# Patient Record
Sex: FEMALE | Race: WHITE | NOT HISPANIC OR LATINO | Employment: OTHER | URBAN - METROPOLITAN AREA
[De-identification: names, ages, dates, MRNs, and addresses within clinical notes are randomized per-mention and may not be internally consistent; named-entity substitution may affect disease eponyms.]

---

## 2017-01-05 ENCOUNTER — HOSPITAL ENCOUNTER (OUTPATIENT)
Dept: RADIOLOGY | Facility: HOSPITAL | Age: 82
Discharge: HOME | End: 2017-01-05
Attending: ORTHOPAEDIC SURGERY
Payer: MEDICARE

## 2017-01-05 ENCOUNTER — ALLSCRIPTS OFFICE VISIT (OUTPATIENT)
Dept: OTHER | Facility: OTHER | Age: 82
End: 2017-01-05

## 2017-01-05 DIAGNOSIS — M25.512 PAIN IN LEFT SHOULDER: ICD-10-CM

## 2017-01-05 DIAGNOSIS — M19.012 PRIMARY OSTEOARTHRITIS OF LEFT SHOULDER: ICD-10-CM

## 2017-01-05 DIAGNOSIS — M79.646 PAIN OF FINGER: ICD-10-CM

## 2017-01-05 PROCEDURE — 73030 X-RAY EXAM OF SHOULDER: CPT

## 2017-01-10 ENCOUNTER — GENERIC CONVERSION - ENCOUNTER (OUTPATIENT)
Dept: OTHER | Facility: OTHER | Age: 82
End: 2017-01-10

## 2017-01-10 ENCOUNTER — APPOINTMENT (OUTPATIENT)
Dept: PHYSICAL THERAPY | Facility: REHABILITATION | Age: 82
End: 2017-01-10
Payer: MEDICARE

## 2017-01-10 DIAGNOSIS — M25.512 PAIN IN LEFT SHOULDER: ICD-10-CM

## 2017-01-10 PROCEDURE — G8990 OTHER PT/OT CURRENT STATUS: HCPCS

## 2017-01-10 PROCEDURE — 97162 PT EVAL MOD COMPLEX 30 MIN: CPT

## 2017-01-10 PROCEDURE — 97140 MANUAL THERAPY 1/> REGIONS: CPT

## 2017-01-10 PROCEDURE — G8991 OTHER PT/OT GOAL STATUS: HCPCS

## 2017-01-16 ENCOUNTER — APPOINTMENT (OUTPATIENT)
Dept: PHYSICAL THERAPY | Facility: REHABILITATION | Age: 82
End: 2017-01-16
Payer: MEDICARE

## 2017-01-16 PROCEDURE — 97140 MANUAL THERAPY 1/> REGIONS: CPT

## 2017-01-16 PROCEDURE — 97110 THERAPEUTIC EXERCISES: CPT

## 2017-01-19 ENCOUNTER — ALLSCRIPTS OFFICE VISIT (OUTPATIENT)
Dept: OTHER | Facility: OTHER | Age: 82
End: 2017-01-19

## 2017-01-19 ENCOUNTER — APPOINTMENT (OUTPATIENT)
Dept: PHYSICAL THERAPY | Facility: REHABILITATION | Age: 82
End: 2017-01-19
Payer: MEDICARE

## 2017-01-19 PROCEDURE — 97140 MANUAL THERAPY 1/> REGIONS: CPT

## 2017-01-23 ENCOUNTER — TELEPHONE (OUTPATIENT)
Dept: PHYSICAL THERAPY | Facility: REHABILITATION | Age: 82
End: 2017-01-23

## 2017-01-25 ENCOUNTER — HOSPITAL ENCOUNTER (OUTPATIENT)
Dept: RADIOLOGY | Facility: HOSPITAL | Age: 82
Discharge: HOME | End: 2017-01-25
Attending: ORTHOPAEDIC SURGERY
Payer: MEDICARE

## 2017-01-25 ENCOUNTER — ALLSCRIPTS OFFICE VISIT (OUTPATIENT)
Dept: OTHER | Facility: OTHER | Age: 82
End: 2017-01-25

## 2017-01-25 DIAGNOSIS — M79.646 PAIN OF FINGER: ICD-10-CM

## 2017-01-25 PROCEDURE — 73140 X-RAY EXAM OF FINGER(S): CPT

## 2017-01-26 ENCOUNTER — APPOINTMENT (OUTPATIENT)
Dept: PHYSICAL THERAPY | Facility: REHABILITATION | Age: 82
End: 2017-01-26
Payer: MEDICARE

## 2017-01-30 ENCOUNTER — APPOINTMENT (OUTPATIENT)
Dept: PHYSICAL THERAPY | Facility: REHABILITATION | Age: 82
End: 2017-01-30
Payer: MEDICARE

## 2017-02-11 ENCOUNTER — APPOINTMENT (EMERGENCY)
Dept: RADIOLOGY | Facility: HOSPITAL | Age: 82
End: 2017-02-11
Payer: MEDICARE

## 2017-02-11 ENCOUNTER — HOSPITAL ENCOUNTER (OUTPATIENT)
Facility: HOSPITAL | Age: 82
Setting detail: OBSERVATION
Discharge: HOME | End: 2017-02-12
Attending: EMERGENCY MEDICINE | Admitting: INTERNAL MEDICINE
Payer: MEDICARE

## 2017-02-11 DIAGNOSIS — K57.90 DIVERTICULOSIS: ICD-10-CM

## 2017-02-11 DIAGNOSIS — A41.9 SEPSIS (HCC): ICD-10-CM

## 2017-02-11 DIAGNOSIS — N39.0 UTI (URINARY TRACT INFECTION): Primary | ICD-10-CM

## 2017-02-11 PROBLEM — E78.5 HLD (HYPERLIPIDEMIA): Status: ACTIVE | Noted: 2017-02-11

## 2017-02-11 PROBLEM — E87.6 HYPOKALEMIA: Status: ACTIVE | Noted: 2017-02-11

## 2017-02-11 PROBLEM — I10 ESSENTIAL HYPERTENSION: Status: ACTIVE | Noted: 2017-02-11

## 2017-02-11 PROBLEM — E11.9 TYPE 2 DIABETES MELLITUS (HCC): Status: ACTIVE | Noted: 2017-02-11

## 2017-02-11 PROBLEM — E87.20 LACTIC ACID ACIDOSIS: Status: ACTIVE | Noted: 2017-02-11

## 2017-02-11 PROBLEM — K52.9 GASTROENTERITIS, ACUTE: Status: ACTIVE | Noted: 2017-02-11

## 2017-02-11 PROBLEM — D72.829 LEUCOCYTOSIS: Status: ACTIVE | Noted: 2017-02-11

## 2017-02-11 PROBLEM — E87.2 LACTIC ACID ACIDOSIS: Status: ACTIVE | Noted: 2017-02-11

## 2017-02-11 PROBLEM — F41.9 ANXIETY: Status: ACTIVE | Noted: 2017-02-11

## 2017-02-11 LAB
ALBUMIN SERPL BCP-MCNC: 3.6 G/DL (ref 3.5–5)
ALP SERPL-CCNC: 95 U/L (ref 46–116)
ALT SERPL W P-5'-P-CCNC: 20 U/L (ref 12–78)
ANION GAP SERPL CALCULATED.3IONS-SCNC: 13 MMOL/L (ref 4–13)
APTT PPP: 28 SECONDS (ref 24–36)
AST SERPL W P-5'-P-CCNC: 23 U/L (ref 5–45)
BACTERIA UR QL AUTO: ABNORMAL /HPF
BASOPHILS # BLD MANUAL: 0 THOUSAND/UL (ref 0–0.1)
BASOPHILS NFR MAR MANUAL: 0 % (ref 0–1)
BILIRUB SERPL-MCNC: 0.85 MG/DL (ref 0.2–1)
BILIRUB UR QL STRIP: NEGATIVE
BUN SERPL-MCNC: 15 MG/DL (ref 5–25)
CALCIUM SERPL-MCNC: 8.9 MG/DL (ref 8.3–10.1)
CHLORIDE SERPL-SCNC: 103 MMOL/L (ref 100–108)
CLARITY UR: CLEAR
CO2 SERPL-SCNC: 26 MMOL/L (ref 21–32)
COLOR UR: YELLOW
CREAT SERPL-MCNC: 0.77 MG/DL (ref 0.6–1.3)
EOSINOPHIL # BLD MANUAL: 0.19 THOUSAND/UL (ref 0–0.4)
EOSINOPHIL NFR BLD MANUAL: 1 % (ref 0–6)
ERYTHROCYTE [DISTWIDTH] IN BLOOD BY AUTOMATED COUNT: 13.3 % (ref 11.6–15.1)
GFR SERPL CREATININE-BSD FRML MDRD: >60 ML/MIN/1.73SQ M
GLUCOSE SERPL-MCNC: 171 MG/DL (ref 65–140)
GLUCOSE UR STRIP-MCNC: NEGATIVE MG/DL
HCT VFR BLD AUTO: 45.6 % (ref 34.8–46.1)
HGB BLD-MCNC: 16.1 G/DL (ref 11.5–15.4)
HGB UR QL STRIP.AUTO: ABNORMAL
HYALINE CASTS #/AREA URNS LPF: ABNORMAL /LPF
INR PPP: 1.07 (ref 0.86–1.16)
KETONES UR STRIP-MCNC: NEGATIVE MG/DL
LACTATE SERPL-SCNC: 1.3 MMOL/L (ref 0.5–2)
LACTATE SERPL-SCNC: 3.1 MMOL/L (ref 0.5–2)
LACTATE SERPL-SCNC: 3.6 MMOL/L (ref 0.5–2)
LEUKOCYTE ESTERASE UR QL STRIP: ABNORMAL
LIPASE SERPL-CCNC: 223 U/L (ref 73–393)
LYMPHOCYTES # BLD AUTO: 1.55 THOUSAND/UL (ref 0.6–4.47)
LYMPHOCYTES # BLD AUTO: 8 % (ref 14–44)
MCH RBC QN AUTO: 31.8 PG (ref 26.8–34.3)
MCHC RBC AUTO-ENTMCNC: 35.3 G/DL (ref 31.4–37.4)
MCV RBC AUTO: 90 FL (ref 82–98)
MONOCYTES # BLD AUTO: 1.94 THOUSAND/UL (ref 0–1.22)
MONOCYTES NFR BLD: 10 % (ref 4–12)
MUCOUS THREADS UR QL AUTO: ABNORMAL
NEUTROPHILS # BLD MANUAL: 15.52 THOUSAND/UL (ref 1.85–7.62)
NEUTS BAND NFR BLD MANUAL: 3 % (ref 0–8)
NEUTS SEG NFR BLD AUTO: 77 % (ref 43–75)
NITRITE UR QL STRIP: NEGATIVE
NON-SQ EPI CELLS URNS QL MICRO: ABNORMAL /HPF
NRBC BLD AUTO-RTO: 0 /100 WBCS
PH UR STRIP.AUTO: 5 [PH] (ref 4.5–8)
PLATELET # BLD AUTO: 146 THOUSANDS/UL (ref 149–390)
PLATELET # BLD AUTO: 201 THOUSANDS/UL (ref 149–390)
PLATELET BLD QL SMEAR: ADEQUATE
PMV BLD AUTO: 12.2 FL (ref 8.9–12.7)
PMV BLD AUTO: 12.6 FL (ref 8.9–12.7)
POTASSIUM SERPL-SCNC: 3.3 MMOL/L (ref 3.5–5.3)
PROMYELOCYTES NFR BLD MANUAL: 1 % (ref 0–0)
PROT SERPL-MCNC: 7.5 G/DL (ref 6.4–8.2)
PROT UR STRIP-MCNC: NEGATIVE MG/DL
PROTHROMBIN TIME: 14 SECONDS (ref 12–14.3)
RBC # BLD AUTO: 5.07 MILLION/UL (ref 3.81–5.12)
RBC #/AREA URNS AUTO: ABNORMAL /HPF
RBC MORPH BLD: NORMAL
SODIUM SERPL-SCNC: 142 MMOL/L (ref 136–145)
SP GR UR STRIP.AUTO: 1.04 (ref 1–1.03)
UROBILINOGEN UR QL STRIP.AUTO: 0.2 E.U./DL
WBC # BLD AUTO: 19.4 THOUSAND/UL (ref 4.31–10.16)
WBC #/AREA URNS AUTO: ABNORMAL /HPF

## 2017-02-11 PROCEDURE — 85007 BL SMEAR W/DIFF WBC COUNT: CPT | Performed by: EMERGENCY MEDICINE

## 2017-02-11 PROCEDURE — 85049 AUTOMATED PLATELET COUNT: CPT | Performed by: INTERNAL MEDICINE

## 2017-02-11 PROCEDURE — 83690 ASSAY OF LIPASE: CPT | Performed by: EMERGENCY MEDICINE

## 2017-02-11 PROCEDURE — 87040 BLOOD CULTURE FOR BACTERIA: CPT | Performed by: EMERGENCY MEDICINE

## 2017-02-11 PROCEDURE — 87086 URINE CULTURE/COLONY COUNT: CPT | Performed by: EMERGENCY MEDICINE

## 2017-02-11 PROCEDURE — 83605 ASSAY OF LACTIC ACID: CPT | Performed by: INTERNAL MEDICINE

## 2017-02-11 PROCEDURE — 81001 URINALYSIS AUTO W/SCOPE: CPT | Performed by: EMERGENCY MEDICINE

## 2017-02-11 PROCEDURE — 99285 EMERGENCY DEPT VISIT HI MDM: CPT

## 2017-02-11 PROCEDURE — 96361 HYDRATE IV INFUSION ADD-ON: CPT

## 2017-02-11 PROCEDURE — 74177 CT ABD & PELVIS W/CONTRAST: CPT

## 2017-02-11 PROCEDURE — 36415 COLL VENOUS BLD VENIPUNCTURE: CPT | Performed by: EMERGENCY MEDICINE

## 2017-02-11 PROCEDURE — 85027 COMPLETE CBC AUTOMATED: CPT | Performed by: EMERGENCY MEDICINE

## 2017-02-11 PROCEDURE — 85730 THROMBOPLASTIN TIME PARTIAL: CPT | Performed by: EMERGENCY MEDICINE

## 2017-02-11 PROCEDURE — 83605 ASSAY OF LACTIC ACID: CPT | Performed by: EMERGENCY MEDICINE

## 2017-02-11 PROCEDURE — 85610 PROTHROMBIN TIME: CPT | Performed by: EMERGENCY MEDICINE

## 2017-02-11 PROCEDURE — 80053 COMPREHEN METABOLIC PANEL: CPT | Performed by: EMERGENCY MEDICINE

## 2017-02-11 PROCEDURE — 96360 HYDRATION IV INFUSION INIT: CPT

## 2017-02-11 RX ORDER — DOCUSATE SODIUM 100 MG/1
100 CAPSULE, LIQUID FILLED ORAL 2 TIMES DAILY PRN
Status: DISCONTINUED | OUTPATIENT
Start: 2017-02-11 | End: 2017-02-12 | Stop reason: HOSPADM

## 2017-02-11 RX ORDER — ACETAMINOPHEN 325 MG/1
650 TABLET ORAL EVERY 6 HOURS PRN
Status: DISCONTINUED | OUTPATIENT
Start: 2017-02-11 | End: 2017-02-12 | Stop reason: HOSPADM

## 2017-02-11 RX ORDER — AMLODIPINE BESYLATE 5 MG/1
5 TABLET ORAL DAILY
Status: DISCONTINUED | OUTPATIENT
Start: 2017-02-12 | End: 2017-02-12 | Stop reason: HOSPADM

## 2017-02-11 RX ORDER — BENAZEPRIL HYDROCHLORIDE 10 MG/1
20 TABLET ORAL DAILY
Status: DISCONTINUED | OUTPATIENT
Start: 2017-02-12 | End: 2017-02-12 | Stop reason: HOSPADM

## 2017-02-11 RX ORDER — METOPROLOL TARTRATE 50 MG/1
100 TABLET, FILM COATED ORAL EVERY 12 HOURS SCHEDULED
Status: DISCONTINUED | OUTPATIENT
Start: 2017-02-11 | End: 2017-02-11

## 2017-02-11 RX ORDER — CALCIUM CARBONATE 200(500)MG
1000 TABLET,CHEWABLE ORAL DAILY PRN
Status: DISCONTINUED | OUTPATIENT
Start: 2017-02-11 | End: 2017-02-12 | Stop reason: HOSPADM

## 2017-02-11 RX ORDER — ATORVASTATIN CALCIUM 10 MG/1
10 TABLET, FILM COATED ORAL
Status: DISCONTINUED | OUTPATIENT
Start: 2017-02-11 | End: 2017-02-12 | Stop reason: HOSPADM

## 2017-02-11 RX ORDER — ONDANSETRON 2 MG/ML
4 INJECTION INTRAMUSCULAR; INTRAVENOUS EVERY 4 HOURS PRN
Status: DISCONTINUED | OUTPATIENT
Start: 2017-02-11 | End: 2017-02-11

## 2017-02-11 RX ORDER — PANTOPRAZOLE SODIUM 20 MG/1
20 TABLET, DELAYED RELEASE ORAL
Status: DISCONTINUED | OUTPATIENT
Start: 2017-02-12 | End: 2017-02-12 | Stop reason: HOSPADM

## 2017-02-11 RX ORDER — ONDANSETRON 2 MG/ML
4 INJECTION INTRAMUSCULAR; INTRAVENOUS EVERY 6 HOURS PRN
Status: DISCONTINUED | OUTPATIENT
Start: 2017-02-11 | End: 2017-02-12 | Stop reason: HOSPADM

## 2017-02-11 RX ORDER — ONDANSETRON 2 MG/ML
INJECTION INTRAMUSCULAR; INTRAVENOUS
Status: DISPENSED
Start: 2017-02-11 | End: 2017-02-11

## 2017-02-11 RX ORDER — ONDANSETRON 2 MG/ML
4 INJECTION INTRAMUSCULAR; INTRAVENOUS ONCE AS NEEDED
Status: DISCONTINUED | OUTPATIENT
Start: 2017-02-11 | End: 2017-02-12 | Stop reason: HOSPADM

## 2017-02-11 RX ORDER — ONDANSETRON 2 MG/ML
4 INJECTION INTRAMUSCULAR; INTRAVENOUS ONCE
Status: DISCONTINUED | OUTPATIENT
Start: 2017-02-11 | End: 2017-02-12 | Stop reason: HOSPADM

## 2017-02-11 RX ADMIN — IOHEXOL 100 ML: 350 INJECTION, SOLUTION INTRAVENOUS at 05:30

## 2017-02-11 RX ADMIN — METOPROLOL TARTRATE 25 MG: 25 TABLET ORAL at 21:29

## 2017-02-11 RX ADMIN — SODIUM CHLORIDE 1000 ML: 0.9 INJECTION, SOLUTION INTRAVENOUS at 04:24

## 2017-02-11 RX ADMIN — SODIUM CHLORIDE 3.38 G: 900 INJECTION INTRAVENOUS at 08:30

## 2017-02-11 RX ADMIN — SODIUM CHLORIDE 2382 ML: 0.9 INJECTION, SOLUTION INTRAVENOUS at 05:47

## 2017-02-11 RX ADMIN — ATORVASTATIN CALCIUM 10 MG: 10 TABLET, FILM COATED ORAL at 21:15

## 2017-02-11 RX ADMIN — POTASSIUM CHLORIDE 125 ML/HR: 2 INJECTION, SOLUTION, CONCENTRATE INTRAVENOUS at 21:28

## 2017-02-11 RX ADMIN — POTASSIUM CHLORIDE 125 ML/HR: 2 INJECTION, SOLUTION, CONCENTRATE INTRAVENOUS at 19:08

## 2017-02-12 VITALS
DIASTOLIC BLOOD PRESSURE: 56 MMHG | HEIGHT: 63 IN | RESPIRATION RATE: 18 BRPM | WEIGHT: 172.84 LBS | HEART RATE: 62 BPM | TEMPERATURE: 98.6 F | BODY MASS INDEX: 30.62 KG/M2 | SYSTOLIC BLOOD PRESSURE: 136 MMHG | OXYGEN SATURATION: 97 %

## 2017-02-12 LAB
ANION GAP SERPL CALCULATED.3IONS-SCNC: 5 MMOL/L (ref 4–13)
BACTERIA UR CULT: NORMAL
BUN SERPL-MCNC: 5 MG/DL (ref 5–25)
CALCIUM SERPL-MCNC: 8.3 MG/DL (ref 8.3–10.1)
CHLORIDE SERPL-SCNC: 110 MMOL/L (ref 100–108)
CO2 SERPL-SCNC: 29 MMOL/L (ref 21–32)
CREAT SERPL-MCNC: 0.46 MG/DL (ref 0.6–1.3)
ERYTHROCYTE [DISTWIDTH] IN BLOOD BY AUTOMATED COUNT: 13.9 % (ref 11.6–15.1)
GFR SERPL CREATININE-BSD FRML MDRD: >60 ML/MIN/1.73SQ M
GLUCOSE SERPL-MCNC: 119 MG/DL (ref 65–140)
HCT VFR BLD AUTO: 39.3 % (ref 34.8–46.1)
HGB BLD-MCNC: 12.9 G/DL (ref 11.5–15.4)
MAGNESIUM SERPL-MCNC: 1.9 MG/DL (ref 1.6–2.6)
MCH RBC QN AUTO: 30.4 PG (ref 26.8–34.3)
MCHC RBC AUTO-ENTMCNC: 32.8 G/DL (ref 31.4–37.4)
MCV RBC AUTO: 93 FL (ref 82–98)
PLATELET # BLD AUTO: 182 THOUSANDS/UL (ref 149–390)
PMV BLD AUTO: 11.9 FL (ref 8.9–12.7)
POTASSIUM SERPL-SCNC: 4 MMOL/L (ref 3.5–5.3)
RBC # BLD AUTO: 4.25 MILLION/UL (ref 3.81–5.12)
SODIUM SERPL-SCNC: 144 MMOL/L (ref 136–145)
WBC # BLD AUTO: 7.64 THOUSAND/UL (ref 4.31–10.16)

## 2017-02-12 PROCEDURE — 85027 COMPLETE CBC AUTOMATED: CPT | Performed by: INTERNAL MEDICINE

## 2017-02-12 PROCEDURE — 80048 BASIC METABOLIC PNL TOTAL CA: CPT | Performed by: INTERNAL MEDICINE

## 2017-02-12 PROCEDURE — 83735 ASSAY OF MAGNESIUM: CPT | Performed by: INTERNAL MEDICINE

## 2017-02-12 RX ADMIN — AMLODIPINE BESYLATE 5 MG: 5 TABLET ORAL at 08:53

## 2017-02-12 RX ADMIN — BENAZEPRIL HYDROCHLORIDE 20 MG: 10 TABLET ORAL at 08:52

## 2017-02-12 RX ADMIN — PANTOPRAZOLE SODIUM 20 MG: 20 TABLET, DELAYED RELEASE ORAL at 05:24

## 2017-02-12 RX ADMIN — ENOXAPARIN SODIUM 40 MG: 40 INJECTION SUBCUTANEOUS at 08:53

## 2017-02-12 RX ADMIN — METOPROLOL TARTRATE 25 MG: 25 TABLET ORAL at 08:52

## 2017-02-12 RX ADMIN — POTASSIUM CHLORIDE 125 ML/HR: 2 INJECTION, SOLUTION, CONCENTRATE INTRAVENOUS at 13:26

## 2017-02-12 RX ADMIN — SERTRALINE HYDROCHLORIDE 50 MG: 50 TABLET, FILM COATED ORAL at 09:01

## 2017-02-12 RX ADMIN — POTASSIUM CHLORIDE 125 ML/HR: 2 INJECTION, SOLUTION, CONCENTRATE INTRAVENOUS at 05:23

## 2017-02-16 LAB
BACTERIA BLD CULT: NORMAL
BACTERIA BLD CULT: NORMAL

## 2017-02-23 ENCOUNTER — GENERIC CONVERSION - ENCOUNTER (OUTPATIENT)
Dept: OTHER | Facility: OTHER | Age: 82
End: 2017-02-23

## 2017-02-25 ENCOUNTER — TRANSCRIBE ORDERS (OUTPATIENT)
Dept: LAB | Facility: HOSPITAL | Age: 82
End: 2017-02-25

## 2017-02-27 ENCOUNTER — ALLSCRIPTS OFFICE VISIT (OUTPATIENT)
Dept: OTHER | Facility: OTHER | Age: 82
End: 2017-02-27

## 2017-02-27 ENCOUNTER — TRANSCRIBE ORDERS (OUTPATIENT)
Dept: ADMINISTRATIVE | Facility: HOSPITAL | Age: 82
End: 2017-02-27

## 2017-02-27 DIAGNOSIS — K86.3 CYST AND PSEUDOCYST OF PANCREAS: Primary | ICD-10-CM

## 2017-02-27 DIAGNOSIS — K86.2 CYST AND PSEUDOCYST OF PANCREAS: Primary | ICD-10-CM

## 2017-02-27 DIAGNOSIS — K86.2 CYST OF PANCREAS: ICD-10-CM

## 2017-03-01 ENCOUNTER — ALLSCRIPTS OFFICE VISIT (OUTPATIENT)
Dept: OTHER | Facility: OTHER | Age: 82
End: 2017-03-01

## 2017-03-01 PROCEDURE — 88175 CYTOPATH C/V AUTO FLUID REDO: CPT | Performed by: OBSTETRICS & GYNECOLOGY

## 2017-03-01 PROCEDURE — 88305 TISSUE EXAM BY PATHOLOGIST: CPT | Performed by: OBSTETRICS & GYNECOLOGY

## 2017-03-01 PROCEDURE — 87624 HPV HI-RISK TYP POOLED RSLT: CPT | Performed by: OBSTETRICS & GYNECOLOGY

## 2017-03-02 ENCOUNTER — LAB REQUISITION (OUTPATIENT)
Dept: LAB | Facility: HOSPITAL | Age: 82
End: 2017-03-02
Payer: MEDICARE

## 2017-03-02 DIAGNOSIS — M54.12 RADICULOPATHY OF CERVICAL REGION: ICD-10-CM

## 2017-03-04 ENCOUNTER — HOSPITAL ENCOUNTER (EMERGENCY)
Facility: HOSPITAL | Age: 82
Discharge: HOME | End: 2017-03-04
Attending: EMERGENCY MEDICINE | Admitting: EMERGENCY MEDICINE
Payer: MEDICARE

## 2017-03-04 ENCOUNTER — APPOINTMENT (EMERGENCY)
Dept: CT IMAGING | Facility: HOSPITAL | Age: 82
End: 2017-03-04
Payer: MEDICARE

## 2017-03-04 VITALS
OXYGEN SATURATION: 98 % | BODY MASS INDEX: 30.58 KG/M2 | WEIGHT: 172.62 LBS | HEART RATE: 74 BPM | SYSTOLIC BLOOD PRESSURE: 143 MMHG | DIASTOLIC BLOOD PRESSURE: 73 MMHG | RESPIRATION RATE: 18 BRPM | TEMPERATURE: 98 F

## 2017-03-04 DIAGNOSIS — S00.03XA HEMATOMA OF FRONTAL SCALP, INITIAL ENCOUNTER: Primary | ICD-10-CM

## 2017-03-04 PROCEDURE — 99284 EMERGENCY DEPT VISIT MOD MDM: CPT

## 2017-03-04 PROCEDURE — 70450 CT HEAD/BRAIN W/O DYE: CPT

## 2017-03-04 RX ORDER — METOPROLOL SUCCINATE 100 MG/1
100 TABLET, EXTENDED RELEASE ORAL DAILY
COMMUNITY
End: 2017-04-01 | Stop reason: HOSPADM

## 2017-03-06 LAB — HPV RRNA GENITAL QL NAA+PROBE: NORMAL

## 2017-03-07 LAB
LAB AP GYN PRIMARY INTERPRETATION: NORMAL
Lab: NORMAL

## 2017-03-11 ENCOUNTER — HOSPITAL ENCOUNTER (OUTPATIENT)
Dept: MRI IMAGING | Facility: HOSPITAL | Age: 82
Discharge: HOME | End: 2017-03-11
Payer: MEDICARE

## 2017-03-11 DIAGNOSIS — K86.2 CYST OF PANCREAS: ICD-10-CM

## 2017-03-11 PROCEDURE — A9585 GADOBUTROL INJECTION: HCPCS | Performed by: INTERNAL MEDICINE

## 2017-03-11 PROCEDURE — 74183 MRI ABD W/O CNTR FLWD CNTR: CPT

## 2017-03-11 RX ADMIN — GADOBUTROL 7 ML: 604.72 INJECTION INTRAVENOUS at 11:55

## 2017-03-13 ENCOUNTER — ALLSCRIPTS OFFICE VISIT (OUTPATIENT)
Dept: OTHER | Facility: OTHER | Age: 82
End: 2017-03-13

## 2017-03-13 ENCOUNTER — GENERIC CONVERSION - ENCOUNTER (OUTPATIENT)
Dept: OTHER | Facility: OTHER | Age: 82
End: 2017-03-13

## 2017-03-13 ENCOUNTER — TRANSCRIBE ORDERS (OUTPATIENT)
Dept: ADMINISTRATIVE | Facility: HOSPITAL | Age: 82
End: 2017-03-13

## 2017-03-13 DIAGNOSIS — K86.2 CYST OF PANCREAS: Primary | ICD-10-CM

## 2017-03-14 ENCOUNTER — HOSPITAL ENCOUNTER (OUTPATIENT)
Dept: RADIOLOGY | Facility: HOSPITAL | Age: 82
Discharge: HOME | End: 2017-03-14

## 2017-03-19 DIAGNOSIS — S32.599A OTHER SPECIFIED FRACTURE OF UNSPECIFIED PUBIS, INITIAL ENCOUNTER FOR CLOSED FRACTURE (HCC): ICD-10-CM

## 2017-03-19 DIAGNOSIS — E11.9 TYPE 2 DIABETES MELLITUS WITHOUT COMPLICATIONS (HCC): ICD-10-CM

## 2017-03-20 ENCOUNTER — APPOINTMENT (EMERGENCY)
Dept: RADIOLOGY | Facility: HOSPITAL | Age: 82
DRG: 535 | End: 2017-03-20
Payer: MEDICARE

## 2017-03-20 ENCOUNTER — APPOINTMENT (EMERGENCY)
Dept: OCCUPATIONAL THERAPY | Facility: HOSPITAL | Age: 82
DRG: 535 | End: 2017-03-20
Payer: MEDICARE

## 2017-03-20 ENCOUNTER — APPOINTMENT (OUTPATIENT)
Dept: RADIOLOGY | Facility: HOSPITAL | Age: 82
DRG: 535 | End: 2017-03-20
Payer: MEDICARE

## 2017-03-20 ENCOUNTER — HOSPITAL ENCOUNTER (INPATIENT)
Facility: HOSPITAL | Age: 82
LOS: 1 days | DRG: 535 | End: 2017-03-22
Attending: EMERGENCY MEDICINE | Admitting: SURGERY
Payer: MEDICARE
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
## 2017-03-20 DIAGNOSIS — S32.511A CLOSED FRACTURE OF RIGHT SUPERIOR PUBIC RAMUS, INITIAL ENCOUNTER: Primary | ICD-10-CM

## 2017-03-20 DIAGNOSIS — R26.2 AMBULATORY DYSFUNCTION: ICD-10-CM

## 2017-03-20 DIAGNOSIS — N85.00 ENDOMETRIAL HYPERPLASIA: ICD-10-CM

## 2017-03-20 PROBLEM — S32.409A ACETABULAR FRACTURE (HCC): Status: ACTIVE | Noted: 2017-03-20

## 2017-03-20 LAB
ANION GAP SERPL CALCULATED.3IONS-SCNC: 8 MMOL/L (ref 4–13)
BASOPHILS # BLD AUTO: 0.02 THOUSANDS/ΜL (ref 0–0.1)
BASOPHILS NFR BLD AUTO: 0 % (ref 0–1)
BUN SERPL-MCNC: 13 MG/DL (ref 5–25)
CALCIUM SERPL-MCNC: 8.9 MG/DL (ref 8.3–10.1)
CHLORIDE SERPL-SCNC: 102 MMOL/L (ref 100–108)
CO2 SERPL-SCNC: 30 MMOL/L (ref 21–32)
CREAT SERPL-MCNC: 0.67 MG/DL (ref 0.6–1.3)
EOSINOPHIL # BLD AUTO: 0.15 THOUSAND/ΜL (ref 0–0.61)
EOSINOPHIL NFR BLD AUTO: 1 % (ref 0–6)
ERYTHROCYTE [DISTWIDTH] IN BLOOD BY AUTOMATED COUNT: 13.7 % (ref 11.6–15.1)
GFR SERPL CREATININE-BSD FRML MDRD: >60 ML/MIN/1.73SQ M
GLUCOSE SERPL-MCNC: 147 MG/DL (ref 65–140)
HCT VFR BLD AUTO: 42 % (ref 34.8–46.1)
HGB BLD-MCNC: 14.3 G/DL (ref 11.5–15.4)
LYMPHOCYTES # BLD AUTO: 0.97 THOUSANDS/ΜL (ref 0.6–4.47)
LYMPHOCYTES NFR BLD AUTO: 9 % (ref 14–44)
MCH RBC QN AUTO: 30.6 PG (ref 26.8–34.3)
MCHC RBC AUTO-ENTMCNC: 34 G/DL (ref 31.4–37.4)
MCV RBC AUTO: 90 FL (ref 82–98)
MONOCYTES # BLD AUTO: 0.61 THOUSAND/ΜL (ref 0.17–1.22)
MONOCYTES NFR BLD AUTO: 6 % (ref 4–12)
NEUTROPHILS # BLD AUTO: 9.09 THOUSANDS/ΜL (ref 1.85–7.62)
NEUTS SEG NFR BLD AUTO: 84 % (ref 43–75)
NRBC BLD AUTO-RTO: 0 /100 WBCS
PLATELET # BLD AUTO: 189 THOUSANDS/UL (ref 149–390)
PMV BLD AUTO: 11.7 FL (ref 8.9–12.7)
POTASSIUM SERPL-SCNC: 3.7 MMOL/L (ref 3.5–5.3)
RBC # BLD AUTO: 4.67 MILLION/UL (ref 3.81–5.12)
SODIUM SERPL-SCNC: 140 MMOL/L (ref 136–145)
WBC # BLD AUTO: 10.87 THOUSAND/UL (ref 4.31–10.16)

## 2017-03-20 PROCEDURE — 97163 PT EVAL HIGH COMPLEX 45 MIN: CPT

## 2017-03-20 PROCEDURE — 73564 X-RAY EXAM KNEE 4 OR MORE: CPT

## 2017-03-20 PROCEDURE — 73502 X-RAY EXAM HIP UNI 2-3 VIEWS: CPT

## 2017-03-20 PROCEDURE — G8988 SELF CARE GOAL STATUS: HCPCS

## 2017-03-20 PROCEDURE — G8979 MOBILITY GOAL STATUS: HCPCS

## 2017-03-20 PROCEDURE — G8978 MOBILITY CURRENT STATUS: HCPCS

## 2017-03-20 PROCEDURE — 73552 X-RAY EXAM OF FEMUR 2/>: CPT

## 2017-03-20 PROCEDURE — 99285 EMERGENCY DEPT VISIT HI MDM: CPT

## 2017-03-20 PROCEDURE — 70450 CT HEAD/BRAIN W/O DYE: CPT

## 2017-03-20 PROCEDURE — 97166 OT EVAL MOD COMPLEX 45 MIN: CPT

## 2017-03-20 PROCEDURE — 72192 CT PELVIS W/O DYE: CPT

## 2017-03-20 PROCEDURE — 80048 BASIC METABOLIC PNL TOTAL CA: CPT | Performed by: ORTHOPAEDIC SURGERY

## 2017-03-20 PROCEDURE — 85025 COMPLETE CBC W/AUTO DIFF WBC: CPT | Performed by: ORTHOPAEDIC SURGERY

## 2017-03-20 PROCEDURE — 74177 CT ABD & PELVIS W/CONTRAST: CPT

## 2017-03-20 PROCEDURE — G8987 SELF CARE CURRENT STATUS: HCPCS

## 2017-03-20 RX ORDER — OXYCODONE HYDROCHLORIDE 10 MG/1
10 TABLET ORAL EVERY 4 HOURS PRN
Status: DISCONTINUED | OUTPATIENT
Start: 2017-03-20 | End: 2017-03-21

## 2017-03-20 RX ORDER — TRAMADOL HYDROCHLORIDE 50 MG/1
50 TABLET ORAL ONCE
Status: COMPLETED | OUTPATIENT
Start: 2017-03-20 | End: 2017-03-20

## 2017-03-20 RX ORDER — MORPHINE SULFATE 2 MG/ML
1 INJECTION, SOLUTION INTRAMUSCULAR; INTRAVENOUS EVERY 4 HOURS PRN
Status: DISCONTINUED | OUTPATIENT
Start: 2017-03-20 | End: 2017-03-22 | Stop reason: HOSPADM

## 2017-03-20 RX ORDER — METOPROLOL SUCCINATE 100 MG/1
100 TABLET, EXTENDED RELEASE ORAL DAILY
Status: DISCONTINUED | OUTPATIENT
Start: 2017-03-20 | End: 2017-03-22 | Stop reason: HOSPADM

## 2017-03-20 RX ORDER — METHOCARBAMOL 500 MG/1
500 TABLET, FILM COATED ORAL EVERY 8 HOURS SCHEDULED
Status: DISCONTINUED | OUTPATIENT
Start: 2017-03-20 | End: 2017-03-22 | Stop reason: HOSPADM

## 2017-03-20 RX ORDER — OXYCODONE HYDROCHLORIDE 5 MG/1
5 TABLET ORAL EVERY 4 HOURS PRN
Status: DISCONTINUED | OUTPATIENT
Start: 2017-03-20 | End: 2017-03-21

## 2017-03-20 RX ORDER — AMLODIPINE BESYLATE AND BENAZEPRIL HYDROCHLORIDE 10; 20 MG/1; MG/1
1 CAPSULE ORAL DAILY
COMMUNITY
End: 2017-04-01 | Stop reason: HOSPADM

## 2017-03-20 RX ORDER — AMLODIPINE BESYLATE 5 MG/1
TABLET ORAL DAILY
COMMUNITY
End: 2017-03-20

## 2017-03-20 RX ORDER — ATORVASTATIN CALCIUM 10 MG/1
10 TABLET, FILM COATED ORAL
Status: DISCONTINUED | OUTPATIENT
Start: 2017-03-20 | End: 2017-03-22 | Stop reason: HOSPADM

## 2017-03-20 RX ORDER — ATORVASTATIN CALCIUM 10 MG/1
TABLET, FILM COATED ORAL DAILY
COMMUNITY
End: 2018-02-26 | Stop reason: SDUPTHER

## 2017-03-20 RX ORDER — ACETAMINOPHEN 325 MG/1
650 TABLET ORAL ONCE
Status: COMPLETED | OUTPATIENT
Start: 2017-03-20 | End: 2017-03-20

## 2017-03-20 RX ADMIN — METHOCARBAMOL 500 MG: 500 TABLET ORAL at 18:38

## 2017-03-20 RX ADMIN — METHOCARBAMOL 500 MG: 500 TABLET ORAL at 22:57

## 2017-03-20 RX ADMIN — ATORVASTATIN CALCIUM 10 MG: 10 TABLET, FILM COATED ORAL at 16:14

## 2017-03-20 RX ADMIN — ACETAMINOPHEN 650 MG: 325 TABLET, FILM COATED ORAL at 09:31

## 2017-03-20 RX ADMIN — SERTRALINE HYDROCHLORIDE 50 MG: 50 TABLET ORAL at 16:14

## 2017-03-20 RX ADMIN — TRAMADOL HYDROCHLORIDE 50 MG: 50 TABLET, FILM COATED ORAL at 12:29

## 2017-03-20 RX ADMIN — IOHEXOL 100 ML: 350 INJECTION, SOLUTION INTRAVENOUS at 18:28

## 2017-03-20 RX ADMIN — METOPROLOL SUCCINATE 100 MG: 100 TABLET, EXTENDED RELEASE ORAL at 16:14

## 2017-03-21 ENCOUNTER — APPOINTMENT (OUTPATIENT)
Dept: RADIOLOGY | Facility: HOSPITAL | Age: 82
DRG: 535 | End: 2017-03-21
Payer: MEDICARE

## 2017-03-21 ENCOUNTER — APPOINTMENT (OUTPATIENT)
Dept: OCCUPATIONAL THERAPY | Facility: HOSPITAL | Age: 82
DRG: 535 | End: 2017-03-21
Payer: MEDICARE

## 2017-03-21 ENCOUNTER — APPOINTMENT (OUTPATIENT)
Dept: PHYSICAL THERAPY | Facility: HOSPITAL | Age: 82
DRG: 535 | End: 2017-03-21
Payer: MEDICARE

## 2017-03-21 LAB
PLATELET # BLD AUTO: 162 THOUSANDS/UL (ref 149–390)
PMV BLD AUTO: 11.3 FL (ref 8.9–12.7)

## 2017-03-21 PROCEDURE — 97530 THERAPEUTIC ACTIVITIES: CPT

## 2017-03-21 PROCEDURE — 97116 GAIT TRAINING THERAPY: CPT

## 2017-03-21 PROCEDURE — 97535 SELF CARE MNGMENT TRAINING: CPT

## 2017-03-21 PROCEDURE — 85049 AUTOMATED PLATELET COUNT: CPT | Performed by: ORTHOPAEDIC SURGERY

## 2017-03-21 PROCEDURE — 71010 HB CHEST X-RAY 1 VIEW FRONTAL (PORTABLE): CPT

## 2017-03-21 RX ORDER — OXYCODONE HYDROCHLORIDE 5 MG/1
5 TABLET ORAL EVERY 4 HOURS PRN
Status: DISCONTINUED | OUTPATIENT
Start: 2017-03-21 | End: 2017-03-21

## 2017-03-21 RX ORDER — ACETAMINOPHEN 325 MG/1
975 TABLET ORAL EVERY 8 HOURS SCHEDULED
Status: DISCONTINUED | OUTPATIENT
Start: 2017-03-21 | End: 2017-03-22 | Stop reason: HOSPADM

## 2017-03-21 RX ORDER — OXYCODONE HYDROCHLORIDE 5 MG/1
5 TABLET ORAL EVERY 4 HOURS PRN
Status: DISCONTINUED | OUTPATIENT
Start: 2017-03-21 | End: 2017-03-22 | Stop reason: HOSPADM

## 2017-03-21 RX ORDER — OXYCODONE HYDROCHLORIDE 5 MG/1
2.5 TABLET ORAL EVERY 4 HOURS PRN
Status: DISCONTINUED | OUTPATIENT
Start: 2017-03-21 | End: 2017-03-22 | Stop reason: HOSPADM

## 2017-03-21 RX ADMIN — OXYCODONE HYDROCHLORIDE 5 MG: 5 TABLET ORAL at 16:30

## 2017-03-21 RX ADMIN — METOPROLOL SUCCINATE 100 MG: 100 TABLET, EXTENDED RELEASE ORAL at 08:08

## 2017-03-21 RX ADMIN — METHOCARBAMOL 500 MG: 500 TABLET ORAL at 21:58

## 2017-03-21 RX ADMIN — METHOCARBAMOL 500 MG: 500 TABLET ORAL at 13:12

## 2017-03-21 RX ADMIN — ATORVASTATIN CALCIUM 10 MG: 10 TABLET, FILM COATED ORAL at 16:28

## 2017-03-21 RX ADMIN — ENOXAPARIN SODIUM 40 MG: 40 INJECTION SUBCUTANEOUS at 08:08

## 2017-03-21 RX ADMIN — METHOCARBAMOL 500 MG: 500 TABLET ORAL at 05:21

## 2017-03-21 RX ADMIN — ACETAMINOPHEN 975 MG: 325 TABLET, FILM COATED ORAL at 21:57

## 2017-03-21 RX ADMIN — SERTRALINE HYDROCHLORIDE 50 MG: 50 TABLET ORAL at 08:08

## 2017-03-22 ENCOUNTER — APPOINTMENT (INPATIENT)
Dept: PHYSICAL THERAPY | Facility: HOSPITAL | Age: 82
DRG: 535 | End: 2017-03-22
Payer: MEDICARE

## 2017-03-22 ENCOUNTER — GENERIC CONVERSION - ENCOUNTER (OUTPATIENT)
Dept: OTHER | Facility: OTHER | Age: 82
End: 2017-03-22

## 2017-03-22 ENCOUNTER — HOSPITAL ENCOUNTER (INPATIENT)
Facility: HOSPITAL | Age: 82
LOS: 10 days | Discharge: HOME | DRG: 560 | End: 2017-04-01
Attending: PHYSICAL MEDICINE & REHABILITATION | Admitting: PHYSICAL MEDICINE & REHABILITATION
Payer: MEDICARE

## 2017-03-22 ENCOUNTER — APPOINTMENT (INPATIENT)
Dept: OCCUPATIONAL THERAPY | Facility: HOSPITAL | Age: 82
DRG: 535 | End: 2017-03-22
Payer: MEDICARE

## 2017-03-22 VITALS
HEART RATE: 59 BPM | RESPIRATION RATE: 16 BRPM | TEMPERATURE: 97.7 F | BODY MASS INDEX: 34.13 KG/M2 | WEIGHT: 180.78 LBS | DIASTOLIC BLOOD PRESSURE: 74 MMHG | SYSTOLIC BLOOD PRESSURE: 162 MMHG | HEIGHT: 61 IN | OXYGEN SATURATION: 92 %

## 2017-03-22 DIAGNOSIS — S32.511A CLOSED FRACTURE OF RIGHT SUPERIOR PUBIC RAMUS (HCC): ICD-10-CM

## 2017-03-22 DIAGNOSIS — S32.409A ACETABULAR FRACTURE (HCC): Primary | ICD-10-CM

## 2017-03-22 LAB — GLUCOSE SERPL-MCNC: 133 MG/DL (ref 65–140)

## 2017-03-22 PROCEDURE — 82948 REAGENT STRIP/BLOOD GLUCOSE: CPT

## 2017-03-22 PROCEDURE — 97110 THERAPEUTIC EXERCISES: CPT

## 2017-03-22 PROCEDURE — 97535 SELF CARE MNGMENT TRAINING: CPT

## 2017-03-22 PROCEDURE — 97116 GAIT TRAINING THERAPY: CPT

## 2017-03-22 PROCEDURE — 97530 THERAPEUTIC ACTIVITIES: CPT

## 2017-03-22 RX ORDER — ATORVASTATIN CALCIUM 10 MG/1
10 TABLET, FILM COATED ORAL
Status: DISCONTINUED | OUTPATIENT
Start: 2017-03-22 | End: 2017-04-01 | Stop reason: HOSPADM

## 2017-03-22 RX ORDER — ACETAMINOPHEN 325 MG/1
975 TABLET ORAL EVERY 8 HOURS SCHEDULED
Status: DISCONTINUED | OUTPATIENT
Start: 2017-03-22 | End: 2017-04-01 | Stop reason: HOSPADM

## 2017-03-22 RX ORDER — METOPROLOL SUCCINATE 100 MG/1
100 TABLET, EXTENDED RELEASE ORAL DAILY
Status: CANCELLED | OUTPATIENT
Start: 2017-03-23

## 2017-03-22 RX ORDER — HYDRALAZINE HYDROCHLORIDE 10 MG/1
10 TABLET, FILM COATED ORAL EVERY 8 HOURS PRN
Status: DISCONTINUED | OUTPATIENT
Start: 2017-03-22 | End: 2017-04-01 | Stop reason: HOSPADM

## 2017-03-22 RX ORDER — OXYCODONE HYDROCHLORIDE 5 MG/1
5 TABLET ORAL EVERY 4 HOURS PRN
Qty: 30 TABLET | Refills: 0
Start: 2017-03-22 | End: 2017-04-01

## 2017-03-22 RX ORDER — METOPROLOL TARTRATE 50 MG/1
50 TABLET, FILM COATED ORAL EVERY 12 HOURS SCHEDULED
Status: DISCONTINUED | OUTPATIENT
Start: 2017-03-23 | End: 2017-03-29

## 2017-03-22 RX ORDER — METHOCARBAMOL 500 MG/1
500 TABLET, FILM COATED ORAL EVERY 8 HOURS SCHEDULED
Status: COMPLETED | OUTPATIENT
Start: 2017-03-22 | End: 2017-03-27

## 2017-03-22 RX ORDER — METHOCARBAMOL 500 MG/1
500 TABLET, FILM COATED ORAL EVERY 8 HOURS SCHEDULED
Status: CANCELLED | OUTPATIENT
Start: 2017-03-22 | End: 2017-03-27

## 2017-03-22 RX ORDER — METOPROLOL SUCCINATE 100 MG/1
100 TABLET, EXTENDED RELEASE ORAL DAILY
Status: DISCONTINUED | OUTPATIENT
Start: 2017-03-23 | End: 2017-03-22

## 2017-03-22 RX ORDER — MORPHINE SULFATE 2 MG/ML
1 INJECTION, SOLUTION INTRAMUSCULAR; INTRAVENOUS EVERY 4 HOURS PRN
Status: DISCONTINUED | OUTPATIENT
Start: 2017-03-22 | End: 2017-03-22

## 2017-03-22 RX ORDER — OXYCODONE HYDROCHLORIDE 5 MG/1
5 TABLET ORAL EVERY 4 HOURS PRN
Status: DISCONTINUED | OUTPATIENT
Start: 2017-03-22 | End: 2017-03-24

## 2017-03-22 RX ORDER — OXYCODONE HYDROCHLORIDE 5 MG/1
2.5 TABLET ORAL EVERY 4 HOURS PRN
Status: CANCELLED | OUTPATIENT
Start: 2017-03-22

## 2017-03-22 RX ORDER — METOPROLOL TARTRATE 50 MG/1
50 TABLET, FILM COATED ORAL EVERY 12 HOURS SCHEDULED
Status: DISCONTINUED | OUTPATIENT
Start: 2017-03-22 | End: 2017-03-22

## 2017-03-22 RX ORDER — ACETAMINOPHEN 325 MG/1
975 TABLET ORAL EVERY 8 HOURS SCHEDULED
Status: CANCELLED | OUTPATIENT
Start: 2017-03-22

## 2017-03-22 RX ORDER — OXYCODONE HYDROCHLORIDE 5 MG/1
5 TABLET ORAL EVERY 4 HOURS PRN
Status: CANCELLED | OUTPATIENT
Start: 2017-03-22

## 2017-03-22 RX ORDER — MORPHINE SULFATE 2 MG/ML
1 INJECTION, SOLUTION INTRAMUSCULAR; INTRAVENOUS EVERY 4 HOURS PRN
Status: CANCELLED | OUTPATIENT
Start: 2017-03-22

## 2017-03-22 RX ORDER — OXYCODONE HYDROCHLORIDE 5 MG/1
2.5 TABLET ORAL EVERY 4 HOURS PRN
Status: DISCONTINUED | OUTPATIENT
Start: 2017-03-22 | End: 2017-03-24

## 2017-03-22 RX ORDER — AMLODIPINE BESYLATE 5 MG/1
5 TABLET ORAL DAILY
Status: DISCONTINUED | OUTPATIENT
Start: 2017-03-22 | End: 2017-03-25

## 2017-03-22 RX ORDER — ATORVASTATIN CALCIUM 10 MG/1
10 TABLET, FILM COATED ORAL
Status: CANCELLED | OUTPATIENT
Start: 2017-03-22

## 2017-03-22 RX ADMIN — METOPROLOL SUCCINATE 100 MG: 100 TABLET, EXTENDED RELEASE ORAL at 09:59

## 2017-03-22 RX ADMIN — ACETAMINOPHEN 975 MG: 325 TABLET, FILM COATED ORAL at 21:00

## 2017-03-22 RX ADMIN — ACETAMINOPHEN 975 MG: 325 TABLET, FILM COATED ORAL at 14:50

## 2017-03-22 RX ADMIN — METHOCARBAMOL 500 MG: 500 TABLET ORAL at 14:50

## 2017-03-22 RX ADMIN — ACETAMINOPHEN 975 MG: 325 TABLET, FILM COATED ORAL at 05:59

## 2017-03-22 RX ADMIN — METHOCARBAMOL 500 MG: 500 TABLET ORAL at 21:00

## 2017-03-22 RX ADMIN — AMLODIPINE BESYLATE 5 MG: 5 TABLET ORAL at 14:50

## 2017-03-22 RX ADMIN — ATORVASTATIN CALCIUM 10 MG: 80 TABLET, FILM COATED ORAL at 16:17

## 2017-03-22 RX ADMIN — METHOCARBAMOL 500 MG: 500 TABLET ORAL at 05:59

## 2017-03-22 RX ADMIN — ENOXAPARIN SODIUM 40 MG: 40 INJECTION SUBCUTANEOUS at 09:59

## 2017-03-22 RX ADMIN — SERTRALINE HYDROCHLORIDE 50 MG: 50 TABLET ORAL at 09:58

## 2017-03-23 LAB
ANION GAP SERPL CALCULATED.3IONS-SCNC: 4 MMOL/L (ref 4–13)
BASOPHILS # BLD AUTO: 0.01 THOUSANDS/ΜL (ref 0–0.1)
BASOPHILS NFR BLD AUTO: 0 % (ref 0–1)
BUN SERPL-MCNC: 10 MG/DL (ref 5–25)
CALCIUM SERPL-MCNC: 9.1 MG/DL (ref 8.3–10.1)
CHLORIDE SERPL-SCNC: 104 MMOL/L (ref 100–108)
CO2 SERPL-SCNC: 33 MMOL/L (ref 21–32)
CREAT SERPL-MCNC: 0.52 MG/DL (ref 0.6–1.3)
EOSINOPHIL # BLD AUTO: 0.18 THOUSAND/ΜL (ref 0–0.61)
EOSINOPHIL NFR BLD AUTO: 2 % (ref 0–6)
ERYTHROCYTE [DISTWIDTH] IN BLOOD BY AUTOMATED COUNT: 13.3 % (ref 11.6–15.1)
GFR SERPL CREATININE-BSD FRML MDRD: >60 ML/MIN/1.73SQ M
GLUCOSE SERPL-MCNC: 100 MG/DL (ref 65–140)
GLUCOSE SERPL-MCNC: 100 MG/DL (ref 65–140)
GLUCOSE SERPL-MCNC: 109 MG/DL (ref 65–140)
GLUCOSE SERPL-MCNC: 113 MG/DL (ref 65–140)
GLUCOSE SERPL-MCNC: 115 MG/DL (ref 65–140)
GLUCOSE SERPL-MCNC: 120 MG/DL (ref 65–140)
GLUCOSE SERPL-MCNC: 79 MG/DL (ref 65–140)
HCT VFR BLD AUTO: 41.1 % (ref 34.8–46.1)
HGB BLD-MCNC: 14.1 G/DL (ref 11.5–15.4)
LYMPHOCYTES # BLD AUTO: 1.29 THOUSANDS/ΜL (ref 0.6–4.47)
LYMPHOCYTES NFR BLD AUTO: 18 % (ref 14–44)
MCH RBC QN AUTO: 30.9 PG (ref 26.8–34.3)
MCHC RBC AUTO-ENTMCNC: 34.3 G/DL (ref 31.4–37.4)
MCV RBC AUTO: 90 FL (ref 82–98)
MONOCYTES # BLD AUTO: 0.61 THOUSAND/ΜL (ref 0.17–1.22)
MONOCYTES NFR BLD AUTO: 8 % (ref 4–12)
NEUTROPHILS # BLD AUTO: 5.26 THOUSANDS/ΜL (ref 1.85–7.62)
NEUTS SEG NFR BLD AUTO: 72 % (ref 43–75)
NRBC BLD AUTO-RTO: 0 /100 WBCS
PLATELET # BLD AUTO: 182 THOUSANDS/UL (ref 149–390)
PMV BLD AUTO: 10.9 FL (ref 8.9–12.7)
POTASSIUM SERPL-SCNC: 3.9 MMOL/L (ref 3.5–5.3)
RBC # BLD AUTO: 4.56 MILLION/UL (ref 3.81–5.12)
SODIUM SERPL-SCNC: 141 MMOL/L (ref 136–145)
WBC # BLD AUTO: 7.39 THOUSAND/UL (ref 4.31–10.16)

## 2017-03-23 PROCEDURE — 97535 SELF CARE MNGMENT TRAINING: CPT

## 2017-03-23 PROCEDURE — 82948 REAGENT STRIP/BLOOD GLUCOSE: CPT

## 2017-03-23 PROCEDURE — 97110 THERAPEUTIC EXERCISES: CPT

## 2017-03-23 PROCEDURE — 85025 COMPLETE CBC W/AUTO DIFF WBC: CPT | Performed by: NURSE PRACTITIONER

## 2017-03-23 PROCEDURE — 80048 BASIC METABOLIC PNL TOTAL CA: CPT | Performed by: NURSE PRACTITIONER

## 2017-03-23 PROCEDURE — 97166 OT EVAL MOD COMPLEX 45 MIN: CPT

## 2017-03-23 PROCEDURE — 97162 PT EVAL MOD COMPLEX 30 MIN: CPT

## 2017-03-23 PROCEDURE — 97530 THERAPEUTIC ACTIVITIES: CPT

## 2017-03-23 RX ADMIN — ACETAMINOPHEN 975 MG: 325 TABLET, FILM COATED ORAL at 21:29

## 2017-03-23 RX ADMIN — AMLODIPINE BESYLATE 5 MG: 5 TABLET ORAL at 09:12

## 2017-03-23 RX ADMIN — METHOCARBAMOL 500 MG: 500 TABLET ORAL at 13:04

## 2017-03-23 RX ADMIN — ACETAMINOPHEN 975 MG: 325 TABLET, FILM COATED ORAL at 05:35

## 2017-03-23 RX ADMIN — ACETAMINOPHEN 975 MG: 325 TABLET, FILM COATED ORAL at 13:03

## 2017-03-23 RX ADMIN — ENOXAPARIN SODIUM 40 MG: 40 INJECTION SUBCUTANEOUS at 09:12

## 2017-03-23 RX ADMIN — METOPROLOL TARTRATE 50 MG: 50 TABLET ORAL at 09:12

## 2017-03-23 RX ADMIN — SERTRALINE 75 MG: 25 TABLET, FILM COATED ORAL at 09:11

## 2017-03-23 RX ADMIN — METOPROLOL TARTRATE 50 MG: 50 TABLET ORAL at 21:29

## 2017-03-23 RX ADMIN — ATORVASTATIN CALCIUM 10 MG: 80 TABLET, FILM COATED ORAL at 16:00

## 2017-03-23 RX ADMIN — METHOCARBAMOL 500 MG: 500 TABLET ORAL at 05:35

## 2017-03-23 RX ADMIN — METHOCARBAMOL 500 MG: 500 TABLET ORAL at 21:29

## 2017-03-24 LAB
GLUCOSE SERPL-MCNC: 103 MG/DL (ref 65–140)
GLUCOSE SERPL-MCNC: 106 MG/DL (ref 65–140)

## 2017-03-24 PROCEDURE — 97530 THERAPEUTIC ACTIVITIES: CPT

## 2017-03-24 PROCEDURE — 97110 THERAPEUTIC EXERCISES: CPT

## 2017-03-24 PROCEDURE — 82948 REAGENT STRIP/BLOOD GLUCOSE: CPT

## 2017-03-24 PROCEDURE — 97535 SELF CARE MNGMENT TRAINING: CPT

## 2017-03-24 RX ORDER — TRAMADOL HYDROCHLORIDE 50 MG/1
25 TABLET ORAL EVERY 4 HOURS PRN
Status: DISCONTINUED | OUTPATIENT
Start: 2017-03-24 | End: 2017-04-01 | Stop reason: HOSPADM

## 2017-03-24 RX ORDER — TRAMADOL HYDROCHLORIDE 50 MG/1
50 TABLET ORAL EVERY 4 HOURS PRN
Status: DISCONTINUED | OUTPATIENT
Start: 2017-03-24 | End: 2017-04-01 | Stop reason: HOSPADM

## 2017-03-24 RX ADMIN — METOPROLOL TARTRATE 50 MG: 50 TABLET ORAL at 09:04

## 2017-03-24 RX ADMIN — METHOCARBAMOL 500 MG: 500 TABLET ORAL at 13:57

## 2017-03-24 RX ADMIN — HYDRALAZINE HYDROCHLORIDE 10 MG: 10 TABLET, FILM COATED ORAL at 22:36

## 2017-03-24 RX ADMIN — ACETAMINOPHEN 975 MG: 325 TABLET, FILM COATED ORAL at 13:56

## 2017-03-24 RX ADMIN — ACETAMINOPHEN 975 MG: 325 TABLET, FILM COATED ORAL at 21:24

## 2017-03-24 RX ADMIN — SERTRALINE 75 MG: 25 TABLET, FILM COATED ORAL at 09:04

## 2017-03-24 RX ADMIN — ENOXAPARIN SODIUM 40 MG: 40 INJECTION SUBCUTANEOUS at 09:07

## 2017-03-24 RX ADMIN — ATORVASTATIN CALCIUM 10 MG: 80 TABLET, FILM COATED ORAL at 17:19

## 2017-03-24 RX ADMIN — METHOCARBAMOL 500 MG: 500 TABLET ORAL at 05:41

## 2017-03-24 RX ADMIN — METOPROLOL TARTRATE 50 MG: 50 TABLET ORAL at 21:24

## 2017-03-24 RX ADMIN — METHOCARBAMOL 500 MG: 500 TABLET ORAL at 21:24

## 2017-03-24 RX ADMIN — AMLODIPINE BESYLATE 5 MG: 5 TABLET ORAL at 09:06

## 2017-03-24 RX ADMIN — ACETAMINOPHEN 975 MG: 325 TABLET, FILM COATED ORAL at 05:41

## 2017-03-24 RX ADMIN — HYDRALAZINE HYDROCHLORIDE 10 MG: 10 TABLET, FILM COATED ORAL at 05:48

## 2017-03-25 PROCEDURE — 97110 THERAPEUTIC EXERCISES: CPT

## 2017-03-25 PROCEDURE — 97116 GAIT TRAINING THERAPY: CPT

## 2017-03-25 PROCEDURE — 97530 THERAPEUTIC ACTIVITIES: CPT

## 2017-03-25 PROCEDURE — 97530 THERAPEUTIC ACTIVITIES: CPT | Performed by: STUDENT IN AN ORGANIZED HEALTH CARE EDUCATION/TRAINING PROGRAM

## 2017-03-25 RX ORDER — AMLODIPINE BESYLATE 5 MG/1
5 TABLET ORAL ONCE
Status: COMPLETED | OUTPATIENT
Start: 2017-03-25 | End: 2017-03-25

## 2017-03-25 RX ORDER — AMLODIPINE BESYLATE 10 MG/1
10 TABLET ORAL DAILY
Status: DISCONTINUED | OUTPATIENT
Start: 2017-03-26 | End: 2017-04-01 | Stop reason: HOSPADM

## 2017-03-25 RX ORDER — ZOLPIDEM TARTRATE 5 MG/1
5 TABLET ORAL
Status: DISCONTINUED | OUTPATIENT
Start: 2017-03-25 | End: 2017-04-01 | Stop reason: HOSPADM

## 2017-03-25 RX ADMIN — SERTRALINE 75 MG: 25 TABLET, FILM COATED ORAL at 08:26

## 2017-03-25 RX ADMIN — METOPROLOL TARTRATE 50 MG: 50 TABLET ORAL at 08:25

## 2017-03-25 RX ADMIN — HYDRALAZINE HYDROCHLORIDE 10 MG: 10 TABLET, FILM COATED ORAL at 22:27

## 2017-03-25 RX ADMIN — AMLODIPINE BESYLATE 5 MG: 5 TABLET ORAL at 11:02

## 2017-03-25 RX ADMIN — ZOLPIDEM TARTRATE 5 MG: 5 TABLET, FILM COATED ORAL at 22:27

## 2017-03-25 RX ADMIN — METHOCARBAMOL 500 MG: 500 TABLET ORAL at 13:58

## 2017-03-25 RX ADMIN — ATORVASTATIN CALCIUM 10 MG: 80 TABLET, FILM COATED ORAL at 15:58

## 2017-03-25 RX ADMIN — METOPROLOL TARTRATE 50 MG: 50 TABLET ORAL at 21:38

## 2017-03-25 RX ADMIN — AMLODIPINE BESYLATE 5 MG: 5 TABLET ORAL at 08:26

## 2017-03-25 RX ADMIN — ACETAMINOPHEN 975 MG: 325 TABLET, FILM COATED ORAL at 13:58

## 2017-03-25 RX ADMIN — ENOXAPARIN SODIUM 40 MG: 40 INJECTION SUBCUTANEOUS at 08:28

## 2017-03-25 RX ADMIN — METHOCARBAMOL 500 MG: 500 TABLET ORAL at 05:57

## 2017-03-25 RX ADMIN — ACETAMINOPHEN 975 MG: 325 TABLET, FILM COATED ORAL at 21:38

## 2017-03-25 RX ADMIN — ACETAMINOPHEN 975 MG: 325 TABLET, FILM COATED ORAL at 05:57

## 2017-03-25 RX ADMIN — METHOCARBAMOL 500 MG: 500 TABLET ORAL at 21:38

## 2017-03-26 PROCEDURE — 97110 THERAPEUTIC EXERCISES: CPT

## 2017-03-26 PROCEDURE — 97530 THERAPEUTIC ACTIVITIES: CPT

## 2017-03-26 PROCEDURE — 97535 SELF CARE MNGMENT TRAINING: CPT

## 2017-03-26 PROCEDURE — 97116 GAIT TRAINING THERAPY: CPT

## 2017-03-26 RX ORDER — BENAZEPRIL HYDROCHLORIDE 10 MG/1
20 TABLET ORAL DAILY
Status: DISCONTINUED | OUTPATIENT
Start: 2017-03-26 | End: 2017-04-01 | Stop reason: HOSPADM

## 2017-03-26 RX ADMIN — METHOCARBAMOL 500 MG: 500 TABLET ORAL at 21:23

## 2017-03-26 RX ADMIN — ACETAMINOPHEN 975 MG: 325 TABLET, FILM COATED ORAL at 14:56

## 2017-03-26 RX ADMIN — ACETAMINOPHEN 975 MG: 325 TABLET, FILM COATED ORAL at 21:23

## 2017-03-26 RX ADMIN — METOPROLOL TARTRATE 50 MG: 50 TABLET ORAL at 21:23

## 2017-03-26 RX ADMIN — ATORVASTATIN CALCIUM 10 MG: 80 TABLET, FILM COATED ORAL at 17:15

## 2017-03-26 RX ADMIN — BENAZEPRIL HYDROCHLORIDE 20 MG: 10 TABLET ORAL at 11:14

## 2017-03-26 RX ADMIN — HYDRALAZINE HYDROCHLORIDE 10 MG: 10 TABLET, FILM COATED ORAL at 06:38

## 2017-03-26 RX ADMIN — ACETAMINOPHEN 975 MG: 325 TABLET, FILM COATED ORAL at 05:28

## 2017-03-26 RX ADMIN — METHOCARBAMOL 500 MG: 500 TABLET ORAL at 05:28

## 2017-03-26 RX ADMIN — SERTRALINE 75 MG: 25 TABLET, FILM COATED ORAL at 08:49

## 2017-03-26 RX ADMIN — METOPROLOL TARTRATE 50 MG: 50 TABLET ORAL at 08:49

## 2017-03-26 RX ADMIN — ZOLPIDEM TARTRATE 5 MG: 5 TABLET, FILM COATED ORAL at 21:23

## 2017-03-26 RX ADMIN — METHOCARBAMOL 500 MG: 500 TABLET ORAL at 14:56

## 2017-03-26 RX ADMIN — ENOXAPARIN SODIUM 40 MG: 40 INJECTION SUBCUTANEOUS at 08:50

## 2017-03-26 RX ADMIN — AMLODIPINE BESYLATE 10 MG: 10 TABLET ORAL at 08:48

## 2017-03-27 LAB
ANION GAP SERPL CALCULATED.3IONS-SCNC: 7 MMOL/L (ref 4–13)
BASOPHILS # BLD AUTO: 0.02 THOUSANDS/ΜL (ref 0–0.1)
BASOPHILS NFR BLD AUTO: 0 % (ref 0–1)
BUN SERPL-MCNC: 11 MG/DL (ref 5–25)
CALCIUM SERPL-MCNC: 9.1 MG/DL (ref 8.3–10.1)
CHLORIDE SERPL-SCNC: 104 MMOL/L (ref 100–108)
CO2 SERPL-SCNC: 31 MMOL/L (ref 21–32)
CREAT SERPL-MCNC: 0.53 MG/DL (ref 0.6–1.3)
EOSINOPHIL # BLD AUTO: 0.08 THOUSAND/ΜL (ref 0–0.61)
EOSINOPHIL NFR BLD AUTO: 1 % (ref 0–6)
ERYTHROCYTE [DISTWIDTH] IN BLOOD BY AUTOMATED COUNT: 13.5 % (ref 11.6–15.1)
GFR SERPL CREATININE-BSD FRML MDRD: >60 ML/MIN/1.73SQ M
GLUCOSE SERPL-MCNC: 93 MG/DL (ref 65–140)
HCT VFR BLD AUTO: 41.3 % (ref 34.8–46.1)
HGB BLD-MCNC: 13.9 G/DL (ref 11.5–15.4)
LYMPHOCYTES # BLD AUTO: 2.02 THOUSANDS/ΜL (ref 0.6–4.47)
LYMPHOCYTES NFR BLD AUTO: 26 % (ref 14–44)
MCH RBC QN AUTO: 30.5 PG (ref 26.8–34.3)
MCHC RBC AUTO-ENTMCNC: 33.7 G/DL (ref 31.4–37.4)
MCV RBC AUTO: 91 FL (ref 82–98)
MONOCYTES # BLD AUTO: 0.55 THOUSAND/ΜL (ref 0.17–1.22)
MONOCYTES NFR BLD AUTO: 7 % (ref 4–12)
NEUTROPHILS # BLD AUTO: 4.96 THOUSANDS/ΜL (ref 1.85–7.62)
NEUTS SEG NFR BLD AUTO: 66 % (ref 43–75)
NRBC BLD AUTO-RTO: 0 /100 WBCS
PLATELET # BLD AUTO: 236 THOUSANDS/UL (ref 149–390)
PMV BLD AUTO: 10.6 FL (ref 8.9–12.7)
POTASSIUM SERPL-SCNC: 4.1 MMOL/L (ref 3.5–5.3)
RBC # BLD AUTO: 4.56 MILLION/UL (ref 3.81–5.12)
SODIUM SERPL-SCNC: 142 MMOL/L (ref 136–145)
WBC # BLD AUTO: 7.66 THOUSAND/UL (ref 4.31–10.16)

## 2017-03-27 PROCEDURE — 97535 SELF CARE MNGMENT TRAINING: CPT

## 2017-03-27 PROCEDURE — 80048 BASIC METABOLIC PNL TOTAL CA: CPT | Performed by: PHYSICIAN ASSISTANT

## 2017-03-27 PROCEDURE — 97116 GAIT TRAINING THERAPY: CPT

## 2017-03-27 PROCEDURE — 85025 COMPLETE CBC W/AUTO DIFF WBC: CPT | Performed by: PHYSICIAN ASSISTANT

## 2017-03-27 PROCEDURE — 97110 THERAPEUTIC EXERCISES: CPT

## 2017-03-27 PROCEDURE — 97530 THERAPEUTIC ACTIVITIES: CPT

## 2017-03-27 RX ADMIN — METOPROLOL TARTRATE 50 MG: 50 TABLET ORAL at 21:31

## 2017-03-27 RX ADMIN — ZOLPIDEM TARTRATE 5 MG: 5 TABLET, FILM COATED ORAL at 21:34

## 2017-03-27 RX ADMIN — ACETAMINOPHEN 975 MG: 325 TABLET, FILM COATED ORAL at 13:21

## 2017-03-27 RX ADMIN — METHOCARBAMOL 500 MG: 500 TABLET ORAL at 05:32

## 2017-03-27 RX ADMIN — ACETAMINOPHEN 975 MG: 325 TABLET, FILM COATED ORAL at 21:31

## 2017-03-27 RX ADMIN — AMLODIPINE BESYLATE 10 MG: 10 TABLET ORAL at 08:43

## 2017-03-27 RX ADMIN — HYDRALAZINE HYDROCHLORIDE 10 MG: 10 TABLET, FILM COATED ORAL at 22:33

## 2017-03-27 RX ADMIN — ATORVASTATIN CALCIUM 10 MG: 80 TABLET, FILM COATED ORAL at 15:47

## 2017-03-27 RX ADMIN — BENAZEPRIL HYDROCHLORIDE 20 MG: 10 TABLET ORAL at 08:43

## 2017-03-27 RX ADMIN — SERTRALINE 75 MG: 25 TABLET, FILM COATED ORAL at 08:42

## 2017-03-27 RX ADMIN — ACETAMINOPHEN 975 MG: 325 TABLET, FILM COATED ORAL at 05:32

## 2017-03-27 RX ADMIN — ENOXAPARIN SODIUM 40 MG: 40 INJECTION SUBCUTANEOUS at 08:42

## 2017-03-27 RX ADMIN — METOPROLOL TARTRATE 50 MG: 50 TABLET ORAL at 08:42

## 2017-03-28 PROCEDURE — 97530 THERAPEUTIC ACTIVITIES: CPT

## 2017-03-28 PROCEDURE — 97535 SELF CARE MNGMENT TRAINING: CPT

## 2017-03-28 PROCEDURE — 97532 HB COGNITIVE SKILLS DEVELOPMENT: CPT

## 2017-03-28 PROCEDURE — 97116 GAIT TRAINING THERAPY: CPT

## 2017-03-28 PROCEDURE — 97110 THERAPEUTIC EXERCISES: CPT

## 2017-03-28 RX ORDER — HYDROCHLOROTHIAZIDE 25 MG/1
25 TABLET ORAL DAILY
Status: DISCONTINUED | OUTPATIENT
Start: 2017-03-28 | End: 2017-04-01 | Stop reason: HOSPADM

## 2017-03-28 RX ADMIN — HYDROCHLOROTHIAZIDE 25 MG: 25 TABLET ORAL at 11:49

## 2017-03-28 RX ADMIN — ACETAMINOPHEN 975 MG: 325 TABLET, FILM COATED ORAL at 21:47

## 2017-03-28 RX ADMIN — TRAMADOL HYDROCHLORIDE 50 MG: 50 TABLET, COATED ORAL at 11:49

## 2017-03-28 RX ADMIN — METOPROLOL TARTRATE 50 MG: 50 TABLET ORAL at 20:27

## 2017-03-28 RX ADMIN — HYDRALAZINE HYDROCHLORIDE 10 MG: 10 TABLET, FILM COATED ORAL at 06:38

## 2017-03-28 RX ADMIN — ENOXAPARIN SODIUM 40 MG: 40 INJECTION SUBCUTANEOUS at 08:10

## 2017-03-28 RX ADMIN — ACETAMINOPHEN 975 MG: 325 TABLET, FILM COATED ORAL at 13:53

## 2017-03-28 RX ADMIN — METOPROLOL TARTRATE 50 MG: 50 TABLET ORAL at 08:09

## 2017-03-28 RX ADMIN — SERTRALINE 75 MG: 25 TABLET, FILM COATED ORAL at 08:09

## 2017-03-28 RX ADMIN — BENAZEPRIL HYDROCHLORIDE 20 MG: 10 TABLET ORAL at 08:09

## 2017-03-28 RX ADMIN — HYDRALAZINE HYDROCHLORIDE 10 MG: 10 TABLET, FILM COATED ORAL at 08:08

## 2017-03-28 RX ADMIN — AMLODIPINE BESYLATE 10 MG: 10 TABLET ORAL at 08:09

## 2017-03-28 RX ADMIN — ATORVASTATIN CALCIUM 10 MG: 80 TABLET, FILM COATED ORAL at 16:54

## 2017-03-28 RX ADMIN — ACETAMINOPHEN 975 MG: 325 TABLET, FILM COATED ORAL at 05:57

## 2017-03-29 PROCEDURE — 97530 THERAPEUTIC ACTIVITIES: CPT | Performed by: OCCUPATIONAL THERAPY ASSISTANT

## 2017-03-29 PROCEDURE — 97110 THERAPEUTIC EXERCISES: CPT | Performed by: OCCUPATIONAL THERAPY ASSISTANT

## 2017-03-29 PROCEDURE — 97530 THERAPEUTIC ACTIVITIES: CPT

## 2017-03-29 PROCEDURE — 97535 SELF CARE MNGMENT TRAINING: CPT | Performed by: OCCUPATIONAL THERAPY ASSISTANT

## 2017-03-29 PROCEDURE — 97110 THERAPEUTIC EXERCISES: CPT

## 2017-03-29 PROCEDURE — 97532 HB COGNITIVE SKILLS DEVELOPMENT: CPT | Performed by: OCCUPATIONAL THERAPY ASSISTANT

## 2017-03-29 PROCEDURE — 97116 GAIT TRAINING THERAPY: CPT

## 2017-03-29 RX ORDER — LANOLIN ALCOHOL/MO/W.PET/CERES
3 CREAM (GRAM) TOPICAL
Status: DISCONTINUED | OUTPATIENT
Start: 2017-03-29 | End: 2017-04-01 | Stop reason: HOSPADM

## 2017-03-29 RX ADMIN — HYDROCHLOROTHIAZIDE 25 MG: 25 TABLET ORAL at 09:21

## 2017-03-29 RX ADMIN — ATORVASTATIN CALCIUM 10 MG: 80 TABLET, FILM COATED ORAL at 15:59

## 2017-03-29 RX ADMIN — METOPROLOL TARTRATE 50 MG: 50 TABLET ORAL at 09:20

## 2017-03-29 RX ADMIN — METOPROLOL TARTRATE 75 MG: 50 TABLET ORAL at 21:38

## 2017-03-29 RX ADMIN — BENAZEPRIL HYDROCHLORIDE 20 MG: 10 TABLET ORAL at 09:22

## 2017-03-29 RX ADMIN — ACETAMINOPHEN 975 MG: 325 TABLET, FILM COATED ORAL at 21:38

## 2017-03-29 RX ADMIN — SERTRALINE 75 MG: 25 TABLET, FILM COATED ORAL at 09:21

## 2017-03-29 RX ADMIN — TRAMADOL HYDROCHLORIDE 50 MG: 50 TABLET, COATED ORAL at 09:21

## 2017-03-29 RX ADMIN — ACETAMINOPHEN 975 MG: 325 TABLET, FILM COATED ORAL at 06:43

## 2017-03-29 RX ADMIN — ENOXAPARIN SODIUM 40 MG: 40 INJECTION SUBCUTANEOUS at 09:20

## 2017-03-29 RX ADMIN — TRAMADOL HYDROCHLORIDE 25 MG: 50 TABLET, COATED ORAL at 13:28

## 2017-03-29 RX ADMIN — HYDRALAZINE HYDROCHLORIDE 10 MG: 10 TABLET, FILM COATED ORAL at 06:47

## 2017-03-29 RX ADMIN — METOPROLOL TARTRATE 25 MG: 25 TABLET ORAL at 09:55

## 2017-03-29 RX ADMIN — ACETAMINOPHEN 975 MG: 325 TABLET, FILM COATED ORAL at 13:28

## 2017-03-29 RX ADMIN — AMLODIPINE BESYLATE 10 MG: 10 TABLET ORAL at 09:21

## 2017-03-30 PROCEDURE — 97110 THERAPEUTIC EXERCISES: CPT

## 2017-03-30 PROCEDURE — 97535 SELF CARE MNGMENT TRAINING: CPT

## 2017-03-30 PROCEDURE — 97530 THERAPEUTIC ACTIVITIES: CPT

## 2017-03-30 PROCEDURE — 97116 GAIT TRAINING THERAPY: CPT

## 2017-03-30 PROCEDURE — 97532 HB COGNITIVE SKILLS DEVELOPMENT: CPT

## 2017-03-30 RX ADMIN — SERTRALINE 75 MG: 25 TABLET, FILM COATED ORAL at 09:23

## 2017-03-30 RX ADMIN — METOPROLOL TARTRATE 75 MG: 50 TABLET ORAL at 21:13

## 2017-03-30 RX ADMIN — ATORVASTATIN CALCIUM 10 MG: 80 TABLET, FILM COATED ORAL at 15:51

## 2017-03-30 RX ADMIN — TRAMADOL HYDROCHLORIDE 50 MG: 50 TABLET, COATED ORAL at 09:23

## 2017-03-30 RX ADMIN — ACETAMINOPHEN 975 MG: 325 TABLET, FILM COATED ORAL at 06:00

## 2017-03-30 RX ADMIN — ACETAMINOPHEN 975 MG: 325 TABLET, FILM COATED ORAL at 13:56

## 2017-03-30 RX ADMIN — ENOXAPARIN SODIUM 40 MG: 40 INJECTION SUBCUTANEOUS at 09:23

## 2017-03-30 RX ADMIN — ACETAMINOPHEN 975 MG: 325 TABLET, FILM COATED ORAL at 21:13

## 2017-03-30 RX ADMIN — METOPROLOL TARTRATE 75 MG: 50 TABLET ORAL at 09:23

## 2017-03-30 RX ADMIN — BENAZEPRIL HYDROCHLORIDE 20 MG: 10 TABLET ORAL at 09:24

## 2017-03-30 RX ADMIN — HYDROCHLOROTHIAZIDE 25 MG: 25 TABLET ORAL at 09:24

## 2017-03-30 RX ADMIN — AMLODIPINE BESYLATE 10 MG: 10 TABLET ORAL at 09:24

## 2017-03-30 RX ADMIN — TRAMADOL HYDROCHLORIDE 25 MG: 50 TABLET, COATED ORAL at 23:26

## 2017-03-31 PROCEDURE — 97530 THERAPEUTIC ACTIVITIES: CPT

## 2017-03-31 PROCEDURE — 97535 SELF CARE MNGMENT TRAINING: CPT

## 2017-03-31 PROCEDURE — 97110 THERAPEUTIC EXERCISES: CPT

## 2017-03-31 RX ORDER — METOPROLOL TARTRATE 50 MG/1
100 TABLET, FILM COATED ORAL EVERY 12 HOURS SCHEDULED
Status: DISCONTINUED | OUTPATIENT
Start: 2017-03-31 | End: 2017-04-01 | Stop reason: HOSPADM

## 2017-03-31 RX ORDER — BENAZEPRIL HYDROCHLORIDE 20 MG/1
20 TABLET ORAL DAILY
Qty: 30 TABLET | Refills: 3 | Status: SHIPPED | OUTPATIENT
Start: 2017-03-31 | End: 2018-02-26 | Stop reason: SDUPTHER

## 2017-03-31 RX ORDER — TRAMADOL HYDROCHLORIDE 50 MG/1
25 TABLET ORAL EVERY 8 HOURS PRN
Qty: 30 TABLET | Refills: 0 | Status: SHIPPED | OUTPATIENT
Start: 2017-03-31 | End: 2017-04-10

## 2017-03-31 RX ORDER — HYDROCHLOROTHIAZIDE 25 MG/1
25 TABLET ORAL DAILY
Qty: 30 TABLET | Refills: 3 | Status: SHIPPED | OUTPATIENT
Start: 2017-03-31 | End: 2018-02-26 | Stop reason: SDUPTHER

## 2017-03-31 RX ORDER — AMLODIPINE BESYLATE 10 MG/1
10 TABLET ORAL DAILY
Qty: 30 TABLET | Refills: 3 | Status: SHIPPED | OUTPATIENT
Start: 2017-03-31 | End: 2018-02-26 | Stop reason: SDUPTHER

## 2017-03-31 RX ORDER — METOPROLOL TARTRATE 100 MG/1
100 TABLET ORAL EVERY 12 HOURS SCHEDULED
Qty: 60 TABLET | Refills: 3 | Status: SHIPPED | OUTPATIENT
Start: 2017-03-31 | End: 2018-02-26 | Stop reason: SDUPTHER

## 2017-03-31 RX ADMIN — HYDRALAZINE HYDROCHLORIDE 10 MG: 10 TABLET, FILM COATED ORAL at 05:46

## 2017-03-31 RX ADMIN — ENOXAPARIN SODIUM 40 MG: 40 INJECTION SUBCUTANEOUS at 08:53

## 2017-03-31 RX ADMIN — ACETAMINOPHEN 975 MG: 325 TABLET, FILM COATED ORAL at 13:32

## 2017-03-31 RX ADMIN — AMLODIPINE BESYLATE 10 MG: 10 TABLET ORAL at 08:54

## 2017-03-31 RX ADMIN — ACETAMINOPHEN 975 MG: 325 TABLET, FILM COATED ORAL at 21:45

## 2017-03-31 RX ADMIN — TRAMADOL HYDROCHLORIDE 50 MG: 50 TABLET, COATED ORAL at 07:12

## 2017-03-31 RX ADMIN — ACETAMINOPHEN 975 MG: 325 TABLET, FILM COATED ORAL at 05:40

## 2017-03-31 RX ADMIN — SERTRALINE 75 MG: 25 TABLET, FILM COATED ORAL at 08:53

## 2017-03-31 RX ADMIN — METOPROLOL TARTRATE 100 MG: 50 TABLET ORAL at 21:45

## 2017-03-31 RX ADMIN — METOPROLOL TARTRATE 100 MG: 50 TABLET ORAL at 08:53

## 2017-03-31 RX ADMIN — TRAMADOL HYDROCHLORIDE 50 MG: 50 TABLET, COATED ORAL at 13:32

## 2017-03-31 RX ADMIN — ATORVASTATIN CALCIUM 10 MG: 80 TABLET, FILM COATED ORAL at 16:30

## 2017-03-31 RX ADMIN — BENAZEPRIL HYDROCHLORIDE 20 MG: 10 TABLET ORAL at 08:53

## 2017-03-31 RX ADMIN — HYDROCHLOROTHIAZIDE 25 MG: 25 TABLET ORAL at 08:53

## 2017-04-01 VITALS
HEIGHT: 61 IN | BODY MASS INDEX: 32.05 KG/M2 | WEIGHT: 169.75 LBS | RESPIRATION RATE: 18 BRPM | TEMPERATURE: 97.9 F | SYSTOLIC BLOOD PRESSURE: 148 MMHG | DIASTOLIC BLOOD PRESSURE: 70 MMHG | HEART RATE: 64 BPM | OXYGEN SATURATION: 96 %

## 2017-04-01 RX ADMIN — HYDROCHLOROTHIAZIDE 25 MG: 25 TABLET ORAL at 08:31

## 2017-04-01 RX ADMIN — METOPROLOL TARTRATE 100 MG: 50 TABLET ORAL at 08:30

## 2017-04-01 RX ADMIN — ENOXAPARIN SODIUM 40 MG: 40 INJECTION SUBCUTANEOUS at 08:30

## 2017-04-01 RX ADMIN — SERTRALINE 75 MG: 25 TABLET, FILM COATED ORAL at 08:30

## 2017-04-01 RX ADMIN — BENAZEPRIL HYDROCHLORIDE 20 MG: 10 TABLET ORAL at 08:31

## 2017-04-01 RX ADMIN — AMLODIPINE BESYLATE 10 MG: 10 TABLET ORAL at 08:31

## 2017-04-01 RX ADMIN — ACETAMINOPHEN 975 MG: 325 TABLET, FILM COATED ORAL at 05:45

## 2017-04-03 ENCOUNTER — GENERIC CONVERSION - ENCOUNTER (OUTPATIENT)
Dept: OTHER | Facility: OTHER | Age: 82
End: 2017-04-03

## 2017-04-07 ENCOUNTER — APPOINTMENT (OUTPATIENT)
Dept: PHYSICAL THERAPY | Facility: REHABILITATION | Age: 82
End: 2017-04-07
Payer: MEDICARE

## 2017-04-11 ENCOUNTER — GENERIC CONVERSION - ENCOUNTER (OUTPATIENT)
Dept: OTHER | Facility: OTHER | Age: 82
End: 2017-04-11

## 2017-04-17 ENCOUNTER — GENERIC CONVERSION - ENCOUNTER (OUTPATIENT)
Dept: OTHER | Facility: OTHER | Age: 82
End: 2017-04-17

## 2017-04-17 ENCOUNTER — APPOINTMENT (OUTPATIENT)
Dept: PHYSICAL THERAPY | Facility: REHABILITATION | Age: 82
End: 2017-04-17
Payer: MEDICARE

## 2017-04-17 PROCEDURE — G8991 OTHER PT/OT GOAL STATUS: HCPCS

## 2017-04-17 PROCEDURE — 97162 PT EVAL MOD COMPLEX 30 MIN: CPT

## 2017-04-17 PROCEDURE — G8990 OTHER PT/OT CURRENT STATUS: HCPCS

## 2017-04-18 ENCOUNTER — ALLSCRIPTS OFFICE VISIT (OUTPATIENT)
Dept: OTHER | Facility: OTHER | Age: 82
End: 2017-04-18

## 2017-04-18 ENCOUNTER — APPOINTMENT (OUTPATIENT)
Dept: PHYSICAL THERAPY | Facility: REHABILITATION | Age: 82
End: 2017-04-18
Payer: MEDICARE

## 2017-04-19 ENCOUNTER — APPOINTMENT (OUTPATIENT)
Dept: PHYSICAL THERAPY | Facility: REHABILITATION | Age: 82
End: 2017-04-19
Payer: MEDICARE

## 2017-04-19 PROCEDURE — 97140 MANUAL THERAPY 1/> REGIONS: CPT

## 2017-04-25 ENCOUNTER — HOSPITAL ENCOUNTER (OUTPATIENT)
Dept: RADIOLOGY | Facility: HOSPITAL | Age: 82
Discharge: HOME | End: 2017-04-25
Attending: ORTHOPAEDIC SURGERY
Payer: MEDICARE

## 2017-04-25 ENCOUNTER — ALLSCRIPTS OFFICE VISIT (OUTPATIENT)
Dept: OTHER | Facility: OTHER | Age: 82
End: 2017-04-25

## 2017-04-25 ENCOUNTER — APPOINTMENT (OUTPATIENT)
Dept: PHYSICAL THERAPY | Facility: REHABILITATION | Age: 82
End: 2017-04-25
Payer: MEDICARE

## 2017-04-25 DIAGNOSIS — S32.401D: ICD-10-CM

## 2017-04-25 DIAGNOSIS — M25.551 PAIN IN RIGHT HIP: ICD-10-CM

## 2017-04-25 DIAGNOSIS — S32.591D OTHER SPECIFIED FRACTURE OF RIGHT PUBIS, SUBSEQUENT ENCOUNTER FOR FRACTURE WITH ROUTINE HEALING: ICD-10-CM

## 2017-04-25 PROCEDURE — 73502 X-RAY EXAM HIP UNI 2-3 VIEWS: CPT

## 2017-04-25 PROCEDURE — 97140 MANUAL THERAPY 1/> REGIONS: CPT

## 2017-04-25 PROCEDURE — 97110 THERAPEUTIC EXERCISES: CPT

## 2017-04-27 ENCOUNTER — APPOINTMENT (OUTPATIENT)
Dept: PHYSICAL THERAPY | Facility: REHABILITATION | Age: 82
End: 2017-04-27
Payer: MEDICARE

## 2017-04-27 PROCEDURE — 97140 MANUAL THERAPY 1/> REGIONS: CPT

## 2017-04-27 PROCEDURE — 97110 THERAPEUTIC EXERCISES: CPT

## 2017-05-02 ENCOUNTER — APPOINTMENT (OUTPATIENT)
Dept: PHYSICAL THERAPY | Facility: REHABILITATION | Age: 82
End: 2017-05-02
Payer: MEDICARE

## 2017-05-02 PROCEDURE — 97112 NEUROMUSCULAR REEDUCATION: CPT

## 2017-05-02 PROCEDURE — 97140 MANUAL THERAPY 1/> REGIONS: CPT

## 2017-05-02 PROCEDURE — 97110 THERAPEUTIC EXERCISES: CPT

## 2017-05-04 ENCOUNTER — APPOINTMENT (OUTPATIENT)
Dept: PHYSICAL THERAPY | Facility: REHABILITATION | Age: 82
End: 2017-05-04
Payer: MEDICARE

## 2017-05-04 PROCEDURE — 97112 NEUROMUSCULAR REEDUCATION: CPT

## 2017-05-04 PROCEDURE — 97110 THERAPEUTIC EXERCISES: CPT

## 2017-05-04 PROCEDURE — 97140 MANUAL THERAPY 1/> REGIONS: CPT

## 2017-05-09 ENCOUNTER — APPOINTMENT (OUTPATIENT)
Dept: PHYSICAL THERAPY | Facility: REHABILITATION | Age: 82
End: 2017-05-09
Payer: MEDICARE

## 2017-05-09 PROCEDURE — 97140 MANUAL THERAPY 1/> REGIONS: CPT

## 2017-05-09 PROCEDURE — 97110 THERAPEUTIC EXERCISES: CPT

## 2017-05-11 ENCOUNTER — APPOINTMENT (OUTPATIENT)
Dept: PHYSICAL THERAPY | Facility: REHABILITATION | Age: 82
End: 2017-05-11
Payer: MEDICARE

## 2017-05-11 PROCEDURE — 97110 THERAPEUTIC EXERCISES: CPT

## 2017-05-16 ENCOUNTER — APPOINTMENT (OUTPATIENT)
Dept: PHYSICAL THERAPY | Facility: REHABILITATION | Age: 82
End: 2017-05-16
Payer: MEDICARE

## 2017-05-16 PROCEDURE — 97112 NEUROMUSCULAR REEDUCATION: CPT

## 2017-05-16 PROCEDURE — 97110 THERAPEUTIC EXERCISES: CPT

## 2017-05-16 PROCEDURE — 97140 MANUAL THERAPY 1/> REGIONS: CPT

## 2017-05-18 ENCOUNTER — APPOINTMENT (OUTPATIENT)
Dept: PHYSICAL THERAPY | Facility: REHABILITATION | Age: 82
End: 2017-05-18
Payer: MEDICARE

## 2017-05-23 ENCOUNTER — APPOINTMENT (OUTPATIENT)
Dept: PHYSICAL THERAPY | Facility: REHABILITATION | Age: 82
End: 2017-05-23
Payer: MEDICARE

## 2017-05-23 PROCEDURE — 97140 MANUAL THERAPY 1/> REGIONS: CPT

## 2017-05-23 PROCEDURE — 97110 THERAPEUTIC EXERCISES: CPT

## 2017-05-25 ENCOUNTER — APPOINTMENT (OUTPATIENT)
Dept: PHYSICAL THERAPY | Facility: REHABILITATION | Age: 82
End: 2017-05-25
Payer: MEDICARE

## 2017-05-25 PROCEDURE — G8990 OTHER PT/OT CURRENT STATUS: HCPCS

## 2017-05-25 PROCEDURE — 97110 THERAPEUTIC EXERCISES: CPT

## 2017-05-25 PROCEDURE — 97140 MANUAL THERAPY 1/> REGIONS: CPT

## 2017-05-25 PROCEDURE — G8991 OTHER PT/OT GOAL STATUS: HCPCS

## 2017-05-26 ENCOUNTER — GENERIC CONVERSION - ENCOUNTER (OUTPATIENT)
Dept: OTHER | Facility: OTHER | Age: 82
End: 2017-05-26

## 2017-05-30 ENCOUNTER — APPOINTMENT (OUTPATIENT)
Dept: PHYSICAL THERAPY | Facility: REHABILITATION | Age: 82
End: 2017-05-30
Payer: MEDICARE

## 2017-05-31 ENCOUNTER — ALLSCRIPTS OFFICE VISIT (OUTPATIENT)
Dept: OTHER | Facility: OTHER | Age: 82
End: 2017-05-31

## 2017-06-06 ENCOUNTER — HOSPITAL ENCOUNTER (OUTPATIENT)
Dept: RADIOLOGY | Facility: HOSPITAL | Age: 82
Discharge: HOME | End: 2017-06-06
Attending: ORTHOPAEDIC SURGERY
Payer: MEDICARE

## 2017-06-06 ENCOUNTER — ALLSCRIPTS OFFICE VISIT (OUTPATIENT)
Dept: OTHER | Facility: OTHER | Age: 82
End: 2017-06-06

## 2017-06-06 DIAGNOSIS — S32.401D: ICD-10-CM

## 2017-06-06 PROCEDURE — 73502 X-RAY EXAM HIP UNI 2-3 VIEWS: CPT

## 2017-06-07 ENCOUNTER — ALLSCRIPTS OFFICE VISIT (OUTPATIENT)
Dept: OTHER | Facility: OTHER | Age: 82
End: 2017-06-07

## 2017-07-10 ENCOUNTER — GENERIC CONVERSION - ENCOUNTER (OUTPATIENT)
Dept: OTHER | Facility: OTHER | Age: 82
End: 2017-07-10

## 2017-07-27 ENCOUNTER — ALLSCRIPTS OFFICE VISIT (OUTPATIENT)
Dept: OTHER | Facility: OTHER | Age: 82
End: 2017-07-27

## 2017-07-27 DIAGNOSIS — Z00.00 ENCOUNTER FOR GENERAL ADULT MEDICAL EXAMINATION WITHOUT ABNORMAL FINDINGS: ICD-10-CM

## 2017-07-27 DIAGNOSIS — R79.9 ABNORMAL FINDING OF BLOOD CHEMISTRY: ICD-10-CM

## 2017-07-27 DIAGNOSIS — E11.9 TYPE 2 DIABETES MELLITUS WITHOUT COMPLICATIONS (HCC): ICD-10-CM

## 2017-07-27 DIAGNOSIS — M79.89 OTHER DISORDERS OF SOFT TISSUE: ICD-10-CM

## 2017-08-07 ENCOUNTER — HOSPITAL ENCOUNTER (OUTPATIENT)
Dept: NON INVASIVE DIAGNOSTICS | Facility: CLINIC | Age: 82
Discharge: HOME | End: 2017-08-07
Payer: MEDICARE

## 2017-08-07 ENCOUNTER — GENERIC CONVERSION - ENCOUNTER (OUTPATIENT)
Dept: OTHER | Facility: OTHER | Age: 82
End: 2017-08-07

## 2017-08-07 DIAGNOSIS — M79.89 OTHER DISORDERS OF SOFT TISSUE: ICD-10-CM

## 2017-08-07 PROCEDURE — 93971 EXTREMITY STUDY: CPT

## 2017-08-24 DIAGNOSIS — Z12.31 ENCOUNTER FOR SCREENING MAMMOGRAM FOR MALIGNANT NEOPLASM OF BREAST: ICD-10-CM

## 2017-11-21 ENCOUNTER — APPOINTMENT (OUTPATIENT)
Dept: LAB | Facility: CLINIC | Age: 82
End: 2017-11-21
Payer: MEDICARE

## 2017-11-21 DIAGNOSIS — R79.9 ABNORMAL FINDING OF BLOOD CHEMISTRY: ICD-10-CM

## 2017-11-21 LAB — VIT B12 SERPL-MCNC: 541 PG/ML (ref 100–900)

## 2017-11-21 PROCEDURE — 82607 VITAMIN B-12: CPT

## 2017-11-21 PROCEDURE — 36415 COLL VENOUS BLD VENIPUNCTURE: CPT

## 2017-12-29 ENCOUNTER — GENERIC CONVERSION - ENCOUNTER (OUTPATIENT)
Dept: OTHER | Facility: OTHER | Age: 82
End: 2017-12-29

## 2017-12-29 DIAGNOSIS — E55.9 VITAMIN D DEFICIENCY: ICD-10-CM

## 2017-12-29 DIAGNOSIS — R41.3 OTHER AMNESIA: ICD-10-CM

## 2018-01-03 ENCOUNTER — GENERIC CONVERSION - ENCOUNTER (OUTPATIENT)
Dept: OTHER | Facility: OTHER | Age: 83
End: 2018-01-03

## 2018-01-10 NOTE — PSYCH
History of Present Illness  Psychotherapy Provided [facility]: Individual Psychotherapy 30 minutes minutes provided today  Goals addressed in session:   Patient experiencing anxiety, mood swings secondary to caregiver stress  Primary caretaker of  who is wheelchair bound currently following back surgery and wound care issues   diabetic  Patient pleasant, verbal and cooperative  HPI - Psych: Patient details hx of mood and anxiety issues and had previously been on Paxil  Having mood swings, issues with sleep, nd feeling overwhelmed at times care taking her   Denies any SI  Has good family and social support systems  Active in her gabino  Will be seeing Dr Mt Strickland today [facility]   Note   Note:   Processed caregiver stress with her- validation and support provided  Reviewed co[ping strategies to better con with this stress and she was provided a patient handout on same which she will review  Agrees to,contact me if needed  Assessment    1   Anxiety (300 00) (F41 9)    Signatures   Electronically signed by : Enriqeu Ruiz LCSW; Jan 11 2016  9:45AM EST                       (Author)

## 2018-01-10 NOTE — RESULT NOTES
Message   Notify the patient normal comprehensive metabolic panel except a mild elevation of the glucose level in the prediabetic range; please have the patient reduce carbohydrates and sweets in the diet and follow up as scheduled        Verified Results  (1) COMPREHENSIVE METABOLIC PANEL 45INO0408 40:31KQ Estefany Other Order Number: [id number]     Test Name Result Flag Reference   GLUCOSE,RANDM 112 mg/dL     If the patient is fasting, the ADA then defines impaired fasting glucose as > 100 mg/dL and diabetes as > or equal to 123 mg/dL  SODIUM 143 mmol/L  136-145   POTASSIUM 4 4 mmol/L  3 5-5 3   CHLORIDE 105 mmol/L  100-108   CARBON DIOXIDE 30 mmol/L  21-32   ANION GAP (CALC) 8 mmol/L  4-13   BLOOD UREA NITROGEN 10 mg/dL  5-25   CREATININE 0 64 mg/dL  0 60-1 30   Standardized to IDMS reference method   CALCIUM 9 3 mg/dL  8 3-10 1   BILI, TOTAL 0 88 mg/dL  0 20-1 00   ALK PHOSPHATAS 96 U/L     ALT (SGPT) 25 U/L  12-78   AST(SGOT) 31 U/L  5-45   ALBUMIN 3 7 g/dL  3 5-5 0   TOTAL PROTEIN 7 7 g/dL  6 4-8 2   eGFR Non-African American      >60 0 ml/min/1 73sq m   - Patient Instructions: This is a fasting blood test  Water,black tea or black  coffee only after 9:00pm the night before test Drink 2 glasses of water the morning of test   National Kidney Disease Education Program recommendations are as follows:  GFR calculation is accurate only with a steady state creatinine  Chronic Kidney disease less than 60 ml/min/1 73 sq  meters  Kidney failure less than 15 ml/min/1 73 sq  meters         Signatures   Electronically signed by : Alberto Peres DO; Nov 26 2016  7:45AM EST                       (Author)

## 2018-01-11 NOTE — RESULT NOTES
Message   Notify the patient normal lipid level follow up as scheduled        Verified Results  (1) COMPREHENSIVE METABOLIC PANEL 89OYZ4074 94:33WH Brooke Su Order Number: [id number]     Test Name Result Flag Reference   GLUCOSE,RANDM 112 mg/dL     If the patient is fasting, the ADA then defines impaired fasting glucose as > 100 mg/dL and diabetes as > or equal to 123 mg/dL  SODIUM 143 mmol/L  136-145   POTASSIUM 4 4 mmol/L  3 5-5 3   CHLORIDE 105 mmol/L  100-108   CARBON DIOXIDE 30 mmol/L  21-32   ANION GAP (CALC) 8 mmol/L  4-13   BLOOD UREA NITROGEN 10 mg/dL  5-25   CREATININE 0 64 mg/dL  0 60-1 30   Standardized to IDMS reference method   CALCIUM 9 3 mg/dL  8 3-10 1   BILI, TOTAL 0 88 mg/dL  0 20-1 00   ALK PHOSPHATAS 96 U/L     ALT (SGPT) 25 U/L  12-78   AST(SGOT) 31 U/L  5-45   ALBUMIN 3 7 g/dL  3 5-5 0   TOTAL PROTEIN 7 7 g/dL  6 4-8 2   eGFR Non-African American      >60 0 ml/min/1 73sq m   - Patient Instructions: This is a fasting blood test  Water,black tea or black  coffee only after 9:00pm the night before test Drink 2 glasses of water the morning of test   National Kidney Disease Education Program recommendations are as follows:  GFR calculation is accurate only with a steady state creatinine  Chronic Kidney disease less than 60 ml/min/1 73 sq  meters  Kidney failure less than 15 ml/min/1 73 sq  meters  (1) HEMOGLOBIN A1C 75XLV1675 10:48AM [email or web address] Order Number: [id number]     Test Name Result Flag Reference   HEMOGLOBIN A1C 6 2 %  4 2-6 3   EST  AVG  GLUCOSE 131 mg/dl       (1) LIPID PANEL, FASTING 25Nov2016 10:48AM [email or web address] Order Number: [id number]     Test Name Result Flag Reference   CHOLESTEROL 136 mg/dL     HDL,DIRECT 54 mg/dL  40-60   Specimen collection should occur prior to Metamizole administration due to the potential for falsely depressed results     LDL CHOLESTEROL CALCULATED 64 mg/dL  0-100 - Patient Instructions: This is a fasting blood test  Water,black tea or black  coffee only after 9:00pm the night before test   Drink 2 glasses of water the morning of test     - Patient Instructions: This is a fasting blood test  Water,black tea or black  coffee only after 9:00pm the night before test Drink 2 glasses of water the morning of test   Triglyceride:         Normal              <150 mg/dl       Borderline High    150-199 mg/dl       High               200-499 mg/dl       Very High          >499 mg/dl  Cholesterol:         Desirable        <200 mg/dl      Borderline High  200-239 mg/dl      High             >239 mg/dl  HDL Cholesterol:        High    >59 mg/dL      Low     <41 mg/dL  LDL CALCULATED:    This screening LDL is a calculated result  It does not have the accuracy of the Direct Measured LDL in the monitoring of patients with hyperlipidemia and/or statin therapy  Direct Measure LDL (PVY879) must be ordered separately in these patients  TRIGLYCERIDES 91 mg/dL  <=150   Specimen collection should occur prior to N-Acetylcysteine or Metamizole administration due to the potential for falsely depressed results         Signatures   Electronically signed by : Errol Caldwell DO; Nov 26 2016  7:49AM EST                       (Author)

## 2018-01-11 NOTE — RESULT NOTES
Message   Notify the patient normal lipid level follow up as scheduled        Verified Results  (1) COMPREHENSIVE METABOLIC PANEL [id number] 78:44UU Estefany Orourke Order Number: [id number]  TW Order Number: [id number] Order Number: [id number]     Test Name Result Flag Reference   GLUCOSE,RANDM 108 mg/dL     If the patient is fasting, the ADA then defines impaired fasting glucose as > 100 mg/dL and diabetes as > or equal to 123 mg/dL  SODIUM 140 mmol/L  136-145   POTASSIUM 4 6 mmol/L  3 5-5 3   CHLORIDE 102 mmol/L  100-108   CARBON DIOXIDE 32 mmol/L  21-32   ANION GAP (CALC) 6 mmol/L  4-13   BLOOD UREA NITROGEN 11 mg/dL  5-25   CREATININE 0 66 mg/dL  0 60-1 30   Standardized to IDMS reference method   CALCIUM 9 1 mg/dL  8 3-10 1   BILI, TOTAL 1 05 mg/dL H 0 20-1 00   ALK PHOSPHATAS 98 U/L     ALT (SGPT) 21 U/L  12-78   AST(SGOT) 27 U/L  5-45   ALBUMIN 3 7 g/dL  3 5-5 0   TOTAL PROTEIN 7 4 g/dL  6 4-8 2   eGFR Non-African American      >60 0 ml/min/1 73sq [facility] Disease Education Program recommendations are as follows:  GFR calculation is accurate only with a steady state creatinine  Chronic Kidney disease less than 60 ml/min/1 73 sq  meters  Kidney failure less than 15 ml/min/1 73 sq  meters  (1) LIPID PANEL, FASTING 64JGT2065 09:29AM Anish Canela   TW Order Number: [id number]  TW Order Number: [id number] Order Number: [id number]     Test Name Result Flag Reference   CHOLESTEROL 137 mg/dL     HDL,DIRECT 57 mg/dL  40-60   Specimen collection should occur prior to Metamizole administration due to the potential for falsely depressed results     LDL CHOLESTEROL CALCULATED 57 mg/dL  0-100   Triglyceride:         Normal              <150 mg/dl       Borderline High    150-199 mg/dl       High               200-499 mg/dl       Very High          >499 mg/dl  Cholesterol:         Desirable        <200 mg/dl Borderline High  200-239 mg/dl      High             >239 mg/dl  HDL Cholesterol:        High    >59 mg/dL      Low     <41 mg/dL  LDL CALCULATED:    This screening LDL is a calculated result  It does not have the accuracy of the Direct Measured LDL in the monitoring of patients with hyperlipidemia and/or statin therapy  Direct Measure LDL (JUH487) must be ordered separately in these patients  TRIGLYCERIDES 116 mg/dL  <=150   Specimen collection should occur prior to N-Acetylcysteine or Metamizole administration due to the potential for falsely depressed results         Signatures   Electronically signed by : Frank Mcclure DO; May 16 2016  6:50PM EST                       (Author)

## 2018-01-11 NOTE — MISCELLANEOUS
Message   Recorded as Task   Date: 04/11/2017 11:04 AM, Created By: [facility]   Task Name: Follow Up   Assigned To: Carmita Hernandez   Regarding Patient: Kendy Reyes, Status: Active   Comment:    Saida Miguel - 11 Apr 2017 11:04 AM     TASK CREATED  Joel Lopez, patients daughter called stating her mother has an apt tomorrow with rk    had recent pelvic fracture and is concerned about ability to do exam  would chair room be an option   daughter awaits call at [phone]    Scotty Salgado is daughter        Active Problems    1  Abnormal CT scan, pelvis (793 6) (R93 5)   2  Age related osteoporosis (733 01) (M81 0)   3  Anxiety (300 00) (F41 9)   4  Asymptomatic gallstones (574 20) (K80 20)   5  Benign essential hypertension (401 1) (I10)   6  Cervical stenosis of spine (723 0) (M48 02)   7  Depression (311) (F32 9)   8  Diabetes mellitus, type 2 (250 00) (E11 9)   9  Dyslipidemia (272 4) (E78 5)   10  Fracture of ramus of right pubis with routine healing (V54 19) (S32 591D)   11  History of allergy (V15 09) (Z88 9)   12  Hyperlipidemia (272 4) (E78 5)   13  Hypokalemia (276 8) (E87 6)   14  Left shoulder pain (719 41) (M25 512)   15  Leiomyoma of uterus (218 9) (D25 9)   16  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)   17  Pancreatic cyst (577 2) (K86 2)   18  Resting tremor (781 0) (R25 9)   19  Right bundle-branch block (426 4) (I45 10)   20  SAH (subarachnoid hemorrhage) (430) (I60 9)   21  Thickened endometrium (793 5) (R93 8)   22  Tinea pedis of left foot (110 4) (B35 3)   23  Vascular anomaly (747 60) (Q27 9)   24  Vitamin D deficiency (268 9) (E55 9)    Current Meds   1  Amlodipine Besy-Benazepril HCl - 10-20 MG Oral Capsule; take 1 capsule daily; Therapy: 48NEK9689 to (Evaluate:01Mar2018)  Requested for: 47YDX5684; Last   Rx:06Mar2017 Ordered   2  Atorvastatin Calcium 10 MG Oral Tablet; take 1 tablet every day;    Therapy: 20Apr2015 to (Evaluate:14Jan2018)  Requested for: 16UEQ9389; Last Rx:19Jan2017 Ordered   3  B Complex Vitamins Oral Capsule; TAKE 1 CAPSULE DAILY; Therapy: (Recorded:27Nov2015) to Recorded   4  Caltrate 600+D TABS; Take 1 tablet daily; Therapy: () to Recorded   5  [employer] Vitamin D 2000 UNIT CAPS; take 1 capsule daily; Therapy: (Osei Newberry) to Recorded   6  Metoprolol Tartrate 100 MG Oral Tablet; Take 1 tablet twice daily; Therapy: 28LON1762 to (Fady Brian)  Requested for: 09QXW4784; Last   Rx:98Wdq0166 Ordered   7  Omeprazole 20 MG Oral Capsule Delayed Release (PriLOSEC); TAKE 1 CAPSULE   DAILY  Requested for: 97WEV0136; Last Rx:28Nov2015 Ordered   8  Sertraline HCl - 50 MG Oral Tablet; TAKE 1 AND 1/2 TABLETS DAILY; Therapy: 90Xav3319 to (Evaluate:01Mar2018)  Requested for: 03ONP8244; Last   Rx:06Mar2017 Ordered   9  Vitamin C 500 MG Oral Capsule; TAKE 1 CAPSULE DAILY; Therapy: 24GFU3350 to Recorded    Allergies    1  No Known Drug Allergies    2   Seasonal    Signatures   Electronically signed by : Karina Myles, ; Apr 11 2017 11:04AM EST                       (Author)

## 2018-01-12 VITALS
RESPIRATION RATE: 16 BRPM | BODY MASS INDEX: 32.02 KG/M2 | HEART RATE: 70 BPM | DIASTOLIC BLOOD PRESSURE: 80 MMHG | WEIGHT: 174 LBS | OXYGEN SATURATION: 97 % | HEIGHT: 62 IN | TEMPERATURE: 97.9 F | SYSTOLIC BLOOD PRESSURE: 142 MMHG

## 2018-01-12 VITALS
SYSTOLIC BLOOD PRESSURE: 148 MMHG | RESPIRATION RATE: 16 BRPM | WEIGHT: 173.03 LBS | HEIGHT: 62 IN | DIASTOLIC BLOOD PRESSURE: 72 MMHG | HEART RATE: 78 BPM | BODY MASS INDEX: 31.84 KG/M2

## 2018-01-12 VITALS
SYSTOLIC BLOOD PRESSURE: 143 MMHG | HEIGHT: 62 IN | BODY MASS INDEX: 32.57 KG/M2 | WEIGHT: 177 LBS | HEART RATE: 78 BPM | DIASTOLIC BLOOD PRESSURE: 75 MMHG

## 2018-01-12 NOTE — RESULT NOTES
Message   notify  the patient normal microalbumin level follow-up is scheduled        Verified Results  (1) COMPREHENSIVE METABOLIC PANEL 02MWF0084 35:24XK Nessa Hannah Order Number: [id number]     Test Name Result Flag Reference   GLUCOSE,RANDM 112 mg/dL     If the patient is fasting, the ADA then defines impaired fasting glucose as > 100 mg/dL and diabetes as > or equal to 123 mg/dL  SODIUM 143 mmol/L  136-145   POTASSIUM 4 4 mmol/L  3 5-5 3   CHLORIDE 105 mmol/L  100-108   CARBON DIOXIDE 30 mmol/L  21-32   ANION GAP (CALC) 8 mmol/L  4-13   BLOOD UREA NITROGEN 10 mg/dL  5-25   CREATININE 0 64 mg/dL  0 60-1 30   Standardized to IDMS reference method   CALCIUM 9 3 mg/dL  8 3-10 1   BILI, TOTAL 0 88 mg/dL  0 20-1 00   ALK PHOSPHATAS 96 U/L     ALT (SGPT) 25 U/L  12-78   AST(SGOT) 31 U/L  5-45   ALBUMIN 3 7 g/dL  3 5-5 0   TOTAL PROTEIN 7 7 g/dL  6 4-8 2   eGFR Non-African American      >60 0 ml/min/1 73sq m   - Patient Instructions: This is a fasting blood test  Water,black tea or black  coffee only after 9:00pm the night before test Drink 2 glasses of water the morning of test   National Kidney Disease Education Program recommendations are as follows:  GFR calculation is accurate only with a steady state creatinine  Chronic Kidney disease less than 60 ml/min/1 73 sq  meters  Kidney failure less than 15 ml/min/1 73 sq  meters  (1) HEMOGLOBIN A1C 38MSO2557 10:48AM [employer] Abraham    Order Number: [id number]     Test Name Result Flag Reference   HEMOGLOBIN A1C 6 2 %  4 2-6 3   EST  AVG  GLUCOSE 131 mg/dl       (1) LIPID PANEL, FASTING 25Nov2016 10:48AM [employer] Rosario    Order Number: [id number]     Test Name Result Flag Reference   CHOLESTEROL 136 mg/dL     HDL,DIRECT 54 mg/dL  40-60   Specimen collection should occur prior to Metamizole administration due to the potential for falsely depressed results     LDL CHOLESTEROL CALCULATED 64 mg/dL  0-100   - Patient Instructions: This is a fasting blood test  Water,black tea or black  coffee only after 9:00pm the night before test   Drink 2 glasses of water the morning of test     - Patient Instructions: This is a fasting blood test  Water,black tea or black  coffee only after 9:00pm the night before test Drink 2 glasses of water the morning of test   Triglyceride:         Normal              <150 mg/dl       Borderline High    150-199 mg/dl       High               200-499 mg/dl       Very High          >499 mg/dl  Cholesterol:         Desirable        <200 mg/dl      Borderline High  200-239 mg/dl      High             >239 mg/dl  HDL Cholesterol:        High    >59 mg/dL      Low     <41 mg/dL  LDL CALCULATED:    This screening LDL is a calculated result  It does not have the accuracy of the Direct Measured LDL in the monitoring of patients with hyperlipidemia and/or statin therapy  Direct Measure LDL (YAG216) must be ordered separately in these patients  TRIGLYCERIDES 91 mg/dL  <=150   Specimen collection should occur prior to N-Acetylcysteine or Metamizole administration due to the potential for falsely depressed results       (1) MICROALBUMIN CREATININE RATIO, RANDOM URINE 25Nov2016 10:48AM Anish Canela    Order Number: [id number]     Test Name Result Flag Reference   MICROALBUMIN/ CREAT R 13 mg/g creatinine  0-30   MICROALBUMIN,URINE 17 6 mg/L  0 0-20 0   CREATININE URINE 136 0 mg/dL         Signatures   Electronically signed by : Alberto Peres DO; Nov 26 2016  7:50AM EST                       (Author)

## 2018-01-12 NOTE — RESULT NOTES
Message   Notify the patient normal TSH follow up as scheduled        Verified Results  (1) TSH 62YTP4375 09:29AM Huerfano Sudhakar    Order Number: [id number] Order Number: [id number]     Test Name Result Flag Reference   TSH 1 850 uIU/mL  0 358-3 740   The recommended reference ranges for TSH during pregnancy are as follows:  First trimester 0 1 to 2 5 uIU/mL  Second trimester  0 2 to 3 0 uIU/mL  Third trimester 0 3 to 3 0 uIU/m       Signatures   Electronically signed by : Alexia Powell DO; May 16 2016  6:02PM EST                       (Author)
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
## 2018-01-13 VITALS
WEIGHT: 164 LBS | BODY MASS INDEX: 30.18 KG/M2 | HEIGHT: 62 IN | SYSTOLIC BLOOD PRESSURE: 136 MMHG | DIASTOLIC BLOOD PRESSURE: 72 MMHG

## 2018-01-13 VITALS
WEIGHT: 173.04 LBS | HEART RATE: 57 BPM | RESPIRATION RATE: 16 BRPM | DIASTOLIC BLOOD PRESSURE: 54 MMHG | HEIGHT: 62 IN | BODY MASS INDEX: 31.84 KG/M2 | OXYGEN SATURATION: 97 % | SYSTOLIC BLOOD PRESSURE: 130 MMHG

## 2018-01-13 VITALS
HEART RATE: 71 BPM | HEIGHT: 62 IN | WEIGHT: 160.04 LBS | DIASTOLIC BLOOD PRESSURE: 62 MMHG | RESPIRATION RATE: 16 BRPM | SYSTOLIC BLOOD PRESSURE: 128 MMHG | BODY MASS INDEX: 29.45 KG/M2

## 2018-01-13 VITALS
BODY MASS INDEX: 30.09 KG/M2 | DIASTOLIC BLOOD PRESSURE: 67 MMHG | HEART RATE: 60 BPM | WEIGHT: 163.5 LBS | HEIGHT: 62 IN | SYSTOLIC BLOOD PRESSURE: 126 MMHG

## 2018-01-13 VITALS
HEIGHT: 62 IN | SYSTOLIC BLOOD PRESSURE: 138 MMHG | WEIGHT: 175.6 LBS | OXYGEN SATURATION: 97 % | BODY MASS INDEX: 32.31 KG/M2 | HEART RATE: 58 BPM | DIASTOLIC BLOOD PRESSURE: 72 MMHG

## 2018-01-13 NOTE — RESULT NOTES
Message   Notify the patient The venous Doppler is normal follow-up as scheduled        Verified Results  VAS LOWER LIMB VENOUS DUPLEX STUDY, UNILATERAL/LIMITED [id number] 09:01AM Surya Prescott Order Number: [id number]    - Patient Instructions: To schedule this appointment, please contact Central Scheduling at [phone]  Test Name Result Flag Reference   VAS LOWER LIMB VENOUS DUPLEX STUDY, UNILATERAL/LIMITED (Report)     [facility] REPORT   CLINICAL:   Indications: Right Other specified soft tissue disorders [M79 89]  Patient has   a swollen right foot with some pain after a recent walk in her yard  The patient   claims she doesn't remember injuring/trauma to her left leg, foot or ankle   Risk Factors: The patient has history of obesity  She has no history of DVT,   limb trauma or immobilization  CONCLUSION:   Impression:   RIGHT LOWER LIMB LIMITED: NORMAL   Evaluation shows no evidence of thrombus in the common femoral vein  Doppler evaluation shows a normal response to augmentation maneuvers  LEFT LOWER LIMB: NORMAL   No evidence of acute or chronic deep vein thrombosis   No evidence of superficial thrombophlebitis noted  Doppler evaluation shows a normal response to augmentation maneuvers  Popliteal, posterior tibial and anterior tibial arterial Doppler waveforms are   triphasic        SIGNATURE:   Electronically Signed by: Vick Taylor MD, RPVI on 2017-08-07 04:15:14 PM       Signatures   Electronically signed by : Salvatore Jeffries DO; Aug  7 2017  6:48PM EST                       (Author)

## 2018-01-14 VITALS
HEART RATE: 54 BPM | DIASTOLIC BLOOD PRESSURE: 82 MMHG | SYSTOLIC BLOOD PRESSURE: 145 MMHG | BODY MASS INDEX: 32.41 KG/M2 | WEIGHT: 176.13 LBS | HEIGHT: 62 IN

## 2018-01-14 VITALS
HEIGHT: 62 IN | TEMPERATURE: 98 F | WEIGHT: 173 LBS | RESPIRATION RATE: 16 BRPM | HEART RATE: 72 BPM | BODY MASS INDEX: 31.83 KG/M2 | SYSTOLIC BLOOD PRESSURE: 140 MMHG | DIASTOLIC BLOOD PRESSURE: 78 MMHG

## 2018-01-14 NOTE — MISCELLANEOUS
Provider Comments  Provider Comments:   Patient did not come to her scheduled appointment today 01/20/16 so a message was left for her to call  the office to reschedule another appointment        Signatures   Electronically signed by : Maggie Gilmore DO; Jan 20 2016  1:56PM EST                       (Author)
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
## 2018-01-16 NOTE — RESULT NOTES
Message   Notify the patient mild elevation of the hemoglobin A1c in the prediabetic range follow up as scheduled        Verified Results  (1) COMPREHENSIVE METABOLIC PANEL 37DWM4358 68:42BP Naveen Nazario Order Number: [id number]     Test Name Result Flag Reference   GLUCOSE,RANDM 112 mg/dL     If the patient is fasting, the ADA then defines impaired fasting glucose as > 100 mg/dL and diabetes as > or equal to 123 mg/dL  SODIUM 143 mmol/L  136-145   POTASSIUM 4 4 mmol/L  3 5-5 3   CHLORIDE 105 mmol/L  100-108   CARBON DIOXIDE 30 mmol/L  21-32   ANION GAP (CALC) 8 mmol/L  4-13   BLOOD UREA NITROGEN 10 mg/dL  5-25   CREATININE 0 64 mg/dL  0 60-1 30   Standardized to IDMS reference method   CALCIUM 9 3 mg/dL  8 3-10 1   BILI, TOTAL 0 88 mg/dL  0 20-1 00   ALK PHOSPHATAS 96 U/L     ALT (SGPT) 25 U/L  12-78   AST(SGOT) 31 U/L  5-45   ALBUMIN 3 7 g/dL  3 5-5 0   TOTAL PROTEIN 7 7 g/dL  6 4-8 2   eGFR Non-African American      >60 0 ml/min/1 73sq m   - Patient Instructions: This is a fasting blood test  Water,black tea or black  coffee only after 9:00pm the night before test Drink 2 glasses of water the morning of test   National Kidney Disease Education Program recommendations are as follows:  GFR calculation is accurate only with a steady state creatinine  Chronic Kidney disease less than 60 ml/min/1 73 sq  meters  Kidney failure less than 15 ml/min/1 73 sq  meters  (1) HEMOGLOBIN A1C 05HRS2124 10:48AM [facility] Order Number: [id number]     Test Name Result Flag Reference   HEMOGLOBIN A1C 6 2 %  4 2-6 3   EST  AVG   GLUCOSE 131 mg/dl         Signatures   Electronically signed by : Zenobia Guallpa DO; Nov 26 2016  7:46AM EST                       (Author)

## 2018-01-16 NOTE — RESULT NOTES
Message   Notify the patient mild elevation of the blood glucose and also a slight elevation of the bilirubin; please have the patient reduce carbohydrates and sweets in the diet and follow up as scheduled to discuss her report  Verified Results  (1) COMPREHENSIVE METABOLIC PANEL 09WQC3422 83:75WQ Joselyn Calzada   TW Order Number: [id number]  TW Order Number: [id number] Order Number: [id number]     Test Name Result Flag Reference   GLUCOSE,RANDM 108 mg/dL     If the patient is fasting, the ADA then defines impaired fasting glucose as > 100 mg/dL and diabetes as > or equal to 123 mg/dL  SODIUM 140 mmol/L  136-145   POTASSIUM 4 6 mmol/L  3 5-5 3   CHLORIDE 102 mmol/L  100-108   CARBON DIOXIDE 32 mmol/L  21-32   ANION GAP (CALC) 6 mmol/L  4-13   BLOOD UREA NITROGEN 11 mg/dL  5-25   CREATININE 0 66 mg/dL  0 60-1 30   Standardized to IDMS reference method   CALCIUM 9 1 mg/dL  8 3-10 1   BILI, TOTAL 1 05 mg/dL H 0 20-1 00   ALK PHOSPHATAS 98 U/L     ALT (SGPT) 21 U/L  12-78   AST(SGOT) 27 U/L  5-45   ALBUMIN 3 7 g/dL  3 5-5 0   TOTAL PROTEIN 7 4 g/dL  6 4-8 2   eGFR Non-African American      >60 0 ml/min/1 73sq [facility] Disease Education Program recommendations are as follows:  GFR calculation is accurate only with a steady state creatinine  Chronic Kidney disease less than 60 ml/min/1 73 sq  meters  Kidney failure less than 15 ml/min/1 73 sq  meters         Signatures   Electronically signed by : Lucía Keen DO; May 16 2016  6:50PM EST                       (Author)

## 2018-01-16 NOTE — RESULT NOTES
Message    Notify the patient the MRI of the abdomen does show nonenhancing cysts throughout the pancreas measuring the largest 11 mm please have the patient see surgical oncologist Dr Alyse Paniagua;      Also it does show gallstones please have the patient follow up with me to discuss and let me know if the patient is having any symptoms of right upper quadrant abdominal pain            Verified Results  * MRI ABDOMEN W WO CONTRAST AND MRCP [id number] 10:51AM Ernie Marshall Order Number: [id number]    - Patient Instructions: To schedule this appointment, please contact Central Scheduling at [phone]  Test Name Result Flag Reference   MRI ABDOMEN W WO CONTRAST AND MRCP (Report)     This is a summary report  The complete report is available in the patient's medical record  If you cannot access the medical record, please contact the sending organization for a detailed fax or copy  MRI OF THE ABDOMEN (PANCREAS) WITH AND WITHOUT CONTRAST WITH MRCP     INDICATION: Follow-up new pancreatic cyst      COMPARISON: CT abdomen and pelvis 2/11/2017  TECHNIQUE: The following pulse sequences were obtained on a 1 5 T scanner: Coronal and axial T2 with TE of 90 and 180 respectively, axial T2 with fat saturation, axial FIESTA fat-sat, axial T1-weighted in-and-out-of phase, axial DWI/ADC, pre-contrast    axial T1 with fat saturation, post-contrast dynamic axial T1 with fat saturation at 20, 70, and 180 seconds, followed by coronal T1 with fat saturation and 7-minute delayed axial T1 with fat saturation  3D MRCP images were obtained with radial thick    slabs and projections  3D rendering was performed from the acquisition scanner  IV Contrast: 7 mL of gadobutrol injection (MULTI-DOSE)       FINDINGS:     PANCREAS:    General: Mild to moderate atrophy of the parenchymal tissue  Lesions: Innumerable cysts are seen scattered throughout the pancreas including a newly described 11 mm cyst in the tail   The cysts are nonenhancing and do not communicate with the normal caliber main pancreatic duct, all measuring less than 12 mm  Accounting for the finding of mild pancreatic atrophy and punctate calcifications on recent CT, 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  Side branch intraductal papillary mucinous neoplasms cannot be completely excluded and recommend next followup in    1 year  Preferred modality is Abdomen MRI with and without IV contrast/MRCP  First alternative is pancreatic protocol abdomen and pelvic CT with contrast  Second is abdomen MRI without contrast/MRCP  The recommendations regarding pancreatic findings assumes that patient does not have family history of pancreatic cancer nor have any symptoms potentially attributable to pancreatic cystic lesions (hyperamylasemia, recent-onset diabetes, severe    epigastric pain, weight loss, steatorrhea, or jaundice ) If these conditions are not true, then management should be deferred to judgement of specialists such as gastroenterologists or oncologic surgeons  Recommendations are based on recent consensus    statements on management of pancreatic cystic lesions from [address] [place] [address] Gastroenterology Association, [employer], the journal Pancreatology, and our own institutional consensus  REFERENCES:   [facility] Guidelines on the Diagnosis and Management of Asymptomatic Neoplastic Pancreatic Cysts  Gastroenterology 2015; 003:510-199 (AGA GUIDELINES)   International Consensus Guidelines for Management of Intraductal Papillary Mucinous Neoplasm and Mucinous Cystic Neoplasms of the Pancreas  Pancreatology 12 (2012) 183-197 Damian Macdonald paper)   [employer]: White Paper: Managing Incidental Findings on Abdominal CT, JACR, October 2011  (ACR GUIDELINES )                  Duct: Main pancreatic duct and side-branches are normal in appearance        BILARY DUCTS:    No intra-hepatic biliary ductal dilatation  Common bile duct is normal in caliber  No evidence of bile duct stricture or choledocholithiasis  No mass identified  LIVER: Normal       GALLBLADDER: Gallstones with no acute cholecystitis or pericholecystic edema  ADRENAL GLANDS: Normal      SPLEEN: Normal      KIDNEYS: Bilateral renal cysts with no hydronephrosis, perinephric collections, or enhancing solid mass lesions  ABDOMINAL CAVITY: No lymphadenopathy or mass  No Ascites  BOWEL: Unremarkable MRI appearance  OSSEOUS STRUCTURES: No osseous destruction  VASCULAR STRUCTURES: Visualized vasculature is normal      ABDOMINAL WALL: Normal      LOWER CHEST: Unremarkable MRI appearance  IMPRESSION:     1  Innumerable nonenhancing cysts throughout the pancreas the largest measuring 11 mm in the tail  These do not communicate with the normal caliber main pancreatic duct and likely represent benign pseudocysts although side branch intraductal papillary    mucinous neoplasms cannot be excluded  Follow-up MRI/MRCP in one year recommended  2  Cholelithiasis with no acute cholecystitis or biliary ductal obstruction  2  Normal MRCP         Workstation performed: [id number]     Signed by:   Genoveva Castro MD   3/13/17       Signatures   Electronically signed by : Zenobia Guallpa DO; Mar 13 2017  5:08PM EST                       (Author)
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
## 2018-01-24 VITALS
RESPIRATION RATE: 16 BRPM | HEIGHT: 62 IN | WEIGHT: 186.01 LBS | HEART RATE: 59 BPM | BODY MASS INDEX: 34.23 KG/M2 | SYSTOLIC BLOOD PRESSURE: 130 MMHG | DIASTOLIC BLOOD PRESSURE: 70 MMHG | OXYGEN SATURATION: 98 %

## 2018-01-24 VITALS — TEMPERATURE: 98.1 F

## 2018-01-24 RX ORDER — ATORVASTATIN CALCIUM 10 MG/1
TABLET, FILM COATED ORAL
Qty: 270 TABLET | Refills: 2 | Status: CANCELLED | OUTPATIENT
Start: 2018-01-24

## 2018-02-26 ENCOUNTER — OFFICE VISIT (OUTPATIENT)
Dept: INTERNAL MEDICINE CLINIC | Facility: CLINIC | Age: 83
End: 2018-02-26
Payer: MEDICARE

## 2018-02-26 VITALS
OXYGEN SATURATION: 97 % | BODY MASS INDEX: 35.23 KG/M2 | WEIGHT: 186.6 LBS | SYSTOLIC BLOOD PRESSURE: 142 MMHG | DIASTOLIC BLOOD PRESSURE: 84 MMHG | TEMPERATURE: 97.8 F | HEIGHT: 61 IN | HEART RATE: 55 BPM

## 2018-02-26 DIAGNOSIS — H61.23 IMPACTED CERUMEN OF BOTH EARS: ICD-10-CM

## 2018-02-26 DIAGNOSIS — F32.A DEPRESSION, UNSPECIFIED DEPRESSION TYPE: ICD-10-CM

## 2018-02-26 DIAGNOSIS — K21.9 GASTROESOPHAGEAL REFLUX DISEASE WITHOUT ESOPHAGITIS: ICD-10-CM

## 2018-02-26 DIAGNOSIS — E78.5 HYPERLIPIDEMIA, UNSPECIFIED HYPERLIPIDEMIA TYPE: ICD-10-CM

## 2018-02-26 DIAGNOSIS — I10 HYPERTENSION, UNSPECIFIED TYPE: ICD-10-CM

## 2018-02-26 DIAGNOSIS — J06.9 ACUTE UPPER RESPIRATORY INFECTION: Primary | ICD-10-CM

## 2018-02-26 DIAGNOSIS — J30.9 ALLERGIC RHINITIS, UNSPECIFIED CHRONICITY, UNSPECIFIED SEASONALITY, UNSPECIFIED TRIGGER: ICD-10-CM

## 2018-02-26 PROCEDURE — 99214 OFFICE O/P EST MOD 30 MIN: CPT | Performed by: NURSE PRACTITIONER

## 2018-02-26 RX ORDER — HYDROCHLOROTHIAZIDE 25 MG/1
25 TABLET ORAL DAILY
Qty: 90 TABLET | Refills: 1 | Status: SHIPPED | OUTPATIENT
Start: 2018-02-26 | End: 2018-08-31 | Stop reason: SDUPTHER

## 2018-02-26 RX ORDER — AMLODIPINE BESYLATE 10 MG/1
10 TABLET ORAL DAILY
Qty: 90 TABLET | Refills: 1 | Status: SHIPPED | OUTPATIENT
Start: 2018-02-26 | End: 2018-08-31 | Stop reason: SDUPTHER

## 2018-02-26 RX ORDER — AMOXICILLIN 500 MG/1
500 CAPSULE ORAL EVERY 8 HOURS SCHEDULED
Qty: 30 CAPSULE | Refills: 0 | Status: SHIPPED | OUTPATIENT
Start: 2018-02-26 | End: 2018-03-08

## 2018-02-26 RX ORDER — MULTIVIT-MIN/IRON/FOLIC ACID/K 18-600-40
1 CAPSULE ORAL DAILY
COMMUNITY
Start: 2016-05-10 | End: 2019-07-18

## 2018-02-26 RX ORDER — METOPROLOL TARTRATE 100 MG/1
100 TABLET ORAL EVERY 12 HOURS SCHEDULED
Qty: 180 TABLET | Refills: 1 | Status: SHIPPED | OUTPATIENT
Start: 2018-02-26 | End: 2019-01-11 | Stop reason: SDUPTHER

## 2018-02-26 RX ORDER — BENAZEPRIL HYDROCHLORIDE 20 MG/1
20 TABLET ORAL DAILY
Qty: 90 TABLET | Refills: 1 | Status: SHIPPED | OUTPATIENT
Start: 2018-02-26 | End: 2019-07-16 | Stop reason: HOSPADM

## 2018-02-26 RX ORDER — OMEPRAZOLE 20 MG/1
20 CAPSULE, DELAYED RELEASE ORAL DAILY
Qty: 90 CAPSULE | Refills: 1 | Status: SHIPPED | OUTPATIENT
Start: 2018-02-26 | End: 2018-08-31 | Stop reason: SDUPTHER

## 2018-02-26 RX ORDER — ATORVASTATIN CALCIUM 10 MG/1
10 TABLET, FILM COATED ORAL DAILY
Qty: 90 TABLET | Refills: 1 | Status: SHIPPED | OUTPATIENT
Start: 2018-02-26 | End: 2018-08-31 | Stop reason: SDUPTHER

## 2018-02-26 RX ORDER — OMEPRAZOLE 20 MG/1
1 CAPSULE, DELAYED RELEASE ORAL DAILY
COMMUNITY
Start: 2017-09-05 | End: 2018-02-26 | Stop reason: SDUPTHER

## 2018-02-26 NOTE — PROGRESS NOTES
Assessment/Plan:    Refilled all medications  Instructed to make normal follow up appointment for chronic medical conditions  Try flonase one spray each nostril daily  Take Antibiotic as prescribed  Schedule an appointment for cerumen removal  Debrox in both ears daily about a week prior to appointment  RTO for worsening symptoms  Diagnoses and all orders for this visit:    Acute upper respiratory infection  -     amoxicillin (AMOXIL) 500 mg capsule; Take 1 capsule (500 mg total) by mouth every 8 (eight) hours for 10 days    Impacted cerumen of both ears    Allergic rhinitis, unspecified chronicity, unspecified seasonality, unspecified trigger    Hypertension, unspecified type  -     amLODIPine (NORVASC) 10 mg tablet; Take 1 tablet (10 mg total) by mouth daily  -     benazepril (LOTENSIN) 20 mg tablet; Take 1 tablet (20 mg total) by mouth daily for 180 days  -     hydrochlorothiazide (HYDRODIURIL) 25 mg tablet; Take 1 tablet (25 mg total) by mouth daily for 180 days  -     metoprolol tartrate (LOPRESSOR) 100 mg tablet; Take 1 tablet (100 mg total) by mouth every 12 (twelve) hours for 180 days    Gastroesophageal reflux disease without esophagitis  -     omeprazole (PriLOSEC) 20 mg delayed release capsule; Take 1 capsule (20 mg total) by mouth daily for 180 days    Hyperlipidemia, unspecified hyperlipidemia type  -     atorvastatin (LIPITOR) 10 mg tablet; Take 1 tablet (10 mg total) by mouth daily for 180 days Pt is unaware of dosage    Depression, unspecified depression type  -     sertraline (ZOLOFT) 50 mg tablet; Take 1 5 tablets (75 mg total) by mouth daily for 90 days    Other orders  -     Discontinue: omeprazole (PriLOSEC) 20 mg delayed release capsule; Take 1 capsule by mouth daily  -     Ascorbic Acid (VITAMIN C) 500 MG CAPS; Take 1 capsule by mouth daily  -     Cholecalciferol ([employer]S VITAMIN D) 2000 units CAPS;  Take 1 capsule by mouth daily  -     B COMPLEX VITAMINS PO; Take 1 capsule by mouth daily  -     Calcium Carb-Cholecalciferol (CALTRATE 600+D) 600-800 MG-UNIT TABS; Take 1 tablet by mouth daily          Subjective:      Patient ID: Marie Amor is a 80 y o  female  Symptoms started about a month ago  +moist non productive cough, fatigue   Denies fever, shortness of breath, n/v/d   Feels like her cough is getting worse  Was here to see Dr Navarro Slider 12/29- dx with URI and given Amoxicillin but patient never took      Also having problems sleeping since her  passed away a year ago         The following portions of the patient's history were reviewed and updated as appropriate: allergies, current medications, past family history, past medical history, past social history, past surgical history and problem list     Review of Systems   Constitutional: Negative  HENT: Positive for postnasal drip and rhinorrhea  Eyes: Negative for discharge  Respiratory: Positive for cough  Negative for chest tightness, shortness of breath and wheezing  Cardiovascular: Negative for chest pain, palpitations and leg swelling  Gastrointestinal: Negative for abdominal pain, diarrhea, nausea and vomiting  Neurological: Negative for dizziness, light-headedness and headaches  Psychiatric/Behavioral: Positive for sleep disturbance  The patient is not nervous/anxious  Objective:      /84 (BP Location: Right arm, Patient Position: Sitting, Cuff Size: Standard)   Pulse 55   Temp 97 8 °F (36 6 °C)   Ht 5' 1" (1 549 m)   Wt 84 6 kg (186 lb 9 6 oz)   SpO2 97%   BMI 35 26 kg/m²          Physical Exam   Constitutional: She is oriented to person, place, and time  She appears well-developed and well-nourished  HENT:   Right Ear: External ear normal    Left Ear: External ear normal    Ears:    Nose: Rhinorrhea present  Mouth/Throat: No oropharyngeal exudate or posterior oropharyngeal erythema  Cardiovascular: Normal rate, regular rhythm and normal heart sounds      Pulmonary/Chest: Effort normal and breath sounds normal    Lymphadenopathy:        Head (right side): Submandibular and tonsillar adenopathy present  Head (left side): Submandibular and tonsillar adenopathy present  Neurological: She is alert and oriented to person, place, and time  Psychiatric: She has a normal mood and affect  Her behavior is normal    Vitals reviewed

## 2018-02-27 DIAGNOSIS — K86.2 CYST OF PANCREAS: Primary | ICD-10-CM

## 2018-03-09 ENCOUNTER — TELEPHONE (OUTPATIENT)
Dept: INTERNAL MEDICINE CLINIC | Facility: CLINIC | Age: 83
End: 2018-03-09

## 2018-03-09 PROBLEM — R93.89 THICKENED ENDOMETRIUM: Status: ACTIVE | Noted: 2017-03-01

## 2018-03-09 PROBLEM — K21.9 ACID REFLUX: Status: ACTIVE | Noted: 2017-09-05

## 2018-03-09 PROBLEM — K86.2 PANCREATIC CYST: Status: ACTIVE | Noted: 2017-02-27

## 2018-03-09 PROBLEM — J06.9 ACUTE UPPER RESPIRATORY INFECTION: Status: ACTIVE | Noted: 2017-12-29

## 2018-03-09 PROBLEM — M25.551 RIGHT HIP PAIN: Status: ACTIVE | Noted: 2017-04-25

## 2018-03-09 PROBLEM — M79.89 RIGHT LEG SWELLING: Status: ACTIVE | Noted: 2017-07-27

## 2018-03-10 ENCOUNTER — TRANSCRIBE ORDERS (OUTPATIENT)
Dept: LAB | Facility: CLINIC | Age: 83
End: 2018-03-10

## 2018-03-10 ENCOUNTER — HOSPITAL ENCOUNTER (OUTPATIENT)
Dept: MRI IMAGING | Facility: HOSPITAL | Age: 83
Discharge: HOME | End: 2018-03-10
Attending: SURGERY
Payer: MEDICARE

## 2018-03-10 ENCOUNTER — APPOINTMENT (OUTPATIENT)
Dept: LAB | Facility: CLINIC | Age: 83
End: 2018-03-10
Payer: MEDICARE

## 2018-03-10 DIAGNOSIS — K86.2 CYST OF PANCREAS: ICD-10-CM

## 2018-03-10 LAB
BUN SERPL-MCNC: 13 MG/DL (ref 5–25)
CREAT SERPL-MCNC: 0.92 MG/DL (ref 0.6–1.3)
GFR SERPL CREATININE-BSD FRML MDRD: 58 ML/MIN/1.73SQ M

## 2018-03-10 PROCEDURE — 74183 MRI ABD W/O CNTR FLWD CNTR: CPT

## 2018-03-10 PROCEDURE — 84520 ASSAY OF UREA NITROGEN: CPT

## 2018-03-10 PROCEDURE — 82565 ASSAY OF CREATININE: CPT

## 2018-03-10 PROCEDURE — 76377 3D RENDER W/INTRP POSTPROCES: CPT

## 2018-03-10 PROCEDURE — A9585 GADOBUTROL INJECTION: HCPCS | Performed by: SURGERY

## 2018-03-10 PROCEDURE — 36415 COLL VENOUS BLD VENIPUNCTURE: CPT

## 2018-03-10 RX ADMIN — GADOBUTROL 7 ML: 604.72 INJECTION INTRAVENOUS at 11:17

## 2018-03-10 NOTE — TELEPHONE ENCOUNTER
Please check with the patient if she can tolerate Ativan 0 5 mg 1 tablet 15 minutes prior to procedure no driving

## 2018-03-12 DIAGNOSIS — F41.9 ANXIETY: Primary | ICD-10-CM

## 2018-03-12 RX ORDER — LORAZEPAM 0.5 MG/1
TABLET ORAL
Qty: 1 TABLET | Refills: 0 | OUTPATIENT
Start: 2018-03-12 | End: 2019-07-16 | Stop reason: HOSPADM

## 2018-03-12 NOTE — TELEPHONE ENCOUNTER
Ok for Ativan per patient  Sent to you for authorization  PDMP checked and good  No fills on file for this medication

## 2018-03-13 ENCOUNTER — OFFICE VISIT (OUTPATIENT)
Dept: SURGICAL ONCOLOGY | Facility: CLINIC | Age: 83
End: 2018-03-13
Payer: MEDICARE

## 2018-03-13 VITALS
WEIGHT: 189 LBS | SYSTOLIC BLOOD PRESSURE: 144 MMHG | TEMPERATURE: 97.5 F | HEIGHT: 61 IN | BODY MASS INDEX: 35.68 KG/M2 | DIASTOLIC BLOOD PRESSURE: 80 MMHG | RESPIRATION RATE: 16 BRPM | HEART RATE: 74 BPM

## 2018-03-13 DIAGNOSIS — K86.2 CYST OF PANCREAS: ICD-10-CM

## 2018-03-13 DIAGNOSIS — D49.0 IPMN (INTRADUCTAL PAPILLARY MUCINOUS NEOPLASM): Primary | ICD-10-CM

## 2018-03-13 PROCEDURE — 99214 OFFICE O/P EST MOD 30 MIN: CPT | Performed by: SURGERY
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
## 2018-03-23 ENCOUNTER — TELEPHONE (OUTPATIENT)
Dept: INTERNAL MEDICINE CLINIC | Facility: CLINIC | Age: 83
End: 2018-03-23
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
## 2018-05-04 ENCOUNTER — TELEPHONE (OUTPATIENT)
Dept: GERIATRICS | Age: 83
End: 2018-05-04

## 2018-05-07 ENCOUNTER — OFFICE VISIT (OUTPATIENT)
Dept: GERIATRICS | Age: 83
End: 2018-05-07
Payer: COMMERCIAL

## 2018-05-07 VITALS
TEMPERATURE: 97.3 F | WEIGHT: 186.5 LBS | HEART RATE: 62 BPM | RESPIRATION RATE: 16 BRPM | BODY MASS INDEX: 33.04 KG/M2 | HEIGHT: 63 IN | DIASTOLIC BLOOD PRESSURE: 72 MMHG | SYSTOLIC BLOOD PRESSURE: 130 MMHG

## 2018-05-07 DIAGNOSIS — F32.A ANXIETY AND DEPRESSION: ICD-10-CM

## 2018-05-07 DIAGNOSIS — H91.93 HEARING PROBLEM OF BOTH EARS: ICD-10-CM

## 2018-05-07 DIAGNOSIS — R41.3 MEMORY CHANGES: Primary | ICD-10-CM

## 2018-05-07 DIAGNOSIS — F41.9 ANXIETY AND DEPRESSION: ICD-10-CM

## 2018-05-07 DIAGNOSIS — R26.2 AMBULATORY DYSFUNCTION: ICD-10-CM

## 2018-05-07 DIAGNOSIS — Z91.89 DRIVING SAFETY ISSUE: ICD-10-CM

## 2018-05-07 DIAGNOSIS — Z79.899 POLYPHARMACY: ICD-10-CM

## 2018-05-07 PROCEDURE — 99205 OFFICE O/P NEW HI 60 MIN: CPT | Performed by: INTERNAL MEDICINE

## 2018-05-07 NOTE — PROGRESS NOTES
MSW met with patient for initial geriatric assessment  Pt presented as alert, and oriented X 3  Pt completed MoCA 22/30 and Geriatric Depression Scale 2/15  Pt reports difficulty with recent loss of  and reports periods of depression but denies any suicidal ideation  Also reports goal is to sell her  Home and purchase a smaller home to move into with daughter Danny Aguilar  MSW met with pt daughter, Rosemary Busby, who reports memory concerns began prior to her dad's death who reported memory concerns about wife to Rosemary Busby reports pt well-being, long term-needs and medication management are concerns  Will discuss with team and create care plan

## 2018-05-08 DIAGNOSIS — I10 ESSENTIAL HYPERTENSION: Primary | ICD-10-CM

## 2018-05-08 RX ORDER — AMLODIPINE BESYLATE AND BENAZEPRIL HYDROCHLORIDE 10; 20 MG/1; MG/1
CAPSULE ORAL
Qty: 90 CAPSULE | Refills: 2 | Status: SHIPPED | OUTPATIENT
Start: 2018-05-08 | End: 2019-07-16 | Stop reason: HOSPADM

## 2018-05-16 ENCOUNTER — TELEPHONE (OUTPATIENT)
Dept: GERIATRICS | Age: 83
End: 2018-05-16

## 2018-05-17 ENCOUNTER — OFFICE VISIT (OUTPATIENT)
Dept: GERIATRICS | Age: 83
End: 2018-05-17
Payer: COMMERCIAL

## 2018-05-17 DIAGNOSIS — R41.3 MEMORY LOSS: ICD-10-CM

## 2018-05-17 PROCEDURE — 96120 PR NEUROPSYCH TESTING BY COMPUTER: CPT | Performed by: INTERNAL MEDICINE

## 2018-06-18 ENCOUNTER — TELEPHONE (OUTPATIENT)
Dept: GERIATRICS | Age: 83
End: 2018-06-18

## 2018-06-18 ENCOUNTER — OFFICE VISIT (OUTPATIENT)
Dept: GERIATRICS | Age: 83
End: 2018-06-18
Payer: COMMERCIAL

## 2018-06-18 VITALS
RESPIRATION RATE: 16 BRPM | HEART RATE: 60 BPM | WEIGHT: 189 LBS | BODY MASS INDEX: 33.49 KG/M2 | TEMPERATURE: 99.6 F | SYSTOLIC BLOOD PRESSURE: 140 MMHG | DIASTOLIC BLOOD PRESSURE: 80 MMHG | HEIGHT: 63 IN

## 2018-06-18 DIAGNOSIS — Z76.89 SLEEP CONCERN: ICD-10-CM

## 2018-06-18 DIAGNOSIS — Z91.89 DRIVING SAFETY ISSUE: Primary | ICD-10-CM

## 2018-06-18 DIAGNOSIS — R26.2 AMBULATORY DYSFUNCTION: ICD-10-CM

## 2018-06-18 DIAGNOSIS — F41.9 ANXIETY: ICD-10-CM

## 2018-06-18 DIAGNOSIS — Z79.899 POLYPHARMACY: ICD-10-CM

## 2018-06-18 DIAGNOSIS — G31.84 MILD COGNITIVE IMPAIRMENT: ICD-10-CM

## 2018-06-18 PROCEDURE — 99215 OFFICE O/P EST HI 40 MIN: CPT | Performed by: INTERNAL MEDICINE
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
## 2018-08-09 DIAGNOSIS — F32.A DEPRESSION, UNSPECIFIED DEPRESSION TYPE: ICD-10-CM

## 2018-08-31 DIAGNOSIS — K21.9 GASTROESOPHAGEAL REFLUX DISEASE WITHOUT ESOPHAGITIS: ICD-10-CM

## 2018-08-31 DIAGNOSIS — E78.5 HYPERLIPIDEMIA, UNSPECIFIED HYPERLIPIDEMIA TYPE: ICD-10-CM

## 2018-08-31 DIAGNOSIS — I10 HYPERTENSION, UNSPECIFIED TYPE: ICD-10-CM

## 2018-08-31 RX ORDER — AMLODIPINE BESYLATE 10 MG/1
10 TABLET ORAL DAILY
Qty: 90 TABLET | Refills: 1 | Status: SHIPPED | OUTPATIENT
Start: 2018-08-31 | End: 2018-11-14 | Stop reason: SDUPTHER

## 2018-08-31 RX ORDER — HYDROCHLOROTHIAZIDE 25 MG/1
25 TABLET ORAL DAILY
Qty: 90 TABLET | Refills: 1 | Status: SHIPPED | OUTPATIENT
Start: 2018-08-31 | End: 2018-11-14 | Stop reason: SDUPTHER

## 2018-08-31 RX ORDER — ATORVASTATIN CALCIUM 10 MG/1
10 TABLET, FILM COATED ORAL DAILY
Qty: 90 TABLET | Refills: 1 | Status: SHIPPED | OUTPATIENT
Start: 2018-08-31 | End: 2018-11-14 | Stop reason: SDUPTHER

## 2018-08-31 RX ORDER — OMEPRAZOLE 20 MG/1
20 CAPSULE, DELAYED RELEASE ORAL DAILY
Qty: 90 CAPSULE | Refills: 1 | Status: SHIPPED | OUTPATIENT
Start: 2018-08-31 | End: 2018-11-14 | Stop reason: SDUPTHER

## 2018-09-10 ENCOUNTER — OFFICE VISIT (OUTPATIENT)
Dept: URGENT CARE | Facility: MEDICAL CENTER | Age: 83
End: 2018-09-10
Payer: COMMERCIAL

## 2018-09-10 ENCOUNTER — APPOINTMENT (OUTPATIENT)
Dept: RADIOLOGY | Facility: MEDICAL CENTER | Age: 83
End: 2018-09-10
Payer: COMMERCIAL

## 2018-09-10 VITALS
TEMPERATURE: 97.9 F | RESPIRATION RATE: 18 BRPM | WEIGHT: 180 LBS | SYSTOLIC BLOOD PRESSURE: 106 MMHG | HEART RATE: 60 BPM | BODY MASS INDEX: 33.99 KG/M2 | OXYGEN SATURATION: 96 % | DIASTOLIC BLOOD PRESSURE: 64 MMHG | HEIGHT: 61 IN

## 2018-09-10 DIAGNOSIS — R53.83 OTHER FATIGUE: ICD-10-CM

## 2018-09-10 DIAGNOSIS — R53.83 OTHER FATIGUE: Primary | ICD-10-CM

## 2018-09-10 DIAGNOSIS — J20.9 ACUTE BRONCHITIS, UNSPECIFIED ORGANISM: ICD-10-CM

## 2018-09-10 DIAGNOSIS — J30.9 ALLERGIC RHINITIS, UNSPECIFIED SEASONALITY, UNSPECIFIED TRIGGER: ICD-10-CM

## 2018-09-10 PROCEDURE — 99213 OFFICE O/P EST LOW 20 MIN: CPT | Performed by: FAMILY MEDICINE

## 2018-09-10 PROCEDURE — 71046 X-RAY EXAM CHEST 2 VIEWS: CPT

## 2018-09-10 RX ORDER — FLUTICASONE PROPIONATE 50 MCG
2 SPRAY, SUSPENSION (ML) NASAL DAILY
Qty: 16 G | Refills: 0 | Status: SHIPPED | OUTPATIENT
Start: 2018-09-10 | End: 2019-03-15

## 2018-09-10 RX ORDER — BENZONATATE 100 MG/1
100 CAPSULE ORAL 3 TIMES DAILY PRN
Qty: 20 CAPSULE | Refills: 0 | Status: SHIPPED | OUTPATIENT
Start: 2018-09-10 | End: 2018-09-25 | Stop reason: SDUPTHER

## 2018-09-10 RX ORDER — ALBUTEROL SULFATE 90 UG/1
2 AEROSOL, METERED RESPIRATORY (INHALATION) EVERY 6 HOURS PRN
Qty: 1 INHALER | Refills: 0 | Status: SHIPPED | OUTPATIENT
Start: 2018-09-10 | End: 2019-07-18
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
## 2018-09-10 NOTE — PATIENT INSTRUCTIONS
Chest x-ray reveals no infiltrates or effusion  Placed the patient on Ventolin inhaler every 4-6 hours, Tessalon Perles 100 mg q 8 hours  For symptoms of allergic rhinitis, I prescribed fluticasone nasal spray  She is to complete course of doxycycline as prescribed  She is to follow-up per primary care provider symptoms worsen or go to the emergency room  Allergic Rhinitis   WHAT YOU NEED TO KNOW:   Allergic rhinitis, or hay fever, is swelling of the inside of your nose  The swelling is a reaction to allergens in the air  An allergen can be anything that causes an allergic reaction  Allergies to weeds, grass, trees, or mold often cause seasonal allergic rhinitis  Indoor dust mites, cockroaches, pet dander, or mold can also cause allergic rhinitis  DISCHARGE INSTRUCTIONS:   Call 911 for the following:   · You have chest pain or shortness of breath  Return to the emergency department if:   · You have severe pain  · You cough up blood  Contact your healthcare provider if:   · You have a fever  · You have ear or sinus pain, or a headache  · Your symptoms get worse, even after treatment  · You have yellow, green, brown, or bloody mucus coming from your nose  · Your nose is bleeding or you have pain inside your nose  · You have trouble sleeping because of your symptoms  · You have questions or concerns about your condition or care  Medicines:   · Medicines  help decrease your symptoms and clear your stuffy nose  · Take your medicine as directed  Contact your healthcare provider if you think your medicine is not helping or if you have side effects  Tell him of her if you are allergic to any medicine  Keep a list of the medicines, vitamins, and herbs you take  Include the amounts, and when and why you take them  Bring the list or the pill bottles to follow-up visits  Carry your medicine list with you in case of an emergency    How to manage allergic rhinitis:  The best way to manage allergic rhinitis is to avoid allergens that can trigger your symptoms  Any of the following may help decrease your symptoms:  · Rinse your nose and sinuses  with a salt water solution or use a salt water nasal spray  This will help thin the mucus in your nose and rinse away pollen and dirt  It will also help reduce swelling so you can breathe normally  Ask your healthcare provider how often to rinse your nose  · Reduce exposure to dust mites  Wash sheets and towels in hot water every week  Cover your pillows and mattresses with allergen-free covers  Limit the number of stuffed animals and soft toys your child has  Wash your child's toys in hot water regularly  Vacuum weekly and use a vacuum  with an air filter  If possible, get rid of carpets and curtains  These collect dust and dust mites  · Reduce exposure to pollen  Keep windows and doors closed in your house and car  Stay inside when air pollution or the pollen count is high  Run your air conditioner on recycle, and change air filters often  Shower and wash your hair before bed every night to rinse away pollen  · Reduce exposure to pet dander  If possible, do not keep cats, dogs, birds, or other pets  If you do keep pets in your home, keep them out of bedrooms and carpeted rooms  Bathe them often  · Reduce exposure to mold  Do not spend time in basements  Choose artificial plants instead of live plants  Keep your home's humidity at less than 45%  Do not have ponds or standing water in your home or yard  · Do not smoke  Avoid others who smoke  Ask your healthcare provider for information if you currently smoke and need help to quit  Follow up with your healthcare provider as directed: You may need to see an allergist often to control your symptoms  Write down your questions so you remember to ask them during your visits    © 2017 [employer] Information is for End User's use only and may not be sold, redistributed or otherwise used for commercial purposes  All illustrations and images included in CareNotes® are the copyrighted property of [employer]  or [employer]  The above information is an  only  It is not intended as medical advice for individual conditions or treatments  Talk to your doctor, nurse or pharmacist before following any medical regimen to see if it is safe and effective for you  Acute Bronchitis   WHAT YOU NEED TO KNOW:   Acute bronchitis is swelling and irritation in the air passages of your lungs  This irritation may cause you to cough or have other breathing problems  Acute bronchitis often starts because of another illness, such as a cold or the flu  The illness spreads from your nose and throat to your windpipe and airways  Bronchitis is often called a chest cold  Acute bronchitis lasts about 3 to 6 weeks and is usually not a serious illness  Your cough can last for several weeks  DISCHARGE INSTRUCTIONS:   Return to the emergency department if:   · You cough up blood  · Your lips or fingernails turn blue  · You feel like you are not getting enough air when you breathe  Contact your healthcare provider if:   · You have a fever  · Your breathing problems do not go away or get worse  · Your cough does not get better within 4 weeks  · You have questions or concerns about your condition or care  Self-care:   · Get more rest   Rest helps your body to heal  Slowly start to do more each day  Rest when you feel it is needed  · Avoid irritants in the air  Avoid chemicals, fumes, and dust  Wear a face mask if you must work around dust or fumes  Stay inside on days when air pollution levels are high  If you have allergies, stay inside when pollen counts are high  Do not use aerosol products, such as spray-on deodorant, bug spray, and hair spray  · Do not smoke or be around others who smoke    Nicotine and other chemicals in cigarettes and cigars damages the cilia that move mucus out of your lungs  Ask your healthcare provider for information if you currently smoke and need help to quit  E-cigarettes or smokeless tobacco still contain nicotine  Talk to your healthcare provider before you use these products  · Drink liquids as directed  Liquids help keep your air passages moist and help you cough up mucus  You may need to drink more liquids when you have acute bronchitis  Ask how much liquid to drink each day and which liquids are best for you  · Use a humidifier or vaporizer  Use a cool mist humidifier or a vaporizer to increase air moisture in your home  This may make it easier for you to breathe and help decrease your cough  Decrease risk for acute bronchitis:   · Get the vaccinations you need  Ask your healthcare provider if you should get vaccinated against the flu or pneumonia  · Prevent the spread of germs  You can decrease your risk of acute bronchitis and other illnesses by doing the following:     [facility] your hands often with soap and water  Carry germ-killing hand lotion or gel with you  You can use the lotion or gel to clean your hands when soap and water are not available  ¨ Do not touch your eyes, nose, or mouth unless you have washed your hands first     ¨ Always cover your mouth when you cough to prevent the spread of germs  It is best to cough into a tissue or your shirt sleeve instead of into your hand  Ask those around you cover their mouths when they cough  ¨ Try to avoid people who have a cold or the flu  If you are sick, stay away from others as much as possible  Medicines: Your healthcare provider may  give you any of the following:  · Ibuprofen or acetaminophen  are medicines that help lower your fever  They are available without a doctor's order  Ask your healthcare provider which medicine is right for you  Ask how much to take and how often to take it  Follow directions   These medicines can cause stomach bleeding if not taken correctly  Ibuprofen can cause kidney damage  Do not take ibuprofen if you have kidney disease, an ulcer, or allergies to aspirin  Acetaminophen can cause liver damage  Do not take more than 4,000 milligrams in 24 hours  · Decongestants  help loosen mucus in your lungs and make it easier to cough up  This can help you breathe easier  · Cough suppressants  decrease your urge to cough  If your cough produces mucus, do not take a cough suppressant unless your healthcare provider tells you to  Your healthcare provider may suggest that you take a cough suppressant at night so you can rest     · Inhalers  may be given  Your healthcare provider may give you one or more inhalers to help you breathe easier and cough less  An inhaler gives your medicine to open your airways  Ask your healthcare provider to show you how to use your inhaler correctly  · Take your medicine as directed  Contact your healthcare provider if you think your medicine is not helping or if you have side effects  Tell him of her if you are allergic to any medicine  Keep a list of the medicines, vitamins, and herbs you take  Include the amounts, and when and why you take them  Bring the list or the pill bottles to follow-up visits  Carry your medicine list with you in case of an emergency  Follow up with your healthcare provider as directed:  Write down questions you have so you will remember to ask them during your follow-up visits  © 2017 [employer] Information is for End User's use only and may not be sold, redistributed or otherwise used for commercial purposes  All illustrations and images included in CareNotes® are the copyrighted property of [employer]  or [employer]  The above information is an  only  It is not intended as medical advice for individual conditions or treatments   Talk to your doctor, nurse or pharmacist before following any medical regimen to see if it is safe and effective for you

## 2018-09-24 ENCOUNTER — TELEPHONE (OUTPATIENT)
Dept: INTERNAL MEDICINE CLINIC | Facility: CLINIC | Age: 83
End: 2018-09-24

## 2018-09-24 NOTE — TELEPHONE ENCOUNTER
Pt's granddaughter called in stating the pt was at urgent care recently and given benzonatate for her cough    Asking if you can please re-send that same script in as she is running out - 100mg quant  30+  [facility]       Call granddaughter once sent please

## 2018-09-25 DIAGNOSIS — J20.9 ACUTE BRONCHITIS, UNSPECIFIED ORGANISM: ICD-10-CM

## 2018-09-25 RX ORDER — BENZONATATE 100 MG/1
100 CAPSULE ORAL 3 TIMES DAILY PRN
Qty: 20 CAPSULE | Refills: 0 | Status: SHIPPED | OUTPATIENT
Start: 2018-09-25 | End: 2019-03-15

## 2018-09-25 NOTE — TELEPHONE ENCOUNTER
Spoke to patient's grand daughter Ofilia Needle and advised  She will call to set up an appointment once she speaks to the patient

## 2018-09-25 NOTE — TELEPHONE ENCOUNTER
Please call patient for appointment per Dr Zaida Verdin, please verify medication, dose and directions for use   Thank you

## 2018-09-25 NOTE — TELEPHONE ENCOUNTER
Sent to you for authorization  LM on grand daughter's VM to CB to set up an appointment for the patient

## 2018-09-25 NOTE — TELEPHONE ENCOUNTER
Pt's granddaughter asked that we please call once it's authorized - she will then call [facility] and get schedule to make an appt

## 2018-11-10 DIAGNOSIS — F32.A DEPRESSION, UNSPECIFIED DEPRESSION TYPE: ICD-10-CM

## 2018-11-14 DIAGNOSIS — I10 HYPERTENSION, UNSPECIFIED TYPE: ICD-10-CM

## 2018-11-14 DIAGNOSIS — K21.9 GASTROESOPHAGEAL REFLUX DISEASE WITHOUT ESOPHAGITIS: ICD-10-CM

## 2018-11-14 DIAGNOSIS — E78.5 HYPERLIPIDEMIA, UNSPECIFIED HYPERLIPIDEMIA TYPE: ICD-10-CM

## 2018-11-14 RX ORDER — AMLODIPINE BESYLATE 10 MG/1
10 TABLET ORAL DAILY
Qty: 90 TABLET | Refills: 0 | Status: SHIPPED | OUTPATIENT
Start: 2018-11-14 | End: 2019-02-08 | Stop reason: SDUPTHER

## 2018-11-14 RX ORDER — ATORVASTATIN CALCIUM 10 MG/1
10 TABLET, FILM COATED ORAL DAILY
Qty: 90 TABLET | Refills: 0 | Status: SHIPPED | OUTPATIENT
Start: 2018-11-14 | End: 2019-05-03 | Stop reason: SDUPTHER

## 2018-11-14 RX ORDER — HYDROCHLOROTHIAZIDE 25 MG/1
25 TABLET ORAL DAILY
Qty: 90 TABLET | Refills: 0 | Status: SHIPPED | OUTPATIENT
Start: 2018-11-14 | End: 2019-05-03 | Stop reason: SDUPTHER

## 2018-11-14 RX ORDER — OMEPRAZOLE 20 MG/1
20 CAPSULE, DELAYED RELEASE ORAL DAILY
Qty: 90 CAPSULE | Refills: 0 | Status: SHIPPED | OUTPATIENT
Start: 2018-11-14 | End: 2019-05-03 | Stop reason: SDUPTHER

## 2018-11-14 NOTE — TELEPHONE ENCOUNTER
Patient recently moved and lost her medications and is asking for refills on all of them      Please advise

## 2019-01-11 DIAGNOSIS — I10 HYPERTENSION, UNSPECIFIED TYPE: ICD-10-CM

## 2019-01-11 RX ORDER — METOPROLOL TARTRATE 100 MG/1
100 TABLET ORAL EVERY 12 HOURS SCHEDULED
Qty: 180 TABLET | Refills: 0 | Status: SHIPPED | OUTPATIENT
Start: 2019-01-11 | End: 2019-07-18

## 2019-02-08 DIAGNOSIS — I10 HYPERTENSION, UNSPECIFIED TYPE: ICD-10-CM

## 2019-02-09 RX ORDER — AMLODIPINE BESYLATE 10 MG/1
TABLET ORAL
Qty: 90 TABLET | Refills: 0 | Status: SHIPPED | OUTPATIENT
Start: 2019-02-09 | End: 2019-05-03 | Stop reason: SDUPTHER

## 2019-03-15 ENCOUNTER — OFFICE VISIT (OUTPATIENT)
Dept: URGENT CARE | Facility: MEDICAL CENTER | Age: 84
End: 2019-03-15
Payer: COMMERCIAL

## 2019-03-15 VITALS
HEART RATE: 56 BPM | HEIGHT: 63 IN | SYSTOLIC BLOOD PRESSURE: 156 MMHG | DIASTOLIC BLOOD PRESSURE: 78 MMHG | OXYGEN SATURATION: 96 % | BODY MASS INDEX: 31.18 KG/M2 | RESPIRATION RATE: 18 BRPM | TEMPERATURE: 98.8 F | WEIGHT: 176 LBS

## 2019-03-15 DIAGNOSIS — IMO0001 COLD: Primary | ICD-10-CM

## 2019-03-15 PROCEDURE — 99213 OFFICE O/P EST LOW 20 MIN: CPT | Performed by: PHYSICIAN ASSISTANT

## 2019-03-15 RX ORDER — FLUTICASONE PROPIONATE 50 MCG
2 SPRAY, SUSPENSION (ML) NASAL DAILY
Qty: 16 G | Refills: 0 | Status: SHIPPED | OUTPATIENT
Start: 2019-03-15 | End: 2019-07-16 | Stop reason: HOSPADM

## 2019-03-15 RX ORDER — CETIRIZINE HYDROCHLORIDE 10 MG/1
5 TABLET ORAL DAILY
Qty: 30 TABLET | Refills: 0 | Status: SHIPPED | OUTPATIENT
Start: 2019-03-15 | End: 2019-07-16 | Stop reason: HOSPADM

## 2019-03-15 NOTE — PATIENT INSTRUCTIONS
Use medications as directed  Motrin and/or Tylenol as needed for aches and pains or any fevers  Follow up with PCP in 3-5 days  Proceed to  ER if symptoms worsen  Cold Symptoms   AMBULATORY CARE:   Cold symptoms  include sneezing, dry throat, a stuffy nose, headache, watery eyes, and a cough  Your cough may be dry, or you may cough up mucus  You may also have muscle aches, joint pain, and tiredness  Rarely, you may have a fever  Cold symptoms occur from inflammation in your upper respiratory system caused by a virus  Most colds go away without treatment  Seek care immediately if:   · You have increased tiredness and weakness  · You are unable to eat  · Your heart is beating much faster than usual for you  · You see white spots in the back of your throat and your neck is swollen and sore to the touch  · You see pinpoint or larger reddish-purple dots on your skin  Contact your healthcare provider if:   · You have a fever higher than 102°F (38 9°C)  · You have new or worsening shortness of breath  · You have thick nasal drainage for more than 2 days  · Your symptoms do not improve or get worse within 5 days  · You have questions or concerns about your condition or care  Treatment for cold symptoms  may include NSAIDS to decrease muscle aches and fever  Cold medicines may also be given to decrease coughing, nasal stuffiness, sneezing, and a runny nose  Manage your cold symptoms: The following may help relieve cold symptoms, such as a dry throat and congestion:  · Gargle with mouthwash or warm salt water as directed  · Suck on throat lozenges or hard candy  · Use a cold or warm vaporizer or humidifier to ease your breathing  · Rest for at least 2 days and then as needed to decrease tiredness and weakness  · Use petroleum based jelly around your nostrils to decrease irritation from blowing your nose  · Drink plenty of liquids   Liquids will help thin and loosen thick mucus so you can cough it up  Liquids will also keep you hydrated  Ask your healthcare provider which liquids are best for you and how much to drink each day  Prevent the spread of germs  by washing your hands often  You can spread your cold germs to others for at least 3 days after your symptoms start  Do not share items, such as eating utensils  Cover your nose and mouth when you cough or sneeze using the crook of your elbow instead of your hands  Throw used tissues in the garbage  Do not smoke:  Smoking may worsen your symptoms and increase the length of time you feel sick  Talk with your healthcare provider if you need help to stop smoking  Follow up with your healthcare provider as directed:  Write down your questions so you remember to ask them during your visits  © 2017 [employer] Information is for End User's use only and may not be sold, redistributed or otherwise used for commercial purposes  All illustrations and images included in CareNotes® are the copyrighted property of [employer]  or [employer]  The above information is an  only  It is not intended as medical advice for individual conditions or treatments  Talk to your doctor, nurse or pharmacist before following any medical regimen to see if it is safe and effective for you
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
## 2019-03-26 ENCOUNTER — APPOINTMENT (OUTPATIENT)
Dept: LAB | Facility: MEDICAL CENTER | Age: 84
End: 2019-03-26
Payer: COMMERCIAL

## 2019-03-26 DIAGNOSIS — D49.0 IPMN (INTRADUCTAL PAPILLARY MUCINOUS NEOPLASM): ICD-10-CM

## 2019-03-26 LAB
BUN SERPL-MCNC: 10 MG/DL (ref 5–25)
CREAT SERPL-MCNC: 0.72 MG/DL (ref 0.6–1.3)
GFR SERPL CREATININE-BSD FRML MDRD: 77 ML/MIN/1.73SQ M

## 2019-03-26 PROCEDURE — 36415 COLL VENOUS BLD VENIPUNCTURE: CPT

## 2019-03-26 PROCEDURE — 82565 ASSAY OF CREATININE: CPT

## 2019-03-26 PROCEDURE — 84520 ASSAY OF UREA NITROGEN: CPT

## 2019-03-30 ENCOUNTER — HOSPITAL ENCOUNTER (OUTPATIENT)
Dept: MRI IMAGING | Facility: HOSPITAL | Age: 84
Discharge: HOME | End: 2019-03-30
Attending: SURGERY
Payer: COMMERCIAL

## 2019-03-30 DIAGNOSIS — D49.0 IPMN (INTRADUCTAL PAPILLARY MUCINOUS NEOPLASM): ICD-10-CM

## 2019-03-30 PROCEDURE — A9585 GADOBUTROL INJECTION: HCPCS | Performed by: SURGERY

## 2019-03-30 PROCEDURE — 74183 MRI ABD W/O CNTR FLWD CNTR: CPT

## 2019-03-30 RX ADMIN — GADOBUTROL 8 ML: 604.72 INJECTION INTRAVENOUS at 10:14

## 2019-04-07 ENCOUNTER — OFFICE VISIT (OUTPATIENT)
Dept: URGENT CARE | Facility: MEDICAL CENTER | Age: 84
End: 2019-04-07
Payer: COMMERCIAL

## 2019-04-07 VITALS
BODY MASS INDEX: 31.18 KG/M2 | TEMPERATURE: 98.2 F | WEIGHT: 176 LBS | HEIGHT: 63 IN | DIASTOLIC BLOOD PRESSURE: 78 MMHG | OXYGEN SATURATION: 98 % | SYSTOLIC BLOOD PRESSURE: 138 MMHG | HEART RATE: 65 BPM | RESPIRATION RATE: 20 BRPM

## 2019-04-07 DIAGNOSIS — A08.4 VIRAL GASTROENTERITIS: Primary | ICD-10-CM

## 2019-04-07 PROCEDURE — 99213 OFFICE O/P EST LOW 20 MIN: CPT | Performed by: FAMILY MEDICINE

## 2019-04-30 ENCOUNTER — TELEPHONE (OUTPATIENT)
Dept: SURGICAL ONCOLOGY | Facility: CLINIC | Age: 84
End: 2019-04-30

## 2019-04-30 ENCOUNTER — OFFICE VISIT (OUTPATIENT)
Dept: SURGICAL ONCOLOGY | Facility: CLINIC | Age: 84
End: 2019-04-30
Payer: COMMERCIAL

## 2019-04-30 VITALS
BODY MASS INDEX: 31.54 KG/M2 | SYSTOLIC BLOOD PRESSURE: 128 MMHG | DIASTOLIC BLOOD PRESSURE: 80 MMHG | WEIGHT: 178 LBS | RESPIRATION RATE: 16 BRPM | HEART RATE: 64 BPM | HEIGHT: 63 IN | TEMPERATURE: 98.7 F

## 2019-04-30 DIAGNOSIS — K86.2 PANCREATIC CYST: Primary | ICD-10-CM

## 2019-04-30 PROCEDURE — 99213 OFFICE O/P EST LOW 20 MIN: CPT | Performed by: SURGERY

## 2019-05-03 DIAGNOSIS — E78.5 HYPERLIPIDEMIA, UNSPECIFIED HYPERLIPIDEMIA TYPE: ICD-10-CM

## 2019-05-03 DIAGNOSIS — I10 HYPERTENSION, UNSPECIFIED TYPE: ICD-10-CM

## 2019-05-03 DIAGNOSIS — K21.9 GASTROESOPHAGEAL REFLUX DISEASE WITHOUT ESOPHAGITIS: ICD-10-CM

## 2019-05-03 RX ORDER — AMLODIPINE BESYLATE 10 MG/1
TABLET ORAL
Qty: 90 TABLET | Refills: 0 | Status: SHIPPED | OUTPATIENT
Start: 2019-05-03 | End: 2019-08-01

## 2019-05-03 RX ORDER — OMEPRAZOLE 20 MG/1
CAPSULE, DELAYED RELEASE ORAL
Qty: 90 CAPSULE | Refills: 0 | Status: SHIPPED | OUTPATIENT
Start: 2019-05-03 | End: 2019-07-18

## 2019-05-03 RX ORDER — ATORVASTATIN CALCIUM 10 MG/1
10 TABLET, FILM COATED ORAL DAILY
Qty: 90 TABLET | Refills: 0 | Status: SHIPPED | OUTPATIENT
Start: 2019-05-03 | End: 2019-07-18

## 2019-05-03 RX ORDER — HYDROCHLOROTHIAZIDE 25 MG/1
25 TABLET ORAL DAILY
Qty: 90 TABLET | Refills: 0 | Status: SHIPPED | OUTPATIENT
Start: 2019-05-03 | End: 2019-08-01

## 2019-05-06 ENCOUNTER — TELEPHONE (OUTPATIENT)
Dept: INTERNAL MEDICINE CLINIC | Facility: CLINIC | Age: 84
End: 2019-05-06

## 2019-06-30 DIAGNOSIS — F32.A DEPRESSION, UNSPECIFIED DEPRESSION TYPE: ICD-10-CM

## 2019-07-09 ENCOUNTER — APPOINTMENT (INPATIENT)
Dept: RADIOLOGY | Facility: HOSPITAL | Age: 84
DRG: 482 | End: 2019-07-09
Payer: COMMERCIAL

## 2019-07-09 ENCOUNTER — HOSPITAL ENCOUNTER (INPATIENT)
Facility: HOSPITAL | Age: 84
LOS: 7 days | Discharge: NURSING, REHAB OR LONG-TERM CARE | DRG: 482 | End: 2019-07-16
Attending: SURGERY | Admitting: SURGERY
Payer: COMMERCIAL

## 2019-07-09 DIAGNOSIS — S72.001A CLOSED FRACTURE OF RIGHT HIP, INITIAL ENCOUNTER (HCC): Primary | ICD-10-CM

## 2019-07-09 LAB
ABO GROUP BLD: NORMAL
ANION GAP SERPL CALCULATED.3IONS-SCNC: 7 MMOL/L (ref 4–13)
BASOPHILS # BLD AUTO: 0.01 THOUSANDS/ΜL (ref 0–0.1)
BASOPHILS NFR BLD AUTO: 0 % (ref 0–1)
BLD GP AB SCN SERPL QL: NEGATIVE
BUN SERPL-MCNC: 13 MG/DL (ref 5–25)
CALCIUM SERPL-MCNC: 9 MG/DL (ref 8.3–10.1)
CHLORIDE SERPL-SCNC: 103 MMOL/L (ref 100–108)
CO2 SERPL-SCNC: 26 MMOL/L (ref 21–32)
CREAT SERPL-MCNC: 0.66 MG/DL (ref 0.6–1.3)
EOSINOPHIL # BLD AUTO: 0 THOUSAND/ΜL (ref 0–0.61)
EOSINOPHIL NFR BLD AUTO: 0 % (ref 0–6)
ERYTHROCYTE [DISTWIDTH] IN BLOOD BY AUTOMATED COUNT: 12.9 % (ref 11.6–15.1)
GFR SERPL CREATININE-BSD FRML MDRD: 81 ML/MIN/1.73SQ M
GLUCOSE SERPL-MCNC: 146 MG/DL (ref 65–140)
HCT VFR BLD AUTO: 41.3 % (ref 34.8–46.1)
HGB BLD-MCNC: 14 G/DL (ref 11.5–15.4)
IMM GRANULOCYTES # BLD AUTO: 0.07 THOUSAND/UL (ref 0–0.2)
IMM GRANULOCYTES NFR BLD AUTO: 1 % (ref 0–2)
INR PPP: 1.14 (ref 0.84–1.19)
LYMPHOCYTES # BLD AUTO: 1.44 THOUSANDS/ΜL (ref 0.6–4.47)
LYMPHOCYTES NFR BLD AUTO: 11 % (ref 14–44)
MAGNESIUM SERPL-MCNC: 2 MG/DL (ref 1.6–2.6)
MCH RBC QN AUTO: 30.3 PG (ref 26.8–34.3)
MCHC RBC AUTO-ENTMCNC: 33.9 G/DL (ref 31.4–37.4)
MCV RBC AUTO: 89 FL (ref 82–98)
MONOCYTES # BLD AUTO: 0.59 THOUSAND/ΜL (ref 0.17–1.22)
MONOCYTES NFR BLD AUTO: 5 % (ref 4–12)
NEUTROPHILS # BLD AUTO: 11.06 THOUSANDS/ΜL (ref 1.85–7.62)
NEUTS SEG NFR BLD AUTO: 83 % (ref 43–75)
NRBC BLD AUTO-RTO: 0 /100 WBCS
PLATELET # BLD AUTO: 239 THOUSANDS/UL (ref 149–390)
PMV BLD AUTO: 10.6 FL (ref 8.9–12.7)
POTASSIUM SERPL-SCNC: 3.1 MMOL/L (ref 3.5–5.3)
PROTHROMBIN TIME: 14.2 SECONDS (ref 11.6–14.5)
RBC # BLD AUTO: 4.62 MILLION/UL (ref 3.81–5.12)
RH BLD: NEGATIVE
SODIUM SERPL-SCNC: 136 MMOL/L (ref 136–145)
SPECIMEN EXPIRATION DATE: NORMAL
WBC # BLD AUTO: 13.17 THOUSAND/UL (ref 4.31–10.16)

## 2019-07-09 PROCEDURE — 71045 X-RAY EXAM CHEST 1 VIEW: CPT

## 2019-07-09 PROCEDURE — 85610 PROTHROMBIN TIME: CPT | Performed by: STUDENT IN AN ORGANIZED HEALTH CARE EDUCATION/TRAINING PROGRAM

## 2019-07-09 PROCEDURE — 99285 EMERGENCY DEPT VISIT HI MDM: CPT

## 2019-07-09 PROCEDURE — 99223 1ST HOSP IP/OBS HIGH 75: CPT | Performed by: ORTHOPAEDIC SURGERY

## 2019-07-09 PROCEDURE — 83735 ASSAY OF MAGNESIUM: CPT | Performed by: STUDENT IN AN ORGANIZED HEALTH CARE EDUCATION/TRAINING PROGRAM

## 2019-07-09 PROCEDURE — 36415 COLL VENOUS BLD VENIPUNCTURE: CPT | Performed by: STUDENT IN AN ORGANIZED HEALTH CARE EDUCATION/TRAINING PROGRAM

## 2019-07-09 PROCEDURE — 80048 BASIC METABOLIC PNL TOTAL CA: CPT | Performed by: STUDENT IN AN ORGANIZED HEALTH CARE EDUCATION/TRAINING PROGRAM

## 2019-07-09 PROCEDURE — 73552 X-RAY EXAM OF FEMUR 2/>: CPT

## 2019-07-09 PROCEDURE — 85025 COMPLETE CBC W/AUTO DIFF WBC: CPT | Performed by: STUDENT IN AN ORGANIZED HEALTH CARE EDUCATION/TRAINING PROGRAM

## 2019-07-09 PROCEDURE — 86900 BLOOD TYPING SEROLOGIC ABO: CPT | Performed by: STUDENT IN AN ORGANIZED HEALTH CARE EDUCATION/TRAINING PROGRAM

## 2019-07-09 PROCEDURE — 93005 ELECTROCARDIOGRAM TRACING: CPT

## 2019-07-09 PROCEDURE — 86850 RBC ANTIBODY SCREEN: CPT | Performed by: STUDENT IN AN ORGANIZED HEALTH CARE EDUCATION/TRAINING PROGRAM

## 2019-07-09 PROCEDURE — 99222 1ST HOSP IP/OBS MODERATE 55: CPT | Performed by: SURGERY

## 2019-07-09 PROCEDURE — 86901 BLOOD TYPING SEROLOGIC RH(D): CPT | Performed by: STUDENT IN AN ORGANIZED HEALTH CARE EDUCATION/TRAINING PROGRAM

## 2019-07-09 RX ORDER — HEPARIN SODIUM 5000 [USP'U]/ML
5000 INJECTION, SOLUTION INTRAVENOUS; SUBCUTANEOUS EVERY 8 HOURS SCHEDULED
Status: DISCONTINUED | OUTPATIENT
Start: 2019-07-09 | End: 2019-07-11

## 2019-07-09 RX ORDER — OXYCODONE HYDROCHLORIDE 5 MG/1
2.5 TABLET ORAL EVERY 4 HOURS PRN
Status: DISCONTINUED | OUTPATIENT
Start: 2019-07-09 | End: 2019-07-16 | Stop reason: HOSPADM

## 2019-07-09 RX ORDER — SODIUM CHLORIDE 9 MG/ML
100 INJECTION, SOLUTION INTRAVENOUS CONTINUOUS
Status: DISCONTINUED | OUTPATIENT
Start: 2019-07-10 | End: 2019-07-11

## 2019-07-09 RX ORDER — SODIUM CHLORIDE, SODIUM LACTATE, POTASSIUM CHLORIDE, CALCIUM CHLORIDE 600; 310; 30; 20 MG/100ML; MG/100ML; MG/100ML; MG/100ML
75 INJECTION, SOLUTION INTRAVENOUS CONTINUOUS
Status: DISCONTINUED | OUTPATIENT
Start: 2019-07-10 | End: 2019-07-11

## 2019-07-09 RX ORDER — AMLODIPINE BESYLATE 10 MG/1
10 TABLET ORAL DAILY
Status: DISCONTINUED | OUTPATIENT
Start: 2019-07-10 | End: 2019-07-16 | Stop reason: HOSPADM

## 2019-07-09 RX ORDER — HYDROCHLOROTHIAZIDE 25 MG/1
25 TABLET ORAL DAILY
Status: DISCONTINUED | OUTPATIENT
Start: 2019-07-10 | End: 2019-07-16 | Stop reason: HOSPADM

## 2019-07-09 RX ORDER — SENNOSIDES 8.6 MG
2 TABLET ORAL DAILY
Status: DISCONTINUED | OUTPATIENT
Start: 2019-07-10 | End: 2019-07-16 | Stop reason: HOSPADM

## 2019-07-09 RX ORDER — ONDANSETRON 2 MG/ML
4 INJECTION INTRAMUSCULAR; INTRAVENOUS EVERY 6 HOURS PRN
Status: DISCONTINUED | OUTPATIENT
Start: 2019-07-09 | End: 2019-07-16 | Stop reason: HOSPADM

## 2019-07-09 RX ORDER — ALBUTEROL SULFATE 90 UG/1
2 AEROSOL, METERED RESPIRATORY (INHALATION) EVERY 6 HOURS PRN
Status: DISCONTINUED | OUTPATIENT
Start: 2019-07-09 | End: 2019-07-16 | Stop reason: HOSPADM

## 2019-07-09 RX ORDER — ACETAMINOPHEN 325 MG/1
650 TABLET ORAL EVERY 6 HOURS PRN
Status: DISCONTINUED | OUTPATIENT
Start: 2019-07-09 | End: 2019-07-10

## 2019-07-09 RX ORDER — METOPROLOL TARTRATE 50 MG/1
100 TABLET, FILM COATED ORAL EVERY 12 HOURS SCHEDULED
Status: DISCONTINUED | OUTPATIENT
Start: 2019-07-09 | End: 2019-07-16 | Stop reason: HOSPADM

## 2019-07-09 RX ORDER — PANTOPRAZOLE SODIUM 20 MG/1
20 TABLET, DELAYED RELEASE ORAL
Status: DISCONTINUED | OUTPATIENT
Start: 2019-07-10 | End: 2019-07-16 | Stop reason: HOSPADM

## 2019-07-09 RX ORDER — OXYCODONE HYDROCHLORIDE 5 MG/1
5 TABLET ORAL EVERY 4 HOURS PRN
Status: DISCONTINUED | OUTPATIENT
Start: 2019-07-09 | End: 2019-07-16 | Stop reason: HOSPADM

## 2019-07-09 RX ADMIN — METOPROLOL TARTRATE 100 MG: 50 TABLET, FILM COATED ORAL at 21:38

## 2019-07-09 RX ADMIN — HEPARIN SODIUM 5000 UNITS: 5000 INJECTION INTRAVENOUS; SUBCUTANEOUS at 21:39

## 2019-07-09 RX ADMIN — SODIUM CHLORIDE, SODIUM LACTATE, POTASSIUM CHLORIDE, AND CALCIUM CHLORIDE 75 ML/HR: .6; .31; .03; .02 INJECTION, SOLUTION INTRAVENOUS at 23:41
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
## 2019-07-10 ENCOUNTER — ANESTHESIA (INPATIENT)
Dept: PERIOP | Facility: HOSPITAL | Age: 84
DRG: 482 | End: 2019-07-10
Payer: COMMERCIAL

## 2019-07-10 ENCOUNTER — APPOINTMENT (INPATIENT)
Dept: RADIOLOGY | Facility: HOSPITAL | Age: 84
DRG: 482 | End: 2019-07-10
Payer: COMMERCIAL

## 2019-07-10 ENCOUNTER — ANESTHESIA EVENT (INPATIENT)
Dept: PERIOP | Facility: HOSPITAL | Age: 84
DRG: 482 | End: 2019-07-10
Payer: COMMERCIAL

## 2019-07-10 LAB
ANION GAP SERPL CALCULATED.3IONS-SCNC: 7 MMOL/L (ref 4–13)
ATRIAL RATE: 65 BPM
ATRIAL RATE: 66 BPM
ATRIAL RATE: 98 BPM
BASOPHILS # BLD AUTO: 0.01 THOUSANDS/ΜL (ref 0–0.1)
BASOPHILS NFR BLD AUTO: 0 % (ref 0–1)
BUN SERPL-MCNC: 13 MG/DL (ref 5–25)
CALCIUM SERPL-MCNC: 8.6 MG/DL (ref 8.3–10.1)
CHLORIDE SERPL-SCNC: 104 MMOL/L (ref 100–108)
CO2 SERPL-SCNC: 30 MMOL/L (ref 21–32)
CREAT SERPL-MCNC: 0.7 MG/DL (ref 0.6–1.3)
EOSINOPHIL # BLD AUTO: 0.02 THOUSAND/ΜL (ref 0–0.61)
EOSINOPHIL NFR BLD AUTO: 0 % (ref 0–6)
ERYTHROCYTE [DISTWIDTH] IN BLOOD BY AUTOMATED COUNT: 13 % (ref 11.6–15.1)
GFR SERPL CREATININE-BSD FRML MDRD: 79 ML/MIN/1.73SQ M
GLUCOSE SERPL-MCNC: 114 MG/DL (ref 65–140)
HCT VFR BLD AUTO: 36.8 % (ref 34.8–46.1)
HGB BLD-MCNC: 12.3 G/DL (ref 11.5–15.4)
IMM GRANULOCYTES # BLD AUTO: 0.04 THOUSAND/UL (ref 0–0.2)
IMM GRANULOCYTES NFR BLD AUTO: 0 % (ref 0–2)
LYMPHOCYTES # BLD AUTO: 1.63 THOUSANDS/ΜL (ref 0.6–4.47)
LYMPHOCYTES NFR BLD AUTO: 15 % (ref 14–44)
MCH RBC QN AUTO: 30.2 PG (ref 26.8–34.3)
MCHC RBC AUTO-ENTMCNC: 33.4 G/DL (ref 31.4–37.4)
MCV RBC AUTO: 90 FL (ref 82–98)
MONOCYTES # BLD AUTO: 1.11 THOUSAND/ΜL (ref 0.17–1.22)
MONOCYTES NFR BLD AUTO: 10 % (ref 4–12)
NEUTROPHILS # BLD AUTO: 7.88 THOUSANDS/ΜL (ref 1.85–7.62)
NEUTS SEG NFR BLD AUTO: 75 % (ref 43–75)
NRBC BLD AUTO-RTO: 0 /100 WBCS
P AXIS: -2 DEGREES
P AXIS: 0 DEGREES
P AXIS: 20 DEGREES
PLATELET # BLD AUTO: 205 THOUSANDS/UL (ref 149–390)
PMV BLD AUTO: 10 FL (ref 8.9–12.7)
POTASSIUM SERPL-SCNC: 3.1 MMOL/L (ref 3.5–5.3)
PR INTERVAL: 212 MS
PR INTERVAL: 216 MS
PR INTERVAL: 232 MS
QRS AXIS: 21 DEGREES
QRS AXIS: 23 DEGREES
QRS AXIS: 76 DEGREES
QRSD INTERVAL: 128 MS
QRSD INTERVAL: 132 MS
QRSD INTERVAL: 134 MS
QT INTERVAL: 386 MS
QT INTERVAL: 454 MS
QT INTERVAL: 462 MS
QTC INTERVAL: 431 MS
QTC INTERVAL: 475 MS
QTC INTERVAL: 480 MS
RBC # BLD AUTO: 4.07 MILLION/UL (ref 3.81–5.12)
SODIUM SERPL-SCNC: 141 MMOL/L (ref 136–145)
T WAVE AXIS: -11 DEGREES
T WAVE AXIS: -43 DEGREES
T WAVE AXIS: -7 DEGREES
VENTRICULAR RATE: 65 BPM
VENTRICULAR RATE: 66 BPM
VENTRICULAR RATE: 75 BPM
WBC # BLD AUTO: 10.69 THOUSAND/UL (ref 4.31–10.16)

## 2019-07-10 PROCEDURE — 93010 ELECTROCARDIOGRAM REPORT: CPT | Performed by: INTERNAL MEDICINE

## 2019-07-10 PROCEDURE — 93005 ELECTROCARDIOGRAM TRACING: CPT

## 2019-07-10 PROCEDURE — 27245 TREAT THIGH FRACTURE: CPT | Performed by: ORTHOPAEDIC SURGERY

## 2019-07-10 PROCEDURE — C1713 ANCHOR/SCREW BN/BN,TIS/BN: HCPCS | Performed by: ORTHOPAEDIC SURGERY

## 2019-07-10 PROCEDURE — 88311 DECALCIFY TISSUE: CPT | Performed by: PATHOLOGY

## 2019-07-10 PROCEDURE — 85025 COMPLETE CBC W/AUTO DIFF WBC: CPT | Performed by: STUDENT IN AN ORGANIZED HEALTH CARE EDUCATION/TRAINING PROGRAM

## 2019-07-10 PROCEDURE — 99232 SBSQ HOSP IP/OBS MODERATE 35: CPT | Performed by: SURGERY

## 2019-07-10 PROCEDURE — 88307 TISSUE EXAM BY PATHOLOGIST: CPT | Performed by: PATHOLOGY

## 2019-07-10 PROCEDURE — 0QS606Z REPOSITION RIGHT UPPER FEMUR WITH INTRAMEDULLARY INTERNAL FIXATION DEVICE, OPEN APPROACH: ICD-10-PCS | Performed by: ORTHOPAEDIC SURGERY

## 2019-07-10 PROCEDURE — 73552 X-RAY EXAM OF FEMUR 2/>: CPT

## 2019-07-10 PROCEDURE — 80048 BASIC METABOLIC PNL TOTAL CA: CPT | Performed by: STUDENT IN AN ORGANIZED HEALTH CARE EDUCATION/TRAINING PROGRAM

## 2019-07-10 PROCEDURE — 99024 POSTOP FOLLOW-UP VISIT: CPT | Performed by: ORTHOPAEDIC SURGERY

## 2019-07-10 PROCEDURE — C1769 GUIDE WIRE: HCPCS | Performed by: ORTHOPAEDIC SURGERY

## 2019-07-10 PROCEDURE — 99223 1ST HOSP IP/OBS HIGH 75: CPT | Performed by: NURSE PRACTITIONER

## 2019-07-10 DEVICE — 11MM/130 DEG TI CANN TROCH FIXATION NAIL 360MM/RIGHT-STER: Type: IMPLANTABLE DEVICE | Site: TROCHANTER | Status: FUNCTIONAL

## 2019-07-10 DEVICE — 11.0MM TI TROCH FIXATION NAIL SCREW/105MM - STERILE: Type: IMPLANTABLE DEVICE | Site: TROCHANTER | Status: FUNCTIONAL

## 2019-07-10 DEVICE — 5.0MM TI LOCKING SCREW W/T25 STARDRIVE 38MM F/IM NAIL-STER: Type: IMPLANTABLE DEVICE | Site: TROCHANTER | Status: FUNCTIONAL

## 2019-07-10 DEVICE — 5.0MM TI LOCKING SCREW W/T25 STARDRIVE 40MM F/IM NAIL-STER: Type: IMPLANTABLE DEVICE | Site: TROCHANTER | Status: FUNCTIONAL

## 2019-07-10 RX ORDER — SODIUM CHLORIDE, SODIUM LACTATE, POTASSIUM CHLORIDE, CALCIUM CHLORIDE 600; 310; 30; 20 MG/100ML; MG/100ML; MG/100ML; MG/100ML
20 INJECTION, SOLUTION INTRAVENOUS CONTINUOUS
Status: CANCELLED | OUTPATIENT
Start: 2019-07-10

## 2019-07-10 RX ORDER — ONDANSETRON 2 MG/ML
4 INJECTION INTRAMUSCULAR; INTRAVENOUS ONCE AS NEEDED
Status: DISCONTINUED | OUTPATIENT
Start: 2019-07-10 | End: 2019-07-11 | Stop reason: HOSPADM

## 2019-07-10 RX ORDER — MAGNESIUM HYDROXIDE 1200 MG/15ML
LIQUID ORAL AS NEEDED
Status: DISCONTINUED | OUTPATIENT
Start: 2019-07-10 | End: 2019-07-10 | Stop reason: HOSPADM

## 2019-07-10 RX ORDER — ONDANSETRON 2 MG/ML
INJECTION INTRAMUSCULAR; INTRAVENOUS AS NEEDED
Status: DISCONTINUED | OUTPATIENT
Start: 2019-07-10 | End: 2019-07-10 | Stop reason: SURG

## 2019-07-10 RX ORDER — CEFAZOLIN SODIUM 2 G/50ML
2000 SOLUTION INTRAVENOUS
Status: COMPLETED | OUTPATIENT
Start: 2019-07-10 | End: 2019-07-10

## 2019-07-10 RX ORDER — SODIUM CHLORIDE, SODIUM LACTATE, POTASSIUM CHLORIDE, CALCIUM CHLORIDE 600; 310; 30; 20 MG/100ML; MG/100ML; MG/100ML; MG/100ML
75 INJECTION, SOLUTION INTRAVENOUS ONCE
Status: COMPLETED | OUTPATIENT
Start: 2019-07-10 | End: 2019-07-11

## 2019-07-10 RX ORDER — POTASSIUM CHLORIDE 14.9 MG/ML
20 INJECTION INTRAVENOUS ONCE
Status: COMPLETED | OUTPATIENT
Start: 2019-07-10 | End: 2019-07-10

## 2019-07-10 RX ORDER — METHOCARBAMOL 500 MG/1
250 TABLET, FILM COATED ORAL EVERY 8 HOURS SCHEDULED
Status: DISCONTINUED | OUTPATIENT
Start: 2019-07-10 | End: 2019-07-16 | Stop reason: HOSPADM

## 2019-07-10 RX ORDER — HYDROMORPHONE HCL/PF 1 MG/ML
0.2 SYRINGE (ML) INJECTION
Status: DISCONTINUED | OUTPATIENT
Start: 2019-07-10 | End: 2019-07-11 | Stop reason: HOSPADM

## 2019-07-10 RX ORDER — PROPOFOL 10 MG/ML
INJECTION, EMULSION INTRAVENOUS AS NEEDED
Status: DISCONTINUED | OUTPATIENT
Start: 2019-07-10 | End: 2019-07-10 | Stop reason: SURG

## 2019-07-10 RX ORDER — POTASSIUM CHLORIDE 20 MEQ/1
20 TABLET, EXTENDED RELEASE ORAL ONCE
Status: COMPLETED | OUTPATIENT
Start: 2019-07-10 | End: 2019-07-10

## 2019-07-10 RX ORDER — CEFAZOLIN SODIUM 2 G/50ML
2000 SOLUTION INTRAVENOUS EVERY 8 HOURS
Status: COMPLETED | OUTPATIENT
Start: 2019-07-11 | End: 2019-07-11

## 2019-07-10 RX ORDER — LIDOCAINE HYDROCHLORIDE 10 MG/ML
INJECTION, SOLUTION INFILTRATION; PERINEURAL AS NEEDED
Status: DISCONTINUED | OUTPATIENT
Start: 2019-07-10 | End: 2019-07-10 | Stop reason: SURG

## 2019-07-10 RX ORDER — FENTANYL CITRATE 50 UG/ML
INJECTION, SOLUTION INTRAMUSCULAR; INTRAVENOUS AS NEEDED
Status: DISCONTINUED | OUTPATIENT
Start: 2019-07-10 | End: 2019-07-10 | Stop reason: SURG

## 2019-07-10 RX ORDER — EPHEDRINE SULFATE 50 MG/ML
INJECTION INTRAVENOUS AS NEEDED
Status: DISCONTINUED | OUTPATIENT
Start: 2019-07-10 | End: 2019-07-10 | Stop reason: SURG

## 2019-07-10 RX ORDER — FENTANYL CITRATE/PF 50 MCG/ML
25 SYRINGE (ML) INJECTION
Status: DISCONTINUED | OUTPATIENT
Start: 2019-07-10 | End: 2019-07-11 | Stop reason: HOSPADM

## 2019-07-10 RX ORDER — SODIUM CHLORIDE 9 MG/ML
INJECTION, SOLUTION INTRAVENOUS CONTINUOUS PRN
Status: DISCONTINUED | OUTPATIENT
Start: 2019-07-10 | End: 2019-07-10 | Stop reason: SURG

## 2019-07-10 RX ORDER — GABAPENTIN 100 MG/1
100 CAPSULE ORAL 2 TIMES DAILY
Status: DISCONTINUED | OUTPATIENT
Start: 2019-07-10 | End: 2019-07-12

## 2019-07-10 RX ORDER — SUCCINYLCHOLINE/SOD CL,ISO/PF 100 MG/5ML
SYRINGE (ML) INTRAVENOUS AS NEEDED
Status: DISCONTINUED | OUTPATIENT
Start: 2019-07-10 | End: 2019-07-10 | Stop reason: SURG

## 2019-07-10 RX ORDER — DEXAMETHASONE SODIUM PHOSPHATE 4 MG/ML
INJECTION, SOLUTION INTRA-ARTICULAR; INTRALESIONAL; INTRAMUSCULAR; INTRAVENOUS; SOFT TISSUE AS NEEDED
Status: DISCONTINUED | OUTPATIENT
Start: 2019-07-10 | End: 2019-07-10 | Stop reason: SURG

## 2019-07-10 RX ORDER — ACETAMINOPHEN 325 MG/1
975 TABLET ORAL EVERY 8 HOURS SCHEDULED
Status: DISCONTINUED | OUTPATIENT
Start: 2019-07-10 | End: 2019-07-16 | Stop reason: HOSPADM

## 2019-07-10 RX ADMIN — SODIUM CHLORIDE: 0.9 INJECTION, SOLUTION INTRAVENOUS at 20:05

## 2019-07-10 RX ADMIN — HYDROCHLOROTHIAZIDE 25 MG: 25 TABLET ORAL at 09:03

## 2019-07-10 RX ADMIN — PANTOPRAZOLE SODIUM 20 MG: 20 TABLET, DELAYED RELEASE ORAL at 05:19

## 2019-07-10 RX ADMIN — PHENYLEPHRINE HYDROCHLORIDE 200 MCG: 10 INJECTION INTRAVENOUS at 20:24

## 2019-07-10 RX ADMIN — LIDOCAINE HYDROCHLORIDE 50 MG: 10 INJECTION, SOLUTION INFILTRATION; PERINEURAL at 20:01

## 2019-07-10 RX ADMIN — PHENYLEPHRINE HYDROCHLORIDE 400 MCG: 10 INJECTION INTRAVENOUS at 20:09

## 2019-07-10 RX ADMIN — DEXAMETHASONE SODIUM PHOSPHATE 5 MG: 4 INJECTION, SOLUTION INTRAMUSCULAR; INTRAVENOUS at 20:29

## 2019-07-10 RX ADMIN — PHENYLEPHRINE HYDROCHLORIDE 100 MCG/MIN: 10 INJECTION INTRAVENOUS at 20:35

## 2019-07-10 RX ADMIN — METHOCARBAMOL 250 MG: 500 TABLET, FILM COATED ORAL at 14:45

## 2019-07-10 RX ADMIN — POTASSIUM CHLORIDE 20 MEQ: 1500 TABLET, EXTENDED RELEASE ORAL at 09:13

## 2019-07-10 RX ADMIN — METHOCARBAMOL 250 MG: 500 TABLET, FILM COATED ORAL at 09:10

## 2019-07-10 RX ADMIN — ACETAMINOPHEN 975 MG: 325 TABLET ORAL at 14:44

## 2019-07-10 RX ADMIN — AMLODIPINE BESYLATE 10 MG: 10 TABLET ORAL at 09:04

## 2019-07-10 RX ADMIN — PROPOFOL 150 MG: 10 INJECTION, EMULSION INTRAVENOUS at 20:01

## 2019-07-10 RX ADMIN — POTASSIUM CHLORIDE 20 MEQ: 200 INJECTION, SOLUTION INTRAVENOUS at 09:14

## 2019-07-10 RX ADMIN — EPHEDRINE SULFATE 10 MG: 50 INJECTION, SOLUTION INTRAVENOUS at 21:39

## 2019-07-10 RX ADMIN — Medication 100 MG: at 20:01

## 2019-07-10 RX ADMIN — GABAPENTIN 100 MG: 100 CAPSULE ORAL at 17:58

## 2019-07-10 RX ADMIN — EPHEDRINE SULFATE 10 MG: 50 INJECTION, SOLUTION INTRAVENOUS at 20:19

## 2019-07-10 RX ADMIN — GABAPENTIN 100 MG: 100 CAPSULE ORAL at 09:10

## 2019-07-10 RX ADMIN — EPHEDRINE SULFATE 10 MG: 50 INJECTION, SOLUTION INTRAVENOUS at 20:34

## 2019-07-10 RX ADMIN — EPHEDRINE SULFATE 10 MG: 50 INJECTION, SOLUTION INTRAVENOUS at 20:30

## 2019-07-10 RX ADMIN — SERTRALINE HYDROCHLORIDE 50 MG: 50 TABLET ORAL at 09:05

## 2019-07-10 RX ADMIN — PHENYLEPHRINE HYDROCHLORIDE 200 MCG: 10 INJECTION INTRAVENOUS at 20:36

## 2019-07-10 RX ADMIN — SENNOSIDES 17.2 MG: 8.6 TABLET, FILM COATED ORAL at 09:05

## 2019-07-10 RX ADMIN — FENTANYL CITRATE 100 MCG: 50 INJECTION, SOLUTION INTRAMUSCULAR; INTRAVENOUS at 20:07

## 2019-07-10 RX ADMIN — EPHEDRINE SULFATE 10 MG: 50 INJECTION, SOLUTION INTRAVENOUS at 20:11

## 2019-07-10 RX ADMIN — METOPROLOL TARTRATE 100 MG: 50 TABLET, FILM COATED ORAL at 09:03

## 2019-07-10 RX ADMIN — CEFAZOLIN SODIUM 2000 MG: 2 SOLUTION INTRAVENOUS at 20:06

## 2019-07-10 RX ADMIN — ONDANSETRON 4 MG: 2 INJECTION INTRAMUSCULAR; INTRAVENOUS at 20:29
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
## 2019-07-10 NOTE — PLAN OF CARE
Problem: Potential for Falls  Goal: Patient will remain free of falls  Description  INTERVENTIONS:  - Assess patient frequently for physical needs  -  Identify cognitive and physical deficits and behaviors that affect risk of falls    -  [place] fall precautions as indicated by assessment   - Educate patient/family on patient safety including physical limitations  - Instruct patient to call for assistance with activity based on assessment  - Modify environment to reduce risk of injury  - Consider OT/PT consult to assist with strengthening/mobility  Outcome: Progressing     Problem: Prexisting or High Potential for Compromised Skin Integrity  Goal: Skin integrity is maintained or improved  Description  INTERVENTIONS:  - Identify patients at risk for skin breakdown  - Assess and monitor skin integrity  - Assess and monitor nutrition and hydration status  - Monitor labs (i e  albumin)  - Assess for incontinence   - Turn and reposition patient  - Assist with mobility/ambulation  - Relieve pressure over bony prominences  - Avoid friction and shearing  - Provide appropriate hygiene as needed including keeping skin clean and dry  - Evaluate need for skin moisturizer/barrier cream  - Collaborate with interdisciplinary team (i e  Nutrition, Rehabilitation, etc )   - Patient/family teaching  Outcome: Progressing     Problem: PAIN - ADULT  Goal: Verbalizes/displays adequate comfort level or baseline comfort level  Description  Interventions:  - Encourage patient to monitor pain and request assistance  - Assess pain using appropriate pain scale  - Administer analgesics based on type and severity of pain and evaluate response  - Implement non-pharmacological measures as appropriate and evaluate response  - Consider cultural and social influences on pain and pain management  - Notify physician/advanced practitioner if interventions unsuccessful or patient reports new pain  Outcome: Progressing     Problem: SAFETY ADULT  Goal: Patient will remain free of falls  Description  INTERVENTIONS:  - Assess patient frequently for physical needs  -  Identify cognitive and physical deficits and behaviors that affect risk of falls    -  [place] fall precautions as indicated by assessment   - Educate patient/family on patient safety including physical limitations  - Instruct patient to call for assistance with activity based on assessment  - Modify environment to reduce risk of injury  - Consider OT/PT consult to assist with strengthening/mobility  Outcome: Progressing  Goal: Maintain or return to baseline ADL function  Description  INTERVENTIONS:  -  Assess patient's ability to carry out ADLs; assess patient's baseline for ADL function and identify physical deficits which impact ability to perform ADLs (bathing, care of mouth/teeth, toileting, grooming, dressing, etc )  - Assess/evaluate cause of self-care deficits   - Assess range of motion  - Assess patient's mobility; develop plan if impaired  - Assess patient's need for assistive devices and provide as appropriate  - Encourage maximum independence but intervene and supervise when necessary  ¯ Involve family in performance of ADLs  ¯ Assess for home care needs following discharge   ¯ Request OT consult to assist with ADL evaluation and planning for discharge  ¯ Provide patient education as appropriate  Outcome: Progressing  Goal: Maintain or return mobility status to optimal level  Description  INTERVENTIONS:  - Assess patient's baseline mobility status (ambulation, transfers, stairs, etc )    - Identify cognitive and physical deficits and behaviors that affect mobility  - Identify mobility aids required to assist with transfers and/or ambulation (gait belt, sit-to-stand, lift, walker, cane, etc )  - [place] fall precautions as indicated by assessment  - Record patient progress and toleration of activity level on Mobility SBAR; progress patient to next Phase/Stage  - Instruct patient to call for assistance with activity based on assessment  - Request Rehabilitation consult to assist with strengthening/weightbearing, etc   Outcome: Progressing     Problem: DISCHARGE PLANNING  Goal: Discharge to home or other facility with appropriate resources  Description  INTERVENTIONS:  - Identify barriers to discharge w/patient and caregiver  - Arrange for needed discharge resources and transportation as appropriate  - Identify discharge learning needs (meds, wound care, etc )  - Arrange for interpretive services to assist at discharge as needed  - Refer to Case Management Department for coordinating discharge planning if the patient needs post-hospital services based on physician/advanced practitioner order or complex needs related to functional status, cognitive ability, or social support system  Outcome: Progressing     Problem: Knowledge Deficit  Goal: Patient/family/caregiver demonstrates understanding of disease process, treatment plan, medications, and discharge instructions  Description  Complete learning assessment and assess knowledge base    Interventions:  - Provide teaching at level of understanding  - Provide teaching via preferred learning methods  Outcome: Progressing     Problem: INFECTION - ADULT  Goal: Absence or prevention of progression during hospitalization  Description  INTERVENTIONS:  - Assess and monitor for signs and symptoms of infection  - Monitor lab/diagnostic results  - Monitor all insertion sites, i e  indwelling lines, tubes, and drains  - Monitor endotracheal (as able) and nasal secretions for changes in amount and color  - [place] appropriate cooling/warming therapies per order  - Administer medications as ordered  - Instruct and encourage patient and family to use good hand hygiene technique  - Identify and instruct in appropriate isolation precautions for identified infection/condition  Outcome: Progressing  Goal: Absence of fever/infection during neutropenic period  Description  INTERVENTIONS:  - Monitor WBC  - Implement neutropenic guidelines  Outcome: Progressing

## 2019-07-10 NOTE — PROGRESS NOTES
Subjective:  Pain controlled  Denies any numbness or tingling    Objective:  Lab Results   Component Value Date/Time    WBC 10 69 (H) 07/10/2019 05:06 AM    WBC 8 67 04/20/2015 05:43 AM    HGB 12 3 07/10/2019 05:06 AM    HGB 12 7 04/20/2015 05:43 AM       Vitals:    07/09/19 2300   BP: 144/72   Pulse: 84   Resp: 18   Temp: 98 6 °F (37 °C)   SpO2: 94%     Right lower extremity  Extremity short and externally rotated  Motor & sensation grossly intact distally  Toes warm and pink x5    Assessment:  75-year-old female with right subtrochanteric femur fracture    Plan:   Nonweightbearing right lower extremity  Pain control  DVT ppx- on hold for OR  PT- pending operative fixation  To OR today for operative fixation right subtrochanteric femur fracture in all indicated procedures  Dispo:  Pending OR

## 2019-07-10 NOTE — ASSESSMENT & PLAN NOTE
Admit to trauma  Orthopedics consulted, appreciate evaluation and recommendations  NWB RLE  Neuro checks  PAin management  DVT prophylaxis

## 2019-07-10 NOTE — PROGRESS NOTES
Progress Note - Domingo Marsh 1934, 80 y o  female MRN: [id number]    Unit/Bed#: [facility] 632-01 Encounter: [id number]    Primary Care Provider: No primary care provider on file  Date and time admitted to hospital: 7/9/2019  6:11 PM        * Closed fracture of right hip [facility])  Assessment & Plan  Admit to trauma  Orthopedics consulted, appreciate evaluation and recommendations  NWB RLE  Neuro checks  PAin management  DVT prophylaxis            Bedside nurse rounds completed with nurse Kana Flores  Prophylaxis: Sequential compression device (Venodyne)     Disposition: home with support vs rehab    Code status:  Level 1 - Full Code    Consultants: Orthopedics, Geriatrics, therapy    Is the patient 72 years or older?: YES:    1  Before the illness or injury that brought you to the Emergency, did you need someone to help you on a regular basis? 0=No   2  Since the illness or injury that brought you to the Emergency, have you needed more help than usual to take care of yourself? 1=Yes   3  Have you been hospitalized for one or more nights during the past 6 months (excluding a stay in the Emergency Department)? 0=No   4  In general, do you see well? 0=Yes   5  In general, do you have serious problems with your memory? 0=No   6  Do you take more than three different medications everyday? 1=Yes   TOTAL   2     Did you order a geriatric consult if the score was 2 or greater?: yes    SUBJECTIVE:     Transfer from: [facility], transferred at patients request  Outside Films Received: no  Tertiary Exam Due on: 7/10/19    Mechanism of Injury:Fall    Details related to Injury: +LOC:  no    Chief Complaint: right hip pain    HPI/Last 24 hour events: admitted and seen by Orthopedics    Decision for surgical intervention, is NPO and at this time an add-on for Surgery today    Active medications:           Current Facility-Administered Medications:     acetaminophen (TYLENOL) tablet 975 mg, 975 mg, Oral, Q8H [facility]   albuterol (PROVENTIL HFA,VENTOLIN HFA) inhaler 2 puff, 2 puff, Inhalation, Q6H PRN    amLODIPine (NORVASC) tablet 10 mg, 10 mg, Oral, Daily    gabapentin (NEURONTIN) capsule 100 mg, 100 mg, Oral, BID    heparin (porcine) subcutaneous injection 5,000 Units, 5,000 Units, Subcutaneous, Q8H [address], 5,000 Units at 07/09/19 2139 **AND** Platelet count, , , Once    hydrochlorothiazide (HYDRODIURIL) tablet 25 mg, 25 mg, Oral, Daily    lactated ringers infusion, 75 mL/hr, Intravenous, Continuous, 75 mL/hr at 07/09/19 2341    methocarbamol (ROBAXIN) tablet 250 mg, 250 mg, Oral, Q8H [address]    metoprolol tartrate (LOPRESSOR) tablet 100 mg, 100 mg, Oral, Q12H CRISTELA, 100 mg at 07/09/19 2138    ondansetron (ZOFRAN) injection 4 mg, 4 mg, Intravenous, Q6H PRN    oxyCODONE (ROXICODONE) IR tablet 2 5 mg, 2 5 mg, Oral, Q4H PRN    oxyCODONE (ROXICODONE) IR tablet 5 mg, 5 mg, Oral, Q4H PRN    pantoprazole (PROTONIX) EC tablet 20 mg, 20 mg, Oral, Early Morning, 20 mg at 07/10/19 0519    senna (SENOKOT) tablet 17 2 mg, 2 tablet, Oral, Daily    sertraline (ZOLOFT) tablet 50 mg, 50 mg, Oral, Daily    sodium chloride 0 9 % infusion, 100 mL/hr, Intravenous, Continuous      OBJECTIVE:     Vitals:   Vitals:    07/10/19 0855   BP: 147/67   Pulse: 75   Resp: 16   Temp: 98 6 °F (37 °C)   SpO2: 95%       Physical Exam:   GENERAL APPEARANCE: comfortable  NEURO: GCS - 15, intact  HEENT: EOM's intact  CV: RRR, no complaint of chest pain, 12 lead ordered pre-op  LUNGS: CTA bilaterally, no shortness of breath  GI: NPO for OR  : catheter in place  MSK: moves upper extremities bilaterally, and LLE  SKIN: warm and dry      I/O:   I/O       07/08 0701 - 07/09 0700 07/09 0701 - 07/10 0700 07/10 0701 - 07/11 0700    P  O   240     Total Intake(mL/kg)  240 (3)     Urine (mL/kg/hr)  575     Total Output  575     Net  -335                  Invasive Devices:    Invasive Devices     Peripheral Intravenous Line            Peripheral IV 07/09/19 Right Antecubital less than 1 day          Drain            Urethral Catheter Latex 16 Fr  less than 1 day                  Imaging:   Xr Chest Portable    Result Date: 7/10/2019  Impression: No acute cardiopulmonary disease  Workstation performed: [id number]     Xr Femur 2 Vw Right    Result Date: 7/10/2019  Impression: Right intertrochanteric hip fracture  The study was marked in [facility] for immediate notification  Workstation performed: [id number]       Labs: Results for Laisha Demarco (MRN [id number]) as of 7/10/2019 09:07   Ref   Range 7/10/2019 05:06   Sodium Latest Ref Range: 136 - 145 mmol/L 141   Potassium Latest Ref Range: 3 5 - 5 3 mmol/L 3 1 (L)   Chloride Latest Ref Range: 100 - 108 mmol/L 104   CO2 Latest Ref Range: 21 - 32 mmol/L 30   Anion Gap Latest Ref Range: 4 - 13 mmol/L 7   BUN Latest Ref Range: 5 - 25 mg/dL 13   Creatinine Latest Ref Range: 0 60 - 1 30 mg/dL 0 70   Glucose, Random Latest Ref Range: 65 - 140 mg/dL 114   Calcium Latest Ref Range: 8 3 - 10 1 mg/dL 8 6   eGFR Latest Units: ml/min/1 73sq m 79   WBC Latest Ref Range: 4 31 - 10 16 Thousand/uL 10 69 (H)   Red Blood Cell Count Latest Ref Range: 3 81 - 5 12 Million/uL 4 07   Hemoglobin Latest Ref Range: 11 5 - 15 4 g/dL 12 3   HCT Latest Ref Range: 34 8 - 46 1 % 36 8   MCV Latest Ref Range: 82 - 98 fL 90   MCH Latest Ref Range: 26 8 - 34 3 pg 30 2   MCHC Latest Ref Range: 31 4 - 37 4 g/dL 33 4   RDW Latest Ref Range: 11 6 - 15 1 % 13 0   Platelet Count Latest Ref Range: 149 - 390 Thousands/uL 205   MPV Latest Ref Range: 8 9 - 12 7 fL 10 0   nRBC Latest Units: /100 WBCs 0   Neutrophils % Latest Ref Range: 43 - 75 % 75   Immat GRANS % Latest Ref Range: 0 - 2 % 0   Lymphocytes Relative Latest Ref Range: 14 - 44 % 15   Monocytes Relative Latest Ref Range: 4 - 12 % 10   Eosinophils Latest Ref Range: 0 - 6 % 0   Basophils Relative Latest Ref Range: 0 - 1 % 0   Immature Grans Absolute Latest Ref Range: 0 00 - 0 20 Thousand/uL 0 04   Absolute Neutrophils Latest Ref Range: 1 85 - 7 62 Thousands/µL 7 88 (H)   Lymphocytes Absolute Latest Ref Range: 0 60 - 4 47 Thousands/µL 1 63   Absolute Monocytes Latest Ref Range: 0 17 - 1 22 Thousand/µL 1 11   Absolute Eosinophils Latest Ref Range: 0 00 - 0 61 Thousand/µL 0 02   Basophils Absolute Latest Ref Range: 0 00 - 0 10 Thousands/µL 0 01     Potassium low at 3 1 and will replete today and recheck

## 2019-07-10 NOTE — SOCIAL WORK
CM met with Pt and her daughter Nimesh Robins with an introduction and explanation of role  Pt reported residing with daughter Les Manley in a 2 story home with the use of a stair lift and 3 steps to enter the home from the garage  Pt reported being independent with ADLs, no hx of VNA but has been to [facility] and [facility] in the past  Pt denied any hx of mental health or drug/alcohol placements  Pt reported having a living will with her friend Kennedy Davey as [place]  Pt's daughter Nimesh Robins reported the Pt and family would be interested in a referral to [facility] if rehab is recommended  CM reviewed d/c planning process including the following: identifying help at home, patient preference for d/c planning needs, Discharge Lounge, [facility] Meds to Bed program, availability of treatment team to discuss questions or concerns patient and/or family may have regarding understanding medications and recognizing signs and symptoms once discharged  CM also encouraged patient to follow up with all recommended appointments after discharge  Patient advised of importance for patient and family to participate in managing patients medical well being

## 2019-07-10 NOTE — PHYSICAL THERAPY NOTE
Physical Therapy Cancellation Note    PT ORDERS RECEIVED, CHART REVIEWED  PATIENT SCHEDULED TO GO TO OR TODAY FOR ORIF PROCEDURE  PT WILL CONTINUE TO FOLLOW AND EVALUATE POST OPERATIVELY      Tyler Shea PT, DPT

## 2019-07-10 NOTE — ANESTHESIA PREPROCEDURE EVALUATION
Review of Systems/Medical History  Patient summary reviewed  Chart reviewed  History of anesthetic complications PONV    Cardiovascular  Hyperlipidemia, Hypertension , Valvular heart disease , mitral regurgitation and tricuspid regurgitation, Dysrhythmias , No CHF , No orthopnea,   Comment: REMOTE HISTORY OF VALVULAR HEART, NO H/O CHF, NO ORTHOPNEA/PND; NO LE EDEMA,  Pulmonary  No asthma , No recent URI , No sleep apnea ,        GI/Hepatic    GERD (OCCASIONAL) ,             Endo/Other  Diabetes ,      GYN       Hematology   Musculoskeletal    Arthritis     Neurology   Psychology   Anxiety, Depression ,              Physical Exam    Airway    Mallampati score: II  TM Distance: >3 FB       Dental   No notable dental hx     Cardiovascular      Pulmonary      Other Findings      Lab Results   Component Value Date    WBC 10 69 (H) 07/10/2019    HGB 12 3 07/10/2019     07/10/2019     Lab Results   Component Value Date    SODIUM 141 07/10/2019    K 3 1 (L) 07/10/2019    BUN 13 07/10/2019    CREATININE 0 70 07/10/2019    GLUCOSE 96 12/21/2015     Lab Results   Component Value Date    PTT 28 02/11/2017      Lab Results   Component Value Date    INR 1 14 07/09/2019       Blood type A    Lab Results   Component Value Date    HGBA1C 6 2 11/25/2016         Anesthesia Plan  ASA Score- 3     Anesthesia Type- general with ASA Monitors  Additional Monitors:   Airway Plan: ETT  Comment:  LUTHER Barraza , have personally seen and evaluated the patient prior to anesthetic care  I have reviewed the pre-anesthetic record, and other medical records if appropriate to the anesthetic care  If a CRNA is involved in the case, I have reviewed the CRNA assessment, if present, and agree  Risks/benefits and alternatives discussed with patient including possible PONV, sore throat, and possibility of rare anesthetic and surgical emergencies          Plan Factors- Patient instructed to abstain from smoking on day of procedure  Patient did not smoke on day of surgery  Induction- intravenous  Postoperative Plan- Plan for postoperative opioid use  Planned trial extubation    Informed Consent- Anesthetic plan and risks discussed with patient  I personally reviewed this patient with the CRNA  Discussed and agreed on the Anesthesia Plan with the CRNA  Michelle Zuleta

## 2019-07-10 NOTE — PLAN OF CARE
Problem: Potential for Falls  Goal: Patient will remain free of falls  Description  INTERVENTIONS:  - Assess patient frequently for physical needs  -  Identify cognitive and physical deficits and behaviors that affect risk of falls    -  [place] fall precautions as indicated by assessment   - Educate patient/family on patient safety including physical limitations  - Instruct patient to call for assistance with activity based on assessment  - Modify environment to reduce risk of injury  - Consider OT/PT consult to assist with strengthening/mobility  Outcome: Progressing     Problem: Prexisting or High Potential for Compromised Skin Integrity  Goal: Skin integrity is maintained or improved  Description  INTERVENTIONS:  - Identify patients at risk for skin breakdown  - Assess and monitor skin integrity  - Assess and monitor nutrition and hydration status  - Monitor labs (i e  albumin)  - Assess for incontinence   - Turn and reposition patient  - Assist with mobility/ambulation  - Relieve pressure over bony prominences  - Avoid friction and shearing  - Provide appropriate hygiene as needed including keeping skin clean and dry  - Evaluate need for skin moisturizer/barrier cream  - Collaborate with interdisciplinary team (i e  Nutrition, Rehabilitation, etc )   - Patient/family teaching  Outcome: Progressing     Problem: PAIN - ADULT  Goal: Verbalizes/displays adequate comfort level or baseline comfort level  Description  Interventions:  - Encourage patient to monitor pain and request assistance  - Assess pain using appropriate pain scale  - Administer analgesics based on type and severity of pain and evaluate response  - Implement non-pharmacological measures as appropriate and evaluate response  - Consider cultural and social influences on pain and pain management  - Notify physician/advanced practitioner if interventions unsuccessful or patient reports new pain  Outcome: Progressing     Problem: SAFETY ADULT  Goal: Maintain or return to baseline ADL function  Description  INTERVENTIONS:  -  Assess patient's ability to carry out ADLs; assess patient's baseline for ADL function and identify physical deficits which impact ability to perform ADLs (bathing, care of mouth/teeth, toileting, grooming, dressing, etc )  - Assess/evaluate cause of self-care deficits   - Assess range of motion  - Assess patient's mobility; develop plan if impaired  - Assess patient's need for assistive devices and provide as appropriate  - Encourage maximum independence but intervene and supervise when necessary  ¯ Involve family in performance of ADLs  ¯ Assess for home care needs following discharge   ¯ Request OT consult to assist with ADL evaluation and planning for discharge  ¯ Provide patient education as appropriate  Outcome: Progressing  Goal: Maintain or return mobility status to optimal level  Description  INTERVENTIONS:  - Assess patient's baseline mobility status (ambulation, transfers, stairs, etc )    - Identify cognitive and physical deficits and behaviors that affect mobility  - Identify mobility aids required to assist with transfers and/or ambulation (gait belt, sit-to-stand, lift, walker, cane, etc )  - [place] fall precautions as indicated by assessment  - Record patient progress and toleration of activity level on Mobility SBAR; progress patient to next Phase/Stage  - Instruct patient to call for assistance with activity based on assessment  - Request Rehabilitation consult to assist with strengthening/weightbearing, etc   Outcome: Progressing     Problem: DISCHARGE PLANNING  Goal: Discharge to home or other facility with appropriate resources  Description  INTERVENTIONS:  - Identify barriers to discharge w/patient and caregiver  - Arrange for needed discharge resources and transportation as appropriate  - Identify discharge learning needs (meds, wound care, etc )  - Arrange for interpretive services to assist at discharge as needed  - Refer to Case Management Department for coordinating discharge planning if the patient needs post-hospital services based on physician/advanced practitioner order or complex needs related to functional status, cognitive ability, or social support system  Outcome: Progressing     Problem: Knowledge Deficit  Goal: Patient/family/caregiver demonstrates understanding of disease process, treatment plan, medications, and discharge instructions  Description  Complete learning assessment and assess knowledge base    Interventions:  - Provide teaching at level of understanding  - Provide teaching via preferred learning methods  Outcome: Progressing

## 2019-07-10 NOTE — OCCUPATIONAL THERAPY NOTE
OCCUPATIONAL THERAPY CANCEL NOTE:    ORDERS RECEIVED  CHART REVIEW COMPLETED  PT PLANNED FOR SX INTERVENTION WITH ORTHO  WILL CONT TO FOLLOW POST-OP      Chisé Diacik, MOT, OTR/L
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
## 2019-07-11 LAB
ANION GAP SERPL CALCULATED.3IONS-SCNC: 7 MMOL/L (ref 4–13)
BUN SERPL-MCNC: 14 MG/DL (ref 5–25)
CALCIUM SERPL-MCNC: 8.6 MG/DL (ref 8.3–10.1)
CHLORIDE SERPL-SCNC: 103 MMOL/L (ref 100–108)
CO2 SERPL-SCNC: 26 MMOL/L (ref 21–32)
CREAT SERPL-MCNC: 0.83 MG/DL (ref 0.6–1.3)
ERYTHROCYTE [DISTWIDTH] IN BLOOD BY AUTOMATED COUNT: 12.8 % (ref 11.6–15.1)
GFR SERPL CREATININE-BSD FRML MDRD: 65 ML/MIN/1.73SQ M
GLUCOSE SERPL-MCNC: 180 MG/DL (ref 65–140)
HCT VFR BLD AUTO: 34.6 % (ref 34.8–46.1)
HGB BLD-MCNC: 12 G/DL (ref 11.5–15.4)
MCH RBC QN AUTO: 31.4 PG (ref 26.8–34.3)
MCHC RBC AUTO-ENTMCNC: 34.7 G/DL (ref 31.4–37.4)
MCV RBC AUTO: 91 FL (ref 82–98)
PLATELET # BLD AUTO: 204 THOUSANDS/UL (ref 149–390)
PMV BLD AUTO: 10.1 FL (ref 8.9–12.7)
POTASSIUM SERPL-SCNC: 3.5 MMOL/L (ref 3.5–5.3)
RBC # BLD AUTO: 3.82 MILLION/UL (ref 3.81–5.12)
SODIUM SERPL-SCNC: 136 MMOL/L (ref 136–145)
WBC # BLD AUTO: 11.97 THOUSAND/UL (ref 4.31–10.16)

## 2019-07-11 PROCEDURE — G8987 SELF CARE CURRENT STATUS: HCPCS

## 2019-07-11 PROCEDURE — G8979 MOBILITY GOAL STATUS: HCPCS

## 2019-07-11 PROCEDURE — 85027 COMPLETE CBC AUTOMATED: CPT | Performed by: ORTHOPAEDIC SURGERY

## 2019-07-11 PROCEDURE — 99232 SBSQ HOSP IP/OBS MODERATE 35: CPT | Performed by: NURSE PRACTITIONER

## 2019-07-11 PROCEDURE — 97167 OT EVAL HIGH COMPLEX 60 MIN: CPT

## 2019-07-11 PROCEDURE — G8988 SELF CARE GOAL STATUS: HCPCS

## 2019-07-11 PROCEDURE — 99024 POSTOP FOLLOW-UP VISIT: CPT | Performed by: PHYSICIAN ASSISTANT

## 2019-07-11 PROCEDURE — 80048 BASIC METABOLIC PNL TOTAL CA: CPT | Performed by: ORTHOPAEDIC SURGERY

## 2019-07-11 PROCEDURE — 97163 PT EVAL HIGH COMPLEX 45 MIN: CPT

## 2019-07-11 PROCEDURE — 99232 SBSQ HOSP IP/OBS MODERATE 35: CPT | Performed by: SURGERY

## 2019-07-11 PROCEDURE — G8978 MOBILITY CURRENT STATUS: HCPCS

## 2019-07-11 RX ADMIN — ACETAMINOPHEN 975 MG: 325 TABLET ORAL at 13:00

## 2019-07-11 RX ADMIN — GABAPENTIN 100 MG: 100 CAPSULE ORAL at 18:53

## 2019-07-11 RX ADMIN — AMLODIPINE BESYLATE 10 MG: 10 TABLET ORAL at 09:14

## 2019-07-11 RX ADMIN — METHOCARBAMOL 250 MG: 500 TABLET, FILM COATED ORAL at 21:11

## 2019-07-11 RX ADMIN — SENNOSIDES 17.2 MG: 8.6 TABLET, FILM COATED ORAL at 09:15

## 2019-07-11 RX ADMIN — GABAPENTIN 100 MG: 100 CAPSULE ORAL at 09:15

## 2019-07-11 RX ADMIN — CEFAZOLIN SODIUM 2000 MG: 2 SOLUTION INTRAVENOUS at 12:57

## 2019-07-11 RX ADMIN — ACETAMINOPHEN 975 MG: 325 TABLET ORAL at 21:11

## 2019-07-11 RX ADMIN — METHOCARBAMOL 250 MG: 500 TABLET, FILM COATED ORAL at 13:01

## 2019-07-11 RX ADMIN — HEPARIN SODIUM 5000 UNITS: 5000 INJECTION INTRAVENOUS; SUBCUTANEOUS at 05:06

## 2019-07-11 RX ADMIN — ACETAMINOPHEN 975 MG: 325 TABLET ORAL at 05:06

## 2019-07-11 RX ADMIN — PANTOPRAZOLE SODIUM 20 MG: 20 TABLET, DELAYED RELEASE ORAL at 05:06

## 2019-07-11 RX ADMIN — ENOXAPARIN SODIUM 30 MG: 30 INJECTION SUBCUTANEOUS at 13:01

## 2019-07-11 RX ADMIN — ENOXAPARIN SODIUM 30 MG: 30 INJECTION SUBCUTANEOUS at 21:11

## 2019-07-11 RX ADMIN — METOPROLOL TARTRATE 100 MG: 50 TABLET, FILM COATED ORAL at 09:14

## 2019-07-11 RX ADMIN — SODIUM CHLORIDE, SODIUM LACTATE, POTASSIUM CHLORIDE, AND CALCIUM CHLORIDE 75 ML/HR: .6; .31; .03; .02 INJECTION, SOLUTION INTRAVENOUS at 05:06

## 2019-07-11 RX ADMIN — METHOCARBAMOL 250 MG: 500 TABLET, FILM COATED ORAL at 05:06

## 2019-07-11 RX ADMIN — HYDROCHLOROTHIAZIDE 25 MG: 25 TABLET ORAL at 09:15

## 2019-07-11 RX ADMIN — CEFAZOLIN SODIUM 2000 MG: 2 SOLUTION INTRAVENOUS at 05:06

## 2019-07-11 RX ADMIN — SERTRALINE HYDROCHLORIDE 50 MG: 50 TABLET ORAL at 09:14

## 2019-07-11 RX ADMIN — METOPROLOL TARTRATE 100 MG: 50 TABLET, FILM COATED ORAL at 21:11

## 2019-07-11 NOTE — OP NOTE
OPERATIVE REPORT  PATIENT NAME: Luba Hodges    :  1934  MRN: [id number]  Pt Location:  OR ROOM 17    SURGERY DATE: 7/10/2019    Surgeon(s) and Role:     * Sybil Pedraza MD - Primary     * Padma Garcia MD - Assisting    Preop Diagnosis:  Closed fracture of right hip, initial encounter ([place] [address] ) Jaison Marshallr    Post-Op Diagnosis Codes:     * Closed fracture of right hip, initial encounter ([place] [address] ) [S72 001A]    Procedure(s) (LRB):  INSERTION NAIL IM FEMUR ANTEGRADE (TROCHANTERIC) (Right)    Specimen(s):  ID Type Source Tests Collected by Time Destination   1 : right hip reamings Tissue Joint, Right Hip TISSUE EXAM Sybil Pedraza MD 7/10/2019 2113        Estimated Blood Loss:   250 mL    Drains:  Urethral Catheter Latex 16 Fr  (Active)   Amt returned on insertion(mL) 325 mL 2019  9:38 PM   Site Assessment Clean;Skin intact 2019  9:38 PM   Collection Container Standard drainage bag 2019  9:38 PM   Securement Method Securing device (Describe) 2019  9:38 PM   Output (mL) 1600 mL 7/10/2019  4:02 PM   Number of days: 1       Anesthesia Type:   Choice    Operative Indications:  Closed fracture of right hip, initial encounter ([place] [address] ) [S72 001A]    Operative Findings:  Right subtrochanteric femur fracture    Complications:   None    Procedure and Technique: This is an 59-year-old female who sustained a fall onto her right hip  She subsequently had pain in the right hip and inability to bear weight and was found have a right subtrochanteric femur fracture  She was admitted to the hospital and cleared by the medical team for surgery  Patient was then brought down operating room and general anesthesia was administered  Patient was then prepped draped in the normal sterile orthopedic fashion    Prior to procedure start, a time-out was performed to the satisfaction of all present in the room verifying correct procedure, correct patient, the correct surgical site     Incision along the anterior aspect of the hip was performed to introduce a ball-tip spike pusher  This was used in the proximal fragment in order to reduce the fracture  A large mallet was used to press on the lateral aspect of the distal fragment in order to reduce the fracture  After appropriate fracture reduction was obtained, an incision to the lateral aspect of the hip was made 3 cm proximal to the greater trochanter  Guidewire was inserted at the appropriate starting point and entry Reamer was used  The reamings from the opening Reamer was then sent for pathology in formalin  A ball-tip guidewire was bent at the tip and inserted through to the distal fragment and down the diaphyseal shaft  This was verified with orthogonal fluoroscopy  A 360 mm arline was then measured to be appropriate  We then he subsequently larger reamers to a 12 5 mm diameter Reamer in order to accommodate an 11 mm nail  The nail was then inserted to the proper depth using a mallet and insertion handle  triple trocar was then inserted to the insertion handle and a guidewire for the cephalomedullary screw was then placed in the appropriate position was verified by orthogonal fluoroscopy  A 105 mm lag screw was measured to be appropriate and subsequently inserted  Our attention was then turned to the distal lockers of which 2 were inserted  Proper placement of implants were verified with orthogonal fluoroscopy  The incisions were then irrigated copiously with normal saline  Proximal lateral incision was closed with 0 Vicryl  All incisions were then closed with 2 O Vicryl subcutaneously  Staples were then used superficially  Dressings were Adaptic, 4 x 4, and Tegaderm    Patient was then woken from general anesthesia and transferred to the PACU in stable condition    Dr Keely Cantu was present and performed all key aspects of the procedure    Patient Disposition:  PACU     SIGNATURE: Irene Jacobo  DATE: July 10, 2019  TIME: 11:05 PM

## 2019-07-11 NOTE — PROGRESS NOTES
Progress Note - Sacha Dahl 1934, 80 y o  female MRN: [id number]    Unit/Bed#: [facility] 632-01 Encounter: [id number]    Primary Care Provider: No primary care provider on file  Date and time admitted to hospital: 7/9/2019  6:11 PM        * Closed fracture of right hip [facility])  Assessment & Plan  S/p Right Femur Antegrade IM Nail (Trochanteric) 7/10 - Ortho  NWB RLE - future weight bearing status per Orthopedic Surgery  Perioperative Ancef - 2x doses, last given at 4AM  Neuro checks  PAin management  DVT prophylaxis  PT/OT  Case Management - Rehab placement pending recommendations per PT/OT        Plan:  PT/OT - eval post operative R femur surgery  Case Management - will need rehab placement most likely secondary to hip fracture   - appreciate PT/OT rec's and place referral  Pain Control PRN  DVT PPx - SQH  F/u AM CBC, transfuse PRN Hgb < 7    Subjective/Objective   Chief Complaint: Some pain in right hip    Subjective: No acute events  Patient resting comfortably in bed after right femur antegrade intramedullary nail fixture status post hip fracture  Objective:     Blood pressure 129/63, pulse 77, temperature 98 4 °F (36 9 °C), temperature source Rectal, resp  rate 16, height 5' 4" (1 626 m), weight 79 7 kg (175 lb 11 3 oz), SpO2 97 %  ,Body mass index is 30 16 kg/m²        Intake/Output Summary (Last 24 hours) at 7/11/2019 0559  Last data filed at 7/11/2019 0505  Gross per 24 hour   Intake 1965 ml   Output 2500 ml   Net -535 ml       Invasive Devices     Peripheral Intravenous Line            Peripheral IV 07/09/19 Right Antecubital 1 day    Peripheral IV 07/10/19 Left Hand less than 1 day          Drain            Urethral Catheter Latex 16 Fr  1 day                Physical Exam: General: AAOx3  Head: normocephalic, atraumatic  Neck: supple, trachea midline  Respiratory: BS b/l  Abdomen: Soft, NT, no rebound/guarding  Heart: RRR, S1s2  Ext: RLE is motor and sensory intact, incision at R groin is c/d/i, no hematoma or ecchymosis at inguinal ligament or in groin  Right lateral/distal thigh incision c/d/i  Pulse: 2+ DP bilateral lower extremities      Lab, Imaging and other studies:I have personally reviewed pertinent lab results    , CBC: No results found for: WBC, HGB, HCT, MCV, PLT, ADJUSTEDWBC, MCH, MCHC, RDW, MPV, NRBC, CMP: No results found for: SODIUM, K, CL, CO2, ANIONGAP, BUN, CREATININE, GLUCOSE, CALCIUM, AST, ALT, ALKPHOS, PROT, BILITOT, EGFR  VTE Pharmacologic Prophylaxis: Heparin  VTE Mechanical Prophylaxis: sequential compression device
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
## 2019-07-11 NOTE — PLAN OF CARE
Problem: OCCUPATIONAL THERAPY ADULT  Goal: Performs self-care activities at highest level of function for planned discharge setting  See evaluation for individualized goals  Description  Treatment Interventions: ADL retraining, Functional transfer training, Endurance training, Cognitive reorientation, Patient/family training, Equipment evaluation/education, Compensatory technique education, Energy conservation, Activityengagement          See flowsheet documentation for full assessment, interventions and recommendations  Note:   Limitation: Decreased ADL status, Decreased Safe judgement during ADL, Decreased cognition, Decreased endurance, Decreased self-care trans, Decreased high-level ADLs  Prognosis: Good  Assessment: 81 YO Female SEEN FOR INITIAL OCCUPATIONAL THERAPY EVALUATION FOLLOWING TRANSFER FROM [facility] S/P FALL OFF PATIO RESULTING IN R SUBTROCHANTERIC HIP FX  PT IS S/P IMN OF R FEMUR  PT IS WBAT ON RLE  PROBLEMS LIST INCLUDES HTN, DM, LEUCOCYTOSIS, DEPRESSION AND ANXIETY  PT IS FROM HOME WITH SUPPORTIVE DAUGHTER WHERE SHE REPORTS BEING INDEPENDENT WITH ADLS/LT IADLS AND HAS ASSIST FOR HEAVY IADLS AND DRIVING PTA  PT CURRENTLY REQUIRES OVERALL MIN A WITH UB ADLS, MAX A WITH LB ADLS, AND MOD A WITH TRANSFERS /LIMITED FUNCTIONAL MOBILITY WITH USE OF RW  PT IS LIMITED 2' PAIN, FATIGUE, IMPAIRED BALANCE, FALL RISK , OVERALL WEAKNESS/DECONDITIONING, EASILY DISTRACTED, INACCESSIBLE HOME ENVIRONMENT and OVERALL LIMITED ACTIVITY TOLERANCE  PT EDUCATED ON DEEP BREATHING TECHNIQUES T/O ACTIVITY and CONTINUE PARTICIPATION IN SELF-CARE/MOBILITY WITH STAFF WHILE IN THE HOSPITAL   PT IS MOTIVATED TO INCREASE INDEPENDENCE AND PARTICIPATE IN THERAPY  FROM AN OCCUPATIONAL THERAPY PERSPECTIVE, PT WOULD BENEFIT FROM ADDITIONAL OT SERVICES IN AN INPT REHAB SETTING UPON D/C  WILL CONT TO FOLLOW TO ADDRESS THE BELOW DESCRIBED GOALS     Recommendation: [address]  OT Discharge Recommendation: Short Term Rehab  OT - OK to Discharge:  Yes

## 2019-07-11 NOTE — SOCIAL WORK
Cm reviewed patient during care coordination rounds  Patient is not stable for d/c today  Cm informed that patient will need inpatient rehab  Cm will confirm rehab options for patient  Per conversation with patient's daughter, she would like a referral to [facility] for rehab  Daughter did not provide SNF list at this time, since she would prefer acute rehab  Cm informed about the need for insurance authorization prior to transfer   Cm to send referral

## 2019-07-11 NOTE — PLAN OF CARE
Problem: PHYSICAL THERAPY ADULT  Goal: Performs mobility at highest level of function for planned discharge setting  See evaluation for individualized goals  Description  Treatment/Interventions: Functional transfer training, LE strengthening/ROM, Elevations, Therapeutic exercise, Endurance training, Patient/family training, Equipment eval/education, Bed mobility, Gait training, Spoke to nursing, OT, Family, Spoke to case management  Equipment Recommended: Walker(RW)       See flowsheet documentation for full assessment, interventions and recommendations  Note:   Prognosis: Fair  Problem List: Decreased strength, Decreased range of motion, Decreased endurance, Impaired balance, Decreased mobility, Decreased safety awareness, Decreased cognition, Pain  Assessment: Pt is 80 y o  female seen for PT evaluation s/p admit to [facility] on 7/9/2019  Two pt identifiers were used to confirm  Pt presented s/p fall  Pt was admitted with a primary dx of: closed r subtrochanteric hip fx  Pt underwent R IM fixation which was performed on 7/10/2019  PT now consulted for assessment of mobility and d/c needs  Pt with up in chair orders  Pts current co morbidities effecting treatment include: anxiety, depression, DM, hematuria, HTN, HLD, OA, malignant melanoma, and personal factors including being alone at times   Pts current clinical presentation is Unstable/ Unpredictable (high complexity) due to Ongoing medical management for primary dx, Increased reliance on more restrictive AD compared to baseline, Decreased activity tolerance compared to baseline, Fall risk, Increased assistance needed from caregiver at current time, Cog status, Continuous pulse oximetry monitoring     Prior to admission, pt was I with ambulation without the use of an AD as per pt  Upon evaluation, pt currently is requiring mod A for bed mobility; mod A for transfers and mod A for ambulation w/ RW    Pt denies any lightheadedness or dizziness with ambulation  Pt presents at PT eval functioning below baseline and currently w/ overall mobility deficits 2* to: BLE weakness, decreased ROM, impaired balance, decreased endurance, gait deviations, pain, decreased activity tolerance compared to baseline, decreased safety awareness, fall risk, decreased cognition  Pt currently at a fall risk 2* to impairments listed above  Based on the aforementioned PT evaluation, pt will continue to benefit from skilled Acute PT interventions to address stated impairments; to maximize functional mobility; for ongoing pt/ family training; and DME needs  At conclusion of PT session pt returned back in chair and chair alarm engaged with phone and call bell within reach  Pt denies any further questions at this time  PT is currently recommending rehab  Pt/ family agreeable to plan and goals as stated on evaluation  PT will continue to follow during hospital stay  Barriers to Discharge: Inaccessible home environment     Recommendation: Other (Comment)(rehab )     PT - OK to Discharge: Yes(to rehab when medically cleared )    See flowsheet documentation for full assessment

## 2019-07-11 NOTE — PROGRESS NOTES
Progress Note - Orthopedics   Darling Ivano 80 y o  female MRN: [id number]  Unit/Bed#: [facility] 331-17 Encounter: [id number]    Assessment:  Postop day 1 after intramedullary fixation right femur fracture, for right subtrochanteric hip fracture    Plan:  Physical therapy, mobilize  DVT prophylaxis  Discharge planning    Weight bearing:   Weight-bearing as tolerated right lower extremity    VTE Pharmacologic Prophylaxis: Heparin  VTE Mechanical Prophylaxis: sequential compression device    Subjective:   Adult female, postop day 1 from intramedullary fixation of right subtrochanteric femur fracture    Vitals: Blood pressure 129/63, pulse 77, temperature 98 4 °F (36 9 °C), temperature source Rectal, resp  rate 16, height 5' 4" (1 626 m), weight 79 7 kg (175 lb 11 3 oz), SpO2 97 %  ,Body mass index is 30 16 kg/m²  Intake/Output Summary (Last 24 hours) at 7/11/2019 0606  Last data filed at 7/11/2019 0505  Gross per 24 hour   Intake 1965 ml   Output 2500 ml   Net -535 ml       Invasive Devices     Peripheral Intravenous Line            Peripheral IV 07/09/19 Right Antecubital 1 day    Peripheral IV 07/10/19 Left Hand less than 1 day          Drain            Urethral Catheter Latex 16 Fr  1 day                Physical Exam:   Awake alert oriented all 3 spheres  Ortho Exam: Hip right thigh has lateral based incisions appropriate to intramedullary fixation, dressings are clean and dry    Thigh is soft and compressible, calf is soft negative Homans test    Lab, Imaging and other studies:   H&H is 12 and 34 6

## 2019-07-11 NOTE — ASSESSMENT & PLAN NOTE
S/p Right Femur Antegrade IM Nail (Trochanteric) 7/10 - Ortho  NWB RLE - future weight bearing status per Orthopedic Surgery  Perioperative Ancef - 2x doses, last given at 4AM  Neuro checks  PAin management  DVT prophylaxis  PT/OT  Case Management - Rehab placement pending recommendations per PT/OT

## 2019-07-11 NOTE — PLAN OF CARE
Problem: Potential for Falls  Goal: Patient will remain free of falls  Description  INTERVENTIONS:  - Assess patient frequently for physical needs  -  Identify cognitive and physical deficits and behaviors that affect risk of falls    -  [place] fall precautions as indicated by assessment   - Educate patient/family on patient safety including physical limitations  - Instruct patient to call for assistance with activity based on assessment  - Modify environment to reduce risk of injury  - Consider OT/PT consult to assist with strengthening/mobility  Outcome: Progressing     Problem: Prexisting or High Potential for Compromised Skin Integrity  Goal: Skin integrity is maintained or improved  Description  INTERVENTIONS:  - Identify patients at risk for skin breakdown  - Assess and monitor skin integrity  - Assess and monitor nutrition and hydration status  - Monitor labs (i e  albumin)  - Assess for incontinence   - Turn and reposition patient  - Assist with mobility/ambulation  - Relieve pressure over bony prominences  - Avoid friction and shearing  - Provide appropriate hygiene as needed including keeping skin clean and dry  - Evaluate need for skin moisturizer/barrier cream  - Collaborate with interdisciplinary team (i e  Nutrition, Rehabilitation, etc )   - Patient/family teaching  Outcome: Progressing     Problem: PAIN - ADULT  Goal: Verbalizes/displays adequate comfort level or baseline comfort level  Description  Interventions:  - Encourage patient to monitor pain and request assistance  - Assess pain using appropriate pain scale  - Administer analgesics based on type and severity of pain and evaluate response  - Implement non-pharmacological measures as appropriate and evaluate response  - Consider cultural and social influences on pain and pain management  - Notify physician/advanced practitioner if interventions unsuccessful or patient reports new pain  Outcome: Progressing     Problem: SAFETY ADULT  Goal: Patient will remain free of falls  Description  INTERVENTIONS:  - Assess patient frequently for physical needs  -  Identify cognitive and physical deficits and behaviors that affect risk of falls    -  [place] fall precautions as indicated by assessment   - Educate patient/family on patient safety including physical limitations  - Instruct patient to call for assistance with activity based on assessment  - Modify environment to reduce risk of injury  - Consider OT/PT consult to assist with strengthening/mobility  Outcome: Progressing  Goal: Maintain or return to baseline ADL function  Description  INTERVENTIONS:  -  Assess patient's ability to carry out ADLs; assess patient's baseline for ADL function and identify physical deficits which impact ability to perform ADLs (bathing, care of mouth/teeth, toileting, grooming, dressing, etc )  - Assess/evaluate cause of self-care deficits   - Assess range of motion  - Assess patient's mobility; develop plan if impaired  - Assess patient's need for assistive devices and provide as appropriate  - Encourage maximum independence but intervene and supervise when necessary  ¯ Involve family in performance of ADLs  ¯ Assess for home care needs following discharge   ¯ Request OT consult to assist with ADL evaluation and planning for discharge  ¯ Provide patient education as appropriate  Outcome: Progressing  Goal: Maintain or return mobility status to optimal level  Description  INTERVENTIONS:  - Assess patient's baseline mobility status (ambulation, transfers, stairs, etc )    - Identify cognitive and physical deficits and behaviors that affect mobility  - Identify mobility aids required to assist with transfers and/or ambulation (gait belt, sit-to-stand, lift, walker, cane, etc )  - [place] fall precautions as indicated by assessment  - Record patient progress and toleration of activity level on Mobility SBAR; progress patient to next Phase/Stage  - Instruct patient to call for assistance with activity based on assessment  - Request Rehabilitation consult to assist with strengthening/weightbearing, etc   Outcome: Progressing     Problem: DISCHARGE PLANNING  Goal: Discharge to home or other facility with appropriate resources  Description  INTERVENTIONS:  - Identify barriers to discharge w/patient and caregiver  - Arrange for needed discharge resources and transportation as appropriate  - Identify discharge learning needs (meds, wound care, etc )  - Arrange for interpretive services to assist at discharge as needed  - Refer to Case Management Department for coordinating discharge planning if the patient needs post-hospital services based on physician/advanced practitioner order or complex needs related to functional status, cognitive ability, or social support system  Outcome: Progressing     Problem: Knowledge Deficit  Goal: Patient/family/caregiver demonstrates understanding of disease process, treatment plan, medications, and discharge instructions  Description  Complete learning assessment and assess knowledge base    Interventions:  - Provide teaching at level of understanding  - Provide teaching via preferred learning methods  Outcome: Progressing     Problem: INFECTION - ADULT  Goal: Absence or prevention of progression during hospitalization  Description  INTERVENTIONS:  - Assess and monitor for signs and symptoms of infection  - Monitor lab/diagnostic results  - Monitor all insertion sites, i e  indwelling lines, tubes, and drains  - Monitor endotracheal (as able) and nasal secretions for changes in amount and color  - [place] appropriate cooling/warming therapies per order  - Administer medications as ordered  - Instruct and encourage patient and family to use good hand hygiene technique  - Identify and instruct in appropriate isolation precautions for identified infection/condition  Outcome: Progressing  Goal: Absence of fever/infection during neutropenic period  Description  INTERVENTIONS:  - Monitor WBC  - Implement neutropenic guidelines  Outcome: Progressing     Problem: Nutrition/Hydration-ADULT  Goal: Nutrient/Hydration intake appropriate for improving, restoring or maintaining nutritional needs  Description  Monitor and assess patient's nutrition/hydration status for malnutrition (ex- brittle hair, bruises, dry skin, pale skin and conjunctiva, muscle wasting, smooth red tongue, and disorientation)  Collaborate with interdisciplinary team and initiate plan and interventions as ordered  Monitor patient's weight and dietary intake as ordered or per policy  Utilize nutrition screening tool and intervene per policy  Determine patient's food preferences and provide high-protein, high-caloric foods as appropriate       INTERVENTIONS:  - Monitor oral intake, urinary output, labs, and treatment plans  - Assess nutrition and hydration status and recommend course of action  - Evaluate amount of meals eaten  - Assist patient with eating if necessary   - Allow adequate time for meals  - Recommend/ encourage appropriate diets, oral nutritional supplements, and vitamin/mineral supplements  - Order, calculate, and assess calorie counts as needed  - Recommend, monitor, and adjust tube feedings and TPN/PPN based on assessed needs  - Assess need for intravenous fluids  - Provide specific nutrition/hydration education as appropriate  - Include patient/family/caregiver in decisions related to nutrition  Outcome: Progressing

## 2019-07-11 NOTE — PROGRESS NOTES
Progress Note - Doris Quintero 80 y o  female MRN: [id number]    [facility] Encounter: [id number]      Assessment/Plan:  1  Ambulatory dysfunction  PT/OT  Rehab post hospitalization  Recommend vitamin D3 1000 International Units p o  Daily    2  Falls  Fall precautions  Recommend PT/OT  Recommend rehab post ablation    3  Deconditioning  Albumin 2 6  Continue PT/OT  Encourage adequate p o  Hydration/nutrition  Rehab post hospitalization    4  Mild cognitive impairment  Diagnosis by Emigdio Monet  Continue supportive care  Follow-up with [address] [place] [address] as needed    5  Delirium precautions  Alert and orient x3, baseline  Continue geriatric pain protocol  Monitor for constipation, last bowel a 07/9/19  Monitor for urinary retention  Encouraged hydration/nutrition  Encourage mobilization, out of bed  Ensure sleep hygiene    6  Depression  Continue Zoloft 75 mg p o  Daily  Monitor sodium    7  Right subtrochanteric femur fracture  Right IMN femur post op day 1  Ortho following  PT/OT  Geriatric pain protocol    Subjective:   Upon exam patient out of bed or recliner chair  She is alert and orient x3  Daughter, Milton Peters, is at bedside  Patient states that she slept well  Her last bowel movement was today she came in     Objective:     Vitals: Blood pressure 128/60, pulse 77, temperature 98 6 °F (37 °C), resp  rate 16, height 5' 4" (1 626 m), weight 79 7 kg (175 lb 11 3 oz), SpO2 97 %  ,Body mass index is 30 16 kg/m²  Intake/Output Summary (Last 24 hours) at 7/11/2019 1125  Last data filed at 7/11/2019 1018  Gross per 24 hour   Intake 2715 ml   Output 3000 ml   Net -285 ml       Physical Exam:   General : NAD  HEENT : MMM   Heart : Normal rate, no murmur rub or gallop  Lungs : CTA no wheezes, rales or rhonchi  Abdomen : Soft, NT/ND, BS auscultated in all 4 quads     Ext :  no edema  Skin : Pink, warm, dry, age appropriate turgor and mobility  Neuro : Nonfocal  Psych : Alert and O x 3      Invasive Devices     Peripheral Intravenous Line            Peripheral IV 07/09/19 Right Antecubital 2 days    Peripheral IV 07/10/19 Left Hand less than 1 day                Lab, Imaging and other studies: I have personally reviewed pertinent reports      VTE Pharmacologic Prophylaxis: Sequential compression device (Venodyne)   VTE Mechanical Prophylaxis: sequential compression device

## 2019-07-11 NOTE — DISCHARGE INSTRUCTIONS
Discharge Instructions - Orthopedics  Doris Quintero 80 y o  female MRN: [id number]  Unit/Bed#: Operating Room    Weight Bearing Status:                                           Weight Bearing as tolerated to the right lower extremity  DVT prophylaxis:  Complete course of Lovenox as directed    Pain:  Continue analgesics as directed    Showering Instructions:   Do not shower until 5 days    Dressing Instructions:   Keep dressing clean, dry and intact until follow up appointment  Driving Instructions:  No driving until cleared by Orthopaedic Surgery  PT/OT:  Continue PT/OT on outpatient basis as directed    Appt Instructions: If you do not have your appointment, please call the clinic at [phone]  Otherwise followup as scheduled below:    Contact the office sooner if you experience any increased numbness/tingling in the extremities        Miscellaneous:  None
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
## 2019-07-11 NOTE — ANESTHESIA POSTPROCEDURE EVALUATION
Post-Op Assessment Note    CV Status:  Stable  Pain Score: 0    Pain management: adequate     Mental Status:  Alert and awake   Hydration Status:  Euvolemic   PONV Controlled:  Controlled   Airway Patency:  Patent   Post Op Vitals Reviewed: Yes      Staff: AnesthesiologistKOMAL           /72 (07/10/19 2306)    Temp (!) 97 2 °F (36 2 °C) (07/10/19 2306)    Pulse 84 (07/10/19 2306)   Resp 13 (07/10/19 2306)    SpO2 98 % (07/10/19 2306)

## 2019-07-12 PROCEDURE — 99232 SBSQ HOSP IP/OBS MODERATE 35: CPT | Performed by: SURGERY

## 2019-07-12 PROCEDURE — 99024 POSTOP FOLLOW-UP VISIT: CPT | Performed by: PHYSICIAN ASSISTANT

## 2019-07-12 PROCEDURE — 97116 GAIT TRAINING THERAPY: CPT

## 2019-07-12 PROCEDURE — 97530 THERAPEUTIC ACTIVITIES: CPT

## 2019-07-12 PROCEDURE — 99232 SBSQ HOSP IP/OBS MODERATE 35: CPT | Performed by: NURSE PRACTITIONER

## 2019-07-12 PROCEDURE — 99223 1ST HOSP IP/OBS HIGH 75: CPT | Performed by: PHYSICAL MEDICINE & REHABILITATION

## 2019-07-12 PROCEDURE — 97535 SELF CARE MNGMENT TRAINING: CPT

## 2019-07-12 PROCEDURE — 97110 THERAPEUTIC EXERCISES: CPT

## 2019-07-12 RX ORDER — LANOLIN ALCOHOL/MO/W.PET/CERES
6 CREAM (GRAM) TOPICAL
Status: DISCONTINUED | OUTPATIENT
Start: 2019-07-12 | End: 2019-07-16 | Stop reason: HOSPADM

## 2019-07-12 RX ORDER — GABAPENTIN 100 MG/1
100 CAPSULE ORAL EVERY 12 HOURS
Status: DISCONTINUED | OUTPATIENT
Start: 2019-07-12 | End: 2019-07-16 | Stop reason: HOSPADM

## 2019-07-12 RX ADMIN — ACETAMINOPHEN 975 MG: 325 TABLET ORAL at 05:31

## 2019-07-12 RX ADMIN — MELATONIN 6 MG: 3 TAB ORAL at 22:20

## 2019-07-12 RX ADMIN — SERTRALINE HYDROCHLORIDE 50 MG: 50 TABLET ORAL at 09:04

## 2019-07-12 RX ADMIN — ENOXAPARIN SODIUM 30 MG: 30 INJECTION SUBCUTANEOUS at 09:04

## 2019-07-12 RX ADMIN — AMLODIPINE BESYLATE 10 MG: 10 TABLET ORAL at 09:04

## 2019-07-12 RX ADMIN — SENNOSIDES 17.2 MG: 8.6 TABLET, FILM COATED ORAL at 09:04

## 2019-07-12 RX ADMIN — ACETAMINOPHEN 975 MG: 325 TABLET ORAL at 13:42

## 2019-07-12 RX ADMIN — METHOCARBAMOL 250 MG: 500 TABLET, FILM COATED ORAL at 13:41

## 2019-07-12 RX ADMIN — ENOXAPARIN SODIUM 30 MG: 30 INJECTION SUBCUTANEOUS at 22:21

## 2019-07-12 RX ADMIN — METOPROLOL TARTRATE 100 MG: 50 TABLET, FILM COATED ORAL at 09:04

## 2019-07-12 RX ADMIN — HYDROCHLOROTHIAZIDE 25 MG: 25 TABLET ORAL at 09:04

## 2019-07-12 RX ADMIN — OXYCODONE HYDROCHLORIDE 5 MG: 5 TABLET ORAL at 07:40

## 2019-07-12 RX ADMIN — PANTOPRAZOLE SODIUM 20 MG: 20 TABLET, DELAYED RELEASE ORAL at 05:32

## 2019-07-12 RX ADMIN — METHOCARBAMOL 250 MG: 500 TABLET, FILM COATED ORAL at 05:32

## 2019-07-12 RX ADMIN — GABAPENTIN 100 MG: 100 CAPSULE ORAL at 22:19

## 2019-07-12 RX ADMIN — GABAPENTIN 100 MG: 100 CAPSULE ORAL at 09:04

## 2019-07-12 RX ADMIN — METOPROLOL TARTRATE 100 MG: 50 TABLET, FILM COATED ORAL at 22:18

## 2019-07-12 NOTE — PLAN OF CARE
Problem: OCCUPATIONAL THERAPY ADULT  Goal: Performs self-care activities at highest level of function for planned discharge setting  See evaluation for individualized goals  Description  Treatment Interventions: ADL retraining, Functional transfer training, Endurance training, Cognitive reorientation, Patient/family training, Equipment evaluation/education, Compensatory technique education, Energy conservation, Activityengagement          See flowsheet documentation for full assessment, interventions and recommendations  Outcome: Progressing  Note:   Limitation: Decreased ADL status, Decreased Safe judgement during ADL, Decreased cognition, Decreased endurance, Decreased self-care trans, Decreased high-level ADLs  Prognosis: Good  Assessment: Pt was seen this date for OT tx session focusing on bed mobility, LB dressing, sit to stand progressions, fall prevention precautions, later transfers and overall activity toelrance  Pt presents supine, complete bed mobility, LB dressing , sit to stnad progressions and tranfers as documetned above  Continues to be limited by pain and requires significant assist x 1 for documneted tasks  Pt also requires increased VC and TC throguhout for overall safety  Motiviated and agreeable to particiation, no LOB while sitting or c o  fatigue throguhout  Pt would ebenfit from continued OT tx to imrpove overall funciotnal abilities  Continue to follow with current POC  Recommendation: Physiatry Consult  OT Discharge Recommendation: Short Term Rehab  OT - OK to Discharge:  Yes

## 2019-07-12 NOTE — PHYSICAL THERAPY NOTE
PHYSICAL THERAPY NOTE          Patient Name: Jada Parikh  [id number] Date: 7/12/2019 07/12/19 1343   Pain Assessment   Pain Assessment 0-10   Pain Score 4   Pain Type Acute pain   Pain Location Hip   Pain Orientation Right   Pain Descriptors Aching   Pain Frequency Constant/continuous   Pain Onset Ongoing   Clinical Progression Gradually improving   Effect of Pain on Daily Activities increased pain with activity    Patient's Stated Pain Goal No pain   Hospital Pain Intervention(s) Ambulation/increased activity;Repositioned   Response to Interventions tolerated    Restrictions/Precautions   Weight Bearing Precautions Per Order Yes   RLE Weight Bearing Per Order WBAT   Other Precautions Cognitive; Chair Alarm; Bed Alarm;Multiple lines; Fall Risk;Pain  (chair alarm on post session )   General   Chart Reviewed Yes   Response to Previous Treatment Patient with no complaints from previous session  Family/Caregiver Present Yes   Cognition   Overall Cognitive Status Impaired   Arousal/Participation Alert   Attention Attends with cues to redirect   Orientation Level Oriented to person;Oriented to place;Oriented to time   Memory Decreased recall of recent events   Following Commands Follows one step commands with increased time or repetition   Subjective   Subjective Pt willing to participate in PT treatment session    Bed Mobility   Supine to Sit 4  Minimal assistance   Additional items Assist x 1; Increased time required;Verbal cues   Transfers   Sit to Stand 3  Moderate assistance   Additional items Assist x 1; Increased time required;Verbal cues   Stand to Sit 3  Moderate assistance   Additional items Assist x 1; Increased time required;Verbal cues   Additional Comments VC and TC needed for hand placement and safety    Ambulation/Elevation   Gait pattern Excessively slow; Short stride; Foward flexed; Inconsistent sarahi;Decreased foot clearance   Gait Assistance 4  Minimal assist   Additional items Assist x 1   Assistive Device Rolling walker   Distance 35ft, 45ft   (seated rest break required )   Balance   Static Sitting Fair   Static Standing Poor +   Ambulatory Poor   Endurance Deficit   Endurance Deficit Yes   Endurance Deficit Description fatigue    Activity Tolerance   Activity Tolerance Patient limited by fatigue   Nurse Made Aware Pt appropriate to be seen and mobilize per nsg    Exercises   THR Sitting;10 reps;AROM; Bilateral  (x 2 sets )   Assessment   Prognosis Fair   Problem List Decreased strength;Decreased range of motion;Decreased endurance; Impaired balance;Decreased mobility;Pain;Decreased safety awareness;Decreased cognition; Impaired judgement   Assessment Pt resting in bed at time of PT treatment session  Pt reports feeling " pretty good" and is willing to participate in PT treatment session  Pt able to perform all bed mobility this session with min A x 1 which is slightly improved compared to previous session  Pt continues to require increased time to complete  All transfers able to be completed this session with mod A x 1 which is also slightly improved compared to previous session  VC and TC still required for hand placement and safety as well as increased time and mod A for proper weight shifts with all transfers  Pt tolerated increased ambulation this session with min A x 1 and the use of a RW, however distances remain limited by fatigue  Pt noted to have decreased gait speed, decreased foot clearance, and forward flexed posture with ambulation  Cues required for RW management during direction changes and for RW management  Pt able to tolerate all therex seated OOB in chair this session without any increase in complaints  VC and TC needed for proper from and pacing as well as physical assistance to complete therex through full available ROM   Pt assisted back into chair at conclusion of PT session with all needs within reach and chair alarm on  Pt denies any further questions at this time  PT will continue to follow  D/C recommendation when medically cleared is rehab  Barriers to Discharge Inaccessible home environment   Goals   Patient Goals " to get better and go home"   [facility] Expiration Date 07/21/19   Treatment Day 1   Plan   Treatment/Interventions Functional transfer training;LE strengthening/ROM; Elevations; Therapeutic exercise; Endurance training;Patient/family training;Equipment eval/education; Bed mobility;Gait training;Spoke to nursing;Family   Progress Progressing toward goals   PT Frequency Other (Comment)  (3-6x a week )   Recommendation   Recommendation Other (Comment)  (rehab)   Equipment Recommended Walker  (RW)   PT - OK to Discharge Yes  (to rehab when medically cleared )   Elex Heap, PT

## 2019-07-12 NOTE — RESTORATIVE TECHNICIAN NOTE
Restorative Specialist Mobility Note       Activity: Ambulate in sauceda, Chair ( Assisted PT, please refer to PT notes for all information )     Assistive Device: Front wheel walker     Ambulation Response: Tolerated well  Repositioned: Sitting, Up in chair           Range of Motion: Active, All extremities            Patient left resting comfortably in bedside recliner, with PT present in room for end of session

## 2019-07-12 NOTE — PROGRESS NOTES
Progress Note - Deborah Mage 80 y o  female MRN: [id number]    Unit/Bed#: [facility] 632-01 Encounter: [id number]      Assessment/Plan:  1  Ambulatory dysfunction  PT/OT  Rehab post hospitalization  Recommend vitamin D3 1000 International Units p o  Daily    2  Insomnia  Will order melatonin 6 mg p o  Q h s  Reschedule Neurontin from p o  B i d  To q 12 hours    3  Deconditioning  Albumin 2 6  Continue PT/OT  Encourage adequate p o  Hydration/nutrition  Rehab post hospitalization    4  Mild cognitive impairment  Diagnosis by [facility]  Continue supportive care  Follow-up [facility] as needed    5  Delirium precautions  Alert and orient x3, baseline  Continue geriatric pain protocol  Monitor for constipation, last bowel movement 07/11/2019  Encourage hydration/nutrition  Encourage sleep hygiene  Encourage mobilization out of bed    6  Depression  Continue Zoloft 75 mg p o  Daily    7  Right subtrochanteric femur fracture  Right IMN femur postop day 2  Ortho following  Awaiting rehab placement  PT/OT  Geriatric pain protocol    Subjective:   Patient oob in recliner chair  Alert and oriented x 3  Per daughter patient more repetitive today  Slept in this am till mid morning  Per daughter last bm was yesterday  Objective:     Vitals: Blood pressure 123/57, pulse 78, temperature 98 2 °F (36 8 °C), temperature source Oral, resp  rate 18, height 5' 4" (1 626 m), weight 79 7 kg (175 lb 11 3 oz), SpO2 98 %  ,Body mass index is 30 16 kg/m²  Intake/Output Summary (Last 24 hours) at 7/12/2019 1404  Last data filed at 7/12/2019 0929  Gross per 24 hour   Intake 1080 ml   Output 1650 ml   Net -570 ml       Physical Exam:   General : NAD  HEENT : MMM no erythema or exudates  EOMI, sclera anicteric  Heart : Normal rate, no murmur rub or gallop  Lungs : CTA no wheezes, rales or rhonchi  Abdomen : Soft, NT/ND, BS auscultated in all 4 quads     Ext :  no edema  Skin : Pink, warm, dry, age appropriate turgor and mobility  Neuro : Nonfocal  Psych : Alert and O x 3      Invasive Devices     Peripheral Intravenous Line            Peripheral IV 07/09/19 Right Antecubital 3 days    Peripheral IV 07/10/19 Left Hand 1 day                Lab, Imaging and other studies: I have personally reviewed pertinent reports      VTE Pharmacologic Prophylaxis: Sequential compression device (Venodyne)   VTE Mechanical Prophylaxis: sequential compression device

## 2019-07-12 NOTE — PROGRESS NOTES
Progress Note - Osiris Moss 1934, 80 y o  female MRN: [id number]    Unit/Bed#: [facility] 632-01 Encounter: [id number]    Primary Care Provider: No primary care provider on file  Date and time admitted to hospital: 7/9/2019  6:11 PM        * Closed fracture of right hip [facility])  Assessment & Plan  S/p Right Femur Antegrade IM Nail (Trochanteric) 7/10 - Ortho  NWB RLE - future weight bearing status per Orthopedic Surgery  Perioperative Ancef - 2x doses, last given at 4AM  Neuro checks  PAin management  DVT prophylaxis  PT/OT  Case Management - Rehab placement pending recommendations per PT/OT          Bedside rounds completed with nurse Gregorio Bahena       Disposition: rehab      SUBJECTIVE:  Chief Complaint: right hip pain    Subjective: I have a little soreness this morning      OBJECTIVE:     Meds/Allergies     Current Facility-Administered Medications:     acetaminophen (TYLENOL) tablet 975 mg, 975 mg, Oral, Q8H [facility], Judy Lobato MD, 975 mg at 07/12/19 0531    albuterol (PROVENTIL HFA,VENTOLIN HFA) inhaler 2 puff, 2 puff, Inhalation, Q6H PRN, Judy Lobato MD    amLODIPine (NORVASC) tablet 10 mg, 10 mg, Oral, Daily, Judy Lobato MD, 10 mg at 07/11/19 0914    enoxaparin (LOVENOX) subcutaneous injection 30 mg, 30 mg, Subcutaneous, Q12H [facility] halfway, Byron Snider Petit-Me, 30 mg at 07/11/19 2111    gabapentin (NEURONTIN) capsule 100 mg, 100 mg, Oral, BID, Judy Lobato MD, 100 mg at 07/11/19 1853    hydrochlorothiazide (HYDRODIURIL) tablet 25 mg, 25 mg, Oral, Daily, Judy Lobato MD, 25 mg at 07/11/19 0915    methocarbamol (ROBAXIN) tablet 250 mg, 250 mg, Oral, Q8H [facility], Judy Lobato MD, 250 mg at 07/12/19 0532    metoprolol tartrate (LOPRESSOR) tablet 100 mg, 100 mg, Oral, Q12H [facility] halfway, Judy Lobato MD, 100 mg at 07/11/19 2111    ondansetron (ZOFRAN) injection 4 mg, 4 mg, Intravenous, Q6H PRN, Judy Lobato MD    oxyCODONE (ROXICODONE) IR tablet 2 5 mg, 2 5 mg, Oral, Q4H PRN, Judy Lobato MD    oxyCODONE (ROXICODONE) IR tablet 5 mg, 5 mg, Oral, Q4H PRN, Joe Bentley MD, 5 mg at 07/12/19 0740    pantoprazole (PROTONIX) EC tablet 20 mg, 20 mg, Oral, Early Morning, Joe Bentley MD, 20 mg at 07/12/19 0532    senna (SENOKOT) tablet 17 2 mg, 2 tablet, Oral, Daily, Joe Bentley MD, 17 2 mg at 07/11/19 0915    sertraline (ZOLOFT) tablet 50 mg, 50 mg, Oral, Daily, Joe Bentley MD, 50 mg at 07/11/19 0914     Vitals:   Vitals:    07/12/19 0044   BP: 122/60   Pulse: 78   Resp: 18   Temp: 98 2 °F (36 8 °C)   SpO2: 98%       Intake/Output:  I/O       07/10 0701 - 07/11 0700 07/11 0701 - 07/12 0700 07/12 0701 - 07/13 0700    P  O  0 1200     I V  (mL/kg) 1965 (24 7) 390 (4 9)     Total Intake(mL/kg) 1965 (24 7) 1590 (19 9)     Urine (mL/kg/hr) 2750 (1 4) 1650 (0 9)     Blood 250      Total Output 3000 1650     Net -1035 -60                   Nutrition/GI Proph/Bowel Reg: regular    Physical Exam:   GENERAL APPEARANCE: resting in bed, comfortable  NEURO: GCS - 15, intact  HEENT: EOM's intact  CV: RRR, no complaints of chest pain  LUNGS: CTA bilaterally, no shortness of breath  GI: tolerating a d regular diet, passing flatus, +BM  : voiding without difficulty  MSK: slow with Right LE, but moving all four extremities  SKIN: warm and dry    Invasive Devices     Peripheral Intravenous Line            Peripheral IV 07/09/19 Right Antecubital 2 days    Peripheral IV 07/10/19 Left Hand 1 day                 Lab Results: none  Imaging/EKG Studies: none  Other Studies: none  VTE Prophylaxis: Sequential compression device (Venodyne)  , Lovenox

## 2019-07-12 NOTE — PROGRESS NOTES
Orthopedics   Ruben Staggers 80 y o  female MRN: [id number]  Unit/Bed#: [facility] 632-01      Subjective:  80 y  o female post operative day 2 right femoral intramedullary nail due to subtrochanteric fracture  Pt doing well  Pain controlled  Patient states he is able to ambulate she denies numbness tingling calf pain chest pain shortness of breath      Labs:  0   Lab Value Date/Time    HCT 34 6 (L) 07/11/2019 0532    HCT 36 8 07/10/2019 0506    HCT 41 3 07/09/2019 1915    HCT 38 3 04/20/2015 0543    HCT 39 1 04/19/2015 0519    HCT 41 4 04/18/2015 0749    HGB 12 0 07/11/2019 0532    HGB 12 3 07/10/2019 0506    HGB 14 0 07/09/2019 1915    HGB 12 7 04/20/2015 0543    HGB 12 9 04/19/2015 0519    HGB 13 9 04/18/2015 0749    INR 1 14 07/09/2019 1915    INR 1 12 04/18/2015 0749    WBC 11 97 (H) 07/11/2019 0532    WBC 10 69 (H) 07/10/2019 0506    WBC 13 17 (H) 07/09/2019 1915    WBC 8 67 04/20/2015 0543    WBC 6 79 04/19/2015 0519    WBC 10 61 (H) 04/18/2015 0749       Meds:    Current Facility-Administered Medications:     acetaminophen (TYLENOL) tablet 975 mg, 975 mg, Oral, Q8H [address], Marily Bower MD, 975 mg at 07/12/19 0531    albuterol (PROVENTIL HFA,VENTOLIN HFA) inhaler 2 puff, 2 puff, Inhalation, Q6H PRN, Marily Bower MD    amLODIPine (NORVASC) tablet 10 mg, 10 mg, Oral, Daily, Marily Bower MD, 10 mg at 07/11/19 0914    enoxaparin (LOVENOX) subcutaneous injection 30 mg, 30 mg, Subcutaneous, Q12H [address], Mayra Paiz Petit-Me, 30 mg at 07/11/19 2111    gabapentin (NEURONTIN) capsule 100 mg, 100 mg, Oral, BID, Marily Bower MD, 100 mg at 07/11/19 1853    hydrochlorothiazide (HYDRODIURIL) tablet 25 mg, 25 mg, Oral, Daily, Marily Bower MD, 25 mg at 07/11/19 0915    methocarbamol (ROBAXIN) tablet 250 mg, 250 mg, Oral, Q8H [address], Marily Bower MD, 250 mg at 07/12/19 0532    metoprolol tartrate (LOPRESSOR) tablet 100 mg, 100 mg, Oral, Q12H [address], Marily Bower MD, 100 mg at 07/11/19 2111    ondansetron (ZOFRAN) injection 4 mg, 4 mg, Intravenous, Q6H PRN, Narda Roe MD    oxyCODONE (ROXICODONE) IR tablet 2 5 mg, 2 5 mg, Oral, Q4H PRN, Narda Roe MD    oxyCODONE (ROXICODONE) IR tablet 5 mg, 5 mg, Oral, Q4H PRN, Narda Roe MD    pantoprazole (PROTONIX) EC tablet 20 mg, 20 mg, Oral, Early Morning, Narda Roe MD, 20 mg at 07/12/19 0532    senna (SENOKOT) tablet 17 2 mg, 2 tablet, Oral, Daily, Narda Roe MD, 17 2 mg at 07/11/19 0915    sertraline (ZOLOFT) tablet 50 mg, 50 mg, Oral, Daily, Narda Roe MD, 50 mg at 07/11/19 1035    Blood Culture:   Lab Results   Component Value Date    BLOODCX No Growth After 5 Days  02/11/2017       Wound Culture:   No results found for: WOUNDCULT    Ins and Outs:  I/O last 24 hours: In: 3135 [P O :780; I V :2355]  Out: 3050 [Urine:2800; Blood:250]          Physical exam:  Vitals:    07/12/19 0044   BP: 122/60   Pulse: 78   Resp: 18   Temp: 98 2 °F (36 8 °C)   SpO2: 98%     right lower extremity  · Dressing clean dry intact  · Sensation intact L2-S1  · Motor intact to knee flexion/extension, EHL/FHL  · 2+ DP pulse, calf nontender palpation active ankle dorsi plantar flexion    Assessment: 80 y  o female post operative day 2 right femur IMN   Pt doing well    Plan:  · Up and out of bed  · Weightbearing as tolerated right lower extremity  · PT/OT  · DVT prophylaxis  · Analgesics  · Dispo: Ortho will follow  · Will continue to assess for acute blood loss anemia    Santos Parson PA-C
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
## 2019-07-12 NOTE — SOCIAL WORK
Cm reviewed patient during care coordination rounds  Patient is not medically stable for d/c today  Cm informed that [facility] is [facility] and that insurance authorization is pending  Cm informed that daughter spoke with insurance verification team to clarify insurance coverage  Cm awaiting update in regards to discharge plan  Cm informed that patient is denied by insurance company for admission to acute rehab [facility]  Cm informed primary team and inquired if a consult for PM&R is needed  Cm provided peer to peer information to primary team, [phone]  Cm informed patient's daughter about denial, peer to peer and the next steps  Cm provided patient's daughter with list of sub-acute rehab facilities

## 2019-07-12 NOTE — OCCUPATIONAL THERAPY NOTE
Occupational Therapy Treatment Note:     07/12/19 1600   Restrictions/Precautions   Weight Bearing Precautions Per Order Yes   RLE Weight Bearing Per Order WBAT   Other Precautions WBS;Pain; Fall Risk   Pain Assessment   Pain Score 4   Pain Type Acute pain   Pain Location Hip   Pain Orientation Right   ADL   Where Assessed Edge of bed   LB Dressing Assistance 3  Moderate Assistance   LB Dressing Deficit Thread RLE into underwear; Thread LLE into underwear;Pull up over hips   LB Dressing Comments Mod A to thread LE and to pull up over hips in stance, increased time   Bed Mobility   Supine to Sit 3  Moderate assistance   Additional items Assist x 1; Increased time required;Verbal cues;LE management   Sit to Supine 3  Moderate assistance   Additional items Assist x 1; Increased time required;LE management;Verbal cues   Additional Comments Sat EOB approx 30 mins with bilateral support, no LOB   Transfers   Sit to Stand 3  Moderate assistance   Additional items Assist x 1; Increased time required;Verbal cues   Stand to Sit 3  Moderate assistance   Additional items Assist x 1; Increased time required;Verbal cues   Additional Comments Lateral tranfers, side step [id number] HOB RW level, Mod A    Cognition   Overall Cognitive Status Impaired   Arousal/Participation Alert   Attention Attends with cues to redirect   Orientation Level Oriented to person;Oriented to place;Oriented to time   Memory Decreased recall of recent events   Following Commands Follows one step commands with increased time or repetition   Comments Increased VC and TC throughout for direction and safety   Activity Tolerance   Activity Tolerance Patient tolerated treatment well   Medical Staff Made Aware NSG aware   Assessment   Assessment Pt was seen this date for OT tx session focusing on bed mobility, LB dressing, sit to stand progressions, fall prevention precautions, later transfers and overall activity toelrance   Pt presents supine, complete bed mobility, LB dressing , sit to stnad progressions and tranfers as documetned above  Continues to be limited by pain and requires significant assist x 1 for documneted tasks  Pt also requires increased VC and TC throguhout for overall safety  Motiviated and agreeable to particiation, no LOB while sitting or c o  fatigue throguhout  Pt would ebenfit from continued OT tx to imrpove overall funciotnal abilities  Continue to follow with current POC     Plan   Treatment Interventions ADL retraining   Goal Expiration Date 07/25/19   Treatment Day 1   OT Frequency 3-5x/wk   Recommendation   OT Discharge Recommendation Short Term [address] [place] [address]

## 2019-07-12 NOTE — PLAN OF CARE
Problem: PHYSICAL THERAPY ADULT  Goal: Performs mobility at highest level of function for planned discharge setting  See evaluation for individualized goals  Description  Treatment/Interventions: Functional transfer training, LE strengthening/ROM, Elevations, Therapeutic exercise, Endurance training, Patient/family training, Equipment eval/education, Bed mobility, Gait training, Spoke to nursing, OT, Family, Spoke to case management  Equipment Recommended: Walker(RW)       See flowsheet documentation for full assessment, interventions and recommendations  Outcome: Progressing  Note:   Prognosis: Fair  Problem List: Decreased strength, Decreased range of motion, Decreased endurance, Impaired balance, Decreased mobility, Pain, Decreased safety awareness, Decreased cognition, Impaired judgement  Assessment: Pt resting in bed at time of PT treatment session  Pt reports feeling " pretty good" and is willing to participate in PT treatment session  Pt able to perform all bed mobility this session with min A x 1 which is slightly improved compared to previous session  Pt continues to require increased time to complete  All transfers able to be completed this session with mod A x 1 which is also slightly improved compared to previous session  VC and TC still required for hand placement and safety as well as increased time and mod A for proper weight shifts with all transfers  Pt tolerated increased ambulation this session with min A x 1 and the use of a RW, however distances remain limited by fatigue  Pt noted to have decreased gait speed, decreased foot clearance, and forward flexed posture with ambulation  Cues required for RW management during direction changes and for RW management  Pt able to tolerate all therex seated OOB in chair this session without any increase in complaints  VC and TC needed for proper from and pacing as well as physical assistance to complete therex through full available ROM   Pt assisted back into chair at conclusion of PT session with all needs within reach and chair alarm on  Pt denies any further questions at this time  PT will continue to follow  D/C recommendation when medically cleared is rehab  Barriers to Discharge: Inaccessible home environment     Recommendation: Other (Comment)(rehab)     PT - OK to Discharge: Yes(to rehab when medically cleared )    See flowsheet documentation for full assessment

## 2019-07-13 PROCEDURE — 99232 SBSQ HOSP IP/OBS MODERATE 35: CPT | Performed by: SURGERY

## 2019-07-13 PROCEDURE — 97116 GAIT TRAINING THERAPY: CPT

## 2019-07-13 RX ADMIN — METHOCARBAMOL 250 MG: 500 TABLET, FILM COATED ORAL at 05:38

## 2019-07-13 RX ADMIN — SERTRALINE HYDROCHLORIDE 50 MG: 50 TABLET ORAL at 10:09

## 2019-07-13 RX ADMIN — GABAPENTIN 100 MG: 100 CAPSULE ORAL at 10:10

## 2019-07-13 RX ADMIN — PANTOPRAZOLE SODIUM 20 MG: 20 TABLET, DELAYED RELEASE ORAL at 05:38

## 2019-07-13 RX ADMIN — METHOCARBAMOL 250 MG: 500 TABLET, FILM COATED ORAL at 14:49

## 2019-07-13 RX ADMIN — METOPROLOL TARTRATE 100 MG: 50 TABLET, FILM COATED ORAL at 22:01

## 2019-07-13 RX ADMIN — SENNOSIDES 17.2 MG: 8.6 TABLET, FILM COATED ORAL at 10:10

## 2019-07-13 RX ADMIN — ENOXAPARIN SODIUM 30 MG: 30 INJECTION SUBCUTANEOUS at 10:11

## 2019-07-13 RX ADMIN — GABAPENTIN 100 MG: 100 CAPSULE ORAL at 22:07

## 2019-07-13 RX ADMIN — METHOCARBAMOL 250 MG: 500 TABLET, FILM COATED ORAL at 22:06

## 2019-07-13 RX ADMIN — MELATONIN 6 MG: 3 TAB ORAL at 22:06

## 2019-07-13 RX ADMIN — ACETAMINOPHEN 975 MG: 325 TABLET ORAL at 05:38

## 2019-07-13 RX ADMIN — ENOXAPARIN SODIUM 30 MG: 30 INJECTION SUBCUTANEOUS at 22:07

## 2019-07-13 RX ADMIN — OXYCODONE HYDROCHLORIDE 2.5 MG: 5 TABLET ORAL at 11:23

## 2019-07-13 RX ADMIN — ACETAMINOPHEN 975 MG: 325 TABLET ORAL at 14:50

## 2019-07-13 RX ADMIN — ACETAMINOPHEN 975 MG: 325 TABLET ORAL at 22:07

## 2019-07-13 NOTE — PROGRESS NOTES
Progress Note - Luba Grounds 1934, 80 y o  female MRN: [id number]    Unit/Bed#: [facility] 632-01 Encounter: [id number]    Primary Care Provider: No primary care provider on file  Date and time admitted to hospital: 7/9/2019  6:11 PM        * Closed fracture of right hip [facility])  Assessment & Plan  S/p Right Femur Antegrade IM Nail (Trochanteric) 7/10 - Ortho  NWB RLE - future weight bearing status per Orthopedic Surgery  Perioperative Ancef - 2x doses, last given at 4AM  Neuro checks  PAin management  DVT prophylaxis  PT/OT  Case Management - Rehab placement pending acceptance          Bedside rounds completed with nurse Avalos       Disposition: rehab pending      SUBJECTIVE:  Chief Complaint: right hip pain    Subjective: I feel pretty good      OBJECTIVE:     Meds/Allergies     Current Facility-Administered Medications:     acetaminophen (TYLENOL) tablet 975 mg, 975 mg, Oral, Q8H [address], Luc Beth MD, 975 mg at 07/13/19 0538    albuterol (PROVENTIL HFA,VENTOLIN HFA) inhaler 2 puff, 2 puff, Inhalation, Q6H PRN, Luc Beth MD    amLODIPine (NORVASC) tablet 10 mg, 10 mg, Oral, Daily, Luc Beth MD, 10 mg at 07/12/19 0904    enoxaparin (LOVENOX) subcutaneous injection 30 mg, 30 mg, Subcutaneous, Q12H [address], Brenda Romero Petit-Me, 30 mg at 07/12/19 2221    gabapentin (NEURONTIN) capsule 100 mg, 100 mg, Oral, Q12H, Tuesday M RUBY Servin, 100 mg at 07/12/19 2219    hydrochlorothiazide (HYDRODIURIL) tablet 25 mg, 25 mg, Oral, Daily, Luc Beth MD, 25 mg at 07/12/19 0904    melatonin tablet 6 mg, 6 mg, Oral, HS, Tuesday M PEPITO ServinNP, 6 mg at 07/12/19 2220    methocarbamol (ROBAXIN) tablet 250 mg, 250 mg, Oral, Q8H [address], Luc Beth MD, 250 mg at 07/13/19 0538    metoprolol tartrate (LOPRESSOR) tablet 100 mg, 100 mg, Oral, Q12H [address], Luc Beth MD, 100 mg at 07/12/19 2214    ondansetron (ZOFRAN) injection 4 mg, 4 mg, Intravenous, Q6H PRN, Luc Beth MD    oxyCODONE (ROXICODONE) IR tablet 2 5 mg, 2 5 mg, Oral, Q4H PRN, Andreas Little MD    oxyCODONE (ROXICODONE) IR tablet 5 mg, 5 mg, Oral, Q4H PRN, Andreas Little MD, 5 mg at 07/12/19 0740    pantoprazole (PROTONIX) EC tablet 20 mg, 20 mg, Oral, Early Morning, Andreas Little MD, 20 mg at 07/13/19 0538    senna (SENOKOT) tablet 17 2 mg, 2 tablet, Oral, Daily, Andreas Little MD, 17 2 mg at 07/12/19 0904    sertraline (ZOLOFT) tablet 50 mg, 50 mg, Oral, Daily, Andreas Little MD, 50 mg at 07/12/19 0904     Vitals:   Vitals:    07/12/19 2335   BP: 127/55   Pulse: 62   Resp: 17   Temp: 98 6 °F (37 °C)   SpO2:        Intake/Output:  I/O       07/11 0701 - 07/12 0700 07/12 0701 - 07/13 0700 07/13 0701 - 07/14 0700    P  O  1200 1180     I V  (mL/kg) 390 (4 9)      Total Intake(mL/kg) 1590 (19 9) 1180 (14 8)     Urine (mL/kg/hr) 1650 (0 9) 1800 (0 9)     Blood       Total Output 1650 1800     Net -60 -620                   Nutrition/GI Proph/Bowel Reg: regular    Physical Exam:   GENERAL APPEARANCE: comfortable  NEURO:intact, GCS - 15  HEENT: EOM's intact  CV: RRR,  no complaint of chest pain  LUNGS: CTA bilaterally, no shortness of breath  GI: tolerating a diet  : voiding  MSK: moves all four extremities, WBAT on RLE  SKIN: intact, warm and dry, dressing to right hip is C/D/I    Invasive Devices     Peripheral Intravenous Line            Peripheral IV 07/09/19 Right Antecubital 3 days    Peripheral IV 07/10/19 Left Hand 2 days                 Lab Results:none   Imaging/EKG Studies: none  Other Studies: none  VTE Prophylaxis: Venodynes, Lovenox

## 2019-07-13 NOTE — PLAN OF CARE
Problem: PHYSICAL THERAPY ADULT  Goal: Performs mobility at highest level of function for planned discharge setting  See evaluation for individualized goals  Description  Treatment/Interventions: Functional transfer training, LE strengthening/ROM, Elevations, Therapeutic exercise, Endurance training, Patient/family training, Equipment eval/education, Bed mobility, Gait training, Spoke to nursing, OT, Family, Spoke to case management  Equipment Recommended: Walker(RW)       See flowsheet documentation for full assessment, interventions and recommendations  Outcome: Progressing  Note:   Prognosis: Good  Problem List: Decreased strength, Decreased range of motion, Decreased endurance, Impaired balance, Decreased mobility, Pain, Orthopedic restrictions  Assessment: Pt seated in recliner upon arrival, pleasant and agreeable to ambualtion  Pt requires Min assist x1 for all sit to stand transfers and made grood progress with ambualtion this session, pt ambulates a total of 140' with RW taking two seated rest breaks and 4 standing rest breaks  pt will continue to benenfit from skilled PT to further maximize safe fucntional mobility to return to OF  Pt remained seated in recliner at end of session with chair alarm on, all ammenities within reach and resting comfortably  Barriers to Discharge: Inaccessible home environment     Recommendation: (Rehab)     PT - OK to Discharge: Yes(to rehab when medically ready)    See flowsheet documentation for full assessment

## 2019-07-13 NOTE — ASSESSMENT & PLAN NOTE
S/p Right Femur Antegrade IM Nail (Trochanteric) 7/10 - Ortho  NWB RLE - future weight bearing status per Orthopedic Surgery  Perioperative Ancef - 2x doses, last given at 4AM  Neuro checks  PAin management  DVT prophylaxis  PT/OT  Case Management - Rehab placement pending acceptance

## 2019-07-13 NOTE — PHYSICAL THERAPY NOTE
Physical Therapy Progress Note     07/13/19 1109   Pain Assessment   Pain Assessment 0-10   Pain Score 6   Pain Type Acute pain   Pain Location Hip   Pain Orientation Right   Hospital Pain Intervention(s) Medication (See MAR); Repositioned; Ambulation/increased activity; Distraction   Diversional Activities Television   Restrictions/Precautions   Weight Bearing Precautions Per Order Yes   RLE Weight Bearing Per Order WBAT   Other Precautions Chair Alarm; Fall Risk;Pain;WBS   General   Chart Reviewed Yes   Response to Previous Treatment Patient with no complaints from previous session  Family/Caregiver Present No   Cognition   Overall Cognitive Status Impaired   Arousal/Participation Alert; Responsive; Cooperative   Attention Attends with cues to redirect   Orientation Level Oriented X4   Memory Decreased short term memory   Following Commands Follows one step commands with increased time or repetition   Transfers   Sit to Stand 4  Minimal assistance   Additional items Assist x 1; Armrests; Increased time required;Verbal cues   Stand to Sit 4  Minimal assistance   Additional items Assist x 1; Armrests; Increased time required;Verbal cues   Ambulation/Elevation   Gait pattern Improper Weight shift;Decreased foot clearance;Decreased R stance;Shuffling; Short stride; Excessively slow   Gait Assistance 4  Minimal assist   Additional items Assist x 1;Verbal cues; Tactile cues  (2nd chair follow)   Assistive Device Rolling walker   Distance 140'   Endurance Deficit   Endurance Deficit Yes   Activity Tolerance   Activity Tolerance Patient limited by fatigue;Patient limited by pain   Nurse Made Aware JOSELUIS Hess   Prognosis Good   Problem List Decreased strength;Decreased range of motion;Decreased endurance; Impaired balance;Decreased mobility;Pain;Orthopedic restrictions   Assessment Pt seated in recliner upon arrival, pleasant and agreeable to ambualtion   Pt requires Min assist x1 for all sit to stand transfers and made grood progress with ambualtion this session, pt ambulates a total of 140' with RW taking two seated rest breaks and 4 standing rest breaks  pt will continue to benenfit from skilled PT to further maximize safe fucntional mobility to return to PLOF  Pt remained seated in recliner at end of session with chair alarm on, all ammenities within reach and resting comfortably  Barriers to Discharge Inaccessible home environment   Goals   Patient Goals to go home   STG Expiration Date 07/21/19   Short Term Goal #1 In 10 days pt will complete: 1) Bed mobility skills with min Ax1 to increase safety and independence as well as decrease caregiver burden  2) Functional transfers with min Ax1 to promote increased independence, safety, and QOL in the home environment  3) Ambulate 100' using least restrictive AD with min Ax1 without LOB and stable vitals so that pt can negotiate home environment safely and promote independence with functional mobility and return to PLOF  4) Stair training up/ down 3 step/s using rail/s with min Ax1 so that pt can enter/negotiate home environment safely and decrease fall risk  5) Improve balance grades to Good to increase safety with all mobility and decrease fall risk   6) Improve BLE strength by 1/2 grade to help increase overall functional mobility and decrease fall risk  7) PT for ongoing pt and family education; DME needs and D/C planning to promote highest level of function in least restrictive environment  Plan   Treatment/Interventions Functional transfer training;LE strengthening/ROM; Elevations; Therapeutic exercise; Endurance training;Bed mobility;Gait training   Progress Progressing toward goals   PT Frequency   (3-6x/week)   Recommendation   Recommendation   (Rehab)   Equipment Recommended Don Ramirez   PT - OK to Discharge Yes  (to rehab when medically ready)     Mis Beard, PTA

## 2019-07-13 NOTE — PLAN OF CARE
Problem: Potential for Falls  Goal: Patient will remain free of falls  Description  INTERVENTIONS:  - Assess patient frequently for physical needs  -  Identify cognitive and physical deficits and behaviors that affect risk of falls    -  [place] fall precautions as indicated by assessment   - Educate patient/family on patient safety including physical limitations  - Instruct patient to call for assistance with activity based on assessment  - Modify environment to reduce risk of injury  - Consider OT/PT consult to assist with strengthening/mobility  Outcome: Progressing     Problem: Prexisting or High Potential for Compromised Skin Integrity  Goal: Skin integrity is maintained or improved  Description  INTERVENTIONS:  - Identify patients at risk for skin breakdown  - Assess and monitor skin integrity  - Assess and monitor nutrition and hydration status  - Monitor labs (i e  albumin)  - Assess for incontinence   - Turn and reposition patient  - Assist with mobility/ambulation  - Relieve pressure over bony prominences  - Avoid friction and shearing  - Provide appropriate hygiene as needed including keeping skin clean and dry  - Evaluate need for skin moisturizer/barrier cream  - Collaborate with interdisciplinary team (i e  Nutrition, Rehabilitation, etc )   - Patient/family teaching  Outcome: Progressing     Problem: PAIN - ADULT  Goal: Verbalizes/displays adequate comfort level or baseline comfort level  Description  Interventions:  - Encourage patient to monitor pain and request assistance  - Assess pain using appropriate pain scale  - Administer analgesics based on type and severity of pain and evaluate response  - Implement non-pharmacological measures as appropriate and evaluate response  - Consider cultural and social influences on pain and pain management  - Notify physician/advanced practitioner if interventions unsuccessful or patient reports new pain  Outcome: Progressing     Problem: SAFETY ADULT  Goal: Patient will remain free of falls  Description  INTERVENTIONS:  - Assess patient frequently for physical needs  -  Identify cognitive and physical deficits and behaviors that affect risk of falls    -  [place] fall precautions as indicated by assessment   - Educate patient/family on patient safety including physical limitations  - Instruct patient to call for assistance with activity based on assessment  - Modify environment to reduce risk of injury  - Consider OT/PT consult to assist with strengthening/mobility  Outcome: Progressing  Goal: Maintain or return to baseline ADL function  Description  INTERVENTIONS:  -  Assess patient's ability to carry out ADLs; assess patient's baseline for ADL function and identify physical deficits which impact ability to perform ADLs (bathing, care of mouth/teeth, toileting, grooming, dressing, etc )  - Assess/evaluate cause of self-care deficits   - Assess range of motion  - Assess patient's mobility; develop plan if impaired  - Assess patient's need for assistive devices and provide as appropriate  - Encourage maximum independence but intervene and supervise when necessary  ¯ Involve family in performance of ADLs  ¯ Assess for home care needs following discharge   ¯ Request OT consult to assist with ADL evaluation and planning for discharge  ¯ Provide patient education as appropriate  Outcome: Progressing  Goal: Maintain or return mobility status to optimal level  Description  INTERVENTIONS:  - Assess patient's baseline mobility status (ambulation, transfers, stairs, etc )    - Identify cognitive and physical deficits and behaviors that affect mobility  - Identify mobility aids required to assist with transfers and/or ambulation (gait belt, sit-to-stand, lift, walker, cane, etc )  - [place] fall precautions as indicated by assessment  - Record patient progress and toleration of activity level on Mobility SBAR; progress patient to next Phase/Stage  - Instruct patient to call for assistance with activity based on assessment  - Request Rehabilitation consult to assist with strengthening/weightbearing, etc   Outcome: Progressing     Problem: DISCHARGE PLANNING  Goal: Discharge to home or other facility with appropriate resources  Description  INTERVENTIONS:  - Identify barriers to discharge w/patient and caregiver  - Arrange for needed discharge resources and transportation as appropriate  - Identify discharge learning needs (meds, wound care, etc )  - Arrange for interpretive services to assist at discharge as needed  - Refer to Case Management Department for coordinating discharge planning if the patient needs post-hospital services based on physician/advanced practitioner order or complex needs related to functional status, cognitive ability, or social support system  Outcome: Progressing     Problem: Knowledge Deficit  Goal: Patient/family/caregiver demonstrates understanding of disease process, treatment plan, medications, and discharge instructions  Description  Complete learning assessment and assess knowledge base    Interventions:  - Provide teaching at level of understanding  - Provide teaching via preferred learning methods  Outcome: Progressing     Problem: INFECTION - ADULT  Goal: Absence or prevention of progression during hospitalization  Description  INTERVENTIONS:  - Assess and monitor for signs and symptoms of infection  - Monitor lab/diagnostic results  - Monitor all insertion sites, i e  indwelling lines, tubes, and drains  - Monitor endotracheal (as able) and nasal secretions for changes in amount and color  - [place] appropriate cooling/warming therapies per order  - Administer medications as ordered  - Instruct and encourage patient and family to use good hand hygiene technique  - Identify and instruct in appropriate isolation precautions for identified infection/condition  Outcome: Progressing  Goal: Absence of fever/infection during neutropenic period  Description  INTERVENTIONS:  - Monitor WBC  - Implement neutropenic guidelines  Outcome: Progressing     Problem: Nutrition/Hydration-ADULT  Goal: Nutrient/Hydration intake appropriate for improving, restoring or maintaining nutritional needs  Description  Monitor and assess patient's nutrition/hydration status for malnutrition (ex- brittle hair, bruises, dry skin, pale skin and conjunctiva, muscle wasting, smooth red tongue, and disorientation)  Collaborate with interdisciplinary team and initiate plan and interventions as ordered  Monitor patient's weight and dietary intake as ordered or per policy  Utilize nutrition screening tool and intervene per policy  Determine patient's food preferences and provide high-protein, high-caloric foods as appropriate       INTERVENTIONS:  - Monitor oral intake, urinary output, labs, and treatment plans  - Assess nutrition and hydration status and recommend course of action  - Evaluate amount of meals eaten  - Assist patient with eating if necessary   - Allow adequate time for meals  - Recommend/ encourage appropriate diets, oral nutritional supplements, and vitamin/mineral supplements  - Order, calculate, and assess calorie counts as needed  - Recommend, monitor, and adjust tube feedings and TPN/PPN based on assessed needs  - Assess need for intravenous fluids  - Provide specific nutrition/hydration education as appropriate  - Include patient/family/caregiver in decisions related to nutrition  Outcome: Progressing

## 2019-07-14 PROCEDURE — 97535 SELF CARE MNGMENT TRAINING: CPT

## 2019-07-14 PROCEDURE — 99232 SBSQ HOSP IP/OBS MODERATE 35: CPT | Performed by: SURGERY

## 2019-07-14 RX ADMIN — HYDROCHLOROTHIAZIDE 25 MG: 25 TABLET ORAL at 08:45

## 2019-07-14 RX ADMIN — METOPROLOL TARTRATE 100 MG: 50 TABLET, FILM COATED ORAL at 21:57

## 2019-07-14 RX ADMIN — METHOCARBAMOL 250 MG: 500 TABLET, FILM COATED ORAL at 13:46

## 2019-07-14 RX ADMIN — GABAPENTIN 100 MG: 100 CAPSULE ORAL at 21:58

## 2019-07-14 RX ADMIN — METOPROLOL TARTRATE 100 MG: 50 TABLET, FILM COATED ORAL at 08:44

## 2019-07-14 RX ADMIN — PANTOPRAZOLE SODIUM 20 MG: 20 TABLET, DELAYED RELEASE ORAL at 06:00

## 2019-07-14 RX ADMIN — ENOXAPARIN SODIUM 30 MG: 30 INJECTION SUBCUTANEOUS at 08:46

## 2019-07-14 RX ADMIN — ENOXAPARIN SODIUM 30 MG: 30 INJECTION SUBCUTANEOUS at 21:58

## 2019-07-14 RX ADMIN — GABAPENTIN 100 MG: 100 CAPSULE ORAL at 08:45

## 2019-07-14 RX ADMIN — ACETAMINOPHEN 975 MG: 325 TABLET ORAL at 21:57

## 2019-07-14 RX ADMIN — AMLODIPINE BESYLATE 10 MG: 10 TABLET ORAL at 08:45

## 2019-07-14 RX ADMIN — ACETAMINOPHEN 975 MG: 325 TABLET ORAL at 05:59

## 2019-07-14 RX ADMIN — METHOCARBAMOL 250 MG: 500 TABLET, FILM COATED ORAL at 21:57

## 2019-07-14 RX ADMIN — METHOCARBAMOL 250 MG: 500 TABLET, FILM COATED ORAL at 06:00

## 2019-07-14 RX ADMIN — SENNOSIDES 17.2 MG: 8.6 TABLET, FILM COATED ORAL at 08:44

## 2019-07-14 RX ADMIN — ACETAMINOPHEN 975 MG: 325 TABLET ORAL at 13:47

## 2019-07-14 RX ADMIN — MELATONIN 6 MG: 3 TAB ORAL at 21:57

## 2019-07-14 RX ADMIN — SERTRALINE HYDROCHLORIDE 50 MG: 50 TABLET ORAL at 08:46

## 2019-07-14 NOTE — PLAN OF CARE
Problem: Potential for Falls  Goal: Patient will remain free of falls  Description  INTERVENTIONS:  - Assess patient frequently for physical needs  -  Identify cognitive and physical deficits and behaviors that affect risk of falls    -  [place] fall precautions as indicated by assessment   - Educate patient/family on patient safety including physical limitations  - Instruct patient to call for assistance with activity based on assessment  - Modify environment to reduce risk of injury  - Consider OT/PT consult to assist with strengthening/mobility  Outcome: Progressing     Problem: Prexisting or High Potential for Compromised Skin Integrity  Goal: Skin integrity is maintained or improved  Description  INTERVENTIONS:  - Identify patients at risk for skin breakdown  - Assess and monitor skin integrity  - Assess and monitor nutrition and hydration status  - Monitor labs (i e  albumin)  - Assess for incontinence   - Turn and reposition patient  - Assist with mobility/ambulation  - Relieve pressure over bony prominences  - Avoid friction and shearing  - Provide appropriate hygiene as needed including keeping skin clean and dry  - Evaluate need for skin moisturizer/barrier cream  - Collaborate with interdisciplinary team (i e  Nutrition, Rehabilitation, etc )   - Patient/family teaching  Outcome: Progressing     Problem: PAIN - ADULT  Goal: Verbalizes/displays adequate comfort level or baseline comfort level  Description  Interventions:  - Encourage patient to monitor pain and request assistance  - Assess pain using appropriate pain scale  - Administer analgesics based on type and severity of pain and evaluate response  - Implement non-pharmacological measures as appropriate and evaluate response  - Consider cultural and social influences on pain and pain management  - Notify physician/advanced practitioner if interventions unsuccessful or patient reports new pain  Outcome: Progressing     Problem: SAFETY ADULT  Goal: Patient will remain free of falls  Description  INTERVENTIONS:  - Assess patient frequently for physical needs  -  Identify cognitive and physical deficits and behaviors that affect risk of falls    -  [place] fall precautions as indicated by assessment   - Educate patient/family on patient safety including physical limitations  - Instruct patient to call for assistance with activity based on assessment  - Modify environment to reduce risk of injury  - Consider OT/PT consult to assist with strengthening/mobility  Outcome: Progressing  Goal: Maintain or return to baseline ADL function  Description  INTERVENTIONS:  -  Assess patient's ability to carry out ADLs; assess patient's baseline for ADL function and identify physical deficits which impact ability to perform ADLs (bathing, care of mouth/teeth, toileting, grooming, dressing, etc )  - Assess/evaluate cause of self-care deficits   - Assess range of motion  - Assess patient's mobility; develop plan if impaired  - Assess patient's need for assistive devices and provide as appropriate  - Encourage maximum independence but intervene and supervise when necessary  ¯ Involve family in performance of ADLs  ¯ Assess for home care needs following discharge   ¯ Request OT consult to assist with ADL evaluation and planning for discharge  ¯ Provide patient education as appropriate  Outcome: Progressing  Goal: Maintain or return mobility status to optimal level  Description  INTERVENTIONS:  - Assess patient's baseline mobility status (ambulation, transfers, stairs, etc )    - Identify cognitive and physical deficits and behaviors that affect mobility  - Identify mobility aids required to assist with transfers and/or ambulation (gait belt, sit-to-stand, lift, walker, cane, etc )  - [place] fall precautions as indicated by assessment  - Record patient progress and toleration of activity level on Mobility SBAR; progress patient to next Phase/Stage  - Instruct patient to call for assistance with activity based on assessment  - Request Rehabilitation consult to assist with strengthening/weightbearing, etc   Outcome: Progressing     Problem: DISCHARGE PLANNING  Goal: Discharge to home or other facility with appropriate resources  Description  INTERVENTIONS:  - Identify barriers to discharge w/patient and caregiver  - Arrange for needed discharge resources and transportation as appropriate  - Identify discharge learning needs (meds, wound care, etc )  - Arrange for interpretive services to assist at discharge as needed  - Refer to Case Management Department for coordinating discharge planning if the patient needs post-hospital services based on physician/advanced practitioner order or complex needs related to functional status, cognitive ability, or social support system  Outcome: Progressing     Problem: Knowledge Deficit  Goal: Patient/family/caregiver demonstrates understanding of disease process, treatment plan, medications, and discharge instructions  Description  Complete learning assessment and assess knowledge base    Interventions:  - Provide teaching at level of understanding  - Provide teaching via preferred learning methods  Outcome: Progressing     Problem: INFECTION - ADULT  Goal: Absence or prevention of progression during hospitalization  Description  INTERVENTIONS:  - Assess and monitor for signs and symptoms of infection  - Monitor lab/diagnostic results  - Monitor all insertion sites, i e  indwelling lines, tubes, and drains  - Monitor endotracheal (as able) and nasal secretions for changes in amount and color  - [place] appropriate cooling/warming therapies per order  - Administer medications as ordered  - Instruct and encourage patient and family to use good hand hygiene technique  - Identify and instruct in appropriate isolation precautions for identified infection/condition  Outcome: Progressing  Goal: Absence of fever/infection during neutropenic period  Description  INTERVENTIONS:  - Monitor WBC  - Implement neutropenic guidelines  Outcome: Progressing     Problem: Nutrition/Hydration-ADULT  Goal: Nutrient/Hydration intake appropriate for improving, restoring or maintaining nutritional needs  Description  Monitor and assess patient's nutrition/hydration status for malnutrition (ex- brittle hair, bruises, dry skin, pale skin and conjunctiva, muscle wasting, smooth red tongue, and disorientation)  Collaborate with interdisciplinary team and initiate plan and interventions as ordered  Monitor patient's weight and dietary intake as ordered or per policy  Utilize nutrition screening tool and intervene per policy  Determine patient's food preferences and provide high-protein, high-caloric foods as appropriate       INTERVENTIONS:  - Monitor oral intake, urinary output, labs, and treatment plans  - Assess nutrition and hydration status and recommend course of action  - Evaluate amount of meals eaten  - Assist patient with eating if necessary   - Allow adequate time for meals  - Recommend/ encourage appropriate diets, oral nutritional supplements, and vitamin/mineral supplements  - Order, calculate, and assess calorie counts as needed  - Recommend, monitor, and adjust tube feedings and TPN/PPN based on assessed needs  - Assess need for intravenous fluids  - Provide specific nutrition/hydration education as appropriate  - Include patient/family/caregiver in decisions related to nutrition  Outcome: Progressing

## 2019-07-14 NOTE — PLAN OF CARE
Problem: OCCUPATIONAL THERAPY ADULT  Goal: Performs self-care activities at highest level of function for planned discharge setting  See evaluation for individualized goals  Description  Treatment Interventions: ADL retraining, Functional transfer training, Endurance training, Cognitive reorientation, Patient/family training, Equipment evaluation/education, Compensatory technique education, Energy conservation, Activityengagement          See flowsheet documentation for full assessment, interventions and recommendations  Outcome: Progressing  Note:   Limitation: Decreased ADL status, Decreased Safe judgement during ADL, Decreased cognition, Decreased endurance, Decreased self-care trans, Decreased high-level ADLs  Prognosis: Good  Assessment: pt participated in am ot session and was seen focusing on adl's bed mobility functional mobility and transfers / toileting  pt requires mod asst to stand from various surfaces, min asst for functional mobility  pt requires sba ub and mod/max  asst lb adls and was s for grooming when seated  pt is motivated and cooperative and is limited by r le rom limitations/pain and balance / mobility  will continue to follow focusing on goals from ie  Recommendation: Physiatry Consult  OT Discharge Recommendation: Short Term Rehab  OT - OK to Discharge:  Yes  CARLY Cord[place]

## 2019-07-14 NOTE — OCCUPATIONAL THERAPY NOTE
Occupational Therapy Treatment Note:       07/14/19 1125   Restrictions/Precautions   RLE Weight Bearing Per Order WBAT   Other Precautions Chair Alarm   Pain Assessment   Pain Assessment 0-10   Pain Score 5   Pain Type Acute pain   Pain Location Hip   Pain Orientation Right   ADL   Where Assessed Chair   Grooming Assistance 5  Supervision/Setup   UB Bathing Assistance 5  Supervision/Setup   LB Bathing Assistance 3  Moderate Assistance   UB Dressing Assistance 5  Supervision/Setup   LB Dressing Assistance 3  Moderate Assistance   Functional Standing Tolerance   Time pt tolerated standing    Bed Mobility   Supine to Sit 3  Moderate assistance   Transfers   Sit to Stand 3  Moderate assistance   Additional items   (mod asst from lower surfaces, min asst from taller recliner)   Stand to Sit 4  Minimal assistance   Functional Mobility   Functional Mobility 4  Minimal assistance   Additional items Rolling walker   Toilet Transfers   Toilet Transfer From Rolling walker   Toilet Transfer Type To and from   Toilet Transfer to Standard bedside commode  (commode over toilet)   Toilet Transfer Technique Ambulating   Toilet Transfers Minimal assistance  (from elevated commode)   Cognition   Arousal/Participation Alert   Attention Attends with cues to redirect   Memory Decreased short term memory   Following Commands Follows one step commands without difficulty   Comments pt was able to perform adls with min cues  for technique  Activity Tolerance   Activity Tolerance Patient tolerated treatment well   Assessment   Assessment pt participated in am ot session and was seen focusing on adl's bed mobility functional mobility and transfers / toileting  pt requires mod asst to stand from various surfaces, min asst for functional mobility  pt requires sba ub and mod/max  asst lb adls and was s for grooming when seated  pt is motivated and cooperative and is limited by r le rom limitations/pain and balance / mobility   will continue to follow focusing on goals from ie  Plan   Treatment Interventions ADL retraining; Activityengagement; Endurance training;Cognitive reorientation;Patient/family training;Equipment evaluation/education; Functional transfer training   Goal Expiration Date 07/25/19   Treatment Day 2   OT Frequency 3-5x/wk   Recommendation   OT Discharge Recommendation Short Term Rehab   Barthel Index   Feeding 10   Bathing 0   Grooming Score 5   Dressing Score 5   Bladder Score 10   Bowels Score 10   Toilet Use Score 5   Transfers (Bed/Chair) Score 10   Mobility (Level Surface) Score 0   Stairs Score 0   Barthel Index Score 55   Modified [place] Scale   Modified Flaco Scale 4   April A CARLY Rosenberg

## 2019-07-14 NOTE — ASSESSMENT & PLAN NOTE
S/p Right Femur Antegrade IM Nail (Trochanteric) 7/10 - Ortho  NWB RLE - future weight bearing status per Orthopedic Surgery  Perioperative Ancef - 2x doses, last given at 4AM  Neuro checks  PAin management  DVT prophylaxis  PT/OT  Case Management - Rehab placement pending acceptance,  Seen by PM&R, will call insurance to appeal decision on level of rehab on Monday, offices closed over the weekend

## 2019-07-14 NOTE — PROGRESS NOTES
Progress Note - Domingo Marsh 1934, 80 y o  female MRN: [id number]    Unit/Bed#: [facility] 632-01 Encounter: [id number]    Primary Care Provider: No primary care provider on file     Date and time admitted to hospital: 7/9/2019  6:11 PM        * Closed fracture of right hip [facility])  Assessment & Plan  S/p Right Femur Antegrade IM Nail (Trochanteric) 7/10 - Ortho  NWB RLE - future weight bearing status per Orthopedic Surgery  Perioperative Ancef - 2x doses, last given at 4AM  Neuro checks  PAin management  DVT prophylaxis  PT/OT  Case Management - Rehab placement pending acceptance,  Seen by PM&R, will call insurance to appeal decision on level of rehab on Monday, offices closed over the weekend          Bedside rounds completed with nurse yes    Disposition: awaiting rehab      SUBJECTIVE:  Chief Complaint: mild right hip pain    Subjective: I get better every day      OBJECTIVE:     Meds/Allergies     Current Facility-Administered Medications:     acetaminophen (TYLENOL) tablet 975 mg, 975 mg, Oral, Q8H [facility], Rolando Pierce MD, 975 mg at 07/14/19 0559    albuterol (PROVENTIL HFA,VENTOLIN HFA) inhaler 2 puff, 2 puff, Inhalation, Q6H PRN, Rolando Pierce MD    amLODIPine (NORVASC) tablet 10 mg, 10 mg, Oral, Daily, Rolando Pierce MD, 10 mg at 07/12/19 0904    enoxaparin (LOVENOX) subcutaneous injection 30 mg, 30 mg, Subcutaneous, Q12H [facility], [facility] Petit-Me, 30 mg at 07/13/19 2207    gabapentin (NEURONTIN) capsule 100 mg, 100 mg, Oral, Q12H, Tuesday M Gareth, CRNP, 100 mg at 07/13/19 2207    hydrochlorothiazide (HYDRODIURIL) tablet 25 mg, 25 mg, Oral, Daily, Rolando Pierce MD, 25 mg at 07/12/19 0904    melatonin tablet 6 mg, 6 mg, Oral, HS, Tuesday M Gareth, CRNP, 6 mg at 07/13/19 2206    methocarbamol (ROBAXIN) tablet 250 mg, 250 mg, Oral, Q8H [facility], Rolando Pierce MD, 250 mg at 07/14/19 0600    metoprolol tartrate (LOPRESSOR) tablet 100 mg, 100 mg, Oral, Q12H [facility], Rolando Pierce MD, 100 mg at 07/13/19 2201    ondansetron (ZOFRAN) injection 4 mg, 4 mg, Intravenous, Q6H PRN, Leesa Jackson MD    oxyCODONE (ROXICODONE) IR tablet 2 5 mg, 2 5 mg, Oral, Q4H PRN, Leesa Jackson MD, 2 5 mg at 07/13/19 1123    oxyCODONE (ROXICODONE) IR tablet 5 mg, 5 mg, Oral, Q4H PRN, Leesa Jackson MD, 5 mg at 07/12/19 0740    pantoprazole (PROTONIX) EC tablet 20 mg, 20 mg, Oral, Early Morning, Leesa Jackson MD, 20 mg at 07/14/19 0600    senna (SENOKOT) tablet 17 2 mg, 2 tablet, Oral, Daily, Leesa Jackson MD, 17 2 mg at 07/13/19 1010    sertraline (ZOLOFT) tablet 50 mg, 50 mg, Oral, Daily, Leesa Jackson MD, 50 mg at 07/13/19 1009     Vitals:   Vitals:    07/13/19 2312   BP: 126/52   Pulse: 72   Resp: 18   Temp: 99 °F (37 2 °C)   SpO2: 96%       Intake/Output:  I/O       07/12 0701 - 07/13 0700 07/13 0701 - 07/14 0700 07/14 0701 - 07/15 0700    P  O  1180 600     I V  (mL/kg)       Total Intake(mL/kg) 1180 (14 8) 600 (7 5)     Urine (mL/kg/hr) 1800 (0 9) 575 (0 3)     Stool  0     Total Output 1800 575     Net -620 +25            Unmeasured Urine Occurrence  1 x     Unmeasured Stool Occurrence  1 x            Nutrition/GI Proph/Bowel Reg: regular    Physical Exam:   GENERAL APPEARANCE: comfortable  NEURO: GCS - 15 and intact  HEENT: EOM's intact  CV: RRR< no complaints of chest pain  LUNGS: CTA bilaterally, no shortness of breath  GI: tolerating a diet  : voiding  MSK: moving all extremities, RLE getting stronger  SKIN: warm and dry    Invasive Devices     Peripheral Intravenous Line            Peripheral IV 07/14/19 Left;Ventral (anterior) Forearm less than 1 day                 Lab Results: none  Imaging/EKG Studies: none  Other Studies: none  VTE Prophylaxis: Sequential compression device (Venodyne)  , Lovenox

## 2019-07-15 PROCEDURE — 97530 THERAPEUTIC ACTIVITIES: CPT

## 2019-07-15 PROCEDURE — 99232 SBSQ HOSP IP/OBS MODERATE 35: CPT | Performed by: SURGERY

## 2019-07-15 PROCEDURE — 97116 GAIT TRAINING THERAPY: CPT

## 2019-07-15 PROCEDURE — 97110 THERAPEUTIC EXERCISES: CPT

## 2019-07-15 PROCEDURE — NS001 PR NO SIGNATURE OR ATTESTATION: Performed by: ORTHOPAEDIC SURGERY

## 2019-07-15 RX ADMIN — GABAPENTIN 100 MG: 100 CAPSULE ORAL at 21:36

## 2019-07-15 RX ADMIN — PANTOPRAZOLE SODIUM 20 MG: 20 TABLET, DELAYED RELEASE ORAL at 05:09

## 2019-07-15 RX ADMIN — ENOXAPARIN SODIUM 30 MG: 30 INJECTION SUBCUTANEOUS at 08:59

## 2019-07-15 RX ADMIN — METHOCARBAMOL 250 MG: 500 TABLET, FILM COATED ORAL at 21:37

## 2019-07-15 RX ADMIN — AMLODIPINE BESYLATE 10 MG: 10 TABLET ORAL at 09:00

## 2019-07-15 RX ADMIN — GABAPENTIN 100 MG: 100 CAPSULE ORAL at 08:59

## 2019-07-15 RX ADMIN — ACETAMINOPHEN 975 MG: 325 TABLET ORAL at 05:09

## 2019-07-15 RX ADMIN — SERTRALINE HYDROCHLORIDE 50 MG: 50 TABLET ORAL at 09:00

## 2019-07-15 RX ADMIN — ENOXAPARIN SODIUM 30 MG: 30 INJECTION SUBCUTANEOUS at 21:39

## 2019-07-15 RX ADMIN — MELATONIN 6 MG: 3 TAB ORAL at 21:37

## 2019-07-15 RX ADMIN — METOPROLOL TARTRATE 100 MG: 50 TABLET, FILM COATED ORAL at 09:00

## 2019-07-15 RX ADMIN — SENNOSIDES 17.2 MG: 8.6 TABLET, FILM COATED ORAL at 08:59

## 2019-07-15 RX ADMIN — METHOCARBAMOL 250 MG: 500 TABLET, FILM COATED ORAL at 13:40

## 2019-07-15 RX ADMIN — METHOCARBAMOL 250 MG: 500 TABLET, FILM COATED ORAL at 05:09

## 2019-07-15 RX ADMIN — METOPROLOL TARTRATE 100 MG: 50 TABLET, FILM COATED ORAL at 21:35

## 2019-07-15 RX ADMIN — HYDROCHLOROTHIAZIDE 25 MG: 25 TABLET ORAL at 09:00

## 2019-07-15 RX ADMIN — ACETAMINOPHEN 975 MG: 325 TABLET ORAL at 13:40

## 2019-07-15 RX ADMIN — ACETAMINOPHEN 975 MG: 325 TABLET ORAL at 21:38

## 2019-07-15 NOTE — PHYSICAL THERAPY NOTE
PHYSICAL THERAPY NOTE          Patient Name: Ericka OROZCOOLR'S Date: 7/15/2019     07/15/19 7160   Pain Assessment   Pain Assessment No/denies pain   Restrictions/Precautions   Weight Bearing Precautions Per Order Yes   RLE Weight Bearing Per Order WBAT   Other Precautions Cognitive; Chair Alarm; Fall Risk   General   Chart Reviewed Yes   Response to Previous Treatment Patient with no complaints from previous session  Family/Caregiver Present No   Cognition   Overall Cognitive Status Impaired   Arousal/Participation Alert   Attention Attends with cues to redirect   Orientation Level Oriented to person;Oriented to place;Oriented to time   Memory Decreased short term memory   Following Commands Follows one step commands with increased time or repetition   Subjective   Subjective Pt willing to parkticipate in PT treatment session    Bed Mobility   Supine to Sit 4  Minimal assistance   Additional items Assist x 1; Increased time required;Verbal cues   Transfers   Sit to Stand 3  Moderate assistance   Additional items Assist x 1; Increased time required;Verbal cues   Stand to Sit 4  Minimal assistance   Additional items Assist x 1; Increased time required;Verbal cues   Additional Comments VC and TC needed for hand placement and safety    Ambulation/Elevation   Gait pattern Excessively slow; Short stride; Foward flexed; Inconsistent sarahi   Gait Assistance 4  Minimal assist   Additional items Assist x 1   Assistive Device Rolling walker   Distance 65ft, 45ft   (standing rest break required )   Balance   Static Sitting Fair   Static Standing Fair -   Ambulatory Poor +   Endurance Deficit   Endurance Deficit Yes   Endurance Deficit Description fatigue   Activity Tolerance   Activity Tolerance Patient limited by fatigue   Nurse Made Aware Pt appropriate to be seen and mobilize per nsg    Exercises   THR Sitting;AROM; Bilateral;15 reps  (x 2 sets )   Assessment   Prognosis Good   Problem List Decreased strength;Decreased range of motion;Decreased endurance; Impaired balance;Decreased mobility;Pain;Decreased cognition;Decreased safety awareness   Assessment Pt resting in bed at time of PT treatment session  Pt reports feeling " a little better" today and is willing to participate in PT treatment session  Pt able to perform all bed mobility this session with min A x  1 and all transfers with mod A x 1 which is slightly improved compared to previous session  Carryover for hand placement and safety with transfers also noted from previous PT session  Pt able to tolerate increased ambulation distances this session with min A x 1 which continues to improve  Pt initially started with step to gait pattern, however was able to progress to step through pattern  Standing rest breaks still required with ambulation as well as VC for RW management and safety  No gross LOB noted with gait training  Pt able to tolerate and perform all therex seated OOB in chair this session without any increase in complaints  VC still required for proper from and pacing with all therex  Pt instructed to perform seated therex 2-3 x a day to increase overall activity tolerance as well as increase RLE strength  Pt assisted back into chair at conclusion of PT Session with all needs within reach and chair alarm on  Pt denies any further questions at this time  PT will continue to follow  D/C recommendation when medically cleared is rehab  Barriers to Discharge Inaccessible home environment   Goals   Patient Goals " to get better and go home"   STG Expiration Date 07/21/19   Treatment Day 2   Plan   Treatment/Interventions Functional transfer training;LE strengthening/ROM; Therapeutic exercise; Endurance training;Patient/family training;Equipment eval/education; Bed mobility;Gait training;Spoke to nursing   Progress Progressing toward goals   PT Frequency Other (Comment)  (3-6x a week ) Recommendation   Recommendation Other (Comment)  (rehab)   Equipment Recommended Meena Langley   PT - OK to Discharge Yes  (to rehab when medically cleared )   Enrique Lozano, PT

## 2019-07-15 NOTE — PLAN OF CARE
Problem: PHYSICAL THERAPY ADULT  Goal: Performs mobility at highest level of function for planned discharge setting  See evaluation for individualized goals  Description  Treatment/Interventions: Functional transfer training, LE strengthening/ROM, Elevations, Therapeutic exercise, Endurance training, Patient/family training, Equipment eval/education, Bed mobility, Gait training, Spoke to nursing, OT, Family, Spoke to case management  Equipment Recommended: Walker(RW)       See flowsheet documentation for full assessment, interventions and recommendations  Outcome: Progressing  Note:   Prognosis: Good  Problem List: Decreased strength, Decreased range of motion, Decreased endurance, Impaired balance, Decreased mobility, Pain, Decreased cognition, Decreased safety awareness  Assessment: Pt resting in bed at time of PT treatment session  Pt reports feeling " a little better" today and is willing to participate in PT treatment session  Pt able to perform all bed mobility this session with min A x  1 and all transfers with mod A x 1 which is slightly improved compared to previous session  Carryover for hand placement and safety with transfers also noted from previous PT session  Pt able to tolerate increased ambulation distances this session with min A x 1 which continues to improve  Pt initially started with step to gait pattern, however was able to progress to step through pattern  Standing rest breaks still required with ambulation as well as VC for RW management and safety  No gross LOB noted with gait training  Pt able to tolerate and perform all therex seated OOB in chair this session without any increase in complaints  VC still required for proper from and pacing with all therex  Pt instructed to perform seated therex 2-3 x a day to increase overall activity tolerance as well as increase RLE strength  Pt assisted back into chair at conclusion of PT Session with all needs within reach and chair alarm on   Pt denies any further questions at this time  PT will continue to follow  D/C recommendation when medically cleared is rehab  Barriers to Discharge: Inaccessible home environment     Recommendation: Other (Comment)(rehab)     PT - OK to Discharge: Yes(to rehab when medically cleared )    See flowsheet documentation for full assessment

## 2019-07-15 NOTE — RESTORATIVE TECHNICIAN NOTE
Restorative Specialist Mobility Note       Activity: Ambulate in [place], Chair(Assisted PT, please refer to PT notes for all information )     Assistive Device: Front wheel walker     Ambulation Response: Tolerated fairly well  Repositioned: Sitting, Up in chair           Range of Motion: Active, All extremities        Patient left resting comfortably in bedside recliner, with chair alarm activated and PT present in room for end of session

## 2019-07-15 NOTE — PLAN OF CARE
Problem: Potential for Falls  Goal: Patient will remain free of falls  Description  INTERVENTIONS:  - Assess patient frequently for physical needs  -  Identify cognitive and physical deficits and behaviors that affect risk of falls    -  [place] fall precautions as indicated by assessment   - Educate patient/family on patient safety including physical limitations  - Instruct patient to call for assistance with activity based on assessment  - Modify environment to reduce risk of injury  - Consider OT/PT consult to assist with strengthening/mobility  Outcome: Progressing     Problem: Prexisting or High Potential for Compromised Skin Integrity  Goal: Skin integrity is maintained or improved  Description  INTERVENTIONS:  - Identify patients at risk for skin breakdown  - Assess and monitor skin integrity  - Assess and monitor nutrition and hydration status  - Monitor labs (i e  albumin)  - Assess for incontinence   - Turn and reposition patient  - Assist with mobility/ambulation  - Relieve pressure over bony prominences  - Avoid friction and shearing  - Provide appropriate hygiene as needed including keeping skin clean and dry  - Evaluate need for skin moisturizer/barrier cream  - Collaborate with interdisciplinary team (i e  Nutrition, Rehabilitation, etc )   - Patient/family teaching  Outcome: Progressing     Problem: PAIN - ADULT  Goal: Verbalizes/displays adequate comfort level or baseline comfort level  Description  Interventions:  - Encourage patient to monitor pain and request assistance  - Assess pain using appropriate pain scale  - Administer analgesics based on type and severity of pain and evaluate response  - Implement non-pharmacological measures as appropriate and evaluate response  - Consider cultural and social influences on pain and pain management  - Notify physician/advanced practitioner if interventions unsuccessful or patient reports new pain  Outcome: Progressing     Problem: SAFETY ADULT  Goal: Patient will remain free of falls  Description  INTERVENTIONS:  - Assess patient frequently for physical needs  -  Identify cognitive and physical deficits and behaviors that affect risk of falls    -  [place] fall precautions as indicated by assessment   - Educate patient/family on patient safety including physical limitations  - Instruct patient to call for assistance with activity based on assessment  - Modify environment to reduce risk of injury  - Consider OT/PT consult to assist with strengthening/mobility  Outcome: Progressing  Goal: Maintain or return to baseline ADL function  Description  INTERVENTIONS:  -  Assess patient's ability to carry out ADLs; assess patient's baseline for ADL function and identify physical deficits which impact ability to perform ADLs (bathing, care of mouth/teeth, toileting, grooming, dressing, etc )  - Assess/evaluate cause of self-care deficits   - Assess range of motion  - Assess patient's mobility; develop plan if impaired  - Assess patient's need for assistive devices and provide as appropriate  - Encourage maximum independence but intervene and supervise when necessary  ¯ Involve family in performance of ADLs  ¯ Assess for home care needs following discharge   ¯ Request OT consult to assist with ADL evaluation and planning for discharge  ¯ Provide patient education as appropriate  Outcome: Progressing  Goal: Maintain or return mobility status to optimal level  Description  INTERVENTIONS:  - Assess patient's baseline mobility status (ambulation, transfers, stairs, etc )    - Identify cognitive and physical deficits and behaviors that affect mobility  - Identify mobility aids required to assist with transfers and/or ambulation (gait belt, sit-to-stand, lift, walker, cane, etc )  - [place] fall precautions as indicated by assessment  - Record patient progress and toleration of activity level on Mobility SBAR; progress patient to next Phase/Stage  - Instruct patient to call for assistance with activity based on assessment  - Request Rehabilitation consult to assist with strengthening/weightbearing, etc   Outcome: Progressing     Problem: DISCHARGE PLANNING  Goal: Discharge to home or other facility with appropriate resources  Description  INTERVENTIONS:  - Identify barriers to discharge w/patient and caregiver  - Arrange for needed discharge resources and transportation as appropriate  - Identify discharge learning needs (meds, wound care, etc )  - Arrange for interpretive services to assist at discharge as needed  - Refer to Case Management Department for coordinating discharge planning if the patient needs post-hospital services based on physician/advanced practitioner order or complex needs related to functional status, cognitive ability, or social support system  Outcome: Progressing     Problem: Knowledge Deficit  Goal: Patient/family/caregiver demonstrates understanding of disease process, treatment plan, medications, and discharge instructions  Description  Complete learning assessment and assess knowledge base    Interventions:  - Provide teaching at level of understanding  - Provide teaching via preferred learning methods  Outcome: Progressing     Problem: INFECTION - ADULT  Goal: Absence or prevention of progression during hospitalization  Description  INTERVENTIONS:  - Assess and monitor for signs and symptoms of infection  - Monitor lab/diagnostic results  - Monitor all insertion sites, i e  indwelling lines, tubes, and drains  - Monitor endotracheal (as able) and nasal secretions for changes in amount and color  - [place] appropriate cooling/warming therapies per order  - Administer medications as ordered  - Instruct and encourage patient and family to use good hand hygiene technique  - Identify and instruct in appropriate isolation precautions for identified infection/condition  Outcome: Progressing  Goal: Absence of fever/infection during neutropenic period  Description  INTERVENTIONS:  - Monitor WBC  - Implement neutropenic guidelines  Outcome: Progressing     Problem: Nutrition/Hydration-ADULT  Goal: Nutrient/Hydration intake appropriate for improving, restoring or maintaining nutritional needs  Description  Monitor and assess patient's nutrition/hydration status for malnutrition (ex- brittle hair, bruises, dry skin, pale skin and conjunctiva, muscle wasting, smooth red tongue, and disorientation)  Collaborate with interdisciplinary team and initiate plan and interventions as ordered  Monitor patient's weight and dietary intake as ordered or per policy  Utilize nutrition screening tool and intervene per policy  Determine patient's food preferences and provide high-protein, high-caloric foods as appropriate       INTERVENTIONS:  - Monitor oral intake, urinary output, labs, and treatment plans  - Assess nutrition and hydration status and recommend course of action  - Evaluate amount of meals eaten  - Assist patient with eating if necessary   - Allow adequate time for meals  - Recommend/ encourage appropriate diets, oral nutritional supplements, and vitamin/mineral supplements  - Order, calculate, and assess calorie counts as needed  - Recommend, monitor, and adjust tube feedings and TPN/PPN based on assessed needs  - Assess need for intravenous fluids  - Provide specific nutrition/hydration education as appropriate  - Include patient/family/caregiver in decisions related to nutrition  Outcome: Progressing

## 2019-07-15 NOTE — SOCIAL WORK
Pt was denied through Peer to Peer  CM spoke to pt's dtr Scotty Salgado regarding SNF choices  Scotty Salgado would like [facility] as first choice or [facility] as second  CM placed referrals

## 2019-07-15 NOTE — ASSESSMENT & PLAN NOTE
S/p Right Femur Antegrade IM Nail (Trochanteric) 7/10 - Ortho  NWB RLE - future weight bearing status per Orthopedic Surgery  Perioperative Ancef - 2x doses, last given at 4AM  Neuro checks  PAin management  DVT prophylaxis  PT/OT  Case Management - Rehab placement pending acceptance,  Seen by PM&R, will call insurance to appeal decision on level of rehab on Monday, offices closed over the weekend  Continues to work with therapy, Pain controlled

## 2019-07-15 NOTE — UTILIZATION REVIEW
Continued Stay Review    Date: 7/14                     Current Patient Class: Inpatient Current Level of Care: Med Surg    HPI:85 y o  female initially admitted on 7/9  presents with  after a fall off her back patio down three steps    Assessment/Plan:   7/14 Progress notes:  Closed fracture of right hip -  S/p Right Femur Antegrade IM Nail (Trochanteric) 7/10   continue pain management, neuro checks,   Rehab placement pending acceptance,  Seen by PM&R, will call insurance to appeal decision on level of rehab on Monday, offices closed over the weekend    Pertinent Labs/Diagnostic Results:   No new    Vital Signs:   07/14/19 2313  99 1 °F (37 3 °C)  77  16  120/51  95 %  None (Room air)  Lying       Medications:   Scheduled Meds:   Current Facility-Administered Medications:  acetaminophen 975 mg Oral Q8H [address]   albuterol 2 puff Inhalation Q6H PRN   amLODIPine 10 mg Oral Daily   enoxaparin 30 mg Subcutaneous Q12H [address]   gabapentin 100 mg Oral Q12H   hydrochlorothiazide 25 mg Oral Daily   melatonin 6 mg Oral HS   methocarbamol 250 mg Oral Q8H [address]   metoprolol tartrate 100 mg Oral Q12H CRISTELA   ondansetron 4 mg Intravenous Q6H PRN   oxyCODONE 2 5 mg Oral Q4H PRN   oxyCODONE 5 mg Oral Q4H PRN   pantoprazole 20 mg Oral Early Morning   senna 2 tablet Oral Daily   sertraline 50 mg Oral Daily     Discharge Plan: 7/15 denied through peer to peer for Acute Rehab  Referral to Hubert Crane and Abeba Browne [facility] Utilization Review Department  Phone: [phone]; Fax [phone]  [email or web address]  ATTENTION: Please call with any questions or concerns to [phone]  and carefully listen to the prompts so that you are directed to the right person  Send all requests for admission clinical reviews, approved or denied determinations and any other requests to fax [phone]   All voicemails are confidential

## 2019-07-15 NOTE — PROGRESS NOTES
Progress Note - Andie Nguyen 1934, 80 y o  female MRN: [id number]    Unit/Bed#: [facility] 632-01 Encounter: [id number]    Primary Care Provider: No primary care provider on file  Date and time admitted to hospital: 7/9/2019  6:11 PM        * Closed fracture of right hip [facility])  Assessment & Plan  S/p Right Femur Antegrade IM Nail (Trochanteric) 7/10 - Ortho  NWB RLE - future weight bearing status per Orthopedic Surgery  Perioperative Ancef - 2x doses, last given at 4AM  Neuro checks  PAin management  DVT prophylaxis  PT/OT  Case Management - Rehab placement pending acceptance,  Seen by PM&R, will call insurance to appeal decision on level of rehab on Monday, offices closed over the weekend  Continues to work with therapy, Pain controlled          Bedside rounds completed with nurse Aftab Manner       Disposition: rehab      SUBJECTIVE:  Chief Complaint: I would like to go to [facility], no other complaints    Subjective: I feel okay      OBJECTIVE:     Meds/Allergies     Current Facility-Administered Medications:     acetaminophen (TYLENOL) tablet 975 mg, 975 mg, Oral, Q8H [address], Tiana Blackburn MD, 975 mg at 07/15/19 0509    albuterol (PROVENTIL HFA,VENTOLIN HFA) inhaler 2 puff, 2 puff, Inhalation, Q6H PRN, Tiana Blackburn MD    amLODIPine (NORVASC) tablet 10 mg, 10 mg, Oral, Daily, Tiana Blackburn MD, 10 mg at 07/14/19 0845    enoxaparin (LOVENOX) subcutaneous injection 30 mg, 30 mg, Subcutaneous, Q12H [address], Andrew Lathe Petit-Me, 30 mg at 07/14/19 2158    gabapentin (NEURONTIN) capsule 100 mg, 100 mg, Oral, Q12H, Tuesday M Gareth, CRNP, 100 mg at 07/14/19 2158    hydrochlorothiazide (HYDRODIURIL) tablet 25 mg, 25 mg, Oral, Daily, Tiana Blackburn MD, 25 mg at 07/14/19 0845    melatonin tablet 6 mg, 6 mg, Oral, HS, Tuesday M Perry, CRNP, 6 mg at 07/14/19 2157    methocarbamol (ROBAXIN) tablet 250 mg, 250 mg, Oral, Q8H [address], Tiana Blackburn MD, 250 mg at 07/15/19 0509    metoprolol tartrate (LOPRESSOR) tablet 100 mg, 100 mg, Oral, Q12H [address], Narda Roe MD, 100 mg at 07/14/19 2157    ondansetron (ZOFRAN) injection 4 mg, 4 mg, Intravenous, Q6H PRN, Narda Roe MD    oxyCODONE (ROXICODONE) IR tablet 2 5 mg, 2 5 mg, Oral, Q4H PRN, Narda Roe MD, 2 5 mg at 07/13/19 1123    oxyCODONE (ROXICODONE) IR tablet 5 mg, 5 mg, Oral, Q4H PRN, Narda Roe MD, 5 mg at 07/12/19 0740    pantoprazole (PROTONIX) EC tablet 20 mg, 20 mg, Oral, Early Morning, Narda Roe MD, 20 mg at 07/15/19 0509    senna (SENOKOT) tablet 17 2 mg, 2 tablet, Oral, Daily, Narda Roe MD, 17 2 mg at 07/14/19 0844    sertraline (ZOLOFT) tablet 50 mg, 50 mg, Oral, Daily, Narda Roe MD, 50 mg at 07/14/19 0846     Vitals:   Vitals:    07/14/19 2313   BP: 120/51   Pulse: 77   Resp: 16   Temp: 99 1 °F (37 3 °C)   SpO2: 95%       Intake/Output:  I/O       07/13 0701 - 07/14 0700 07/14 0701 - 07/15 0700 07/15 0701 - 07/16 0700    P  O  950 1552     Total Intake(mL/kg) 950 (11 9) 1552 (19 5)     Urine (mL/kg/hr) 575 (0 3) 1500 (0 8)     Stool 0      Total Output 575 1500     Net +375 +52            Unmeasured Urine Occurrence 1 x 2 x     Unmeasured Stool Occurrence 1 x             Nutrition/GI Proph/Bowel Reg: regular    Physical Exam:   GENERAL APPEARANCE: resting comfortably  NEURO: GCS - 15, alert  HEENT: EOM's intact  CV: RRR, no complaints of chest discomfort  LUNGS: CTA bilaterally, using incentive spirometer, no shortness of breath  GI: tolerating a diet  : voiding  MSK: Moving all extremities, working with therapy  SKIN: warm and dry    Invasive Devices     Peripheral Intravenous Line            Peripheral IV 07/14/19 Left;Ventral (anterior) Forearm 1 day                 Lab Results: none  Imaging/EKG Studies: none  Other Studies: none  VTE Prophylaxis: Sequential compression device (Venodyne) , Lovenox

## 2019-07-15 NOTE — PROGRESS NOTES
Subjective: No acute events overnight  No acute distress  Objective:  A 10 point ROS was performed; negative except as noted above       Lab Results   Component Value Date/Time    WBC 11 97 (H) 07/11/2019 05:32 AM    WBC 8 67 04/20/2015 05:43 AM    HGB 12 0 07/11/2019 05:32 AM    HGB 12 7 04/20/2015 05:43 AM       Vitals:    07/14/19 2313   BP: 120/51   Pulse: 77   Resp: 16   Temp: 99 1 °F (37 3 °C)   SpO2: 95%     right lower extremity  Dressing C/D/I, small amount of saturation to proximal dressing  Motor and sensation grossly intact to DP/SP/Tib/Genie/Saph nerve distributions  Toes warm and well perfused with brisk capillary refill    Assessment: 80 y o  female post op day #5 from right Femur TFN     Plan:  Wbat  right lower extremity   Pain control  DVT ppx: Sequential compression device (Venodyne)  and Enoxaparin (Lovenox)  PT/OT  Dispo: Ok for discharge from [facility] perspective

## 2019-07-16 VITALS
TEMPERATURE: 97.5 F | BODY MASS INDEX: 30 KG/M2 | RESPIRATION RATE: 14 BRPM | WEIGHT: 175.71 LBS | HEART RATE: 64 BPM | HEIGHT: 64 IN | DIASTOLIC BLOOD PRESSURE: 68 MMHG | SYSTOLIC BLOOD PRESSURE: 138 MMHG | OXYGEN SATURATION: 97 %

## 2019-07-16 PROCEDURE — 97110 THERAPEUTIC EXERCISES: CPT

## 2019-07-16 PROCEDURE — 97116 GAIT TRAINING THERAPY: CPT

## 2019-07-16 PROCEDURE — 99238 HOSP IP/OBS DSCHRG MGMT 30/<: CPT | Performed by: PHYSICIAN ASSISTANT

## 2019-07-16 RX ORDER — OXYCODONE HYDROCHLORIDE 5 MG/1
5 TABLET ORAL EVERY 4 HOURS PRN
Qty: 8 TABLET | Refills: 0 | Status: SHIPPED | OUTPATIENT
Start: 2019-07-16 | End: 2019-07-26

## 2019-07-16 RX ORDER — ACETAMINOPHEN 325 MG/1
975 TABLET ORAL EVERY 8 HOURS SCHEDULED
Qty: 30 TABLET | Refills: 0 | Status: SHIPPED | OUTPATIENT
Start: 2019-07-16 | End: 2019-08-01

## 2019-07-16 RX ORDER — GABAPENTIN 100 MG/1
100 CAPSULE ORAL EVERY 12 HOURS
Refills: 0
Start: 2019-07-16 | End: 2019-07-18

## 2019-07-16 RX ADMIN — ACETAMINOPHEN 975 MG: 325 TABLET ORAL at 05:31

## 2019-07-16 RX ADMIN — SERTRALINE HYDROCHLORIDE 50 MG: 50 TABLET ORAL at 08:35

## 2019-07-16 RX ADMIN — HYDROCHLOROTHIAZIDE 25 MG: 25 TABLET ORAL at 08:37

## 2019-07-16 RX ADMIN — METHOCARBAMOL 250 MG: 500 TABLET, FILM COATED ORAL at 05:31

## 2019-07-16 RX ADMIN — AMLODIPINE BESYLATE 10 MG: 10 TABLET ORAL at 08:37

## 2019-07-16 RX ADMIN — SENNOSIDES 17.2 MG: 8.6 TABLET, FILM COATED ORAL at 08:35

## 2019-07-16 RX ADMIN — METOPROLOL TARTRATE 100 MG: 50 TABLET, FILM COATED ORAL at 08:37

## 2019-07-16 RX ADMIN — PANTOPRAZOLE SODIUM 20 MG: 20 TABLET, DELAYED RELEASE ORAL at 05:31

## 2019-07-16 RX ADMIN — ENOXAPARIN SODIUM 30 MG: 30 INJECTION SUBCUTANEOUS at 08:35

## 2019-07-16 RX ADMIN — GABAPENTIN 100 MG: 100 CAPSULE ORAL at 08:35

## 2019-07-16 NOTE — SOCIAL WORK
Transport set for [address] via judo   Pt's dtr Jerald Lucia aware of transport and the out of pocket costs associated with wheelchair

## 2019-07-16 NOTE — DISCHARGE SUMMARY
Discharge- Grant Reyes 1934, 80 y o  female MRN: [id number]    Unit/Bed#: [facility] 632-01 Encounter: [id number]    Primary Care Provider: No primary care provider on file  Date and time admitted to hospital: 7/9/2019  6:11 PM        * Closed fracture of right hip [facility])  Assessment & Plan  -S/p Right Femur Antegrade IM Nail (Trochanteric) 7/10 - Ortho  -NWB RLE - future weight bearing status per Orthopedic Surgery  -Perioperative Ancef - 2x doses, completed  -Neuro checks  -Pain management  -DVT prophylaxis  -PT/OT -- recommended to rehab  -Case Management - Rehab placement pending acceptance,  Seen by PM&R, will call insurance to appeal decision on level of rehab on Monday, offices closed over the weekend      DVT prophylaxis: SCDs and Lovenox  PT and OT: eval and treat    Disposition:  DC today  Subjective:  No new complaints  Patient reports she is doing well  Denies any other complaints  Reports that her leg feels better today and that she is doing better day by day  Objective:  General:  No acute distress  HEENT:  Normocephalic  Cardiac:  Regular rate and rhythm  Lungs:  CTA bilaterally  Abdomen: Bowel sounds x4, nontender  Extremities:  +2 pulses on extremities, neurovascularly intact  Neuro:  Cranial nerves 2-12 intact, no focal deficits  Skin:  Warm, dry, intact    Resolved Problems  Date Reviewed: 7/16/2019    None          Admission Date:   Admission Orders (From admission, onward)    Ordered        07/09/19 1904  Inpatient Admission  Once               Admitting Diagnosis: Hip injury [S79 359A]  Closed fracture of right hip, initial encounter ([facility] [address] ) [S72 001A]    HPI: Per Luke Miguel, "Grant Reyes is a 80 y o  female who presents with pmh HLD, IPMN, GERD, L knee replacement, memory loss, no blood thinners presents after a fall off her back patio down three steps  She has transfer from [facility] for a trauma evaluation    She was down for 1/2 hour before neighbor came and helped her, her right leg rotated outwards, she denies hitting her head losing consciousness  Her only pain is in her right leg, she says she was able to walk after the accident  She is CT head and C-spine performed at the outside hospital which were negative for any acute finding "    Procedures Performed: No orders of the defined types were placed in this encounter  Summary of Hospital Course:  Patient is a 66-year-old female who comes in for evaluation status post fall  She was transferred from [facility] for trauma evaluation  Patient underwent orthopedic consultation for a right femur fracture  Orthopedics recommended OR for operative evaluation  Patient went to the OR on 07/10/2019  Patient postop did well  Patient was initiated on multimodal pain regimen and DVT prophylaxis  She continued to work with physical therapy and occupational therapy  PT and OT recommended rehab  Patient was then subsequently discharged on 07/16/2019 to rehab  She would have outpatient follow-up with the orthopedic team     Significant Findings, Care, Treatment and Services Provided: Xr Chest Portable    Result Date: 7/10/2019  Impression: No acute cardiopulmonary disease  Workstation performed: [id number]     Xr Femur 2 Vw Right    Result Date: 7/11/2019  Impression: Fluoroscopic guidance provided for surgical procedure  Please refer to the separate procedure notes for additional details  Workstation performed: [id number]     Xr Femur 2 Vw Right    Result Date: 7/10/2019  Impression: Right intertrochanteric hip fracture  The study was marked in [facility] for immediate notification  Workstation performed: [id number]       Complications: no complications    Condition at Discharge: good       Discharge instructions/Information to patient and family:   See after visit summary for information provided to patient and family        Provisions for Follow-Up Care:  See after visit summary for information related to follow-up care and any pertinent home health orders  PCP: No primary care provider on file  Disposition: [facility]    Planned Readmission: No    Discharge Statement   I spent 23 minutes discharging the patient  This time was spent on the day of discharge  I had direct contact with the patient on the day of discharge  Additional documentation is required if more than 30 minutes were spent on discharge  Discharge Medications:  See after visit summary for reconciled discharge medications provided to patient and family

## 2019-07-16 NOTE — PHYSICAL THERAPY NOTE
Physical Therapy Progress Note        07/16/19 1040   Pain Assessment   Pain Assessment No/denies pain   Pain Score No Pain  (pain with mobility)   Pain Type Acute pain   Pain Location Hip   Pain Orientation Right   Hospital Pain Intervention(s) Ambulation/increased activity;Repositioned   Response to Interventions Tolerated   Restrictions/Precautions   Weight Bearing Precautions Per Order Yes   RLE Weight Bearing Per Order WBAT   Other Precautions WBS; Fall Risk;Pain   General   Chart Reviewed Yes   Response to Previous Treatment Patient with no complaints from previous session  Family/Caregiver Present No   Cognition   Arousal/Participation Alert; Cooperative   Comments Patient is pleasant and cooperative throughout session   Bed Mobility   Supine to Sit 4  Minimal assistance   Additional items Assist x 1;HOB elevated; Increased time required;Verbal cues   Sit to Supine 3  Moderate assistance   Additional items Assist x 1;Bedrails; Increased time required;Verbal cues;LE management   Transfers   Sit to Stand 4  Minimal assistance   Additional items Assist x 1; Increased time required;Verbal cues   Stand to Sit 4  Minimal assistance   Additional items Assist x 1; Increased time required;Verbal cues   Ambulation/Elevation   Gait pattern Excessively slow; Step to;Decreased foot clearance; Improper Weight shift   Gait Assistance 4  Minimal assist   Additional items Assist x 1   Assistive Device Rolling walker   Distance 60 feet x 4 with standing rest breaks   Balance   Static Sitting Good   Dynamic Sitting Fair   Static Standing Fair -   Dynamic Standing Poor +   Endurance Deficit   Endurance Deficit Yes   Endurance Deficit Description Fatigue   Activity Tolerance   Activity Tolerance Patient limited by fatigue   Nurse Made Aware Appropriate to see per RN   Exercises   Heelslides Supine;10 reps;AROM; Bilateral   Glute Sets Supine;10 reps;AROM   Ankle Pumps Supine;20 reps;AROM; Bilateral   Assessment   Prognosis Good   Problem List Decreased strength;Decreased endurance; Impaired balance;Decreased mobility;Orthopedic restrictions;Pain   Assessment Patient lying supine in bed, agreeable to particiapte in therapy  She was able to navigate bed to sit EOB with min A and use of bed rails, mod A to return to bed  She was able to ambulate increased distances with standing rest breaks due to fatigue and instability  She is progressing well towards long term goals slowly  She was able to perform TE as noted without difficulty  She would continue to benefit from skilled PT to maximzie functional independence  Barriers to Discharge Inaccessible home environment   Goals   Patient Goals To walk   STG Expiration Date 07/21/19   Treatment Day 3   Plan   Treatment/Interventions Functional transfer training;LE strengthening/ROM; Therapeutic exercise; Endurance training;Gait training;Spoke to nursing   Progress Progressing toward goals   PT Frequency   (3-6x a week)   Recommendation   Recommendation   (rehab)   Equipment Recommended Walker  (Rw)   PT - OK to Discharge Yes  (to rehab when medically stable)     Camila Oscar, PTA

## 2019-07-16 NOTE — SOCIAL WORK
Auth obtained  [id number]  Good for 7 days  Next review is 7/22/19  Level 1  F/U Enoch Jacob  [phone] phone  [phone] fax

## 2019-07-16 NOTE — PLAN OF CARE
Problem: PHYSICAL THERAPY ADULT  Goal: Performs mobility at highest level of function for planned discharge setting  See evaluation for individualized goals  Description  Treatment/Interventions: Functional transfer training, LE strengthening/ROM, Elevations, Therapeutic exercise, Endurance training, Patient/family training, Equipment eval/education, Bed mobility, Gait training, Spoke to nursing, OT, Family, Spoke to case management  Equipment Recommended: Walker(RW)       See flowsheet documentation for full assessment, interventions and recommendations  Outcome: Progressing  Note:   Prognosis: Good  Problem List: Decreased strength, Decreased endurance, Impaired balance, Decreased mobility, Orthopedic restrictions, Pain  Assessment: Patient lying supine in bed, agreeable to particiapte in therapy  She was able to navigate bed to sit EOB with min A and use of bed rails, mod A to return to bed  She was able to ambulate increased distances with standing rest breaks due to fatigue and instability  She is progressing well towards long term goals slowly  She was able to perform TE as noted without difficulty  She would continue to benefit from skilled PT to maximzie functional independence  Barriers to Discharge: Inaccessible home environment     Recommendation: (rehab)     PT - OK to Discharge: Yes(to rehab when medically stable)    See flowsheet documentation for full assessment

## 2019-07-18 ENCOUNTER — NURSING HOME VISIT (OUTPATIENT)
Dept: GERIATRICS | Facility: OTHER | Age: 84
End: 2019-07-18
Payer: COMMERCIAL

## 2019-07-18 DIAGNOSIS — M81.0 AGE-RELATED OSTEOPOROSIS WITHOUT CURRENT PATHOLOGICAL FRACTURE: ICD-10-CM

## 2019-07-18 DIAGNOSIS — E78.5 DYSLIPIDEMIA: ICD-10-CM

## 2019-07-18 DIAGNOSIS — K21.9 GASTROESOPHAGEAL REFLUX DISEASE WITHOUT ESOPHAGITIS: ICD-10-CM

## 2019-07-18 DIAGNOSIS — S72.001D CLOSED FRACTURE OF RIGHT HIP WITH ROUTINE HEALING: Primary | ICD-10-CM

## 2019-07-18 DIAGNOSIS — I10 BENIGN ESSENTIAL HYPERTENSION: ICD-10-CM

## 2019-07-18 DIAGNOSIS — F43.29 GRIEF REACTION WITH PROLONGED BEREAVEMENT: ICD-10-CM

## 2019-07-18 DIAGNOSIS — Z79.899 POLYPHARMACY: ICD-10-CM

## 2019-07-18 DIAGNOSIS — E11.9 TYPE 2 DIABETES MELLITUS WITHOUT COMPLICATION, WITHOUT LONG-TERM CURRENT USE OF INSULIN (HCC): ICD-10-CM

## 2019-07-18 DIAGNOSIS — E55.9 VITAMIN D DEFICIENCY: ICD-10-CM

## 2019-07-18 PROBLEM — J06.9 ACUTE UPPER RESPIRATORY INFECTION: Status: RESOLVED | Noted: 2017-12-29 | Resolved: 2019-07-18

## 2019-07-18 PROBLEM — D72.829 LEUCOCYTOSIS: Status: RESOLVED | Noted: 2017-02-11 | Resolved: 2019-07-18

## 2019-07-18 PROBLEM — E87.6 HYPOKALEMIA: Status: RESOLVED | Noted: 2017-02-11 | Resolved: 2019-07-18

## 2019-07-18 PROBLEM — E87.2 LACTIC ACID ACIDOSIS: Status: RESOLVED | Noted: 2017-02-11 | Resolved: 2019-07-18

## 2019-07-18 PROBLEM — M79.89 RIGHT LEG SWELLING: Status: RESOLVED | Noted: 2017-07-27 | Resolved: 2019-07-18

## 2019-07-18 PROBLEM — R41.3 MEMORY LOSS: Status: ACTIVE | Noted: 2017-08-08

## 2019-07-18 PROBLEM — R93.89 THICKENED ENDOMETRIUM: Status: RESOLVED | Noted: 2017-03-01 | Resolved: 2019-07-18

## 2019-07-18 PROBLEM — E87.20 LACTIC ACID ACIDOSIS: Status: RESOLVED | Noted: 2017-02-11 | Resolved: 2019-07-18

## 2019-07-18 PROBLEM — S32.409A ACETABULAR FRACTURE (HCC): Status: RESOLVED | Noted: 2017-03-20 | Resolved: 2019-07-18

## 2019-07-18 PROBLEM — S32.511A CLOSED FRACTURE OF RIGHT SUPERIOR PUBIC RAMUS (HCC): Status: RESOLVED | Noted: 2017-03-22 | Resolved: 2019-07-18

## 2019-07-18 PROBLEM — K52.9 GASTROENTERITIS, ACUTE: Status: RESOLVED | Noted: 2017-02-11 | Resolved: 2019-07-18

## 2019-07-18 PROBLEM — A41.9 SEPSIS (HCC): Status: RESOLVED | Noted: 2017-02-11 | Resolved: 2019-07-18

## 2019-07-18 PROBLEM — M25.551 RIGHT HIP PAIN: Status: RESOLVED | Noted: 2017-04-25 | Resolved: 2019-07-18

## 2019-07-18 PROCEDURE — 99306 1ST NF CARE HIGH MDM 50: CPT | Performed by: INTERNAL MEDICINE

## 2019-07-18 NOTE — ASSESSMENT & PLAN NOTE
Blood pressure is currently well controlled  Will continue hydrochlorothiazide 25 mg daily, amlodipine 10 mg daily,  Will decrease metoprolol to 100 mg long acting succinate daily and follow blood pressures

## 2019-07-18 NOTE — ASSESSMENT & PLAN NOTE
Without recent symptoms  Stop omeprazole and start famotidine 20 mg every 12 hr as needed and follow symptoms

## 2019-07-18 NOTE — ASSESSMENT & PLAN NOTE
I called reviewed medication list with the patient's daughters well  We will stop gabapentin, vitamin B12, vitamin-C, omeprazole, and statin therapies

## 2019-07-18 NOTE — ASSESSMENT & PLAN NOTE
Lab Results   Component Value Date    HGBA1C 6 2 11/25/2016    Stable  Diet controlled  May stop Accu-Cheks
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
## 2019-07-18 NOTE — ASSESSMENT & PLAN NOTE
Continue daily vitamin-D therapy  Stop proton pump inhibitor therapy as this may worsen osteoporosis

## 2019-07-18 NOTE — ASSESSMENT & PLAN NOTE
Considering advancing age, mildly decreased functional status, no history of atherosclerotic complications, negative MRA of the brain, and A1c below diabetic range, it is unclear that statin therapy benefits outweigh risks  I will stop statin therapy at this point

## 2019-07-18 NOTE — ASSESSMENT & PLAN NOTE
Start PT and OT  Continue pain control with acetaminophen 650 mg 3 times a day and oxycodone as needed  Continue DVT prophylaxis with enoxaparin 30 mg twice a day

## 2019-07-22 ENCOUNTER — NURSING HOME VISIT (OUTPATIENT)
Dept: GERIATRICS | Facility: OTHER | Age: 84
End: 2019-07-22
Payer: COMMERCIAL

## 2019-07-22 DIAGNOSIS — E55.9 VITAMIN D DEFICIENCY: Chronic | ICD-10-CM

## 2019-07-22 DIAGNOSIS — I10 BENIGN ESSENTIAL HYPERTENSION: Chronic | ICD-10-CM

## 2019-07-22 DIAGNOSIS — F43.29 GRIEF REACTION WITH PROLONGED BEREAVEMENT: Chronic | ICD-10-CM

## 2019-07-22 DIAGNOSIS — S72.001D CLOSED FRACTURE OF RIGHT HIP WITH ROUTINE HEALING: Primary | Chronic | ICD-10-CM

## 2019-07-22 PROCEDURE — 99309 SBSQ NF CARE MODERATE MDM 30: CPT | Performed by: INTERNAL MEDICINE

## 2019-07-22 NOTE — PROGRESS NOTES
FOLLOW UP VISIT - [facility] [phone]  Location: [facility]  POS: 31 ([facility])    NAME: Marlys Oleary  AGE: 80 y o  SEX: female    DATE OF ENCOUNTER: 7/22/2019    ASSESSMENT AND PROBLEMS:  Closed fracture of right hip with routine healing  Stable  Continue Lovenox DVT prophylaxis  Continue PT and OT  Benign essential hypertension  I reviewed need for vital sign records with staff  Continue current metoprolol, amlodipine, hydrochlorothiazide  Vitamin D deficiency  Stable  Continue daily vitamin-D  Grief reaction with prolonged bereavement  Stable  Continue sertraline daily  CHIEF COMPLAINT:  Generalized weakness    HISTORY OF PRESENT ILLNESS:  Right hip fracture-patient continues working with PT and OT  She reports some continued pain in the right hip however it is manageable  She continues use acetaminophen 3 times a day and has not used oxycodone in the last week  Constipation-patient does report some constipation  She reports using an as needed medication to help and then had a blowout  She reports that her belly seems to be doing fine now and denies abdominal pain  Hypertension-patient continues on metoprolol, amlodipine, hydrochlorothiazide  REVIEW OF SYSTEMS:  Per history of present illness, all other systems reviewed and negative  HISTORY:  Medical Hx: Reviewed, unchanged  Family Hx: Reviewed, unchanged  Soc Hx: Reviewed, unchanged  No Known Allergies    PHYSICAL EXAM:  Vital Signs:  No vitals available since previous visit on Thursday    General: NAD  Eye Exam: anicteric sclera, EOMI  Oral Exam: moist mucous membranes  Neck Exam: no anterior cervical lymphadenopathy noted, neck supple  Cardiovascular: regular rate, regular rhythm, no murmurs, rubs, or gallops  Pulmonary: no wheeze, no rhonchi  Abdominal: soft, nontender, nondistended, bowel sounds audible  Extremities and skin: no edema noted, no rashes  Neurological: alert and responsive, moving all 4 extremities symmetrically    PERTINENT LABS/IMAGING DATA:  BMP, CBC pending today  CURRENT MEDICATIONS:  All medications reviewed and updated in Nursing Home EMR      Elio Handy MD MPH  7/22/2019 12:17 PM

## 2019-07-22 NOTE — ASSESSMENT & PLAN NOTE
I reviewed need for vital sign records with staff  Continue current metoprolol, amlodipine, hydrochlorothiazide

## 2019-07-25 ENCOUNTER — NURSING HOME VISIT (OUTPATIENT)
Dept: GERIATRICS | Facility: OTHER | Age: 84
End: 2019-07-25
Payer: COMMERCIAL

## 2019-07-25 DIAGNOSIS — E55.9 VITAMIN D DEFICIENCY: Chronic | ICD-10-CM

## 2019-07-25 DIAGNOSIS — S72.001D CLOSED FRACTURE OF RIGHT HIP WITH ROUTINE HEALING: Primary | Chronic | ICD-10-CM

## 2019-07-25 DIAGNOSIS — F43.29 GRIEF REACTION WITH PROLONGED BEREAVEMENT: Chronic | ICD-10-CM

## 2019-07-25 DIAGNOSIS — I10 BENIGN ESSENTIAL HYPERTENSION: Chronic | ICD-10-CM

## 2019-07-25 PROCEDURE — 99309 SBSQ NF CARE MODERATE MDM 30: CPT | Performed by: INTERNAL MEDICINE

## 2019-07-25 NOTE — PROGRESS NOTES
FOLLOW UP VISIT - [facility] [phone]  Location: [facility]  POS: 31 ([facility])    NAME: Clif Oleary  AGE: 80 y o  SEX: female    DATE OF ENCOUNTER: 7/25/2019    ASSESSMENT AND PROBLEMS:  Closed fracture of right hip with routine healing  Stable  Continue PT OT  Continue Lovenox DVT prophylaxis  Stop standing Tylenol and change to as needed for pain  Benign essential hypertension  Review reviewed with staff need for recording of vital signs  Continue current metoprolol, amlodipine, hydrochlorothiazide  Grief reaction with prolonged bereavement  Stable  Continue sertraline  Vitamin D deficiency  Stable  Continue daily vitamin-D therapy  CHIEF COMPLAINT:  Generalized weakness    HISTORY OF PRESENT ILLNESS:  Right hip fracture-patient continues to work with PT and OT  She reports the pain is overall doing very well  She reports the pain is infrequent and mild  Hypertension-patient continues on hydrochlorothiazide, metoprolol, amlodipine  Vitamin-D deficiency-patient continues with  vitamin-D supplements daily  REVIEW OF SYSTEMS:  Per history of present illness, all other systems reviewed and negative      HISTORY:  Medical Hx: Reviewed, unchanged  Family Hx: Reviewed, unchanged  Soc Hx: Reviewed, unchanged  No Known Allergies    PHYSICAL EXAM:  Vital Signs:  No new vital signs listed since last visit  General: NAD  Eye Exam: anicteric sclera, EOMI  Oral Exam: moist mucous membranes  Neck Exam: no anterior cervical lymphadenopathy noted, neck supple  Cardiovascular: regular rate, regular rhythm, no murmurs, rubs, or gallops  Pulmonary: no wheeze, no rhonchi  Abdominal: soft, nontender, nondistended, bowel sounds audible  Extremities and skin: no edema noted, no rashes  Neurological: alert and responsive, moving all 4 extremities symmetrically    PERTINENT LABS/IMAGING DATA:  No new data    CURRENT MEDICATIONS:  All medications reviewed and updated in [email or web address] Aroldo Chavez MD MPH  7/25/2019 11:36 AM

## 2019-07-25 NOTE — ASSESSMENT & PLAN NOTE
Stable  Continue PT OT  Continue Lovenox DVT prophylaxis  Stop standing Tylenol and change to as needed for pain

## 2019-07-25 NOTE — ASSESSMENT & PLAN NOTE
Review reviewed with staff need for recording of vital signs  Continue current metoprolol, amlodipine, hydrochlorothiazide

## 2019-07-29 ENCOUNTER — NURSING HOME VISIT (OUTPATIENT)
Dept: GERIATRICS | Facility: OTHER | Age: 84
End: 2019-07-29
Payer: COMMERCIAL

## 2019-07-29 DIAGNOSIS — I10 ESSENTIAL HYPERTENSION: Primary | ICD-10-CM

## 2019-07-29 DIAGNOSIS — F43.29 GRIEF REACTION WITH PROLONGED BEREAVEMENT: Chronic | ICD-10-CM

## 2019-07-29 DIAGNOSIS — E55.9 VITAMIN D DEFICIENCY: Chronic | ICD-10-CM

## 2019-07-29 DIAGNOSIS — S72.001D CLOSED FRACTURE OF RIGHT HIP WITH ROUTINE HEALING: Primary | Chronic | ICD-10-CM

## 2019-07-29 PROCEDURE — 99309 SBSQ NF CARE MODERATE MDM 30: CPT | Performed by: INTERNAL MEDICINE

## 2019-07-29 RX ORDER — METOPROLOL TARTRATE 100 MG/1
TABLET ORAL
Qty: 180 TABLET | Refills: 0 | Status: SHIPPED | OUTPATIENT
Start: 2019-07-29 | End: 2019-08-01

## 2019-07-29 NOTE — ASSESSMENT & PLAN NOTE
Blood pressures have been in very good range  Continue hydrochlorothiazide 25 mg, metoprolol 100 mg  Decrease amlodipine to 5 mg daily and continue to follow blood pressures

## 2019-07-29 NOTE — PROGRESS NOTES
FOLLOW UP VISIT - [facility] [phone]  Location: [facility]  POS: 31 ([facility])    NAME: Catherine lOeary  AGE: 80 y o  SEX: female    DATE OF ENCOUNTER: 7/29/2019    ASSESSMENT AND PROBLEMS:  Closed fracture of right hip with routine healing  Stable  Continue PT OT  Grief reaction with prolonged bereavement  Stable  Continue sertraline  Benign essential hypertension  Blood pressures have been in very good range  Continue hydrochlorothiazide 25 mg, metoprolol 100 mg  Decrease amlodipine to 5 mg daily and continue to follow blood pressures  Vitamin D deficiency  Cont daily Vit D       CHIEF COMPLAINT:  Generalized weakness    HISTORY OF PRESENT ILLNESS:  Right hip fracture-continues working with PT and OT  She reports that she thinks that she is getting stronger and is anxious to get home  She reports that the pain has been very well controlled  Hypertension-patient continues on metoprolol, amlodipine, hydrochlorothiazide  She denies dizziness  Vitamin-D deficiency-she continues on daily vitamin-D therapy  Prolonged grief reaction-patient is on sertraline daily  She reports good mood  REVIEW OF SYSTEMS:  Per history of present illness, all other systems reviewed and negative      HISTORY:  Medical Hx: Reviewed, unchanged  Family Hx: Reviewed, unchanged  Soc Hx: Reviewed, unchanged  No Known Allergies    PHYSICAL EXAM:  Vital Signs:  /78, temp 98 2°, HR 76, RR 18  General: NAD  Eye Exam: anicteric sclera, EOMI  Oral Exam: moist mucous membranes  Neck Exam: no anterior cervical lymphadenopathy noted, neck supple  Cardiovascular: regular rate, regular rhythm, no murmurs, rubs, or gallops  Pulmonary: no wheeze, no rhonchi  Abdominal: soft, nontender, nondistended, bowel sounds audible  Extremities and skin: no edema noted, no rashes  Neurological: alert and responsive, moving all 4 extremities symmetrically    PERTINENT LABS/IMAGING DATA:  No new data    CURRENT MEDICATIONS:  All medications reviewed and updated in Nursing Home EMR      Natalie Mendoza MD MPH  7/29/2019 3:24 PM

## 2019-07-31 ENCOUNTER — TELEPHONE (OUTPATIENT)
Dept: OBGYN CLINIC | Facility: HOSPITAL | Age: 84
End: 2019-07-31

## 2019-07-31 NOTE — TELEPHONE ENCOUNTER
Patient cancelled 8/2/19 appointment- we offered 8/6/19 but patient could not make that- she is scheduled for 8/8/19  This will put her 4 weeks post surgery   Just wanted to make sure you were aware

## 2019-07-31 NOTE — TELEPHONE ENCOUNTER
Patient's daughter Janay Hartman is calling to state that patient is getting out of [employer] on Friday, she has to reschedule her first post op appt  Sent Kelli an email for forced appt      Daughter Bianca's callback CP#[phone]

## 2019-08-01 ENCOUNTER — NURSING HOME VISIT (OUTPATIENT)
Dept: GERIATRICS | Facility: OTHER | Age: 84
End: 2019-08-01
Payer: COMMERCIAL

## 2019-08-01 DIAGNOSIS — I10 HYPERTENSION, UNSPECIFIED TYPE: ICD-10-CM

## 2019-08-01 DIAGNOSIS — K59.01 CONSTIPATION, SLOW TRANSIT: ICD-10-CM

## 2019-08-01 DIAGNOSIS — I10 BENIGN ESSENTIAL HYPERTENSION: Chronic | ICD-10-CM

## 2019-08-01 DIAGNOSIS — S72.001D CLOSED FRACTURE OF RIGHT HIP WITH ROUTINE HEALING: Primary | Chronic | ICD-10-CM

## 2019-08-01 DIAGNOSIS — E55.9 VITAMIN D DEFICIENCY: Chronic | ICD-10-CM

## 2019-08-01 DIAGNOSIS — M81.0 AGE-RELATED OSTEOPOROSIS WITHOUT CURRENT PATHOLOGICAL FRACTURE: Chronic | ICD-10-CM

## 2019-08-01 DIAGNOSIS — F43.29 GRIEF REACTION WITH PROLONGED BEREAVEMENT: Chronic | ICD-10-CM

## 2019-08-01 PROBLEM — Z79.899 POLYPHARMACY: Status: RESOLVED | Noted: 2019-07-18 | Resolved: 2019-08-01

## 2019-08-01 PROCEDURE — 99315 NF DSCHRG MGMT 30 MIN/LESS: CPT | Performed by: INTERNAL MEDICINE

## 2019-08-01 RX ORDER — HYDROCHLOROTHIAZIDE 25 MG/1
25 TABLET ORAL DAILY
Qty: 90 TABLET | Refills: 0 | Status: SHIPPED | OUTPATIENT
Start: 2019-08-01 | End: 2021-09-28 | Stop reason: ALTCHOICE

## 2019-08-01 RX ORDER — AMLODIPINE BESYLATE 5 MG/1
5 TABLET ORAL DAILY
Qty: 90 TABLET | Refills: 0 | Status: SHIPPED | OUTPATIENT
Start: 2019-08-01 | End: 2019-12-02 | Stop reason: SDUPTHER

## 2019-08-01 RX ORDER — SENNOSIDES 8.6 MG
1 TABLET ORAL
Qty: 120 EACH | Refills: 0 | Status: SHIPPED | OUTPATIENT
Start: 2019-08-01 | End: 2019-08-05 | Stop reason: ALTCHOICE

## 2019-08-01 RX ORDER — METOPROLOL SUCCINATE 100 MG/1
100 TABLET, EXTENDED RELEASE ORAL EVERY EVENING
Qty: 90 TABLET | Refills: 0 | Status: SHIPPED | OUTPATIENT
Start: 2019-08-01 | End: 2019-12-02 | Stop reason: SDUPTHER

## 2019-08-01 NOTE — PROGRESS NOTES
Nursing Home Discharge Summary    Location: [facility]  POS: 31 (SNF)    Admission Date:  07/16/2019  Discharge Date:  08/02/2019    Discharged to: home    Discharge Diagnoses:   Diagnosis ICD-10-CM Associated Orders   1  Closed fracture of right hip with routine healing S72 001D    2  Hypertension, unspecified type I10    3  Benign essential hypertension I10    4  Grief reaction with prolonged bereavement F43 21    5  Vitamin D deficiency E55 9    6  Age-related osteoporosis without current pathological fracture M81 0        Patient's Medical Course:  Patient with PT and OT during her stay  She was continued on Lovenox during her stay and will complete course of DVT prophylaxis with Xarelto at home  Blood pressure medications were reduced and blood pressures remained well controlled  As well polypharmacy was reduced  The patient was taken off of omeprazole as this may also worsen osteoporosis  As well as gabapentin, vitamin B12, vitamin-C and statin medications were stopped as these are unlikely to be helpful

## 2019-08-02 DIAGNOSIS — I10 HYPERTENSION, UNSPECIFIED TYPE: ICD-10-CM

## 2019-08-02 RX ORDER — AMLODIPINE BESYLATE 10 MG/1
TABLET ORAL
Qty: 90 TABLET | Refills: 0 | Status: SHIPPED | OUTPATIENT
Start: 2019-08-02 | End: 2019-08-05 | Stop reason: DRUGHIGH

## 2019-08-03 ENCOUNTER — TELEPHONE (OUTPATIENT)
Dept: OBGYN CLINIC | Facility: HOSPITAL | Age: 84
End: 2019-08-03

## 2019-08-03 NOTE — TELEPHONE ENCOUNTER
Caller: Marcelo Richards with [address] [place] [address] Visiting Nurses  Patient: Beatrice Duran  C/B #: [phone]  Dr Wynn Medici:    Marcelo Richards with [facility] following up with doctor regarding patient incision site which is red  Patient also had some bleeding overnight  Paged on call Dr Jacob Cifuentes

## 2019-08-05 ENCOUNTER — TELEPHONE (OUTPATIENT)
Dept: OBGYN CLINIC | Facility: HOSPITAL | Age: 84
End: 2019-08-05

## 2019-08-05 ENCOUNTER — OFFICE VISIT (OUTPATIENT)
Dept: FAMILY MEDICINE CLINIC | Facility: CLINIC | Age: 84
End: 2019-08-05
Payer: COMMERCIAL

## 2019-08-05 VITALS
SYSTOLIC BLOOD PRESSURE: 110 MMHG | RESPIRATION RATE: 16 BRPM | HEIGHT: 63 IN | WEIGHT: 169 LBS | HEART RATE: 74 BPM | OXYGEN SATURATION: 97 % | TEMPERATURE: 98.1 F | BODY MASS INDEX: 29.95 KG/M2 | DIASTOLIC BLOOD PRESSURE: 70 MMHG

## 2019-08-05 DIAGNOSIS — Z13.220 ENCOUNTER FOR LIPID SCREENING FOR CARDIOVASCULAR DISEASE: ICD-10-CM

## 2019-08-05 DIAGNOSIS — S72.001D CLOSED FRACTURE OF RIGHT HIP WITH ROUTINE HEALING: Primary | Chronic | ICD-10-CM

## 2019-08-05 DIAGNOSIS — Z13.29 SCREENING FOR THYROID DISORDER: ICD-10-CM

## 2019-08-05 DIAGNOSIS — Z13.0 SCREENING, IRON DEFICIENCY ANEMIA: ICD-10-CM

## 2019-08-05 DIAGNOSIS — E11.9 TYPE 2 DIABETES MELLITUS WITHOUT COMPLICATION, WITHOUT LONG-TERM CURRENT USE OF INSULIN (HCC): ICD-10-CM

## 2019-08-05 DIAGNOSIS — Z13.6 ENCOUNTER FOR LIPID SCREENING FOR CARDIOVASCULAR DISEASE: ICD-10-CM

## 2019-08-05 PROCEDURE — 99213 OFFICE O/P EST LOW 20 MIN: CPT | Performed by: PHYSICIAN ASSISTANT

## 2019-08-05 NOTE — TELEPHONE ENCOUNTER
Umair Brock, Daughter [phone]    Dr Brynn Sams    Daughter is requesting sooner appt to remove staples  States they look red  Request sent to practice admin   Kelli Calvo

## 2019-08-05 NOTE — PROGRESS NOTES
Assessment/Plan:     1  Closed fracture of right hip with routine healing  Reviewed hospital records  Patient fell on 07/09 fracturing her right femur  She underwent ORIF on 07/10  She was discharged on 07/16 to rehab  She was then discharged from rehab on 08/2  Patient appears to be doing well  Continue with physical therapy  Continue Tylenol as needed  Wound does not appear infected  Follow up with Dr Claudette Boles tomorrow  Follow up here as needed  2  Type 2 diabetes mellitus without complication, without long-term current use of insulin ([facility] [address] )  Patient is due for routine labs, follow-up, and AWV  Patient will schedule with Dr Titus Dance at checkout  - Comprehensive metabolic panel; Future  - HEMOGLOBIN A1C W/ EAG ESTIMATION; Future  - Microalbumin / creatinine urine ratio; Future    3  Screening for thyroid disorder  - TSH, 3rd generation with Free T4 reflex; Future    4  Encounter for lipid screening for cardiovascular disease  - Comprehensive metabolic panel; Future  - Lipid panel; Future    5  Screening, iron deficiency anemia  - CBC and differential; Future      No problem-specific Assessment & Plan notes found for this encounter  Patient Active Problem List   Diagnosis    Type 2 diabetes mellitus without complication, without long-term current use of insulin ([facility] [address] )    Benign essential hypertension    Gastroesophageal reflux disease without esophagitis    Grief reaction with prolonged bereavement    Leiomyoma of uterus    Pancreatic cyst    Right bundle-branch block    Resting tremor    Closed fracture of right hip with routine healing    Vitamin D deficiency    Memory loss    Age related osteoporosis    Constipation, slow transit       Subjective:      Patient ID: Andie Nguyen is a 80 y o  female  Patient is here for follow-up on hospitalization  She broke her right hip on 07/09  It was the result of a fall    States she missed a step and fell down 3 steps, landing on her right side   She is unsure when she was discharged from the hospital but states she left [employer] 3 days ago  States she is still in some discomfort, but is much improved  She believes she is doing well  She is doing physical therapy 3 times a week  It will now be done out of her home  She is mostly moving around in a wheelchair  Some movement using a walker  Prior to this she was very independent  She has not looked at her wound, but the nursing home said it was red  She is unsure if there is any drainage or warmth  Denies fever  Pain has been well controlled with Tylenol  She has been taking her Xarelto daily for DVT prophylaxis  Bartholome Pinks She will finish this course in 4 days  She will follow up with Dr Reggie Villar tomorrow  He was the surgeon  She believes the staples will be removed  The following portions of the patient's history were reviewed and updated as appropriate: allergies, current medications, past family history, past medical history, past social history, past surgical history and problem list     Review of Systems   Constitutional: Positive for activity change (Due to fracture) and fatigue  Negative for appetite change, chills, diaphoresis and fever  HENT: Negative for congestion, postnasal drip, rhinorrhea, sinus pressure, sinus pain and sore throat  Respiratory: Negative for cough, chest tightness and shortness of breath  Cardiovascular: Negative for chest pain, palpitations and leg swelling  Gastrointestinal: Negative for abdominal pain, constipation, diarrhea, nausea and vomiting  Musculoskeletal: Positive for arthralgias and gait problem  Negative for back pain, joint swelling and myalgias  Skin: Positive for wound  Negative for rash  Neurological: Positive for weakness ( generalized)  Negative for dizziness, tremors, seizures, syncope, facial asymmetry, speech difficulty, light-headedness, numbness and headaches  Hematological: Negative for adenopathy  Objective:      /70 (BP Location: Left arm, Patient Position: Sitting, Cuff Size: Adult)   Pulse 74   Temp 98 1 °F (36 7 °C) (Tympanic)   Resp 16   Ht 5' 2 99" (1 6 m)   Wt 76 7 kg (169 lb)   SpO2 97%   BMI 29 94 kg/m²          Physical Exam   Constitutional: She is oriented to person, place, and time  She appears well-developed and well-nourished  HENT:   Head: Normocephalic and atraumatic  Eyes: Pupils are equal, round, and reactive to light  Conjunctivae are normal    Neck: Normal range of motion  Neck supple  Cardiovascular: Normal rate, regular rhythm, normal heart sounds and intact distal pulses  Pulmonary/Chest: Effort normal and breath sounds normal    Abdominal: Soft  Bowel sounds are normal    Musculoskeletal:   Wheelchair-bound  Patient able to stand with a walker  Range of motion right hip limited due to pain  Neurological: She is alert and oriented to person, place, and time  Skin: Skin is warm and dry  Approximately 5 cm incision right outer hip with overlying staples  Incision intact  Mild surrounding erythema  No induration or warmth  No discharge  Mild tenderness to touch  Psychiatric: She has a normal mood and affect  Her behavior is normal  Judgment and thought content normal    Nursing note and vitals reviewed        Current Outpatient Medications on File Prior to Visit   Medication Sig Dispense Refill    amLODIPine (NORVASC) 5 mg tablet Take 1 tablet (5 mg total) by mouth daily 90 tablet 0    Cholecalciferol ([employer] VITAMIN D) 2000 units CAPS Take 1 capsule by mouth daily      hydrochlorothiazide (HYDRODIURIL) 25 mg tablet Take 1 tablet (25 mg total) by mouth daily 90 tablet 0    metoprolol succinate (TOPROL-XL) 100 mg 24 hr tablet Take 1 tablet (100 mg total) by mouth every evening 90 tablet 0    sertraline (ZOLOFT) 50 mg tablet Take 1 tablet (50 mg total) by mouth daily  0    rivaroxaban (XARELTO) 10 mg tablet Take 1 tablet (10 mg total) by mouth every evening for 6 days 6 tablet 0    [DISCONTINUED] amLODIPine (NORVASC) 10 mg tablet TAKE 1 TABLET BY MOUTH EVERY DAY (Patient not taking: Reported on 8/5/2019) 90 tablet 0    [DISCONTINUED] Calcium Carb-Cholecalciferol (CALTRATE 600+D) 600-800 MG-UNIT TABS Take 1 tablet by mouth daily      [DISCONTINUED] senna (SENOKOT) 8 6 mg Take 1 tablet (8 6 mg total) by mouth daily at bedtime (Patient not taking: Reported on 8/5/2019) 120 each 0     No current facility-administered medications on file prior to visit

## 2019-08-06 ENCOUNTER — HOSPITAL ENCOUNTER (OUTPATIENT)
Dept: RADIOLOGY | Facility: HOSPITAL | Age: 84
Discharge: HOME | End: 2019-08-06
Attending: ORTHOPAEDIC SURGERY
Payer: COMMERCIAL

## 2019-08-06 ENCOUNTER — OFFICE VISIT (OUTPATIENT)
Dept: OBGYN CLINIC | Facility: HOSPITAL | Age: 84
End: 2019-08-06

## 2019-08-06 VITALS — HEART RATE: 102 BPM | SYSTOLIC BLOOD PRESSURE: 126 MMHG | DIASTOLIC BLOOD PRESSURE: 75 MMHG

## 2019-08-06 DIAGNOSIS — S72.001A CLOSED FRACTURE OF RIGHT HIP, INITIAL ENCOUNTER (HCC): Primary | ICD-10-CM

## 2019-08-06 DIAGNOSIS — S72.001A CLOSED FRACTURE OF RIGHT HIP, INITIAL ENCOUNTER (HCC): ICD-10-CM

## 2019-08-06 PROCEDURE — 73552 X-RAY EXAM OF FEMUR 2/>: CPT

## 2019-08-06 PROCEDURE — 99024 POSTOP FOLLOW-UP VISIT: CPT | Performed by: ORTHOPAEDIC SURGERY

## 2019-08-06 RX ORDER — CEPHALEXIN 500 MG/1
500 CAPSULE ORAL 4 TIMES DAILY
Qty: 36 CAPSULE | Refills: 0 | Status: SHIPPED | OUTPATIENT
Start: 2019-08-06 | End: 2019-08-15

## 2019-08-06 NOTE — PROGRESS NOTES
Patient Name:  René Contreras  MRN:  [id number]    Assessment & Plan     4 weeks s/p IM femur antegrade nail performed on 07/10/19  1  Staples were removed today and dressings were applied  2  The dressing by her knee may be removed tomorrow, the dressing at her hip should be changed 1x a day   3  Keflex 500 MG QID was prescribed today and sent to her pharmacy electronically for precautionary measures to be taken for the next 9 days   4  Continue PT for endurance and ROM exercises   5  Follow up in 1 week for a wound check       Subjective     80 y o female presents to the office today aprox  4 weeks s/p right IM femur antegrade nail insertion performed on 07/10/19  Overall Jose Sharpe is doing well  She is currently having PT come to her house as well as [facility]  She has been up and walking, without difficulty  She denies any fevers or chills  General ROS:  Negative for fever or chills  Neurological ROS:  Negative for numbness or tingling  Objective     /75   Pulse 102     Right lower extremity:  Ambulates with the use of a wheelchair   Incision, clean dry and staples intact   Slight superficial rudeness to the incision near her hip, no redness surrounding incision near her knee   Skin warm and well perfused       Data Review     I have personally reviewed pertinent films in PACS, and my interpretation follows      X-ray right femur: Orthopedic hardware in good alignment       Social History     Tobacco Use    Smoking status: Never Smoker    Smokeless tobacco: Never Used   Substance Use Topics    Alcohol use: Not Currently     Alcohol/week: 0 0 standard drinks     Frequency: Never     Drinks per session: 1 or 2     Binge frequency: Never    Drug use: No       Scribe Attestation    I,:   Davee Canavan Ditmars am acting as a scribe while in the presence of the attending physician :        I,:   Araceli Maldonado MD personally performed the services described in this documentation    as scribed in my presence :

## 2019-08-07 ENCOUNTER — TELEPHONE (OUTPATIENT)
Dept: OBGYN CLINIC | Facility: HOSPITAL | Age: 84
End: 2019-08-07

## 2019-08-07 NOTE — TELEPHONE ENCOUNTER
Please be advise Vic Yancey the visiting nurse called stating dressing that was applied to top Femur incision area was completely saturated with blood  Nurse stated incision has not dehisced but there is an area above the incision area looks like a scab was removed almost like a "skin tear " where the drainage is coming from  Nurse stated she chenged dressing and applied ABDs and tape and will return tomorrow for a follow up visit  Nurse advised pt  To call her if ABD becomes saturated again  Nurse will call back tomorrow with an update

## 2019-08-08 NOTE — TELEPHONE ENCOUNTER
Can you check in with patient today  I can see her tomorrow if there is any issues  Staples were taken out Tuesday

## 2019-08-08 NOTE — TELEPHONE ENCOUNTER
Please be advise I spoke to [facility] the RN visiting pt  For dressing changes  Rn noted patient is still having drainage to top incision and also noted to have one staple left at that incision site  Rn is going to send pics via e-mail for Dr  To take a look at  Also nurse wanted to know if she should be allowed to shower since she is having drainage and still have staples left  I will forward pics to you  Please advise

## 2019-08-09 ENCOUNTER — OFFICE VISIT (OUTPATIENT)
Dept: OBGYN CLINIC | Facility: HOSPITAL | Age: 84
End: 2019-08-09

## 2019-08-09 VITALS — SYSTOLIC BLOOD PRESSURE: 124 MMHG | TEMPERATURE: 98.2 F | DIASTOLIC BLOOD PRESSURE: 61 MMHG | HEART RATE: 66 BPM

## 2019-08-09 DIAGNOSIS — S72.001A CLOSED FRACTURE OF RIGHT HIP, INITIAL ENCOUNTER (HCC): Primary | ICD-10-CM

## 2019-08-09 PROCEDURE — 3075F SYST BP GE 130 - 139MM HG: CPT | Performed by: PHYSICIAN ASSISTANT

## 2019-08-09 PROCEDURE — 99024 POSTOP FOLLOW-UP VISIT: CPT | Performed by: PHYSICIAN ASSISTANT

## 2019-08-09 PROCEDURE — 4040F PNEUMOC VAC/ADMIN/RCVD: CPT | Performed by: PHYSICIAN ASSISTANT

## 2019-08-09 PROCEDURE — 3078F DIAST BP <80 MM HG: CPT | Performed by: PHYSICIAN ASSISTANT

## 2019-08-09 PROCEDURE — 1160F RVW MEDS BY RX/DR IN RCRD: CPT | Performed by: PHYSICIAN ASSISTANT

## 2019-08-09 PROCEDURE — 1111F DSCHRG MED/CURRENT MED MERGE: CPT | Performed by: PHYSICIAN ASSISTANT

## 2019-08-09 RX ORDER — MELOXICAM 15 MG/1
15 TABLET ORAL DAILY
Qty: 30 TABLET | Refills: 0 | Status: SHIPPED | OUTPATIENT
Start: 2019-08-09 | End: 2019-09-05 | Stop reason: ALTCHOICE

## 2019-08-09 RX ORDER — SULFAMETHOXAZOLE AND TRIMETHOPRIM 800; 160 MG/1; MG/1
1 TABLET ORAL EVERY 12 HOURS SCHEDULED
Qty: 14 TABLET | Refills: 0 | Status: SHIPPED | OUTPATIENT
Start: 2019-08-09 | End: 2019-08-16

## 2019-08-13 ENCOUNTER — OFFICE VISIT (OUTPATIENT)
Dept: OBGYN CLINIC | Facility: HOSPITAL | Age: 84
End: 2019-08-13

## 2019-08-13 VITALS — TEMPERATURE: 99 F | SYSTOLIC BLOOD PRESSURE: 113 MMHG | HEART RATE: 69 BPM | DIASTOLIC BLOOD PRESSURE: 71 MMHG

## 2019-08-13 DIAGNOSIS — Z98.890 S/P MUSCULOSKELETAL SYSTEM SURGERY: Primary | ICD-10-CM

## 2019-08-13 PROCEDURE — 99024 POSTOP FOLLOW-UP VISIT: CPT | Performed by: ORTHOPAEDIC SURGERY

## 2019-08-13 NOTE — PROGRESS NOTES
Patient Name:  Gurdeep Abraham  MRN:  [id number]    [address] [place] [address]     80 y o  female 5 weeks s/p right IM femur antergrade nail performed on 07/10/19  1  Continue ABX as prescribed   2  Continue PT  3  Dry dressing was applied to the incision near her hip   4  Follow up in 2 weeks with repeat x-ray's and a wound check       Subjective     80 y o  female aprox  5 weeks s/p IM nail performed on 07/10/19  Overall she is doing well  She continues to take her ABX as prescribed  Tamara Felix continues to work with PT  She states that she has been more ambulatory, without difficulty  She denies any pain today  General ROS:  Negative for fever or chills  Neurological ROS:  Negative for numbness or tingling  Objective     /71   Pulse 69   Temp 99 °F (37 2 °C)     Right hip:  Ambulates in a wheelchair today  Incision, clean dry and intact, no signs of infection  Less redness then previous visit  No drainage    Skin warm and well perfused         Data Review     I have personally reviewed pertinent films in PACS, and my interpretation follows      No new imaging to review       Social History     Tobacco Use    Smoking status: Never Smoker    Smokeless tobacco: Never Used   Substance Use Topics    Alcohol use: Not Currently     Alcohol/week: 0 0 standard drinks     Frequency: Never     Drinks per session: 1 or 2     Binge frequency: Never    Drug use: No       Scribe Attestation    I,:   Maxi Brewster am acting as a scribe while in the presence of the attending physician :        I,:   Blayne Estevez MD personally performed the services described in this documentation    as scribed in my presence :

## 2019-08-15 ENCOUNTER — TELEPHONE (OUTPATIENT)
Dept: OBGYN CLINIC | Facility: HOSPITAL | Age: 84
End: 2019-08-15

## 2019-08-15 NOTE — TELEPHONE ENCOUNTER
Jeremy from the [facility] calling to find out if it is okay for patient to shower  Her staples were removed on 8/6 and dressing is changed daily  She has a small amount of drainage to incision  Please advise if okay to shower keeping incision out of the direct flow of water?

## 2019-08-29 ENCOUNTER — OFFICE VISIT (OUTPATIENT)
Dept: OBGYN CLINIC | Facility: HOSPITAL | Age: 84
End: 2019-08-29

## 2019-08-29 ENCOUNTER — HOSPITAL ENCOUNTER (OUTPATIENT)
Dept: RADIOLOGY | Facility: HOSPITAL | Age: 84
Discharge: HOME | End: 2019-08-29
Attending: ORTHOPAEDIC SURGERY
Payer: COMMERCIAL

## 2019-08-29 VITALS
BODY MASS INDEX: 31.1 KG/M2 | SYSTOLIC BLOOD PRESSURE: 125 MMHG | HEART RATE: 68 BPM | HEIGHT: 62 IN | WEIGHT: 169 LBS | DIASTOLIC BLOOD PRESSURE: 70 MMHG

## 2019-08-29 DIAGNOSIS — Z98.890 S/P MUSCULOSKELETAL SYSTEM SURGERY: Primary | ICD-10-CM

## 2019-08-29 DIAGNOSIS — S72.001A CLOSED FRACTURE OF RIGHT HIP, INITIAL ENCOUNTER (HCC): ICD-10-CM

## 2019-08-29 PROCEDURE — 99024 POSTOP FOLLOW-UP VISIT: CPT | Performed by: ORTHOPAEDIC SURGERY

## 2019-08-29 PROCEDURE — 73552 X-RAY EXAM OF FEMUR 2/>: CPT
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
## 2019-08-30 ENCOUNTER — TELEPHONE (OUTPATIENT)
Dept: OBGYN CLINIC | Facility: HOSPITAL | Age: 84
End: 2019-08-30

## 2019-08-30 NOTE — TELEPHONE ENCOUNTER
Patient was seen in the office yesterday  [facility] from the [facility] is calling because more home care was recommended for the patient but the [facility] discharged her for meeting all of her goals  She is no longer home bound  They wanted to make the doctor aware   Ebenezer Aguiar can be reached at #[phone]

## 2019-09-04 LAB
LEFT EYE DIABETIC RETINOPATHY: NORMAL
RIGHT EYE DIABETIC RETINOPATHY: NORMAL

## 2019-09-05 ENCOUNTER — OFFICE VISIT (OUTPATIENT)
Dept: FAMILY MEDICINE CLINIC | Facility: CLINIC | Age: 84
End: 2019-09-05
Payer: COMMERCIAL

## 2019-09-05 VITALS
RESPIRATION RATE: 16 BRPM | HEART RATE: 60 BPM | BODY MASS INDEX: 30.91 KG/M2 | OXYGEN SATURATION: 97 % | WEIGHT: 168 LBS | TEMPERATURE: 98 F | DIASTOLIC BLOOD PRESSURE: 60 MMHG | SYSTOLIC BLOOD PRESSURE: 110 MMHG | HEIGHT: 62 IN

## 2019-09-05 DIAGNOSIS — Z00.00 MEDICARE ANNUAL WELLNESS VISIT, SUBSEQUENT: Primary | ICD-10-CM

## 2019-09-05 PROCEDURE — 1170F FXNL STATUS ASSESSED: CPT | Performed by: PHYSICIAN ASSISTANT

## 2019-09-05 PROCEDURE — 1125F AMNT PAIN NOTED PAIN PRSNT: CPT | Performed by: PHYSICIAN ASSISTANT

## 2019-09-05 PROCEDURE — G0439 PPPS, SUBSEQ VISIT: HCPCS | Performed by: PHYSICIAN ASSISTANT

## 2019-09-05 PROCEDURE — 3288F FALL RISK ASSESSMENT DOCD: CPT | Performed by: PHYSICIAN ASSISTANT

## 2019-09-05 PROCEDURE — 1100F PTFALLS ASSESS-DOCD GE2>/YR: CPT | Performed by: PHYSICIAN ASSISTANT
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
## 2019-09-05 NOTE — PATIENT INSTRUCTIONS
Recommended Shingrix vaccine  Obesity   AMBULATORY CARE:   Obesity  is when your body mass index (BMI) is greater than 30  Your healthcare provider will use your height and weight to measure your BMI  The risks of obesity include  many health problems, such as injuries or physical disability  You may need tests to check for the following:  · Diabetes     · High blood pressure or high cholesterol     · Heart disease     · Gallbladder or liver disease     · Cancer of the colon, breast, prostate, liver, or kidney     · Sleep apnea     · Arthritis or gout  Seek care immediately if:   · You have a severe headache, confusion, or difficulty speaking  · You have weakness on one side of your body  · You have chest pain, sweating, or shortness of breath  Contact your healthcare provider if:   · You have symptoms of gallbladder or liver disease, such as pain in your upper abdomen  · You have knee or hip pain and discomfort while walking  · You have symptoms of diabetes, such as intense hunger and thirst, and frequent urination  · You have symptoms of sleep apnea, such as snoring or daytime sleepiness  · You have questions or concerns about your condition or care  Treatment for obesity  focuses on helping you lose weight to improve your health  Even a small decrease in BMI can reduce the risk for many health problems  Your healthcare provider will help you set a weight-loss goal   · Lifestyle changes  are the first step in treating obesity  These include making healthy food choices and getting regular physical activity  Your healthcare provider may suggest a weight-loss program that involves coaching, education, and therapy  · Medicine  may help you lose weight when it is used with a healthy diet and physical activity  · Surgery  can help you lose weight if you are very obese and have other health problems  There are several types of weight-loss surgery   Ask your healthcare provider for more information  Be successful losing weight:   · Set small, realistic goals  An example of a small goal is to walk for 20 minutes 5 days a week  Anther goal is to lose 5% of your body weight  · Tell friends, family members, and coworkers about your goals  and ask for their support  Ask a friend to lose weight with you, or join a weight-loss support group  · Identify foods or triggers that may cause you to overeat , and find ways to avoid them  Remove tempting high-calorie foods from your home and workplace  Place a bowl of fresh fruit on your kitchen counter  If stress causes you to eat, then find other ways to cope with stress  · Keep a diary to track what you eat and drink  Also write down how many minutes of physical activity you do each day  Weigh yourself once a week and record it in your diary  Eating changes: You will need to eat 500 to 1,000 fewer calories each day than you currently eat to lose 1 to 2 pounds a week  The following changes will help you cut calories:  · Eat smaller portions  Use small plates, no larger than 9 inches in diameter  Fill your plate half full of fruits and vegetables  Measure your food using measuring cups until you know what a serving size looks like  · Eat 3 meals and 1 or 2 snacks each day  Plan your meals in advance  [facility] and eat at home most of the time  Eat slowly  · Eat fruits and vegetables at every meal   They are low in calories and high in fiber, which makes you feel full  Do not add butter, margarine, or cream sauce to vegetables  Use herbs to season steamed vegetables  · Eat less fat and fewer fried foods  Eat more baked or grilled chicken and fish  These protein sources are lower in calories and fat than red meat  Limit fast food  Dress your salads with olive oil and vinegar instead of bottled dressing  · Limit the amount of sugar you eat  Do not drink sugary beverages  Limit alcohol    Activity changes:  Physical activity is good for your body in many ways  It helps you burn calories and build strong muscles  It decreases stress and depression, and improves your mood  It can also help you sleep better  Talk to your healthcare provider before you begin an exercise program   · Exercise for at least 30 minutes 5 days a week  Start slowly  Set aside time each day for physical activity that you enjoy and that is convenient for you  It is best to do both weight training and an activity that increases your heart rate, such as walking, bicycling, or swimming  · Find ways to be more active  Do yard work and housecleaning  Walk up the stairs instead of using elevators  Spend your leisure time going to events that require walking, such as outdoor festivals or fairs  This extra physical activity can help you lose weight and keep it off  Follow up with your healthcare provider as directed: You may need to meet with a dietitian  Write down your questions so you remember to ask them during your visits  © 2017 [address] Information is for End User's use only and may not be sold, redistributed or otherwise used for commercial purposes  All illustrations and images included in CareNotes® are the copyrighted property of [employer]  or [employer]  The above information is an  only  It is not intended as medical advice for individual conditions or treatments  Talk to your doctor, nurse or pharmacist before following any medical regimen to see if it is safe and effective for you  Urinary Incontinence   WHAT YOU NEED TO KNOW:   What is urinary incontinence? Urinary incontinence (UI) is when you lose control of your bladder  What causes UI? UI occurs because your bladder cannot store or empty urine properly  The following are the most common types of UI:  · Stress incontinence  is when you leak urine due to increased bladder pressure  This may happen when you cough, sneeze, or exercise       · Urge incontinence  is when you feel the need to urinate right away and leak urine accidentally  · Mixed incontinence  is when you have both stress and urge UI  What are the signs and symptoms of UI?   · You feel like your bladder does not empty completely when you urinate  · You urinate often and need to urinate immediately  · You leak urine when you sleep, or you wake up with the urge to urinate  · You leak urine when you cough, sneeze, exercise, or laugh  How is UI diagnosed? Your healthcare provider will ask how often you leak urine and whether you have stress or urge symptoms  Tell him which medicines you take, how often you urinate, and how much liquid you drink each day  You may need any of the following tests:  · Urine tests  may show infection or kidney function  · A pelvic exam  may be done to check for blockages  A pelvic exam will also show if your bladder, uterus, or other organs have moved out of place  · An x-ray, ultrasound, or CT  may show problems with parts of your urinary system  You may be given contrast liquid to help your organs show up better in the pictures  Tell the healthcare provider if you have ever had an allergic reaction to contrast liquid  Do not enter the MRI room with anything metal  Metal can cause serious injury  Tell the healthcare provider if you have any metal in or on your body  · A bladder scan  will show how much urine is left in your bladder after you urinate  You will be asked to urinate and then healthcare providers will use a small ultrasound machine to check the urine left in your bladder  · Cystometry  is used to check the function of your urinary system  Your healthcare provider checks the pressure in your bladder while filling it with fluid  Your bladder pressure may also be tested when your bladder is full and while you urinate  How is UI treated? · Medicines  can help strengthen your bladder control      · Electrical stimulation  is used to send a small amount of electrical energy to your pelvic floor muscles  This helps control your bladder function  Electrodes may be placed outside your body or in your rectum  For women, the electrodes may be placed in the vagina  · A bulking agent  may be injected into the wall of your urethra to make it thicker  This helps keep your urethra closed and decreases urine leakage  · Devices  such as a clamp, pessary, or tampon may help stop urine leaks  Ask your healthcare provider for more information about these and other devices  · Surgery  may be needed if other treatments do not work  Several types of surgery can help improve your bladder control  Ask your healthcare provider for more information about the surgery you may need  How can I manage my symptoms? · Do pelvic muscle exercises often  Your pelvic muscles help you stop urinating  Squeeze these muscles tight for 5 seconds, then relax for 5 seconds  Gradually work up to squeezing for 10 seconds  Do 3 sets of 15 repetitions a day, or as directed  This will help strengthen your pelvic muscles and improve bladder control  · A catheter  may be used to help empty your bladder  A catheter is a tiny, plastic tube that is put into your bladder to drain your urine  Your healthcare provider may tell you to use a catheter to prevent your bladder from getting too full and leaking urine  · Keep a UI record  Write down how often you leak urine and how much you leak  Make a note of what you were doing when you leaked urine  · Train your bladder  Go to the bathroom at set times, such as every 2 hours, even if you do not feel the urge to go  You can also try to hold your urine when you feel the urge to go  For example, hold your urine for 5 minutes when you feel the urge to go  As that becomes easier, hold your urine for 10 minutes  · Drink liquids as directed    Ask your healthcare provider how much liquid to drink each day and which liquids are best for you  You may need to limit the amount of liquid you drink to help control your urine leakage  Limit or do not have drinks that contain caffeine or alcohol  Do not drink any liquid right before you go to bed  · Prevent constipation  Eat a variety of high-fiber foods  Good examples are high-fiber cereals, beans, vegetables, and whole-grain breads  Prune juice may help make your bowel movement softer  Walking is the best way to trigger your intestines to have a bowel movement  · Exercise regularly and maintain a healthy weight  Ask your healthcare provider how much you should weigh and about the best exercise plan for you  Weight loss and exercise will decrease pressure on your bladder and help you control your leakage  Ask him to help you create a weight loss plan if you are overweight  When should I seek immediate care? · You have severe pain  · You are confused or cannot think clearly  When should I contact my healthcare provider? · You have a fever  · You see blood in your urine  · You have pain when you urinate  · You have new or worse pain, even after treatment  · Your mouth feels dry or you have vision changes  · Your urine is cloudy or smells bad  · You have questions or concerns about your condition or care  CARE AGREEMENT:   You have the right to help plan your care  Learn about your health condition and how it may be treated  Discuss treatment options with your caregivers to decide what care you want to receive  You always have the right to refuse treatment  The above information is an  only  It is not intended as medical advice for individual conditions or treatments  Talk to your doctor, nurse or pharmacist before following any medical regimen to see if it is safe and effective for you  © 2017 [address] Information is for End User's use only and may not be sold, redistributed or otherwise used for commercial purposes   All illustrations and images included in CareNotes® are the copyrighted property of [employer]  or [employer]acker  Cigarette Smoking and Your Health   AMBULATORY CARE:   Risks to your health if you smoke:  Nicotine and other chemicals found in tobacco damage every cell in your body  Even if you are a light smoker, you have an increased risk for cancer, heart disease, and lung disease  If you are pregnant or have diabetes, smoking increases your risk for complications  Benefits to your health if you stop smoking:   · You decrease respiratory symptoms such as coughing, wheezing, and shortness of breath  · You reduce your risk for cancers of the lung, mouth, throat, kidney, bladder, pancreas, stomach, and cervix  If you already have cancer, you increase the benefits of chemotherapy  You also reduce your risk for cancer returning or a second cancer from developing  · You reduce your risk for heart disease, blood clots, heart attack, and stroke  · You reduce your risk for lung infections, and diseases such as pneumonia, asthma, chronic bronchitis, and emphysema  · Your circulation improves  More oxygen can be delivered to your body  If you have diabetes, you lower your risk for complications, such as kidney, artery, and eye diseases  You also lower your risk for nerve damage  Nerve damage can lead to amputations, poor vision, and blindness  · You improve your body's ability to heal and to fight infections  Benefits to the health of others if you stop smoking:  Tobacco is harmful to nonsmokers who breathe in your secondhand smoke  The following are ways the health of others around you may improve when you stop smoking:  · You lower the risks for lung cancer and heart disease in nonsmoking adults  · If you are pregnant, you lower the risk for miscarriage, early delivery, low birth weight, and stillbirth   You also lower your baby's risk for SIDS, obesity, developmental delay, and neurobehavioral problems, such as ADHD  · If you have children, you lower their risk for ear infections, colds, pneumonia, bronchitis, and asthma  For more information and support to stop smoking:   · Smokefree  gov  Phone: 0- 826 - 582-8089  Web Address: www [employer]  Follow up with your healthcare provider as directed:  Write down your questions so you remember to ask them during your visits  © 2017 [address] Information is for End User's use only and may not be sold, redistributed or otherwise used for commercial purposes  All illustrations and images included in CareNotes® are the copyrighted property of [employer] M , Inc  or [employer]  The above information is an  only  It is not intended as medical advice for individual conditions or treatments  Talk to your doctor, nurse or pharmacist before following any medical regimen to see if it is safe and effective for you  Fall Prevention   AMBULATORY CARE:   Fall prevention  includes ways to make your home and other areas safer  It also includes ways you can move more carefully to prevent a fall  Health conditions that cause changes in your blood pressure, vision, or muscle strength and coordination may increase your risk for falls  Medicines may also increase your risk for falls if they make you dizzy, weak, or sleepy  Call 911 or have someone else call if:   · You have fallen and are unconscious  · You have fallen and cannot move part of your body  Contact your healthcare provider if:   · You have fallen and have pain or a headache  · You have questions or concerns about your condition or care  Fall prevention tips:   · Stand or sit up slowly  This may help you keep your balance and prevent falls  · Use assistive devices as directed  Your healthcare provider may suggest that you use a cane or walker to help you keep your balance   You may need to have grab bars put in your bathroom near the toilet or in the shower  · Wear shoes that fit well and have soles that   Wear shoes both inside and outside  Use slippers with good   Do not wear shoes with high heels  · Wear a personal alarm  This is a device that allows you to call 911 if you fall and need help  Ask your healthcare provider for more information  · Stay active  Exercise can help strengthen your muscles and improve your balance  Your healthcare provider may recommend water aerobics or walking  He or she may also recommend physical therapy to improve your coordination  Never start an exercise program without talking to your healthcare provider first      · Manage your medical conditions  Keep all appointments with your healthcare providers  Visit your eye doctor as directed  Home safety tips:   · Add items to prevent falls in the bathroom  Put nonslip strips on your bath or shower floor to prevent you from slipping  Use a bath mat if you do not have carpet in the bathroom  This will prevent you from falling when you step out of the bath or shower  Use a shower seat so you do not need to stand while you shower  Sit on the toilet or a chair in your bathroom to dry yourself and put on clothing  This will prevent you from losing your balance from drying or dressing yourself while you are standing  · Keep paths clear  Remove books, shoes, and other objects from walkways and stairs  Place cords for telephones and lamps out of the way so that you do not need to walk over them  Tape them down if you cannot move them  Remove small rugs  If you cannot remove a rug, secure it with double-sided tape  This will prevent you from tripping  · Install bright lights in your home  Use night lights to help light paths to the bathroom or kitchen  Always turn on the light before you start walking  · Keep items you use often on shelves within reach  Do not use a step stool to help you reach an item      · Paint or place reflective tape on the edges of your stairs  This will help you see the stairs better  Follow up with your healthcare provider as directed:  Write down your questions so you remember to ask them during your visits  © 2017 [address]  Information is for End User's use only and may not be sold, redistributed or otherwise used for commercial purposes  All illustrations and images included in CareNotes® are the copyrighted property of [employer] M [employer]  or [employer]acker  The above information is an  only  It is not intended as medical advice for individual conditions or treatments  Talk to your doctor, nurse or pharmacist before following any medical regimen to see if it is safe and effective for you  Advance Directives   WHAT YOU NEED TO KNOW:   What are advance directives? Advance directives are legal documents that state your wishes and plans for medical care  These plans are made ahead of time in case you lose your ability to make decisions for yourself  Advance directives can apply to any medical decision, such as the treatments you want, and if you want to donate organs  What are the types of advance directives? There are many types of advance directives, and each state has rules about how to use them  You may choose a combination of any of the following:  · Living will: This is a written record of the treatment you want  You can also choose which treatments you do not want, which to limit, and which to stop at a certain time  This includes surgery, medicine, IV fluid, and tube feedings  · Durable power of  for healthcare [facility]): This is a written record that states who you want to make healthcare choices for you when you are unable to make them for yourself  This person, called a proxy, is usually a family member or a friend  You may choose more than 1 proxy      · Do not resuscitate (DNR) order:  A DNR order is used in case your heart stops beating or you stop breathing  It is a request not to have certain forms of treatment, such as CPR  A DNR order may be included in other types of advance directives  · Medical directive: This covers the care that you want if you are in a coma, near death, or unable to make decisions for yourself  You can list the treatments you want for each condition  Treatment may include pain medicine, surgery, blood transfusions, dialysis, IV or tube feedings, and a ventilator (breathing machine)  · Values history: This document has questions about your views, beliefs, and how you feel and think about life  This information can help others choose the care that you would choose  Why are advance directives important? An advance directive helps you control your care  Although spoken wishes may be used, it is better to have your wishes written down  Spoken wishes can be misunderstood, or not followed  Treatments may be given even if you do not want them  An advance directive may make it easier for your family to make difficult choices about your care  How do I decide what to put in my advance directives? · Make informed decisions:  Make sure you fully understand treatments or care you may receive  Think about the benefits and problems your decisions could cause for you or your family  Talk to healthcare providers if you have concerns or questions before you write down your wishes  You may also want to talk with your Zoroastrianism or , or a   Check your state laws to make sure that what you put in your advance directive is legal      · Sign all forms:  Sign and date your advance directive when you have finished  You may also need 2 witnesses to sign the forms  Witnesses cannot be your doctor or his staff, your spouse, heirs or beneficiaries, people you owe money to, or your chosen proxy  Talk to your family, proxy, and healthcare providers about your advance directive   Give each person a copy, and keep one for yourself in a place you can get to easily  Do not keep it hidden or locked away  · Review and revise your plans: You can revise your advance directive at any time, as long as you are able to make decisions  Review your plan every year, and when there are changes in your life, or your health  When you make changes, let your family, proxy, and healthcare providers know  Give each a new copy  Where can I find more information? · [employer]  [address] , [place] [address]  Phone: 3- [phone] - [phone]  Phone: 1- 459 - [phone]  Web Address: http://www  aafp org  · [address] [facility])  [address] [address] [facility], [address]  Phone: 1- 565 - [phone]  Phone: [phone]  Web Address: Aisha caal  CARE AGREEMENT:   You have the right to help plan your care  To help with this plan, you must learn about your health condition and treatment options  You must also learn about advance directives and how they are used  Work with your healthcare providers to decide what care will be used to treat you  You always have the right to refuse treatment  The above information is an  only  It is not intended as medical advice for individual conditions or treatments  Talk to your doctor, nurse or pharmacist before following any medical regimen to see if it is safe and effective for you  © 2017 [address] Information is for End User's use only and may not be sold, redistributed or otherwise used for commercial purposes  All illustrations and images included in CareNotes® are the copyrighted property of A D A M , Inc  or Ortiz Thacker

## 2019-09-24 ENCOUNTER — TELEPHONE (OUTPATIENT)
Dept: OBGYN CLINIC | Facility: HOSPITAL | Age: 84
End: 2019-09-24

## 2019-09-24 DIAGNOSIS — Z98.890 S/P MUSCULOSKELETAL SYSTEM SURGERY: Primary | ICD-10-CM

## 2019-09-27 ENCOUNTER — EVALUATION (OUTPATIENT)
Dept: PHYSICAL THERAPY | Facility: CLINIC | Age: 84
End: 2019-09-27
Payer: COMMERCIAL

## 2019-09-27 DIAGNOSIS — Z98.890 S/P MUSCULOSKELETAL SYSTEM SURGERY: Primary | ICD-10-CM

## 2019-09-27 PROCEDURE — 97110 THERAPEUTIC EXERCISES: CPT | Performed by: PHYSICAL THERAPIST

## 2019-09-27 PROCEDURE — 97162 PT EVAL MOD COMPLEX 30 MIN: CPT | Performed by: PHYSICAL THERAPIST

## 2019-09-27 NOTE — PROGRESS NOTES
PT Evaluation     Today's date: 2019  Patient name: Carmita Moran  : 1934  MRN: [id number]  Referring provider: Yuki Shrestha MD  Dx:   Encounter Diagnosis     ICD-10-CM    1  S/P musculoskeletal system surgery Z98 890                   Assessment  Assessment details: Carmita Moran is a 80 y o  female presenting to physical therapy s/p right IM nail following a femoral fracture on 07/10/2019 and presents with pain, decreased range of motion, decreased strength, gait/balance dysfunction and decreased activity tolerance  Assessment reveals PROM/AROM deficits and decreased strength as per MMT limiting her ability to perform normal daily functions  Secondary to these impairments, patient has increased difficulty performing ADL's, household chores and  work related tasks  Magedbrianna Lucero would benefit from skilled PT to address these issues and maximize function  Thank you for the referral     Impairments: abnormal gait, abnormal muscle tone, abnormal or restricted ROM, abnormal movement, activity intolerance, impaired balance, impaired physical strength, lacks appropriate home exercise program, pain with function and weight-bearing intolerance  Understanding of Dx/Px/POC: excellent  Goals  STG (6 weeks)  1  Patient will be independent with HEP  2  Decrease pain at worst by 2 points on NPRS  3  Increase right hip PROM to [facility] [place] in all planes  4  Patient will demonstrate ability to navigate stairs reciprocally  LTG (12 weeks)  1  Decrease pain at worst from 4 points on NPRS  2  Increase right hip AROM to [facility] [place] in all planes  3  Increase hip x3 strength by 1 MMT grade for improved ADL function  4  Patient will demonstrate ability to ambulate community distances for improved independence   5   Increase FOTO > or equal to expected outcome    Plan  Patient would benefit from: skilled PT  Planned therapy interventions: joint mobilization, manual therapy, neuromuscular re-education, patient education, strengthening, stretching, therapeutic exercise, home exercise program, ADL training and balance  Frequency: 2x week  Duration in weeks: 8  Treatment plan discussed with: patient        Subjective Evaluation    History of Present Illness  Date of surgery: 7/10/2019  Mechanism of injury: Patient reports to outpatient PT on 2019 s/p right IM femur antegrade nail performed on 07/10/19 resultant from a fall on going down steps on 2019  Patient reports being d/c'd to acute rehab on 2019 and d/c'd home on 2019 with home PT  Patient has been ambulating with a RW and notes pain with ADL function, ambulation and rolling onto her right side at night  Patient reports no more drainage from incision and denies any other red flag issues  Patients goals for PT are to improve her right LE motion, strength, and overall ADL function including ambulatory independence as her largest current deficits include putting on her socks, driving, and performing her own shopping             Not a recurrent problem   Quality of life: good    Pain  Current pain ratin  At best pain ratin  At worst pain ratin  Quality: dull ache  Relieving factors: rest and relaxation  Aggravating factors: walking, standing and stair climbing  Progression: improved    Social Support  Steps to enter house: yes  Stairs in house: yes   Lives in: multiple-level home  Lives with: adult children    Employment status: not working    Diagnostic Tests  X-ray: abnormal  Treatments  Previous treatment: physical therapy  Discharged from (in last 30 days): skilled nursing facility and home health care  Patient Goals  Patient goals for therapy: increased strength, independence with ADLs/IADLs, return to sport/leisure activities, increased motion and decreased pain          Objective     Neurological Testing     Sensation     Hip   Left Hip   Intact: light touch    Right Hip   Intact: light touch    Reflexes   Left   Patellar (L4): absent (0)  Achilles (S1): trace (1+)  Clonus sign: negative    Right   Patellar (L4): absent (0)  Achilles (S1): trace (1+)  Clonus sign: negative    Active Range of Motion     Right Hip   Flexion: 85 degrees with pain  Abduction: 20 degrees   External rotation (90/90): 5 degrees with pain  Internal rotation (90/90): 5 degrees with pain    Passive Range of Motion     Right Hip   Flexion: 90 degrees with pain  Abduction: 25 degrees   External rotation (90/90): 15 degrees with pain  Internal rotation (90/90): 8 degrees     Strength/Myotome Testing     Left Hip   Planes of Motion   Flexion: 4-  Abduction: 3-  External rotation: 3+  Internal rotation: 3+    Right Hip   Planes of Motion   Flexion: 3-  Abduction: 2+  External rotation: 3-  Internal rotation: 3-    Ambulation     Ambulation: Level Surfaces   Ambulation with assistive device: independent    Ambulation: Stairs   Ascend stairs: independent  Pattern: reciprocal  Railings: two rails  Descend stairs: independent  Pattern: reciprocal  Railings: two rails             Precautions: Memory loss, HTN, HLD, Depression, DM, Anxiety      Manual  9/27            R hip PROM             R hip flexor stretch             R glut maximum stretch                                           Exercise Diary  9/27            Supine bridge 2x10            Standing hip abduction             SLR 2x6            Gait training             Gastroc stretch in standing             Stair training             Mini squats             S/L clamshells 2x10                                                                                                                                                                            Modalities

## 2019-10-01 ENCOUNTER — OFFICE VISIT (OUTPATIENT)
Dept: OBGYN CLINIC | Facility: HOSPITAL | Age: 84
End: 2019-10-01

## 2019-10-01 ENCOUNTER — HOSPITAL ENCOUNTER (OUTPATIENT)
Dept: RADIOLOGY | Facility: HOSPITAL | Age: 84
Discharge: HOME | End: 2019-10-01
Attending: ORTHOPAEDIC SURGERY
Payer: COMMERCIAL

## 2019-10-01 VITALS
DIASTOLIC BLOOD PRESSURE: 70 MMHG | BODY MASS INDEX: 28.68 KG/M2 | WEIGHT: 168 LBS | HEART RATE: 73 BPM | SYSTOLIC BLOOD PRESSURE: 134 MMHG | HEIGHT: 64 IN

## 2019-10-01 DIAGNOSIS — S72.001A CLOSED FRACTURE OF RIGHT HIP, INITIAL ENCOUNTER (HCC): ICD-10-CM

## 2019-10-01 DIAGNOSIS — Z98.890 S/P MUSCULOSKELETAL SYSTEM SURGERY: Primary | ICD-10-CM

## 2019-10-01 PROCEDURE — 73552 X-RAY EXAM OF FEMUR 2/>: CPT

## 2019-10-01 PROCEDURE — 99024 POSTOP FOLLOW-UP VISIT: CPT | Performed by: ORTHOPAEDIC SURGERY

## 2019-10-01 NOTE — PROGRESS NOTES
Assessment/Plan:  Patient ID: Anh Felder 80 y o  female   Surgery: Insertion Nail Im Femur Antegrade (trochanteric) - Right  Date of Surgery: 7/10/2019    12 weeks s/p right femur IM nail  May continue outpatient PT, if she feels it is beneficial  She is ambulating with the use of a walker without difficulty  A new PT script was provided today to continue current protocol for 3 more weeks then d/c to HEP  Follow up in 8 weeks time with repeat right femur x-ray's  Follow Up:  8  week(s)    To Do Next Visit:  X-rays of the  right  femur      CHIEF COMPLAINT:  Chief Complaint   Patient presents with    Right Hip - Follow-up         SUBJECTIVE:  Anh Felder is a 80y o  year old female who presents for follow up after Insertion Nail Im Femur Antegrade (trochanteric) - Right  Today patient has no complaints  She denies any pain today  She states that she has been able to sleep, without issues          PHYSICAL EXAMINATION:  General: well developed and well nourished, alert, oriented times 3 and appears comfortable  Psychiatric: Normal    MUSCULOSKELETAL EXAMINATION:  Incision: healed  Surgery Site: normal, no evidence of infection   Range of Motion: As expected  Neurovascular status: Neuro intact, good cap refill  Activity Restrictions: No restrictions      STUDIES REVIEWED:  Images were reviewd in PACS: Orthopedic hardware in good alignment       PROCEDURES PERFORMED:  Procedures  No Procedures performed today       Scribe Attestation    I,:   Fernando Brewster am acting as a scribe while in the presence of the attending physician :        I,:   Gloria Jones MD personally performed the services described in this documentation    as scribed in my presence :

## 2019-10-02 ENCOUNTER — OFFICE VISIT (OUTPATIENT)
Dept: PHYSICAL THERAPY | Facility: CLINIC | Age: 84
End: 2019-10-02
Payer: COMMERCIAL

## 2019-10-02 DIAGNOSIS — Z98.890 S/P MUSCULOSKELETAL SYSTEM SURGERY: Primary | ICD-10-CM

## 2019-10-02 PROCEDURE — 97530 THERAPEUTIC ACTIVITIES: CPT | Performed by: PHYSICAL THERAPIST

## 2019-10-02 PROCEDURE — 97110 THERAPEUTIC EXERCISES: CPT | Performed by: PHYSICAL THERAPIST

## 2019-10-02 PROCEDURE — 97140 MANUAL THERAPY 1/> REGIONS: CPT | Performed by: PHYSICAL THERAPIST

## 2019-10-02 NOTE — PROGRESS NOTES
Daily Note     Today's date: 10/2/2019  Patient name: Delroy Willett  : 1934  MRN: [id number]  Referring provider: Garland Orourke MD  Dx:   Encounter Diagnosis     ICD-10-CM    1  S/P musculoskeletal system surgery Z98 890                   Subjective: Patient reports compliance with her HEP and states that she is performing most of her ADL's/household chores at this time  Objective: See treatment diary below      Assessment: Patient demonstrating continued limitations into hip ER/IR but overall strength improving  Began ambulating with a SPC and patient demonstrated normalized gait and safety to begin home ambulation with the cane  Plan: Continue per plan of care        Precautions: Memory loss, HTN, HLD, Depression, DM, Anxiety      Manual  9/27 10/2           R hip PROM  10'           R hip flexor stretch  4x30"           R glut maximum stretch  4x30"                                         Exercise Diary  9/27 10/2           Supine bridge 2x10 2x10           Standing hip abduction  2x10 B/L           SLR 2x6 2x10           Gait training  SPC  75'x4           Gastroc stretch in standing  4x20"           Stair training  4"  2x10           Mini squats  2x10           S/L clamshells 2x10 2x10 x2"           Biodex balance  NV                                                                                                                                                              Modalities

## 2019-10-03 ENCOUNTER — OFFICE VISIT (OUTPATIENT)
Dept: PHYSICAL THERAPY | Facility: CLINIC | Age: 84
End: 2019-10-03
Payer: COMMERCIAL

## 2019-10-03 DIAGNOSIS — Z98.890 S/P MUSCULOSKELETAL SYSTEM SURGERY: Primary | ICD-10-CM

## 2019-10-03 PROCEDURE — 97110 THERAPEUTIC EXERCISES: CPT | Performed by: PHYSICAL THERAPIST

## 2019-10-03 PROCEDURE — 97140 MANUAL THERAPY 1/> REGIONS: CPT | Performed by: PHYSICAL THERAPIST

## 2019-10-03 NOTE — PROGRESS NOTES
Daily Note     Today's date: 10/3/2019  Patient name: Tesfaye Atkinson  : 1934  MRN: [id number]  Referring provider: Holland Thurston MD  Dx:   Encounter Diagnosis     ICD-10-CM    1  S/P musculoskeletal system surgery Z98 890                   Subjective: Pt reports feeling well today  No pain or soreness after last session  Objective: See treatment diary below      Assessment: Patient tolerated session well  Demonstrates weakness in RLE when performing exercises needing short rest break  Cues needed to utilize [facility] with proper gait pattern and demonstrates occasional instability due to mild buckling of R hip and knee  Demonstrates good safety when negotiating stairs using b/l rails  Would benefit from continued PT  Plan: Continue per plan of care  Precautions: Memory loss, HTN, HLD, Depression, DM, Anxiety      Manual  9/27 10/2 10/3          R hip PROM  10' 10'          R hip flexor stretch  4x30" 4x30"          R glut maximum stretch  4x30" 4x30"                                        Exercise Diary  9/27 10/2 10/3          Supine bridge 2x10 2x10 2x10          Standing hip abduction  2x10 B/L 2x10 B/L          SLR 2x6 2x10 2x10          Gait training  SPC  75'x4 SPC  75'x4          Gastroc stretch in standing  4x20" 4x30"          Stair training  4"  2x10 4" and 6" non recipricole and recipricole pattern  BL rails             Mini squats  2x10 2x10          S/L clamshells 2x10 2x10 x2" 2x10 x2"          Biodex balance  NV LOS easy 3x                                                                                                                                                             Modalities

## 2019-10-07 ENCOUNTER — OFFICE VISIT (OUTPATIENT)
Dept: PHYSICAL THERAPY | Facility: CLINIC | Age: 84
End: 2019-10-07
Payer: COMMERCIAL

## 2019-10-07 DIAGNOSIS — Z98.890 S/P MUSCULOSKELETAL SYSTEM SURGERY: Primary | ICD-10-CM

## 2019-10-07 PROCEDURE — 97140 MANUAL THERAPY 1/> REGIONS: CPT | Performed by: PHYSICAL THERAPIST

## 2019-10-07 PROCEDURE — 97110 THERAPEUTIC EXERCISES: CPT | Performed by: PHYSICAL THERAPIST

## 2019-10-07 PROCEDURE — 97530 THERAPEUTIC ACTIVITIES: CPT | Performed by: PHYSICAL THERAPIST

## 2019-10-07 NOTE — PROGRESS NOTES
Daily Note     Today's date: 10/7/2019  Patient name: Alina Briceno  : 1934  MRN: [id number]  Referring provider: Faith Enriquez MD  Dx:   Encounter Diagnosis     ICD-10-CM    1  S/P musculoskeletal system surgery Z98 890                   Subjective: Patient reports significant DOMS following last tx but has since recouped fully  Objective: See treatment diary below      Assessment: Patient demonstrating improving right hip PROM with greatest restriction into hip extension secondary to hip flexor tightness  Hip flexor weakness remains as per reduced AROM, but improvement noted  Gait remains dysfunctional with SPC and will continue with RW at this time  Plan: Continue per plan of care  Precautions: Memory loss, HTN, HLD, Depression, DM, Anxiety      Manual  9/27 10/2 10/3 10/7         R hip PROM  10' 10' 10'         R hip flexor stretch  4x30" 4x30" 4x30"         R glut maximum stretch  4x30" 4x30" 4x30"                                       Exercise Diary  9/27 10/2 10/3 10/7         Supine bridge 2x10 2x10 2x10 2x10          Standing hip abduction  2x10 B/L 2x10 B/L 2x10 B/L         SLR 2x6 2x10 2x10 2x10         Gait training  SPC  75'x4 SPC  75'x4 SPC  75'x4         Gastroc stretch in standing  4x20" 4x30" 4x30"         Stair training  4"  2x10 4" and 6" non recipricole and recipricole pattern  BL rails  4" and 6" non recipricole and recipricole pattern  BL rails           Mini squats  2x10 2x10 2x10         S/L clamshells 2x10 2x10 x2" 2x10 x2" 2x10 x2"         Biodex balance  NV LOS easy 3x LOS easy 3x                                                                                                                                                            Modalities

## 2019-10-09 ENCOUNTER — OFFICE VISIT (OUTPATIENT)
Dept: PHYSICAL THERAPY | Facility: CLINIC | Age: 84
End: 2019-10-09
Payer: COMMERCIAL

## 2019-10-09 DIAGNOSIS — Z98.890 S/P MUSCULOSKELETAL SYSTEM SURGERY: Primary | ICD-10-CM

## 2019-10-09 PROCEDURE — 97530 THERAPEUTIC ACTIVITIES: CPT | Performed by: PHYSICAL THERAPIST

## 2019-10-09 PROCEDURE — 97110 THERAPEUTIC EXERCISES: CPT | Performed by: PHYSICAL THERAPIST

## 2019-10-09 PROCEDURE — 97140 MANUAL THERAPY 1/> REGIONS: CPT | Performed by: PHYSICAL THERAPIST

## 2019-10-13 ENCOUNTER — APPOINTMENT (EMERGENCY)
Dept: CT IMAGING | Facility: HOSPITAL | Age: 84
End: 2019-10-13
Payer: COMMERCIAL

## 2019-10-13 ENCOUNTER — APPOINTMENT (EMERGENCY)
Dept: RADIOLOGY | Facility: HOSPITAL | Age: 84
End: 2019-10-13
Payer: COMMERCIAL

## 2019-10-13 ENCOUNTER — HOSPITAL ENCOUNTER (EMERGENCY)
Facility: HOSPITAL | Age: 84
Discharge: HOME | End: 2019-10-13
Attending: EMERGENCY MEDICINE
Payer: COMMERCIAL

## 2019-10-13 VITALS
OXYGEN SATURATION: 94 % | RESPIRATION RATE: 18 BRPM | HEART RATE: 64 BPM | SYSTOLIC BLOOD PRESSURE: 179 MMHG | DIASTOLIC BLOOD PRESSURE: 83 MMHG | WEIGHT: 172.4 LBS | TEMPERATURE: 97.8 F | BODY MASS INDEX: 29.59 KG/M2

## 2019-10-13 DIAGNOSIS — S09.90XA INJURY OF HEAD, INITIAL ENCOUNTER: ICD-10-CM
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
## 2019-10-13 PROCEDURE — 99284 EMERGENCY DEPT VISIT MOD MDM: CPT

## 2019-10-13 PROCEDURE — 72100 X-RAY EXAM L-S SPINE 2/3 VWS: CPT

## 2019-10-13 PROCEDURE — 70450 CT HEAD/BRAIN W/O DYE: CPT

## 2019-10-13 PROCEDURE — 72125 CT NECK SPINE W/O DYE: CPT

## 2019-10-13 PROCEDURE — 73521 X-RAY EXAM HIPS BI 2 VIEWS: CPT

## 2019-10-13 PROCEDURE — 99284 EMERGENCY DEPT VISIT MOD MDM: CPT | Performed by: EMERGENCY MEDICINE
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
## 2019-10-14 ENCOUNTER — APPOINTMENT (OUTPATIENT)
Dept: PHYSICAL THERAPY | Facility: CLINIC | Age: 84
End: 2019-10-14
Payer: COMMERCIAL

## 2019-10-16 ENCOUNTER — OFFICE VISIT (OUTPATIENT)
Dept: PHYSICAL THERAPY | Facility: CLINIC | Age: 84
End: 2019-10-16
Payer: COMMERCIAL

## 2019-10-16 DIAGNOSIS — Z98.890 S/P MUSCULOSKELETAL SYSTEM SURGERY: Primary | ICD-10-CM

## 2019-10-16 PROCEDURE — 97530 THERAPEUTIC ACTIVITIES: CPT | Performed by: PHYSICAL THERAPIST

## 2019-10-16 PROCEDURE — 97140 MANUAL THERAPY 1/> REGIONS: CPT | Performed by: PHYSICAL THERAPIST

## 2019-10-16 PROCEDURE — 97110 THERAPEUTIC EXERCISES: CPT | Performed by: PHYSICAL THERAPIST

## 2019-10-16 NOTE — PROGRESS NOTES
Daily Note     Today's date: 10/16/2019  Patient name: Amandeep Gomez  : 1934  MRN: [id number]  Referring provider: Song Vincent MD  Dx:   Encounter Diagnosis     ICD-10-CM    1  S/P musculoskeletal system surgery Z98 890                   Subjective: Patient reports suffering 1 fall going down her steps at home while trying to step around her cats and was seen in the ER  Patient reports that all of her imaging was negative  Objective: See treatment diary below      Assessment: Patient demonstrating continued improvement in pain free hip PROM  Significant improvement in hip extension noted with bridges and patient continues to ambulate appropriately with a SPC  Progressed most TE noted below and patient tolerated well  Significant VC's required throughout treatment to maintain proper form with all TE  Plan: Continue per plan of care  Precautions: Memory loss, HTN, HLD, Depression, DM, Anxiety      Manual  9/27 10/2 10/3 10/7 10/9 10/16       R hip PROM  10' 10' 10' 10' 10'       R hip flexor stretch  4x30" 4x30" 4x30" 4x30" 4x30"       R glut maximum stretch  4x30" 4x30" 4x30" 4x30" 4x30"                                     Exercise Diary  9/27 10/2 10/3 10/7 10/9 10/16       Supine bridge 2x10 2x10 2x10 2x10  2x10 2x10  x5"       Standing hip abduction  2x10 B/L 2x10 B/L 2x10 B/L 2x10 B/L 2x10 B/L  OTB       SLR 2x6 2x10 2x10 2x10 2x10 3x10       Gait training  SPC  75'x4 SPC  75'x4 SPC  75'x4 SPC  75'x4 SPC 75'x4       Gastroc stretch in standing  4x20" 4x30" 4x30" 4x30" 4x30"       Stair training  4"  2x10 4" and 6" non recipricole and recipricole pattern  BL rails  4" and 6" non recipricole and recipricole pattern  BL rails  4" and 6" recrip, B/L rails   " and 6" recrip   B/L rails        Mini squats  2x10 2x10 2x10 2x10  2x10       S/L clamshells 2x10 2x10 x2" 2x10 x2" 2x10 x2" 2x10 x2" 3x10 x2"  OTB       Biodex balance  NV LOS easy 3x LOS easy 3x LOS easy LOS easy Modalities

## 2019-10-21 ENCOUNTER — OFFICE VISIT (OUTPATIENT)
Dept: PHYSICAL THERAPY | Facility: CLINIC | Age: 84
End: 2019-10-21
Payer: COMMERCIAL

## 2019-10-21 DIAGNOSIS — Z98.890 S/P MUSCULOSKELETAL SYSTEM SURGERY: Primary | ICD-10-CM

## 2019-10-21 PROCEDURE — 97110 THERAPEUTIC EXERCISES: CPT | Performed by: PHYSICAL THERAPIST

## 2019-10-21 PROCEDURE — 97530 THERAPEUTIC ACTIVITIES: CPT | Performed by: PHYSICAL THERAPIST

## 2019-10-21 PROCEDURE — 97140 MANUAL THERAPY 1/> REGIONS: CPT | Performed by: PHYSICAL THERAPIST

## 2019-10-21 NOTE — PROGRESS NOTES
Daily Note     Today's date: 10/21/2019  Patient name: Ray Che  : 1934  MRN: [id number]  Referring provider: Elena Padron MD  Dx:   Encounter Diagnosis     ICD-10-CM    1  S/P musculoskeletal system surgery Z98 890                   Subjective: Patient reports mild soreness from daily activities and states that she has no exacerbating events over the weekend  Objective: See treatment diary below      Assessment: Patient demonstrating improvement in awareness/safety with gait and stair navigation on 6" steps  Limited cone of stability remains present on biodex balance tasks  Overall strength improving and is evident in her current function  Plan: Continue per plan of care  Precautions: Memory loss, HTN, HLD, Depression, DM, Anxiety      Manual  9/27 10/2 10/3 10/7 10/9 10/16 10/21      R hip PROM  10' 10' 10' 10' 10' 10'      R hip flexor stretch  4x30" 4x30" 4x30" 4x30" 4x30" 4x30"      R glut maximum stretch  4x30" 4x30" 4x30" 4x30" 4x30" 4x30"                                    Exercise Diary  9/27 10/2 10/3 10/7 10/9 10/16 10/21      Supine bridge 2x10 2x10 2x10 2x10  2x10 2x10  x5" 2x10 x5"      Standing hip abduction  2x10 B/L 2x10 B/L 2x10 B/L 2x10 B/L 2x10 B/L  OTB 2x10 B/L PiTB      SLR 2x6 2x10 2x10 2x10 2x10 3x8 3x8      Gait training  SPC  75'x4 SPC  75'x4 SPC  75'x4 SPC  75'x4 SPC 75'x4 'x4      Gastroc stretch in standing  4x20" 4x30" 4x30" 4x30" 4x30"       Stair training  4"  2x10 4" and 6" non recipricole and recipricole pattern  BL rails  4" and 6" non recipricole and recipricole pattern  BL rails  4" and 6" recrip, B/L rails   " and 6" recrip   B/L rails  6" recip B/L rails  2x8      Mini squats  2x10 2x10 2x10 2x10  2x10 2x10      S/L clamshells 2x10 2x10 x2" 2x10 x2" 2x10 x2" 2x10 x2" 3x10 x2"  OTB 3x10 x2" PiTB      Biodex balance  NV LOS easy 3x LOS easy 3x LOS easy LOS easy Maze x2 no hands      Recumbent bike       L1  5' Modalities

## 2019-10-23 ENCOUNTER — OFFICE VISIT (OUTPATIENT)
Dept: PHYSICAL THERAPY | Facility: CLINIC | Age: 84
End: 2019-10-23
Payer: COMMERCIAL

## 2019-10-23 DIAGNOSIS — Z98.890 S/P MUSCULOSKELETAL SYSTEM SURGERY: Primary | ICD-10-CM

## 2019-10-23 PROCEDURE — 97530 THERAPEUTIC ACTIVITIES: CPT

## 2019-10-23 PROCEDURE — 97110 THERAPEUTIC EXERCISES: CPT

## 2019-10-23 PROCEDURE — 97140 MANUAL THERAPY 1/> REGIONS: CPT

## 2019-10-23 NOTE — PROGRESS NOTES
Daily Note     Today's date: 10/23/2019  Patient name: Kushal Michelle  : 1934  MRN: [id number]  Referring provider: Renetta Saba MD  Dx:   Encounter Diagnosis     ICD-10-CM    1  S/P musculoskeletal system surgery Z98 890                   Subjective: When asked patient if she had any problems since last visit she responded with "not really"  Feels PT has helped her a lot so far  Feeling stronger overall  Said that when she feels good she goes without her walker but never goes about the community without it  Feeling generally fatigued today  Objective: See treatment diary below  Assessment: Noticeable lack of endurance today with patient also noting low energy levels  Seated rest break used during standing hip abd and prior to Biodex  Required cueing for better understanding of proper weight shifting as she would compensate with excessive hip and trunk movement on Biodex  Reduced distance gait training with SPC today due to fatigue and for patient safety  Plan: Continue per plan of care  Precautions: Memory loss, HTN, HLD, Depression, DM, Anxiety    Manual  9/27 10/2 10/3 10/7 10/9 10/16 10/21 10/23     R hip PROM  10' 10' 10' 10' 10' 10' EH     R hip flexor stretch  4x30" 4x30" 4x30" 4x30" 4x30" 4x30" [facility]     R glut maximum stretch  4x30" 4x30" 4x30" 4x30" 4x30" 4x30" [facility]                                   Exercise Diary  9/27 10/2 10/3 10/7 10/9 10/16 10/21 10/23     Supine bridge 2x10 2x10 2x10 2x10  2x10 2x10  x5" 2x10 x5" 5" hold, 2x10     Standing hip abduction  2x10 B/L 2x10 B/L 2x10 B/L 2x10 B/L 2x10 B/L  OTB 2x10 B/L PiTB Bilat  Pink  2x10     SLR 2x6 2x10 2x10 2x10 2x10 3x8 3x8 3x8     Gait training  SPC  75'x4 SPC  75'x4 SPC  75'x4 SPC  75'x4 SPC 75'x4 'x4 SPC  25 ft  x2     Gastroc stretch in standing  4x20" 4x30" 4x30" 4x30" 4x30"       Stair training  4"  2x10 4" and 6" non recipricole and recipricole pattern  BL rails    4" and 6" non recipricole and recipricole pattern  BL rails  4" and 6" recrip, B/L rails   " and 6" recrip   B/L rails  6" recip B/L rails  2x8 6"  bilat rails  2x8     Mini squats  2x10 2x10 2x10 2x10  2x10 2x10 2x10     S/L clamshells 2x10 2x10 x2" 2x10 x2" 2x10 x2" 2x10 x2" 3x10 x2"  OTB 3x10 x2" PiTB Pink  2" hold, 3x10     Biodex balance  NV LOS easy 3x LOS easy 3x LOS easy LOS easy Maze x2 no hands Maze  x2 with no hands     Recumbent bike       L1  5' 5 min  L1                                                                                                                                           Modalities

## 2019-10-29 ENCOUNTER — OFFICE VISIT (OUTPATIENT)
Dept: PHYSICAL THERAPY | Facility: CLINIC | Age: 84
End: 2019-10-29
Payer: COMMERCIAL

## 2019-10-29 DIAGNOSIS — Z98.890 S/P MUSCULOSKELETAL SYSTEM SURGERY: Primary | ICD-10-CM

## 2019-10-29 PROCEDURE — 97140 MANUAL THERAPY 1/> REGIONS: CPT | Performed by: PHYSICAL THERAPIST

## 2019-10-29 PROCEDURE — 97530 THERAPEUTIC ACTIVITIES: CPT | Performed by: PHYSICAL THERAPIST

## 2019-10-29 PROCEDURE — 97110 THERAPEUTIC EXERCISES: CPT | Performed by: PHYSICAL THERAPIST

## 2019-10-29 NOTE — PROGRESS NOTES
PT Re-Evaluation     Today's date: 10/29/2019  Patient name: Merlinda Moynahan  : 1934  MRN: [id number]  Referring provider: Ashley Nunez MD  Dx:   Encounter Diagnosis     ICD-10-CM    1  S/P musculoskeletal system surgery Z98 890                   Assessment  Assessment details: Patient is a 80y o  year old female who attended physical therapy for 9 treatment sessions regarding gait/balance deficits s/p right femoral IM nail from a fall on 07/10/2019  Patient reports 80% improvement at this time which correlates to improved FOTO scoring  Patient has shown improvement throughout PT by demonstrating decreased pain, increased range of motion, increased strength, improved tolerance to activity and improved gait/balance  Patient continues to present with pain, decreased ROM, decreased strength, and decreased tolerance to activity  Angela Young would benefit from continued physical therapy to address these issues and to maximize function  Thank you  Impairments: abnormal gait, abnormal muscle tone, abnormal or restricted ROM, abnormal movement, activity intolerance, impaired balance, impaired physical strength, lacks appropriate home exercise program, pain with function and weight-bearing intolerance  Understanding of Dx/Px/POC: excellent   Prognosis: good    Goals  STG (6 weeks)  1  Patient will be independent with HEP = MET  2  Decrease pain at worst by 2 points on NPRS = PROGRESSING  3  Increase right hip PR[facility] to [place] in [facility] planes = PROGRESSING  4  Patient will demonstrate ability to navigate stairs reciprocally = MET  LTG (12 weeks)  1  Decrease pain at worst from 4 points on NPRS = PROGRESSING  2  Increase right hip AROM to [place] [place] in all planes = PROGRESSING  3  Increase hip x3 strength by 1 MMT grade for improved ADL function = PROGRESSING  4  Patient will demonstrate ability to ambulate community distances for improved independence = PROGRESSING  5   Increase FOTO > or equal to expected outcome = Ever    Plan  Patient would benefit from: skilled PT  Planned therapy interventions: joint mobilization, manual therapy, neuromuscular re-education, patient education, strengthening, stretching, therapeutic exercise, home exercise program, ADL training and balance  Frequency: 2x week  Duration in weeks: 8  Treatment plan discussed with: patient        Subjective Evaluation    History of Present Illness  Date of surgery: 7/10/2019  Mechanism of injury: Patient reports to outpatient PT on 2019 s/p right IM femur antegrade nail performed on 07/10/19 resultant from a fall on going down steps on 2019  Patient reports being d/c'd to acute rehab on 2019 and d/c'd home on 2019 with home PT  Patient has been ambulating with a RW and notes pain with ADL function, ambulation and rolling onto her right side at night  Patient reports no more drainage from incision and denies any other red flag issues  Patients goals for PT are to improve her right LE motion, strength, and overall ADL function including ambulatory independence as her largest current deficits include putting on her socks, driving, and performing her own shopping  10/29/2019: Patient reports improving function and states that she has been feeling steadier on her feet over the last few weeks  Patient states that she is now able to ambulate longer distances but continues to have difficulty putting on her socks  Patient also noted compliance with her HEP at this time  Largest deficits remain ambulatory endurance for community ambulation and general balance  Patients goals remain improving gait, balance, and independence without a RW             Not a recurrent problem   Quality of life: good    Pain  Current pain ratin  At best pain ratin  At worst pain ratin  Quality: dull ache  Relieving factors: rest and relaxation  Aggravating factors: walking, standing and stair climbing  Progression: improved    Social Support  Steps to enter house: yes  Stairs in house: yes   Lives in: multiple-level home  Lives with: adult children    Employment status: not working    Diagnostic Tests  X-ray: abnormal  Treatments  Previous treatment: physical therapy  Discharged from (in last 30 days): skilled nursing facility and home health care  Patient Goals  Patient goals for therapy: increased strength, independence with ADLs/IADLs, return to sport/leisure activities, increased motion and decreased pain          Objective     Neurological Testing     Sensation     Hip   Left Hip   Intact: light touch    Right Hip   Intact: light touch    Reflexes   Left   Patellar (L4): absent (0)  Achilles (S1): trace (1+)  Clonus sign: negative    Right   Patellar (L4): absent (0)  Achilles (S1): trace (1+)  Clonus sign: negative    Active Range of Motion     Right Hip   Flexion: 90 degrees   Abduction: 25 degrees   External rotation (90/90): 20 degrees   Internal rotation (90/90): 12 degrees     Passive Range of Motion     Right Hip   Flexion: 92 degrees   Abduction: 30 degrees   External rotation (90/90): 25 degrees   Internal rotation (90/90): 17 degrees     Strength/Myotome Testing     Left Hip   Planes of Motion   Flexion: 4-  Abduction: 3-  External rotation: 3+  Internal rotation: 3+    Right Hip   Planes of Motion   Flexion: 3-  Abduction: 2+  External rotation: 3-  Internal rotation: 3-    Ambulation     Ambulation: Level Surfaces   Ambulation with assistive device: independent    Additional Level Surfaces Ambulation Details  6MWT: 900 ft   With RW  5XSTS: 17 8 seconds with B/L UE push off  TUG: with B/L UE push off and RW = 13 65 seconds    Gait assessment: without an A D, gait is slow with a Trendelenburg pattern secondary to right glut medius weakness    Ambulation: Stairs   Ascend stairs: independent  Pattern: reciprocal  Railings: one rail  Descend stairs: independent  Pattern: reciprocal  Railings: one rail       Precautions: Memory loss, HTN, HLD, Depression, DM, Anxiety    Manual  9/27 10/2 10/3 10/7 10/9 10/16 10/21 10/23 10/29    R hip PROM  10' 10' 10' 10' 10' 10' 140[facility] 10'    R hip flexor stretch  4x30" 4x30" 4x30" 4x30" 4x30" 4x30" [address] [place] [address] 4x30"    R glut maximum stretch  4x30" 4x30" 4x30" 4x30" 4x30" 4x30" [address] [place] [address] 4x30"    Re-assessment         15'                     Exercise Diary  9/27 10/2 10/3 10/7 10/9 10/16 10/21 10/23 10/29    Supine bridge 2x10 2x10 2x10 2x10  2x10 2x10  x5" 2x10 x5" 5" hold, 2x10 5" hold  2x10 w/ abd belt    Standing hip abduction  2x10 B/L 2x10 B/L 2x10 B/L 2x10 B/L 2x10 B/L  OTB 2x10 B/L PiTB Bilat  Pink  2x10 B/L pink 2x10    SLR 2x6 2x10 2x10 2x10 2x10 3x8 3x8 3x8 3x8    Gait training  SPC  75'x4 SPC  75'x4 SPC  75'x4 SPC  75'x4 SPC 75'x4 'x4 SPC  25 ft  x2 6MWT    Gastroc stretch in standing  4x20" 4x30" 4x30" 4x30" 4x30"       Stair training  4"  2x10 4" and 6" non recipricole and recipricole pattern  BL rails  4" and 6" non recipricole and recipricole pattern  BL rails  4" and 6" recrip, B/L rails   " and 6" recrip   B/L rails  6" recip B/L rails  2x8 6"  bilat rails  2x8 6" B/L hand rails   2x8    Mini squats  2x10 2x10 2x10 2x10  2x10 2x10 2x10 2x10    S/L clamshells 2x10 2x10 x2" 2x10 x2" 2x10 x2" 2x10 x2" 3x10 x2"  OTB 3x10 x2" PiTB Pink  2" hold, 3x10 Pink 3x10 x2"    Biodex balance  NV LOS easy 3x LOS easy 3x LOS easy LOS easy Maze x2 no hands Maze  x2 with no hands     Recumbent bike       L1  5' 5 min  L1 L1  5'                                                                                                                                          Modalities

## 2019-10-31 ENCOUNTER — APPOINTMENT (OUTPATIENT)
Dept: PHYSICAL THERAPY | Facility: CLINIC | Age: 84
End: 2019-10-31
Payer: COMMERCIAL

## 2019-10-31 NOTE — PROGRESS NOTES
Daily Note     Today's date: 10/31/2019  Patient name: Neha Srivastava  : 1934  MRN: [id number]  Referring provider: Laureen Gillis MD  Dx: No diagnosis found  Subjective: Patient reports       Objective: See treatment diary below      Assessment: Patient demonstrating       Plan: Continue per plan of care  Precautions: Memory loss, HTN, HLD, Depression, DM, Anxiety    Manual  9/27 10/2 10/3 10/7 10/9 10/16 10/21 10/23 10/29 10/31   R hip PROM  10' 10' 10' 10' 10' 10' 1404 [facility] 10' 10'   R hip flexor stretch  4x30" 4x30" 4x30" 4x30" 4x30" 4x30" 1404 [facility] 4x30" 4x30"   R glut maximum stretch  4x30" 4x30" 4x30" 4x30" 4x30" 4x30" 1404 [facility] 4x30" 4x30"   Re-assessment         15'                     Exercise Diary  9/27 10/2 10/3 10/7 10/9 10/16 10/21 10/23 10/29 10/31   Supine bridge 2x10 2x10 2x10 2x10  2x10 2x10  x5" 2x10 x5" 5" hold, 2x10 5" hold  2x10 w/ abd belt 5" hold  2x10 w/ abd belt   Standing hip abduction  2x10 B/L 2x10 B/L 2x10 B/L 2x10 B/L 2x10 B/L  OTB 2x10 B/L PiTB Bilat  Pink  2x10 B/L pink 2x10 B/L pink 2x10   SLR 2x6 2x10 2x10 2x10 2x10 3x8 3x8 3x8 3x8 3x8   Gait training  SPC  75'x4 SPC  75'x4 SPC  75'x4 SPC  75'x4 SPC 75'x4 'x4 SPC  25 ft  x2 6MWT 6MWT   Gastroc stretch in standing  4x20" 4x30" 4x30" 4x30" 4x30"       Stair training  4"  2x10 4" and 6" non recipricole and recipricole pattern  BL rails  4" and 6" non recipricole and recipricole pattern  BL rails  4" and 6" recrip, B/L rails   " and 6" recrip   B/L rails  6" recip B/L rails  2x8 6"  bilat rails  2x8 6" B/L hand rails   2x8 6" B/L hand rails   2x8   Mini squats  2x10 2x10 2x10 2x10  2x10 2x10 2x10 2x10 2x10   S/L clamshells 2x10 2x10 x2" 2x10 x2" 2x10 x2" 2x10 x2" 3x10 x2"  OTB 3x10 x2" PiTB Pink  2" hold, 3x10 Pink 3x10 x2" Pink 3x10 x2"   Biodex balance  NV LOS easy 3x LOS easy 3x LOS easy LOS easy Maze x2 no hands Maze  x2 with no hands     Recumbent bike       L1  5' 5 min  L1 L1  5' L1  5' Modalities

## 2019-11-05 ENCOUNTER — OFFICE VISIT (OUTPATIENT)
Dept: PHYSICAL THERAPY | Facility: CLINIC | Age: 84
End: 2019-11-05
Payer: COMMERCIAL

## 2019-11-05 DIAGNOSIS — Z98.890 S/P MUSCULOSKELETAL SYSTEM SURGERY: Primary | ICD-10-CM

## 2019-11-05 PROCEDURE — 97110 THERAPEUTIC EXERCISES: CPT

## 2019-11-05 PROCEDURE — 97530 THERAPEUTIC ACTIVITIES: CPT

## 2019-11-05 PROCEDURE — 97140 MANUAL THERAPY 1/> REGIONS: CPT

## 2019-11-05 NOTE — PROGRESS NOTES
Daily Note     Today's date: 2019  Patient name: Liliana Duvall  : 1934  MRN: [id number]  Referring provider: Rashida Red MD  Dx:   Encounter Diagnosis     ICD-10-CM    1  S/P musculoskeletal system surgery Z98 890        Start Time: 1500  Stop Time: 92  Total time in clinic (min): 45 minutes    Subjective: Patient has no complaints of pain this visit  Objective: See treatment diary below    Assessment: Tolerated treatment well  Patient would benefit from continued PT  Patient requires guidance t/o the treatment session secondary to her memory loss  She fatigued appropriately to the current PT POC  Plan: Continue per plan of care  Precautions: Memory loss, HTN, HLD, Depression, DM, Anxiety    Manual  9/27 10/2 10/3 10/7 10/9 10/16 10/21 10/23 11    R hip PROM  10' 10' 10' 10' 10' 10' [facility] 10'    R hip flexor stretch  4x30" 4x30" 4x30" 4x30" 4x30" 4x30" [address] [place] [address] 4x30"    R glut maximum stretch  4x30" 4x30" 4x30" 4x30" 4x30" 4x30" [address] [place] [address] 4x30"                                Exercise Diary  9/27 10/2 10/3 10/7 10/9 10/16 10/21 10/23 11/    Supine bridge 2x10 2x10 2x10 2x10  2x10 2x10  x5" 2x10 x5" 5" hold, 2x10 5"  Hold  2x10    Standing hip abduction  2x10 B/L 2x10 B/L 2x10 B/L 2x10 B/L 2x10 B/L  OTB 2x10 B/L PiTB Bilat  Pink  2x10 B  Pink  2x10    SLR 2x6 2x10 2x10 2x10 2x10 3x8 3x8 3x8 3x8    Gait training  SPC  75'x4 SPC  75'x4 SPC  75'x4 SPC  75'x4 SPC 75'x4 'x4 SPC  25 ft  x2 SPC  50 ft    Gastroc stretch in standing  4x20" 4x30" 4x30" 4x30" 4x30"       Stair training  4"  2x10 4" and 6" non recipricole and recipricole pattern  BL rails  4" and 6" non recipricole and recipricole pattern  BL rails  4" and 6" recrip, B/L rails   " and 6" recrip   B/L rails  6" recip B/L rails  2x8 6"  bilat rails  2x8 6"   B rails  2x8    Mini squats  2x10 2x10 2x10 2x10  2x10 2x10 2x10 2x10    S/L clamshells 2x10 2x10 x2" 2x10 x2" 2x10 x2" 2x10 x2" 3x10 x2"  OTB 3x10 x2" PiTB Pink  2" hold, 3x10 Pink  2" hold  3x10    Biodex balance  NV LOS easy 3x LOS easy 3x LOS easy LOS easy Maze x2 no hands Maze  x2 with no hands Maze  x2 with no hands    Recumbent bike       L1  5' 5 min  L1 L1   5 min                                                                                                                                        Modalities

## 2019-11-07 ENCOUNTER — OFFICE VISIT (OUTPATIENT)
Dept: PHYSICAL THERAPY | Facility: CLINIC | Age: 84
End: 2019-11-07
Payer: COMMERCIAL

## 2019-11-07 DIAGNOSIS — Z98.890 S/P MUSCULOSKELETAL SYSTEM SURGERY: Primary | ICD-10-CM

## 2019-11-07 PROCEDURE — 97530 THERAPEUTIC ACTIVITIES: CPT | Performed by: PHYSICAL THERAPIST

## 2019-11-07 PROCEDURE — 97140 MANUAL THERAPY 1/> REGIONS: CPT | Performed by: PHYSICAL THERAPIST

## 2019-11-07 PROCEDURE — 97110 THERAPEUTIC EXERCISES: CPT | Performed by: PHYSICAL THERAPIST

## 2019-11-07 NOTE — PROGRESS NOTES
Daily Note     Today's date: 2019  Patient name: Laurel Armenta  : 1934  MRN: [id number]  Referring provider: Trinh Sal MD  Dx:   Encounter Diagnosis     ICD-10-CM    1  S/P musculoskeletal system surgery Z98 890        Start Time: 1200  Stop Time: 3187  Total time in clinic (min): 45 minutes    Subjective: Patient reports compliance with her HEP and states that she had moderate DOMS following last session  Patient notes increased home walking without an A D  Objective: See treatment diary below      Assessment: Patient demonstrating a mild antalgic gait with decreased hip extension on the right in terminal stance  Patient demonstrates ability to perform 6" steps reciprocally with 1 railing  Continued to progress proximal stability for improved gait and stair navigation  Plan: Continue per plan of care  Precautions: Memory loss, HTN, HLD, Depression, DM, Anxiety    Manual  9/27 10/2 10/3 10/7 10/9 10/16 10/21 10/23 11/5 11/7   R hip PROM  10' 10' 10' 10' 10' 10' 1404 [facility] 10' 8'   R hip flexor stretch  4x30" 4x30" 4x30" 4x30" 4x30" 4x30" 1404 [facility] 4x30" 4x30"   R glut maximum stretch  4x30" 4x30" 4x30" 4x30" 4x30" 4x30" E[facility] 4x30" 4x30"                               Exercise Diary  9/27 10/2 10/3 10/7 10/9 10/16 10/21 10/23 11/5 11/7   Supine bridge 2x10 2x10 2x10 2x10  2x10 2x10  x5" 2x10 x5" 5" hold, 2x10 5"  Hold  2x10 3x10 x5"   Standing hip abduction  2x10 B/L 2x10 B/L 2x10 B/L 2x10 B/L 2x10 B/L  OTB 2x10 B/L PiTB Bilat  Pink  2x10 B  Pink  2x10 B/L Pink 2x10   SLR 2x6 2x10 2x10 2x10 2x10 3x8 3x8 3x8 3x8 3x10   Gait training  SPC  75'x4 SPC  75'x4 SPC  75'x4 SPC  75'x4 SPC 75'x4 'x4 SPC  25 ft  x2 SPC  50 ft 300' no A D   Gastroc stretch in standing  4x20" 4x30" 4x30" 4x30" 4x30"       Stair training  4"  2x10 4" and 6" non recipricole and recipricole pattern  BL rails  4" and 6" non recipricole and recipricole pattern  BL rails  4" and 6" recrip, B/L rails   " and 6" recrip   B/L rails  6" recip B/L rails  2x8 6"  bilat rails  2x8 6"   B rails  2x8 6"  1 rail  2x8   Mini squats  2x10 2x10 2x10 2x10  2x10 2x10 2x10 2x10 2x10   S/L clamshells 2x10 2x10 x2" 2x10 x2" 2x10 x2" 2x10 x2" 3x10 x2"  OTB 3x10 x2" PiTB Pink  2" hold, 3x10 Pink  2" hold  3x10 Pink 2" hold  3x10   Biodex balance  NV LOS easy 3x LOS easy 3x LOS easy LOS easy Maze x2 no hands Maze  x2 with no hands Maze  x2 with no hands Maze x2 w/ no hands   Recumbent bike       L1  5' 5 min  L1 L1   5 min L1  6'                                                                                                                                       Modalities

## 2019-11-12 ENCOUNTER — OFFICE VISIT (OUTPATIENT)
Dept: PHYSICAL THERAPY | Facility: CLINIC | Age: 84
End: 2019-11-12
Payer: COMMERCIAL

## 2019-11-12 DIAGNOSIS — Z98.890 S/P MUSCULOSKELETAL SYSTEM SURGERY: Primary | ICD-10-CM

## 2019-11-12 PROCEDURE — 97110 THERAPEUTIC EXERCISES: CPT

## 2019-11-12 PROCEDURE — 97140 MANUAL THERAPY 1/> REGIONS: CPT

## 2019-11-12 PROCEDURE — 97530 THERAPEUTIC ACTIVITIES: CPT

## 2019-11-12 NOTE — PROGRESS NOTES
Daily Note     Today's date: 2019  Patient name: Heather Rosario  : 1934  MRN: [id number]  Referring provider: Rita Jolly MD  Dx:   Encounter Diagnosis     ICD-10-CM    1  S/P musculoskeletal system surgery Z98 890                   Subjective: Patient reports that she is doing well today denying any soreness following last visit  Pt also noted that she has been ambulating with no AD lately noting no LOB  Objective: See treatment diary below      Assessment: Patient ambulated into the clinic today using no AD demonstrating no unsteadiness as well  Increased R glute weakness noted vs the L  Continued to focus on LE strengthening and balance exercises with close A needed  Pt left session today feeling fatigued, but happy with her progress  Plan: Continue per plan of care  Precautions: Memory loss, HTN, HLD, Depression, DM, Anxiety    Manual  11/12  10/3 10/7 10/9 10/16 10/21 10/23 11/5 11/7   R hip PROM 8'  10' 10' 10' 10' 10' 1404 [facility] 10' 8'   R hip flexor stretch 4x30"  4x30" 4x30" 4x30" 4x30" 4x30" E[facility] 4x30" 4x30"   R glut maximum stretch 4x30"  4x30" 4x30" 4x30" 4x30" 4x30" E[facility] 4x30" 4x30"                               Exercise Diary  11/12  10/3 10/7 10/9 10/16 10/21 10/23 11/5 11/7   Supine bridge 3x10 x 5"  2x10 2x10  2x10 2x10  x5" 2x10 x5" 5" hold, 2x10 5"  Hold  2x10 3x10 x5"   Standing hip abduction B/L Pink 2x10  2x10 B/L 2x10 B/L 2x10 B/L 2x10 B/L  OTB 2x10 B/L PiTB Bilat  Pink  2x10 B  Pink  2x10 B/L Pink 2x10   SLR 3x10  2x10 2x10 2x10 3x8 3x8 3x8 3x8 3x10   Gait training 300' no AD  SPC  75'x4 SPC  75'x4 SPC  75'x4 SPC 75'x4 'x4 SPC  25 ft  x2 SPC  50 ft 300' no A D   Gastroc stretch in standing   4x30" 4x30" 4x30" 4x30"       Stair training 6" 1 rail 2x8  4" and 6" non recipricole and recipricole pattern  BL rails  4" and 6" non recipricole and recipricole pattern  BL rails  4" and 6" recrip, B/L rails   " and 6" recrip   B/L rails  6" recip B/L rails  2x8 6"  bilat rails  2x8 6"   B rails  2x8 6"  1 rail  2x8   Mini squats 2x10  2x10 2x10 2x10  2x10 2x10 2x10 2x10 2x10   S/L clamshells Pink 2" hold 3x10  2x10 x2" 2x10 x2" 2x10 x2" 3x10 x2"  OTB 3x10 x2" PiTB Pink  2" hold, 3x10 Pink  2" hold  3x10 Pink 2" hold  3x10   Biodex balance Maze x3 w/ hands 82% best  LOS easy 3x LOS easy 3x LOS easy LOS easy Maze x2 no hands Maze  x2 with no hands Maze  x2 with no hands Maze x2 w/ no hands   Recumbent bike L1 6'      L1  5' 5 min  L1 L1   5 min L1  6'                                                                                                                                       Modalities

## 2019-11-14 ENCOUNTER — OFFICE VISIT (OUTPATIENT)
Dept: PHYSICAL THERAPY | Facility: CLINIC | Age: 84
End: 2019-11-14
Payer: COMMERCIAL

## 2019-11-14 DIAGNOSIS — Z98.890 S/P MUSCULOSKELETAL SYSTEM SURGERY: Primary | ICD-10-CM

## 2019-11-14 PROCEDURE — 97530 THERAPEUTIC ACTIVITIES: CPT | Performed by: PHYSICAL THERAPIST

## 2019-11-14 PROCEDURE — 97140 MANUAL THERAPY 1/> REGIONS: CPT | Performed by: PHYSICAL THERAPIST

## 2019-11-14 PROCEDURE — 97110 THERAPEUTIC EXERCISES: CPT | Performed by: PHYSICAL THERAPIST

## 2019-11-14 NOTE — PROGRESS NOTES
Daily Note     Today's date: 2019  Patient name: Jamarcus Bell  : 1934  MRN: [id number]  Referring provider: Tangela Gross MD  Dx:   Encounter Diagnosis     ICD-10-CM    1  S/P musculoskeletal system surgery Z98 890                   Subjective: Patient reports compliance with her HEP and states that she continues to ambulate without a SPC  Objective: See treatment diary below      Assessment: Patient demonstrating mild increase in right hip tightness secondary to increased activity  Gait continues improving, including gait speed with a moderate antalgic pattern secondary to weakness  Improving weight shift onto right LE present on biodex requiring occasional cues to prevent UE assist       Plan: Continue per plan of care        Precautions: Memory loss, HTN, HLD, Depression, DM, Anxiety    Manual             R hip PROM 8' 8'           R hip flexor stretch 4x30" 4x30"           R glut maximum stretch 4x30" 4x30"                                       Exercise Diary             Supine bridge 3x10 x 5" 3x10 x5"           Standing hip abduction B/L Pink 2x10 B/L pink 2x10 B/L           SLR 3x10 3x10           Gait training 300' no ' no AD           Gastroc stretch in standing             Stair training 6" 1 rail 2x8 6"  1 rail  2x8           Mini squats 2x10 2x10           S/L clamshells Pink 2" hold 3x10 Pink 2" hold  3x10           Biodex balance Maze x3 w/ hands 82% best Maze x2 w/o hands           Recumbent bike L1 6' L1-10'                                                                                                                                               Modalities

## 2019-11-19 ENCOUNTER — OFFICE VISIT (OUTPATIENT)
Dept: PHYSICAL THERAPY | Facility: CLINIC | Age: 84
End: 2019-11-19
Payer: COMMERCIAL

## 2019-11-19 DIAGNOSIS — Z98.890 S/P MUSCULOSKELETAL SYSTEM SURGERY: Primary | ICD-10-CM

## 2019-11-19 PROCEDURE — 97110 THERAPEUTIC EXERCISES: CPT | Performed by: PHYSICAL THERAPIST

## 2019-11-19 PROCEDURE — 97140 MANUAL THERAPY 1/> REGIONS: CPT | Performed by: PHYSICAL THERAPIST

## 2019-11-19 NOTE — PROGRESS NOTES
Daily Note     Today's date: 2019  Patient name: Felipe Mcnally  : 1934  MRN: [id number]  Referring provider: Thong Chin MD  Dx:   Encounter Diagnosis     ICD-10-CM    1  S/P musculoskeletal system surgery Z98 890                   Subjective: Pt reports feeling more comfortable ambulating without cane  She tells me she has to take her time with walking and turning  Recently had railings installed around front door steps  Objective: See treatment diary below  Progressed LE strengthening  Decreased L LE step length  Assessment: Since starting therapy pt has made the following progress towards goals:  1) Decreased pain  2) Improved range of motion  3) Improved strength  4) Improved self rated function  re  Making good progress, still with functional limitations as a result of weakness and AROM loss (hip extension)  Recommend continued short course of therapy  Plan: Continue per plan of care        Precautions: Memory loss, HTN, HLD, Depression, DM, Anxiety    Manual            R hip PROM 8' 8' 8'          R hip flexor stretch 4x30" 4x30" 4 x 30"          R glut maximum stretch 4x30" 4x30" 4 x 30"                                      Exercise Diary            Supine bridge 3x10 x 5" 3x10 x5" 3 x 10  5"          Standing hip abduction B/L Pink 2x10 B/L pink 2x10 B/L pink  2 x 10  bilat          SLR 3x10 3x10 3 x 10          Gait training 300' no ' no AD           Gastroc stretch in standing   10sec x 10          Stair training 6" 1 rail 2x8 6"  1 rail  2x8 6"  1 rail 2 x 10          Mini squats 2x10 2x10 2 x 15          S/L clamshells Pink 2" hold 3x10 Pink 2" hold  3x10 Pink   3sec  2 x 10          Biodex balance Maze x3 w/ hands 82% best Maze x2 w/o hands Maze  X 2 with hands          Recumbent bike L1 6' L1-10' L1  10 min Modalities

## 2019-11-21 ENCOUNTER — OFFICE VISIT (OUTPATIENT)
Dept: PHYSICAL THERAPY | Facility: CLINIC | Age: 84
End: 2019-11-21
Payer: COMMERCIAL

## 2019-11-21 DIAGNOSIS — Z98.890 S/P MUSCULOSKELETAL SYSTEM SURGERY: Primary | ICD-10-CM

## 2019-11-21 PROCEDURE — 97140 MANUAL THERAPY 1/> REGIONS: CPT | Performed by: PHYSICAL THERAPIST

## 2019-11-21 PROCEDURE — 97110 THERAPEUTIC EXERCISES: CPT | Performed by: PHYSICAL THERAPIST

## 2019-11-21 PROCEDURE — 97112 NEUROMUSCULAR REEDUCATION: CPT | Performed by: PHYSICAL THERAPIST

## 2019-11-21 NOTE — PROGRESS NOTES
Daily Note     Today's date: 2019  Patient name: Kushal Michelle  : 1934  MRN: [id number]  Referring provider: Renetta Saba MD  Dx:   Encounter Diagnosis     ICD-10-CM    1  S/P musculoskeletal system surgery Z98 890                   Subjective: Patient reports continued compliance with increased ambulation and notes increasing steadiness on her feet without an A D  Objective: See treatment diary below      Assessment: Gait speed and sarahi improving along with step length/stride length nearing equal   Moderate right hip abductor weakness remains and patient requires cueing to improve eccentric component of exercises  Plan: Continue per plan of care        Precautions: Memory loss, HTN, HLD, Depression, DM, Anxiety    Manual           R hip PROM 8' 8' 8' 8'         R hip flexor stretch 4x30" 4x30" 4 x 30" 4x30"         R glut maximum stretch 4x30" 4x30" 4 x 30" 4x30"                                     Exercise Diary           Supine bridge 3x10 x 5" 3x10 x5" 3 x 10  5" 3x10 x5"         Standing hip abduction B/L Pink 2x10 B/L pink 2x10 B/L pink  2 x 10  bilat Pink 3x10 B/L         SLR 3x10 3x10 3 x 10 3x10         Gait training 300' no ' no AD           Gastroc stretch in standing   10sec x 10 3x30"         Stair training 6" 1 rail 2x8 6"  1 rail  2x8 6"  1 rail 2 x 10 6" 1 rail  2x10         Mini squats 2x10 2x10 2 x 15 2x15         S/L clamshells Pink 2" hold 3x10 Pink 2" hold  3x10 Pink   3sec  2 x 10 Green  2x10 x3"         Biodex balance Maze x3 w/ hands 82% best Maze x2 w/o hands Maze  X 2 with hands Maze x2 with hands         Recumbent bike L1 6' L1-10' L1  10 min L1  8'                                                                                                                                             Modalities

## 2019-11-26 ENCOUNTER — HOSPITAL ENCOUNTER (OUTPATIENT)
Dept: RADIOLOGY | Facility: HOSPITAL | Age: 84
Discharge: HOME | End: 2019-11-26
Attending: ORTHOPAEDIC SURGERY
Payer: COMMERCIAL

## 2019-11-26 ENCOUNTER — OFFICE VISIT (OUTPATIENT)
Dept: OBGYN CLINIC | Facility: HOSPITAL | Age: 84
End: 2019-11-26
Payer: COMMERCIAL

## 2019-11-26 VITALS — HEART RATE: 64 BPM | SYSTOLIC BLOOD PRESSURE: 186 MMHG | DIASTOLIC BLOOD PRESSURE: 83 MMHG

## 2019-11-26 DIAGNOSIS — S72.001A CLOSED FRACTURE OF RIGHT HIP, INITIAL ENCOUNTER (HCC): ICD-10-CM

## 2019-11-26 DIAGNOSIS — Z98.890 S/P MUSCULOSKELETAL SYSTEM SURGERY: Primary | ICD-10-CM

## 2019-11-26 DIAGNOSIS — Z98.890 S/P MUSCULOSKELETAL SYSTEM SURGERY: ICD-10-CM

## 2019-11-26 PROCEDURE — 73552 X-RAY EXAM OF FEMUR 2/>: CPT

## 2019-11-26 PROCEDURE — 99213 OFFICE O/P EST LOW 20 MIN: CPT | Performed by: ORTHOPAEDIC SURGERY

## 2019-11-26 NOTE — PROGRESS NOTES
Patient Name:  Neha Srivastava  MRN:  [id number]    Assessment & Plan     4 months s/p right femur IM nail performed on 07/10/19  1  May continue PT for 3-4 more weeks then transition to a HEP, script provided today   2  Follow up in 2 months time with repeat right femur x-ray's     Subjective     80 y o  female aprox  4 months s/p right femur IM nail performed on 07/10/19  Overall Leidy Pena is doing well  She has started ambulating without assistance  She is currently still attending formal outpatient PT, with improvement  She denies any pain today  Leidy Pena notes improvement with endurance  General ROS:  Negative for fever or chills  Neurological ROS:  Negative for numbness or tingling  Objective     BP (!) 186/83 Comment: she forgot to take her medicine  Pulse 64     RLE:   Ambulates without assistance  Heal to toe gate   Good ROM   Skin intact   Skin warm and well perfused      Psychiatric: Mood and affect are appropriate    Data Review     I have personally reviewed pertinent films in PACS, and my interpretation follows      X-ray right femur: orthopedic hardware in good alignment     Social History     Tobacco Use    Smoking status: Never Smoker    Smokeless tobacco: Never Used   Substance Use Topics    Alcohol use: Not Currently     Alcohol/week: 0 0 standard drinks     Frequency: Never     Drinks per session: 1 or 2     Binge frequency: Never    Drug use: No       Scribe Attestation    I,:   Brigitte Brewster am acting as a scribe while in the presence of the attending physician :        I,:   Radha Michelle MD personally performed the services described in this documentation    as scribed in my presence :

## 2019-12-02 DIAGNOSIS — I10 HYPERTENSION, UNSPECIFIED TYPE: ICD-10-CM

## 2019-12-02 DIAGNOSIS — I10 BENIGN ESSENTIAL HYPERTENSION: Chronic | ICD-10-CM

## 2019-12-02 RX ORDER — AMLODIPINE BESYLATE 5 MG/1
5 TABLET ORAL DAILY
Qty: 90 TABLET | Refills: 1 | Status: SHIPPED | OUTPATIENT
Start: 2019-12-02 | End: 2020-05-25

## 2019-12-02 RX ORDER — METOPROLOL SUCCINATE 100 MG/1
100 TABLET, EXTENDED RELEASE ORAL EVERY EVENING
Qty: 90 TABLET | Refills: 1 | Status: SHIPPED | OUTPATIENT
Start: 2019-12-02 | End: 2020-05-25

## 2019-12-03 ENCOUNTER — APPOINTMENT (OUTPATIENT)
Dept: PHYSICAL THERAPY | Facility: CLINIC | Age: 84
End: 2019-12-03
Payer: COMMERCIAL

## 2019-12-05 ENCOUNTER — APPOINTMENT (OUTPATIENT)
Dept: PHYSICAL THERAPY | Facility: CLINIC | Age: 84
End: 2019-12-05
Payer: COMMERCIAL

## 2019-12-05 NOTE — PROGRESS NOTES
Daily Note     Today's date: 2019  Patient name: Ritchie Morales  : 1934  MRN: [id number]  Referring provider: Eliane Hoyos MD  Dx:   Encounter Diagnosis     ICD-10-CM    1  S/P musculoskeletal system surgery Z98 890                   Subjective: Patient reports compliance with her HEP and notes continued improvement in LE strength and steadiness on her feet with the SPC  Objective: See treatment diary below      Assessment: Improving awareness during gait with no evidence of instability using the SPC  Improving squat depth noted demonstrating improved quad activation/strength  Hip flexors remain weak as per SLR exercise  Plan: Continue per plan of care        Precautions: Memory loss, HTN, HLD, Depression, DM, Anxiety    Manual   12        R hip PROM 8' 8' 8' 8' 8'        R hip flexor stretch 4x30" 4x30" 4 x 30" 4x30" 4x30"        R glut maximum stretch 4x30" 4x30" 4 x 30" 4x30" 4x30"                                    Exercise Diary   12/        Supine bridge 3x10 x 5" 3x10 x5" 3 x 10  5" 3x10 x5" 3x10 x5"        Standing hip abduction B/L Pink 2x10 B/L pink 2x10 B/L pink  2 x 10  bilat Pink 3x10 B/L Pink 3x10 B/L        SLR 3x10 3x10 3 x 10 3x10 3x10        Gait training 300' no ' no AD           Gastroc stretch in standing   10sec x 10 3x30" 3x30"        Stair training 6" 1 rail 2x8 6"  1 rail  2x8 6"  1 rail 2 x 10 6" 1 rail  2x10 6" 1 rail 2x10        Mini squats 2x10 2x10 2 x 15 2x15 2x15        S/L clamshells Pink 2" hold 3x10 Pink 2" hold  3x10 Pink   3sec  2 x 10 Green  2x10 x3" Green 2x10 x3"        Biodex balance Maze x3 w/ hands 82% best Maze x2 w/o hands Maze  X 2 with hands Maze x2 with hands Maze x2 with hands        Recumbent bike L1 6' L1-10' L1  10 min L1  8' L2-8'                                                                                                                                            Modalities

## 2019-12-10 ENCOUNTER — OFFICE VISIT (OUTPATIENT)
Dept: PHYSICAL THERAPY | Facility: CLINIC | Age: 84
End: 2019-12-10
Payer: COMMERCIAL

## 2019-12-10 DIAGNOSIS — Z98.890 S/P MUSCULOSKELETAL SYSTEM SURGERY: Primary | ICD-10-CM

## 2019-12-10 PROCEDURE — 97530 THERAPEUTIC ACTIVITIES: CPT | Performed by: PHYSICAL THERAPIST

## 2019-12-10 PROCEDURE — 97140 MANUAL THERAPY 1/> REGIONS: CPT | Performed by: PHYSICAL THERAPIST

## 2019-12-10 PROCEDURE — 97110 THERAPEUTIC EXERCISES: CPT | Performed by: PHYSICAL THERAPIST

## 2019-12-10 NOTE — PROGRESS NOTES
Daily Note     Today's date: 12/10/2019  Patient name: Mukund Peters  : 1934  MRN: [id number]  Referring provider: Chad Aranda MD  Dx:   Encounter Diagnosis     ICD-10-CM    1  S/P musculoskeletal system surgery Z98 890                   Subjective: Patient reports continued HEP compliance and notes no LOB or falls since last treatment session  Patient notes improving strength correlating with improved ADL function  Objective: See treatment diary below    BP: 154/74  HR: 64  Temp: 95 6 deg  Assessment: Patient demonstrating continued improvement in gait including gait speed without an A D and improved transfer/bed mobility  Hip strength improving in all planes with evidence of weak hip abductors during ambulation but trendelenburg pattern reducing  Vitals taken as per above and were sable as patient reported "not feeling right" and complaints of sweating, but nothing visible  Patient educated to schedule an appointment with her PCP, and go to the ER if her symptoms worsen  Plan: Continue per plan of care        Precautions: Memory loss, HTN, HLD, Depression, DM, Anxiety    Manual  11/12 11/14 11/19 11/21 12/10        R hip PROM 8' 8' 8' 8' 8'        R hip flexor stretch 4x30" 4x30" 4 x 30" 4x30" 4x30"        R glut maximum stretch 4x30" 4x30" 4 x 30" 4x30" 4x30"                                    Exercise Diary   12/10        Supine bridge 3x10 x 5" 3x10 x5" 3 x 10  5" 3x10 x5" 3x10 x5"        Standing hip abduction B/L Pink 2x10 B/L pink 2x10 B/L pink  2 x 10  bilat Pink 3x10 B/L         SLR 3x10 3x10 3 x 10 3x10         Gait training 300' no ' no AD           Gastroc stretch in standing   10sec x 10 3x30"         Stair training 6" 1 rail 2x8 6"  1 rail  2x8 6"  1 rail 2 x 10 6" 1 rail  2x10 6" 1 rail 2x10        Mini squats 2x10 2x10 2 x 15 2x15         S/L clamshells Pink 2" hold 3x10 Pink 2" hold  3x10 Pink   3sec  2 x 10 Green  2x10 x3" Green 2x10 x3" Biodex balance Maze x3 w/ hands 82% best Maze x2 w/o hands Maze  X 2 with hands Maze x2 with hands Maze x2 with hands        Recumbent bike L1 6' L1-10' L1  10 min L1  8' L2-8'                                                                                                                                            Modalities

## 2019-12-12 ENCOUNTER — OFFICE VISIT (OUTPATIENT)
Dept: PHYSICAL THERAPY | Facility: CLINIC | Age: 84
End: 2019-12-12
Payer: COMMERCIAL

## 2019-12-12 DIAGNOSIS — Z98.890 S/P MUSCULOSKELETAL SYSTEM SURGERY: Primary | ICD-10-CM

## 2019-12-12 PROCEDURE — 97110 THERAPEUTIC EXERCISES: CPT | Performed by: PHYSICAL THERAPIST

## 2019-12-12 PROCEDURE — 97112 NEUROMUSCULAR REEDUCATION: CPT | Performed by: PHYSICAL THERAPIST

## 2019-12-12 PROCEDURE — 97140 MANUAL THERAPY 1/> REGIONS: CPT | Performed by: PHYSICAL THERAPIST

## 2019-12-12 NOTE — PROGRESS NOTES
Daily Note     Today's date: 2019  Patient name: Jaja Phan  : 1934  MRN: [id number]  Referring provider: Raya Crawford MD  Dx:   Encounter Diagnosis     ICD-10-CM    1  S/P musculoskeletal system surgery Z98 890                   Subjective: Patient reports feeling much better since prior session and states that she is ambulating all distances and environment without an A D  Objective: See treatment diary below      Assessment: Patient demonstrating improvement in cone of stability as per balance/neuromuscular tasks with biodex feedback  Proximal LE strength improving including glut medius and glut miguelina as per improved motion with TE  Plan: Continue per plan of care        Precautions: Memory loss, HTN, HLD, Depression, DM, Anxiety    Manual  11/12 11/14 11/19 11/21 12/10 12/12       R hip PROM 8' 8' 8' 8' 8' 8'       R hip flexor stretch 4x30" 4x30" 4 x 30" 4x30" 4x30" 4x30"       R glut maximum stretch 4x30" 4x30" 4 x 30" 4x30" 4x30" 4x30"                                   Exercise Diary  11/12 11/14 11/19 11/21 12/10 12/12       Supine bridge 3x10 x 5" 3x10 x5" 3 x 10  5" 3x10 x5" 3x10 x5" 3x10 x5"       Standing hip abduction B/L Pink 2x10 B/L pink 2x10 B/L pink  2 x 10  bilat Pink 3x10 B/L  OTB  2x10 B/L       SLR 3x10 3x10 3 x 10 3x10         Gait training 300' no ' no AD           Gastroc stretch in standing   10sec x 10 3x30"         Stair training 6" 1 rail 2x8 6"  1 rail  2x8 6"  1 rail 2 x 10 6" 1 rail  2x10 6" 1 rail 2x10 6"  1 rail 2x10       Mini squats 2x10 2x10 2 x 15 2x15  2x15       S/L clamshells Pink 2" hold 3x10 Pink 2" hold  3x10 Pink   3sec  2 x 10 Green  2x10 x3" Green 2x10 x3" Green  2x10 x3"       Biodex balance Maze x3 w/ hands 82% best Maze x2 w/o hands Maze  X 2 with hands Maze x2 with hands Maze x2 with hands Maze x2 w/ hands       Recumbent bike L1 6' L1-10' L1  10 min L1  8' L2-8' L2-8' Modalities

## 2019-12-17 ENCOUNTER — OFFICE VISIT (OUTPATIENT)
Dept: PHYSICAL THERAPY | Facility: CLINIC | Age: 84
End: 2019-12-17
Payer: COMMERCIAL

## 2019-12-17 DIAGNOSIS — Z98.890 S/P MUSCULOSKELETAL SYSTEM SURGERY: Primary | ICD-10-CM

## 2019-12-17 PROCEDURE — 97140 MANUAL THERAPY 1/> REGIONS: CPT | Performed by: PHYSICAL THERAPIST

## 2019-12-17 PROCEDURE — 97110 THERAPEUTIC EXERCISES: CPT | Performed by: PHYSICAL THERAPIST

## 2019-12-17 NOTE — PROGRESS NOTES
PT Re-Evaluation  and PT Discharge    Today's date: 2019  Patient name: Neha Srivastava  : 1934  MRN: [id number]  Referring provider: Laureen Gillis MD  Dx:   Encounter Diagnosis     ICD-10-CM    1  S/P musculoskeletal system surgery Z98 890                   Assessment  Assessment details: Patient is a 80y o  year old female who attended physical therapy for 21 treatment sessions regarding gait/balance deficits s/p right femoral IM nail from a fall on 07/10/2019  Patient reports 90% improvement at this time which correlates to improved FOTO scoring  Patient has shown improvement throughout PT by demonstrating decreased pain, increased range of motion, increased strength, improved tolerance to activity and improved gait/balance  Patient continues to present with pain, decreased ROM, decreased strength, and decreased tolerance to activity  Leidy Pena would benefit from continued physical therapy to address these issues and to maximize function  Thank you  Impairments: abnormal gait, abnormal muscle tone, abnormal or restricted ROM, abnormal movement, activity intolerance, impaired balance, impaired physical strength, lacks appropriate home exercise program, pain with function and weight-bearing intolerance  Understanding of Dx/Px/POC: excellent   Prognosis: good    Goals  STG (6 weeks)  1  Patient will be independent with HEP = MET  2  Decrease pain at worst by 2 points on NPRS = MET  3  Increase right hip [facility] to [facility] in all planes = MET  4  Patient will demonstrate ability to navigate stairs reciprocally = MET  LTG (12 weeks)  1  Decrease pain at worst from 4 points on NPRS = MET  2  Increase right hip AROM to [facility] in all planes = MET  3  Increase hip x3 strength by 1 MMT grade for improved ADL function = PARTIALLY MET  4  Patient will demonstrate ability to ambulate community distances for improved independence = MET  5   Increase FOTO > or equal to expected outcome = MET    Plan  Patient would benefit from: skilled PT  Planned therapy interventions: joint mobilization, manual therapy, neuromuscular re-education, patient education, strengthening, stretching, therapeutic exercise, home exercise program, ADL training and balance  Frequency: 2x week  Duration in weeks: 8  Treatment plan discussed with: patient        Subjective Evaluation    History of Present Illness  Date of surgery: 7/10/2019  Mechanism of injury: Patient reports to outpatient PT on 09/27/2019 s/p right IM femur antegrade nail performed on 07/10/19 resultant from a fall on going down steps on 07/09/2019  Patient reports being d/c'd to acute rehab on 07/16/2019 and d/c'd home on 08/02/2019 with home PT  Patient has been ambulating with a RW and notes pain with ADL function, ambulation and rolling onto her right side at night  Patient reports no more drainage from incision and denies any other red flag issues  Patients goals for PT are to improve her right LE motion, strength, and overall ADL function including ambulatory independence as her largest current deficits include putting on her socks, driving, and performing her own shopping  10/29/2019: Patient reports improving function and states that she has been feeling steadier on her feet over the last few weeks  Patient states that she is now able to ambulate longer distances but continues to have difficulty putting on her socks  Patient also noted compliance with her HEP at this time  Largest deficits remain ambulatory endurance for community ambulation and general balance  Patients goals remain improving gait, balance, and independence without a RW      12/17/2019: Patient reports ambulating most distances without an AD and utilizes a SPC in [facility]  Patient states that she is performing step ups with a single hand rail and reports no LOB since d/c on AD  Patient also reports compliance with some of her HEP             Not a recurrent problem   Quality of life: good    Pain  Current pain ratin  At best pain ratin  At worst pain ratin  Quality: dull ache  Relieving factors: rest and relaxation  Aggravating factors: walking, standing and stair climbing  Progression: improved    Social Support  Steps to enter house: yes  Stairs in house: yes   Lives in: multiple-level home  Lives with: adult children    Employment status: not working    Diagnostic Tests  X-ray: abnormal  Treatments  Previous treatment: physical therapy  Discharged from (in last 30 days): skilled nursing facility and home health care  Patient Goals  Patient goals for therapy: increased strength, independence with ADLs/IADLs, return to sport/leisure activities, increased motion and decreased pain          Objective     Neurological Testing     Sensation     Hip   Left Hip   Intact: light touch    Right Hip   Intact: light touch    Reflexes   Left   Patellar (L4): absent (0)  Achilles (S1): trace (1+)  Clonus sign: negative    Right   Patellar (L4): absent (0)  Achilles (S1): trace (1+)  Clonus sign: negative    Active Range of Motion     Right Hip   Flexion: 95 degrees   Abduction: 30 degrees   External rotation (90/90): 25 degrees   Internal rotation (90/90): 15 degrees     Passive Range of Motion     Right Hip   Flexion: 100 degrees   Abduction: 38 degrees   External rotation (90/90): 30 degrees   Internal rotation (90/90): 19 degrees     Strength/Myotome Testing     Left Hip   Planes of Motion   Flexion: 4  Abduction: 3+  External rotation: 4  Internal rotation: 4    Right Hip   Planes of Motion   Flexion: 4-  Abduction: 3-  External rotation: 4-  Internal rotation: 4-    Ambulation     Ambulation: Level Surfaces   Ambulation without assistive device: independent    Additional Level Surfaces Ambulation Details  6MWT: 900 ft   With RW (: 715 ft without AD)  5XSTS: 17 8 seconds with B/L UE push off (: 15 41 seconds)  TUG: with B/L UE push off and RW = 13 65 seconds (:13 28 seconds without AD)    Gait assessment: without an A D, gait is slow with a Trendelenburg pattern secondary to right glut medius weakness (Improving speed but Trendelenburg pattern remains)    Ambulation: Stairs   Ascend stairs: independent  Pattern: reciprocal  Railings: one rail  Descend stairs: independent  Pattern: reciprocal  Railings: one rail       Precautions: Memory loss, HTN, HLD, Depression, DM, Anxiety    RManual  11/12 11/14 11/19 11/21 12/10 12/12 12/17      R hip PROM 8' 8' 8' 8' 8' 8' 8'      R hip flexor stretch 4x30" 4x30" 4 x 30" 4x30" 4x30" 4x30" 4x30"      R glut maximum stretch 4x30" 4x30" 4 x 30" 4x30" 4x30" 4x30" 4x30"      Re-assessment       30' (13 man, 15 TE)                     Exercise Diary  11/12 11/14 11/19 11/21 12/10 12/12 12/17      Supine bridge 3x10 x 5" 3x10 x5" 3 x 10  5" 3x10 x5" 3x10 x5" 3x10 x5"       Standing hip abduction B/L Pink 2x10 B/L pink 2x10 B/L pink  2 x 10  bilat Pink 3x10 B/L  OTB  2x10 B/L       SLR 3x10 3x10 3 x 10 3x10         Gait training 300' no ' no AD           Gastroc stretch in standing   10sec x 10 3x30"         Stair training 6" 1 rail 2x8 6"  1 rail  2x8 6"  1 rail 2 x 10 6" 1 rail  2x10 6" 1 rail 2x10 6"  1 rail 2x10 6"  1 rail 2x10      Mini squats 2x10 2x10 2 x 15 2x15  2x15 2x15      S/L clamshells Pink 2" hold 3x10 Pink 2" hold  3x10 Pink   3sec  2 x 10 Green  2x10 x3" Green 2x10 x3" Green  2x10 x3" Green 2x10 x3"      Biodex balance Maze x3 w/ hands 82% best Maze x2 w/o hands Maze  X 2 with hands Maze x2 with hands Maze x2 with hands Maze x2 w/ hands Maze x2 w/ hands      Recumbent bike L1 6' L1-10' L1  10 min L1  8' L2-8' L2-8' L2-8'      HEP review       10'                                                                                                                             Modalities

## 2019-12-19 ENCOUNTER — APPOINTMENT (OUTPATIENT)
Dept: PHYSICAL THERAPY | Facility: CLINIC | Age: 84
End: 2019-12-19
Payer: COMMERCIAL

## 2019-12-23 ENCOUNTER — APPOINTMENT (OUTPATIENT)
Dept: PHYSICAL THERAPY | Facility: CLINIC | Age: 84
End: 2019-12-23
Payer: COMMERCIAL

## 2019-12-23 DIAGNOSIS — F32.A DEPRESSION, UNSPECIFIED DEPRESSION TYPE: ICD-10-CM

## 2019-12-26 ENCOUNTER — APPOINTMENT (OUTPATIENT)
Dept: PHYSICAL THERAPY | Facility: CLINIC | Age: 84
End: 2019-12-26
Payer: COMMERCIAL

## 2019-12-30 ENCOUNTER — APPOINTMENT (OUTPATIENT)
Dept: PHYSICAL THERAPY | Facility: CLINIC | Age: 84
End: 2019-12-30
Payer: COMMERCIAL

## 2019-12-31 ENCOUNTER — TELEPHONE (OUTPATIENT)
Dept: FAMILY MEDICINE CLINIC | Facility: CLINIC | Age: 84
End: 2019-12-31

## 2019-12-31 DIAGNOSIS — E78.5 HYPERLIPIDEMIA, UNSPECIFIED HYPERLIPIDEMIA TYPE: Primary | ICD-10-CM

## 2019-12-31 RX ORDER — ATORVASTATIN CALCIUM 10 MG/1
10 TABLET, FILM COATED ORAL DAILY
COMMUNITY
End: 2021-09-28 | Stop reason: ALTCHOICE

## 2019-12-31 NOTE — TELEPHONE ENCOUNTER
Karley Norma pt's daughter called in to the office CC ok, and stated to me that when pt left [facility] she was given a 90 days script with no refills  This contradicts what Sarah Chadwick wrote Vs  Them giving her a script for the statin that was discontinued  Thank you!

## 2019-12-31 NOTE — TELEPHONE ENCOUNTER
Pt LM requesting refill on Atorvastatin 10 mg, pt states she was given medication when she was admitted in [facility]  I did see in some of [facility] notes that statins were discontinued  I left message on pt's home # requesting a call back to get more clarification if pt is suppose to be taking med

## 2020-03-12 ENCOUNTER — OFFICE VISIT (OUTPATIENT)
Dept: FAMILY MEDICINE CLINIC | Facility: CLINIC | Age: 85
End: 2020-03-12
Payer: COMMERCIAL

## 2020-03-12 VITALS
HEIGHT: 64 IN | DIASTOLIC BLOOD PRESSURE: 72 MMHG | OXYGEN SATURATION: 96 % | RESPIRATION RATE: 20 BRPM | TEMPERATURE: 98 F | BODY MASS INDEX: 30.7 KG/M2 | SYSTOLIC BLOOD PRESSURE: 130 MMHG | WEIGHT: 179.8 LBS | HEART RATE: 71 BPM

## 2020-03-12 DIAGNOSIS — I10 BENIGN ESSENTIAL HYPERTENSION: Primary | Chronic | ICD-10-CM

## 2020-03-12 PROCEDURE — 1160F RVW MEDS BY RX/DR IN RCRD: CPT | Performed by: PHYSICIAN ASSISTANT

## 2020-03-12 PROCEDURE — 3075F SYST BP GE 130 - 139MM HG: CPT | Performed by: PHYSICIAN ASSISTANT

## 2020-03-12 PROCEDURE — 99213 OFFICE O/P EST LOW 20 MIN: CPT | Performed by: PHYSICIAN ASSISTANT

## 2020-03-12 PROCEDURE — 4040F PNEUMOC VAC/ADMIN/RCVD: CPT | Performed by: PHYSICIAN ASSISTANT

## 2020-03-12 PROCEDURE — 1036F TOBACCO NON-USER: CPT | Performed by: PHYSICIAN ASSISTANT

## 2020-03-12 PROCEDURE — 3078F DIAST BP <80 MM HG: CPT | Performed by: PHYSICIAN ASSISTANT

## 2020-03-12 PROCEDURE — 3008F BODY MASS INDEX DOCD: CPT | Performed by: PHYSICIAN ASSISTANT

## 2020-03-12 NOTE — PROGRESS NOTES
Assessment/Plan:    1  Benign essential hypertension  Unremarkable blood pressure today  Would not advise to start hydrochlorothiazide at this time due to risk of hypotension  Continue metoprolol and amlodipine daily  Limit sodium, alcohol, and caffeine  Patient has a blood pressure cuff at home and recommended checking daily  Follow-up in 3 months, sooner if needed  Patient Active Problem List   Diagnosis    Type 2 diabetes mellitus without complication, without long-term current use of insulin ([facility] )    Benign essential hypertension    Gastroesophageal reflux disease without esophagitis    Leiomyoma of uterus    Pancreatic cyst    Right bundle-branch block    Resting tremor    Closed fracture of right hip with routine healing    Vitamin D deficiency    Memory loss    Age related osteoporosis    Constipation, slow transit       Subjective:      Patient ID: Veronica Riddle is a 80 y o  female  Patient is here concerned about high blood pressure  States she was at the dentist this morning and was told her blood pressure was 190/90  She states she does not like going to the dentist   States she has been compliant with her metoprolol and amlodipine  Denies any medication side effects  She was also taking hydrochlorothiazide last year  This was given to her in the hospital when she was in the nursing home for her broken hip  She has not had it since  Denies dizziness or feeling lightheaded  Denies visual changes  Denies headaches  Denies chest pain, palpitations, shortness of breath  Denies edema  Denies numbness, tingling, or weakness  The following portions of the patient's history were reviewed and updated as appropriate: allergies, current medications, past family history, past medical history, past social history, past surgical history and problem list     Review of Systems   Constitutional: Negative for chills, fatigue and fever     HENT: Negative for congestion, ear pain, postnasal drip, rhinorrhea and sore throat  Eyes: Negative for visual disturbance  Respiratory: Negative for cough, chest tightness, shortness of breath and wheezing  Cardiovascular: Negative for chest pain, palpitations and leg swelling  Gastrointestinal: Negative for abdominal pain, constipation, diarrhea, nausea and vomiting  Musculoskeletal: Negative for myalgias  Skin: Negative for rash  Neurological: Negative for dizziness, light-headedness and headaches  Hematological: Negative for adenopathy  Objective:      /72   Pulse 71   Temp 98 °F (36 7 °C) (Tympanic)   Resp 20   Ht 5' 4" (1 626 m)   Wt 81 6 kg (179 lb 12 8 oz)   SpO2 96%   BMI 30 86 kg/m²          Physical Exam   Constitutional: She is oriented to person, place, and time  She appears well-developed and well-nourished  HENT:   Head: Normocephalic and atraumatic  Eyes: Pupils are equal, round, and reactive to light  Conjunctivae are normal    Neck: Normal range of motion  Neck supple  Cardiovascular: Normal rate, regular rhythm, S1 normal, S2 normal, normal heart sounds and intact distal pulses  Pulmonary/Chest: Effort normal and breath sounds normal    Abdominal: Soft  Normal appearance and bowel sounds are normal  She exhibits no mass  There is no hepatosplenomegaly  There is no tenderness  Musculoskeletal: Normal range of motion  Lymphadenopathy:     She has no cervical adenopathy  Neurological: She is alert and oriented to person, place, and time  Skin: Skin is warm, dry and intact  No rash noted  Psychiatric: She has a normal mood and affect  Her behavior is normal  Judgment and thought content normal    Nursing note and vitals reviewed          Current Outpatient Medications on File Prior to Visit   Medication Sig Dispense Refill    amLODIPine (NORVASC) 5 mg tablet Take 1 tablet (5 mg total) by mouth daily 90 tablet 1    atorvastatin (LIPITOR) 10 mg tablet Take 10 mg by mouth daily      Cholecalciferol ([employer]S VITAMIN D) 2000 units CAPS Take 1 capsule by mouth daily      hydrochlorothiazide (HYDRODIURIL) 25 mg tablet Take 1 tablet (25 mg total) by mouth daily 90 tablet 0    metoprolol succinate (TOPROL-XL) 100 mg 24 hr tablet Take 1 tablet (100 mg total) by mouth every evening 90 tablet 1    sertraline (ZOLOFT) 50 mg tablet TAKE 1+1/2 TABLETS (75 MG TOTAL) BY MOUTH DAILY FOR 90 DAYS 135 tablet 0     No current facility-administered medications on file prior to visit

## 2020-03-19 DIAGNOSIS — F32.A DEPRESSION, UNSPECIFIED DEPRESSION TYPE: ICD-10-CM

## 2020-05-08 ENCOUNTER — TELEPHONE (OUTPATIENT)
Dept: GERIATRICS | Age: 85
End: 2020-05-08

## 2020-05-24 DIAGNOSIS — I10 HYPERTENSION, UNSPECIFIED TYPE: ICD-10-CM

## 2020-05-24 DIAGNOSIS — I10 BENIGN ESSENTIAL HYPERTENSION: Chronic | ICD-10-CM

## 2020-05-25 RX ORDER — AMLODIPINE BESYLATE 5 MG/1
TABLET ORAL
Qty: 90 TABLET | Refills: 1 | Status: SHIPPED | OUTPATIENT
Start: 2020-05-25 | End: 2020-12-22

## 2020-05-25 RX ORDER — METOPROLOL SUCCINATE 100 MG/1
100 TABLET, EXTENDED RELEASE ORAL EVERY EVENING
Qty: 90 TABLET | Refills: 1 | Status: SHIPPED | OUTPATIENT
Start: 2020-05-25 | End: 2020-12-02 | Stop reason: SDUPTHER

## 2020-06-12 DIAGNOSIS — F32.A DEPRESSION, UNSPECIFIED DEPRESSION TYPE: ICD-10-CM

## 2020-06-15 RX ORDER — AMOXICILLIN 500 MG/1
CAPSULE ORAL
COMMUNITY
Start: 2020-03-11

## 2020-06-16 ENCOUNTER — TELEPHONE (OUTPATIENT)
Dept: ADMINISTRATIVE | Facility: OTHER | Age: 85
End: 2020-06-16

## 2020-06-16 ENCOUNTER — OFFICE VISIT (OUTPATIENT)
Dept: FAMILY MEDICINE CLINIC | Facility: CLINIC | Age: 85
End: 2020-06-16
Payer: COMMERCIAL

## 2020-06-16 VITALS
OXYGEN SATURATION: 97 % | WEIGHT: 182.8 LBS | BODY MASS INDEX: 33.64 KG/M2 | TEMPERATURE: 98.1 F | RESPIRATION RATE: 17 BRPM | HEIGHT: 62 IN | HEART RATE: 68 BPM | SYSTOLIC BLOOD PRESSURE: 136 MMHG | DIASTOLIC BLOOD PRESSURE: 74 MMHG

## 2020-06-16 DIAGNOSIS — I10 BENIGN ESSENTIAL HYPERTENSION: Primary | Chronic | ICD-10-CM

## 2020-06-16 PROCEDURE — 4040F PNEUMOC VAC/ADMIN/RCVD: CPT | Performed by: PHYSICIAN ASSISTANT

## 2020-06-16 PROCEDURE — 3078F DIAST BP <80 MM HG: CPT | Performed by: PHYSICIAN ASSISTANT

## 2020-06-16 PROCEDURE — 99213 OFFICE O/P EST LOW 20 MIN: CPT | Performed by: PHYSICIAN ASSISTANT

## 2020-06-16 PROCEDURE — 1036F TOBACCO NON-USER: CPT | Performed by: PHYSICIAN ASSISTANT

## 2020-06-16 PROCEDURE — 3008F BODY MASS INDEX DOCD: CPT | Performed by: PHYSICIAN ASSISTANT

## 2020-06-16 PROCEDURE — 1160F RVW MEDS BY RX/DR IN RCRD: CPT | Performed by: PHYSICIAN ASSISTANT

## 2020-06-16 PROCEDURE — 3075F SYST BP GE 130 - 139MM HG: CPT | Performed by: PHYSICIAN ASSISTANT

## 2020-06-25 ENCOUNTER — APPOINTMENT (OUTPATIENT)
Dept: LAB | Facility: CLINIC | Age: 85
End: 2020-06-25
Payer: COMMERCIAL

## 2020-06-25 DIAGNOSIS — E11.9 TYPE 2 DIABETES MELLITUS WITHOUT COMPLICATION, WITHOUT LONG-TERM CURRENT USE OF INSULIN (HCC): ICD-10-CM

## 2020-06-25 DIAGNOSIS — Z13.6 ENCOUNTER FOR LIPID SCREENING FOR CARDIOVASCULAR DISEASE: ICD-10-CM

## 2020-06-25 DIAGNOSIS — Z13.220 ENCOUNTER FOR LIPID SCREENING FOR CARDIOVASCULAR DISEASE: ICD-10-CM

## 2020-06-25 DIAGNOSIS — Z13.29 SCREENING FOR THYROID DISORDER: ICD-10-CM

## 2020-06-25 DIAGNOSIS — Z13.0 SCREENING, IRON DEFICIENCY ANEMIA: ICD-10-CM

## 2020-06-25 LAB
ALBUMIN SERPL BCP-MCNC: 3.7 G/DL (ref 3.5–5)
ALP SERPL-CCNC: 94 U/L (ref 46–116)
ALT SERPL W P-5'-P-CCNC: 19 U/L (ref 12–78)
ANION GAP SERPL CALCULATED.3IONS-SCNC: 5 MMOL/L (ref 4–13)
AST SERPL W P-5'-P-CCNC: 26 U/L (ref 5–45)
BASOPHILS # BLD AUTO: 0.03 THOUSANDS/ΜL (ref 0–0.1)
BASOPHILS NFR BLD AUTO: 1 % (ref 0–1)
BILIRUB SERPL-MCNC: 0.86 MG/DL (ref 0.2–1)
BUN SERPL-MCNC: 12 MG/DL (ref 5–25)
CALCIUM SERPL-MCNC: 9.2 MG/DL (ref 8.3–10.1)
CHLORIDE SERPL-SCNC: 104 MMOL/L (ref 100–108)
CHOLEST SERPL-MCNC: 202 MG/DL (ref 50–200)
CO2 SERPL-SCNC: 31 MMOL/L (ref 21–32)
CREAT SERPL-MCNC: 0.72 MG/DL (ref 0.6–1.3)
CREAT UR-MCNC: 101 MG/DL
EOSINOPHIL # BLD AUTO: 0.05 THOUSAND/ΜL (ref 0–0.61)
EOSINOPHIL NFR BLD AUTO: 1 % (ref 0–6)
ERYTHROCYTE [DISTWIDTH] IN BLOOD BY AUTOMATED COUNT: 13.4 % (ref 11.6–15.1)
EST. AVERAGE GLUCOSE BLD GHB EST-MCNC: 123 MG/DL
GFR SERPL CREATININE-BSD FRML MDRD: 76 ML/MIN/1.73SQ M
GLUCOSE P FAST SERPL-MCNC: 115 MG/DL (ref 65–99)
HBA1C MFR BLD: 5.9 %
HCT VFR BLD AUTO: 43.8 % (ref 34.8–46.1)
HDLC SERPL-MCNC: 51 MG/DL
HGB BLD-MCNC: 14.2 G/DL (ref 11.5–15.4)
IMM GRANULOCYTES # BLD AUTO: 0.03 THOUSAND/UL (ref 0–0.2)
IMM GRANULOCYTES NFR BLD AUTO: 1 % (ref 0–2)
LDLC SERPL CALC-MCNC: 127 MG/DL (ref 0–100)
LYMPHOCYTES # BLD AUTO: 1.4 THOUSANDS/ΜL (ref 0.6–4.47)
LYMPHOCYTES NFR BLD AUTO: 24 % (ref 14–44)
MCH RBC QN AUTO: 29.8 PG (ref 26.8–34.3)
MCHC RBC AUTO-ENTMCNC: 32.4 G/DL (ref 31.4–37.4)
MCV RBC AUTO: 92 FL (ref 82–98)
MICROALBUMIN UR-MCNC: 25.7 MG/L (ref 0–20)
MICROALBUMIN/CREAT 24H UR: 25 MG/G CREATININE (ref 0–30)
MONOCYTES # BLD AUTO: 0.44 THOUSAND/ΜL (ref 0.17–1.22)
MONOCYTES NFR BLD AUTO: 8 % (ref 4–12)
NEUTROPHILS # BLD AUTO: 3.93 THOUSANDS/ΜL (ref 1.85–7.62)
NEUTS SEG NFR BLD AUTO: 65 % (ref 43–75)
NONHDLC SERPL-MCNC: 151 MG/DL
NRBC BLD AUTO-RTO: 0 /100 WBCS
PLATELET # BLD AUTO: 251 THOUSANDS/UL (ref 149–390)
PMV BLD AUTO: 10.4 FL (ref 8.9–12.7)
POTASSIUM SERPL-SCNC: 3.9 MMOL/L (ref 3.5–5.3)
PROT SERPL-MCNC: 7.9 G/DL (ref 6.4–8.2)
RBC # BLD AUTO: 4.77 MILLION/UL (ref 3.81–5.12)
SODIUM SERPL-SCNC: 140 MMOL/L (ref 136–145)
TRIGL SERPL-MCNC: 118 MG/DL
TSH SERPL DL<=0.05 MIU/L-ACNC: 2.16 UIU/ML (ref 0.36–3.74)
WBC # BLD AUTO: 5.88 THOUSAND/UL (ref 4.31–10.16)

## 2020-06-25 PROCEDURE — 85025 COMPLETE CBC W/AUTO DIFF WBC: CPT

## 2020-06-25 PROCEDURE — 82043 UR ALBUMIN QUANTITATIVE: CPT

## 2020-06-25 PROCEDURE — 36415 COLL VENOUS BLD VENIPUNCTURE: CPT

## 2020-06-25 PROCEDURE — 80061 LIPID PANEL: CPT

## 2020-06-25 PROCEDURE — 82570 ASSAY OF URINE CREATININE: CPT

## 2020-06-25 PROCEDURE — 83036 HEMOGLOBIN GLYCOSYLATED A1C: CPT

## 2020-06-25 PROCEDURE — 80053 COMPREHEN METABOLIC PANEL: CPT

## 2020-06-25 PROCEDURE — 84443 ASSAY THYROID STIM HORMONE: CPT

## 2020-07-12 ENCOUNTER — HOSPITAL ENCOUNTER (OUTPATIENT)
Dept: MRI IMAGING | Facility: HOSPITAL | Age: 85
Discharge: HOME | End: 2020-07-12
Attending: SURGERY
Payer: COMMERCIAL

## 2020-07-12 DIAGNOSIS — K86.2 PANCREATIC CYST: ICD-10-CM

## 2020-07-12 PROCEDURE — 74183 MRI ABD W/O CNTR FLWD CNTR: CPT

## 2020-07-12 PROCEDURE — A9585 GADOBUTROL INJECTION: HCPCS | Performed by: SURGERY

## 2020-07-12 RX ADMIN — GADOBUTROL 8 ML: 604.72 INJECTION INTRAVENOUS at 16:06

## 2020-07-21 ENCOUNTER — TELEMEDICINE (OUTPATIENT)
Dept: FAMILY MEDICINE CLINIC | Facility: CLINIC | Age: 85
End: 2020-07-21
Payer: COMMERCIAL

## 2020-07-21 DIAGNOSIS — Z20.828 EXPOSURE TO SARS-ASSOCIATED CORONAVIRUS: ICD-10-CM

## 2020-07-21 DIAGNOSIS — Z20.828 EXPOSURE TO SARS-ASSOCIATED CORONAVIRUS: Primary | ICD-10-CM

## 2020-07-21 PROCEDURE — U0003 INFECTIOUS AGENT DETECTION BY NUCLEIC ACID (DNA OR RNA); SEVERE ACUTE RESPIRATORY SYNDROME CORONAVIRUS 2 (SARS-COV-2) (CORONAVIRUS DISEASE [COVID-19]), AMPLIFIED PROBE TECHNIQUE, MAKING USE OF HIGH THROUGHPUT TECHNOLOGIES AS DESCRIBED BY CMS-2020-01-R: HCPCS

## 2020-07-21 PROCEDURE — 1160F RVW MEDS BY RX/DR IN RCRD: CPT | Performed by: PHYSICIAN ASSISTANT

## 2020-07-21 PROCEDURE — 99213 OFFICE O/P EST LOW 20 MIN: CPT | Performed by: PHYSICIAN ASSISTANT
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
## 2020-07-23 LAB — SARS-COV-2 RNA SPEC QL NAA+PROBE: NOT DETECTED

## 2020-08-07 ENCOUNTER — OFFICE VISIT (OUTPATIENT)
Dept: SURGICAL ONCOLOGY | Facility: CLINIC | Age: 85
End: 2020-08-07
Payer: COMMERCIAL

## 2020-08-07 VITALS
TEMPERATURE: 97.8 F | DIASTOLIC BLOOD PRESSURE: 88 MMHG | HEIGHT: 61 IN | HEART RATE: 74 BPM | SYSTOLIC BLOOD PRESSURE: 130 MMHG | WEIGHT: 181.2 LBS | BODY MASS INDEX: 34.21 KG/M2

## 2020-08-07 DIAGNOSIS — K86.2 PANCREATIC CYST: Primary | ICD-10-CM

## 2020-08-07 PROCEDURE — 3044F HG A1C LEVEL LT 7.0%: CPT | Performed by: SURGERY

## 2020-08-07 PROCEDURE — 2022F DILAT RTA XM EVC RTNOPTHY: CPT | Performed by: SURGERY

## 2020-08-07 PROCEDURE — 1160F RVW MEDS BY RX/DR IN RCRD: CPT | Performed by: SURGERY

## 2020-08-07 PROCEDURE — 1036F TOBACCO NON-USER: CPT | Performed by: SURGERY

## 2020-08-07 PROCEDURE — 99213 OFFICE O/P EST LOW 20 MIN: CPT | Performed by: SURGERY

## 2020-08-07 PROCEDURE — 3075F SYST BP GE 130 - 139MM HG: CPT | Performed by: SURGERY

## 2020-08-07 PROCEDURE — 3060F POS MICROALBUMINURIA REV: CPT | Performed by: SURGERY

## 2020-08-07 PROCEDURE — 4040F PNEUMOC VAC/ADMIN/RCVD: CPT | Performed by: SURGERY

## 2020-08-07 PROCEDURE — 3008F BODY MASS INDEX DOCD: CPT | Performed by: SURGERY

## 2020-08-07 PROCEDURE — 3079F DIAST BP 80-89 MM HG: CPT | Performed by: SURGERY
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
## 2020-08-18 ENCOUNTER — TELEPHONE (OUTPATIENT)
Dept: FAMILY MEDICINE CLINIC | Facility: CLINIC | Age: 85
End: 2020-08-18

## 2020-09-06 DIAGNOSIS — F32.A DEPRESSION, UNSPECIFIED DEPRESSION TYPE: ICD-10-CM

## 2020-09-08 ENCOUNTER — OFFICE VISIT (OUTPATIENT)
Dept: FAMILY MEDICINE CLINIC | Facility: CLINIC | Age: 85
End: 2020-09-08
Payer: COMMERCIAL

## 2020-09-08 VITALS
TEMPERATURE: 97.1 F | DIASTOLIC BLOOD PRESSURE: 72 MMHG | SYSTOLIC BLOOD PRESSURE: 170 MMHG | OXYGEN SATURATION: 96 % | BODY MASS INDEX: 33.38 KG/M2 | HEART RATE: 69 BPM | HEIGHT: 62 IN | WEIGHT: 181.4 LBS

## 2020-09-08 DIAGNOSIS — Z00.00 MEDICARE ANNUAL WELLNESS VISIT, SUBSEQUENT: ICD-10-CM

## 2020-09-08 DIAGNOSIS — Z12.39 SCREENING FOR BREAST CANCER: ICD-10-CM

## 2020-09-08 DIAGNOSIS — K21.9 GASTROESOPHAGEAL REFLUX DISEASE WITHOUT ESOPHAGITIS: ICD-10-CM

## 2020-09-08 DIAGNOSIS — E78.5 HYPERLIPIDEMIA, UNSPECIFIED HYPERLIPIDEMIA TYPE: ICD-10-CM

## 2020-09-08 DIAGNOSIS — E11.9 TYPE 2 DIABETES MELLITUS WITHOUT COMPLICATION, WITHOUT LONG-TERM CURRENT USE OF INSULIN (HCC): Primary | ICD-10-CM

## 2020-09-08 DIAGNOSIS — I10 BENIGN ESSENTIAL HYPERTENSION: Chronic | ICD-10-CM

## 2020-09-08 DIAGNOSIS — F32.A DEPRESSION, UNSPECIFIED DEPRESSION TYPE: ICD-10-CM

## 2020-09-08 DIAGNOSIS — M81.0 AGE-RELATED OSTEOPOROSIS WITHOUT CURRENT PATHOLOGICAL FRACTURE: Chronic | ICD-10-CM

## 2020-09-08 PROCEDURE — G0439 PPPS, SUBSEQ VISIT: HCPCS | Performed by: NURSE PRACTITIONER

## 2020-09-08 PROCEDURE — 1125F AMNT PAIN NOTED PAIN PRSNT: CPT | Performed by: NURSE PRACTITIONER

## 2020-09-08 PROCEDURE — 99214 OFFICE O/P EST MOD 30 MIN: CPT | Performed by: NURSE PRACTITIONER

## 2020-09-08 PROCEDURE — 1170F FXNL STATUS ASSESSED: CPT | Performed by: NURSE PRACTITIONER

## 2020-09-08 PROCEDURE — 3725F SCREEN DEPRESSION PERFORMED: CPT | Performed by: NURSE PRACTITIONER

## 2020-09-08 NOTE — PROGRESS NOTES
[facility]    ASSESSMENT AND PLAN     1  Type 2 diabetes mellitus without complication, without long-term current use of insulin ([address] )  Diabetes well controlled with diet  Hemoglobin A1c 6/20 5:5 9   Reassess again end of September    - HEMOGLOBIN A1C W/ EAG ESTIMATION; Future  - Comprehensive metabolic panel; Future    2  Benign essential hypertension  BP fluctuates  Trends reviewed  Patient asymptomatic  Patient states she gets white coat syndrome  States compliant with Toprol XL and amlodipine daily  Hydrochlorothiazide was stopped in June  Discussed with patient she would likely benefit from restarting  She will check her blood pressure at home twice daily over the next few weeks  Return in 3-4 weeks for follow-up  Bring home cuff for comparison  If restarting hydrochlorothiazide, will start at 12 5  Patient and daughter agreeable with plan  - HEMOGLOBIN A1C W/ EAG ESTIMATION; Future  - Comprehensive metabolic panel; Future    3  Age-related osteoporosis without current pathological fracture  Continues with OTC vitamin-D and calcium supplements    4  Gastroesophageal reflux disease without esophagitis  Patient states currently well managed with diet  5  Hyperlipidemia, unspecified hyperlipidemia type  Compliant with atorvastatin 10 daily  Last lipid panel 06/2020  Total cholesterol 202 with   Reassess end of September    - Lipid panel; Future    6  Depression, unspecified depression type  Appears to be doing relatively well on sertraline 75 mg daily  Patient does admit to having occasional periods of sadness (since  passing 2 years ago)  Denies concern for self-harm or harm of others  Continue same dose at this time  Monitor q 3 months    - sertraline (ZOLOFT) 50 mg tablet; Take 1 5 tablets (75 mg total) by mouth daily  Dispense: 135 tablet; Refill: 0    7  Screening for breast cancer  Last mammo 2016   Patient agreeable to routine screening    - Mammo screening bilateral w 3d & cad; Future    8  Medicare annual wellness visit, subsequent  Medicare wellness completed today            SUBJECTIVE       Patient ID: Paolo Savage is a 80 y o  female  Chief Complaint   Patient presents with   [facility] Wellness Visit     AWV       HISTORY OF PRESENT ILLNESS    Presents today for routine checkup and Medicare wellness  Has no healthcare concerns today  Has been taking medication as prescribed     Daughter present for visit        The following portions of the patient's history were reviewed and updated as appropriate: allergies, current medications, past family history, past medical history, past social history, past surgical history and problem list     REVIEW OF SYSTEMS  Review of Systems   Constitutional: Negative  HENT: Negative  Respiratory: Negative  Cardiovascular: Negative  Gastrointestinal: Negative  Genitourinary: Negative  Neurological: Negative  Psychiatric/Behavioral: Negative          OBJECTIVE      VITAL SIGNS  /72 (BP Location: Right arm, Patient Position: Sitting, Cuff Size: Adult)   Pulse 69   Temp (!) 97 1 °F (36 2 °C)   Ht 5' 1 5" (1 562 m)   Wt 82 3 kg (181 lb 6 4 oz)   SpO2 96%   BMI 33 72 kg/m²     CURRENT MEDICATIONS    Current Outpatient Medications:     amLODIPine (NORVASC) 5 mg tablet, TAKE 1 TABLET BY MOUTH EVERY DAY, Disp: 90 tablet, Rfl: 1    amoxicillin (AMOXIL) 500 mg capsule, TAKE 4 CAPSULES BY MOUTH 1 HOUR PRIOR TO DENTAL TREATMENT , Disp: , Rfl:     atorvastatin (LIPITOR) 10 mg tablet, Take 10 mg by mouth daily, Disp: , Rfl:     Cholecalciferol (CVS VITAMIN D) 2000 units CAPS, Take 1 capsule by mouth daily, Disp: , Rfl:     hydrochlorothiazide (HYDRODIURIL) 25 mg tablet, Take 1 tablet (25 mg total) by mouth daily, Disp: 90 tablet, Rfl: 0    metoprolol succinate (TOPROL-XL) 100 mg 24 hr tablet, TAKE 1 TABLET (100 MG TOTAL) BY MOUTH EVERY EVENING, Disp: 90 tablet, Rfl: 1    sertraline (ZOLOFT) 50 mg tablet, Take 1 5 tablets (75 mg total) by mouth daily, Disp: 135 tablet, Rfl: 0      PHYSICAL EXAMINATION   Physical Exam  Vitals signs and nursing note reviewed  Constitutional:       General: She is not in acute distress  Appearance: Normal appearance  She is well-developed  She is not ill-appearing  HENT:      Head: Normocephalic and atraumatic  Right Ear: Tympanic membrane, ear canal and external ear normal       Left Ear: Tympanic membrane, ear canal and external ear normal       Nose: Nose normal       Mouth/Throat:      Lips: Pink  Pharynx: Oropharynx is clear  Eyes:      General:         Right eye: No discharge  Left eye: No discharge  Conjunctiva/sclera: Conjunctivae normal       Pupils: Pupils are equal, round, and reactive to light  Neck:      Musculoskeletal: Normal range of motion  Thyroid: No thyromegaly  Vascular: No carotid bruit  Trachea: Trachea normal    Cardiovascular:      Rate and Rhythm: Normal rate and regular rhythm  Pulmonary:      Effort: Pulmonary effort is normal  No respiratory distress  Breath sounds: Normal breath sounds  Abdominal:      General: Bowel sounds are normal       Palpations: Abdomen is soft  Tenderness: There is no abdominal tenderness  Musculoskeletal: Normal range of motion  Right lower leg: No edema  Left lower leg: No edema  Lymphadenopathy:      Head:      Right side of head: No submandibular or tonsillar adenopathy  Left side of head: No submandibular or tonsillar adenopathy  Cervical: No cervical adenopathy  Skin:     General: Skin is warm and dry  Neurological:      Mental Status: She is alert and oriented to person, place, and time  Gait: Gait is intact  Comments: Ambulates with a cane  Gait steady  No apparent impairment  Denies falls     Psychiatric:         Attention and Perception: Attention and perception normal          Mood and Affect: Mood and affect normal          Speech: Speech normal          Behavior: Behavior normal
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
## 2020-09-08 NOTE — PATIENT INSTRUCTIONS
Medicare Preventive Visit Patient Instructions  Thank you for completing your Welcome to Medicare Visit or Medicare Annual Wellness Visit today  Your next wellness visit will be due in one year (9/8/2021)  The screening/preventive services that you may require over the next 5-10 years are detailed below  Some tests may not apply to you based off risk factors and/or age  Screening tests ordered at today's visit but not completed yet may show as past due  Also, please note that scanned in results may not display below  Preventive Screenings:  Service Recommendations Previous Testing/Comments   Colorectal Cancer Screening  * Colonoscopy    * Fecal Occult Blood Test (FOBT)/Fecal Immunochemical Test (FIT)  * Fecal DNA/Cologuard Test  * Flexible Sigmoidoscopy Age: 54-65 years old   Colonoscopy: every 10 years (may be performed more frequently if at higher risk)  OR  FOBT/FIT: every 1 year  OR  Cologuard: every 3 years  OR  Sigmoidoscopy: every 5 years  Screening may be recommended earlier than age 48 if at higher risk for colorectal cancer  Also, an individualized decision between you and your healthcare provider will decide whether screening between the ages of 74-80 would be appropriate  Colonoscopy: Not on file  FOBT/FIT: Not on file  Cologuard: Not on file  Sigmoidoscopy: Not on file    Screening Not Indicated     Breast Cancer Screening Age: 36 years old  Frequency: every 1-2 years  Not required if history of left and right mastectomy Mammogram: 08/23/2016       Cervical Cancer Screening Between the ages of 21-29, pap smear recommended once every 3 years  Between the ages of 33-67, can perform pap smear with HPV co-testing every 5 years     Recommendations may differ for women with a history of total hysterectomy, cervical cancer, or abnormal pap smears in past  Pap Smear: 03/01/2017    Screening Not Indicated   Hepatitis C Screening Once for adults born between 1945 and 1965  More frequently in patients at high risk for Hepatitis C Hep C Antibody: Not on file       Diabetes Screening 1-2 times per year if you're at risk for diabetes or have pre-diabetes Fasting glucose: 115 mg/dL   A1C: 5 9 %    Screening Not Indicated  History Diabetes   Cholesterol Screening Once every 5 years if you don't have a lipid disorder  May order more often based on risk factors  Lipid panel: 06/25/2020    Screening Not Indicated  History Lipid Disorder     Other Preventive Screenings Covered by Medicare:  1  Abdominal Aortic Aneurysm (AAA) Screening: covered once if your at risk  You're considered to be at risk if you have a family history of AAA  2  Lung Cancer Screening: covers low dose CT scan once per year if you meet all of the following conditions: (1) Age 50-69; (2) No signs or symptoms of lung cancer; (3) Current smoker or have quit smoking within the last 15 years; (4) You have a tobacco smoking history of at least 30 pack years (packs per day multiplied by number of years you smoked); (5) You get a written order from a healthcare provider  3  Glaucoma Screening: covered annually if you're considered high risk: (1) You have diabetes OR (2) Family history of glaucoma OR (3)  aged 48 and older OR (3)  American aged 72 and older  3  Osteoporosis Screening: covered every 2 years if you meet one of the following conditions: (1) You're estrogen deficient and at risk for osteoporosis based off medical history and other findings; (2) Have a vertebral abnormality; (3) On glucocorticoid therapy for more than 3 months; (4) Have primary hyperparathyroidism; (5) On osteoporosis medications and need to assess response to drug therapy  · Last bone density test (DXA Scan): Not on file  5  HIV Screening: covered annually if you're between the age of 12-76  Also covered annually if you are younger than 13 and older than 72 with risk factors for HIV infection   For pregnant patients, it is covered up to 3 times per pregnancy  Immunizations:  Immunization Recommendations   Influenza Vaccine Annual influenza vaccination during flu season is recommended for all persons aged >= 6 months who do not have contraindications   Pneumococcal Vaccine (Prevnar and Pneumovax)  * Prevnar = PCV13  * Pneumovax = PPSV23   Adults 25-60 years old: 1-3 doses may be recommended based on certain risk factors  Adults 72 years old: Prevnar (PCV13) vaccine recommended followed by Pneumovax (PPSV23) vaccine  If already received PPSV23 since turning 65, then PCV13 recommended at least one year after PPSV23 dose  Hepatitis B Vaccine 3 dose series if at intermediate or high risk (ex: diabetes, end stage renal disease, liver disease)   Tetanus (Td) Vaccine - COST NOT COVERED BY MEDICARE PART B Following completion of primary series, a booster dose should be given every 10 years to maintain immunity against tetanus  Td may also be given as tetanus wound prophylaxis  Tdap Vaccine - COST NOT COVERED BY MEDICARE PART B Recommended at least once for all adults  For pregnant patients, recommended with each pregnancy  Shingles Vaccine (Shingrix) - COST NOT COVERED BY MEDICARE PART B  2 shot series recommended in those aged 48 and above     Health Maintenance Due:  There are no preventive care reminders to display for this patient  Immunizations Due:      Topic Date Due    Influenza Vaccine  07/01/2020     Advance Directives   What are advance directives? Advance directives are legal documents that state your wishes and plans for medical care  These plans are made ahead of time in case you lose your ability to make decisions for yourself  Advance directives can apply to any medical decision, such as the treatments you want, and if you want to donate organs  What are the types of advance directives? There are many types of advance directives, and each state has rules about how to use them   You may choose a combination of any of the following:  · Living will: This is a written record of the treatment you want  You can also choose which treatments you do not want, which to limit, and which to stop at a certain time  This includes surgery, medicine, IV fluid, and tube feedings  · Durable power of  for healthcare [facility]): This is a written record that states who you want to make healthcare choices for you when you are unable to make them for yourself  This person, called a proxy, is usually a family member or a friend  You may choose more than 1 proxy  · Do not resuscitate (DNR) order:  A DNR order is used in case your heart stops beating or you stop breathing  It is a request not to have certain forms of treatment, such as CPR  A DNR order may be included in other types of advance directives  · Medical directive: This covers the care that you want if you are in a coma, near death, or unable to make decisions for yourself  You can list the treatments you want for each condition  Treatment may include pain medicine, surgery, blood transfusions, dialysis, IV or tube feedings, and a ventilator (breathing machine)  · Values history: This document has questions about your views, beliefs, and how you feel and think about life  This information can help others choose the care that you would choose  Why are advance directives important? An advance directive helps you control your care  Although spoken wishes may be used, it is better to have your wishes written down  Spoken wishes can be misunderstood, or not followed  Treatments may be given even if you do not want them  An advance directive may make it easier for your family to make difficult choices about your care  Weight Management   Why it is important to manage your weight:  Being overweight increases your risk of health conditions such as heart disease, high blood pressure, type 2 diabetes, and certain types of cancer   It can also increase your risk for osteoarthritis, sleep apnea, and other respiratory problems  Aim for a slow, steady weight loss  Even a small amount of weight loss can lower your risk of health problems  How to lose weight safely:  A safe and healthy way to lose weight is to eat fewer calories and get regular exercise  You can lose up about 1 pound a week by decreasing the number of calories you eat by 500 calories each day  Healthy meal plan for weight management:  A healthy meal plan includes a variety of foods, contains fewer calories, and helps you stay healthy  A healthy meal plan includes the following:  · Eat whole-grain foods more often  A healthy meal plan should contain fiber  Fiber is the part of grains, fruits, and vegetables that is not broken down by your body  Whole-grain foods are healthy and provide extra fiber in your diet  Some examples of whole-grain foods are whole-wheat breads and pastas, oatmeal, brown rice, and bulgur  · Eat a variety of vegetables every day  Include dark, leafy greens such as spinach, kale, susy greens, and mustard greens  Eat yellow and orange vegetables such as carrots, sweet potatoes, and winter squash  · Eat a variety of fruits every day  Choose fresh or canned fruit (canned in its own juice or light syrup) instead of juice  Fruit juice has very little or no fiber  · Eat low-fat dairy foods  Drink fat-free (skim) milk or 1% milk  Eat fat-free yogurt and low-fat cottage cheese  Try low-fat cheeses such as mozzarella and other reduced-fat cheeses  · Choose meat and other protein foods that are low in fat  Choose beans or other legumes such as split peas or lentils  Choose fish, skinless poultry (chicken or turkey), or lean cuts of red meat (beef or pork)  Before you cook meat or poultry, cut off any visible fat  · Use less fat and oil  Try baking foods instead of frying them  Add less fat, such as margarine, sour cream, regular salad dressing and mayonnaise to foods  Eat fewer high-fat foods   Some examples of high-fat foods include french fries, doughnuts, ice cream, and cakes  · Eat fewer sweets  Limit foods and drinks that are high in sugar  This includes candy, cookies, regular soda, and sweetened drinks  Exercise:  Exercise at least 30 minutes per day on most days of the week  Some examples of exercise include walking, biking, dancing, and swimming  You can also fit in more physical activity by taking the stairs instead of the elevator or parking farther away from stores  Ask your healthcare provider about the best exercise plan for you  © Copyright [employer] 2018 Information is for End User's use only and may not be sold, redistributed or otherwise used for commercial purposes   All illustrations and images included in CareNotes® are the copyrighted property of A D A M , Inc  or [address] [place], [place] [address]regine lawrence

## 2020-09-09 ENCOUNTER — TELEPHONE (OUTPATIENT)
Dept: FAMILY MEDICINE CLINIC | Facility: CLINIC | Age: 85
End: 2020-09-09

## 2020-09-09 NOTE — TELEPHONE ENCOUNTER
Forest from [employer] called and left a message on the clinical voicemail stating that patients Sertraline needs a prior authorization      They can be reached at [phone] Opt 3  Reference # [id number]

## 2020-09-10 NOTE — TELEPHONE ENCOUNTER
SERTRALINE #135 FOR A 90 DAY WAS APPROVED THROUGH [facility]  PT TAKES A TOTAL OF 75MGS A DAY AND THIS MED DOES NOT COME IN A 75 MG TABLET  THIS IS A QUANITY ISSUE    LM AT [facility]

## 2020-09-28 ENCOUNTER — TELEPHONE (OUTPATIENT)
Dept: FAMILY MEDICINE CLINIC | Facility: CLINIC | Age: 85
End: 2020-09-28

## 2020-09-28 NOTE — TELEPHONE ENCOUNTER
Phone call from daughter Nohemi Goncalves stating that patient has been more depressed for the past 2-3 weeks, has been crying daily, and asking if we would increase her Sertraline  She is currently taking 75 mg    [facility] prefers [facility] [address]

## 2020-09-28 NOTE — TELEPHONE ENCOUNTER
Please call patient  Schedule an appointment to reassess her depression  2 slots  She was just in on 09/08 and told me she was doing relatively well on the 75   This can be a video visit

## 2020-09-28 NOTE — TELEPHONE ENCOUNTER
LMOM for patient to call back and schedule visit, either virtual or in person  Patient needs 2 slots  Ok per CC

## 2020-10-06 ENCOUNTER — APPOINTMENT (OUTPATIENT)
Dept: LAB | Facility: CLINIC | Age: 85
End: 2020-10-06
Payer: COMMERCIAL

## 2020-10-06 DIAGNOSIS — E11.9 TYPE 2 DIABETES MELLITUS WITHOUT COMPLICATION, WITHOUT LONG-TERM CURRENT USE OF INSULIN (HCC): ICD-10-CM

## 2020-10-06 DIAGNOSIS — E78.5 HYPERLIPIDEMIA, UNSPECIFIED HYPERLIPIDEMIA TYPE: ICD-10-CM

## 2020-10-06 DIAGNOSIS — I10 BENIGN ESSENTIAL HYPERTENSION: Chronic | ICD-10-CM

## 2020-10-06 LAB
ALBUMIN SERPL BCP-MCNC: 4 G/DL (ref 3.5–5)
ALP SERPL-CCNC: 95 U/L (ref 46–116)
ALT SERPL W P-5'-P-CCNC: 19 U/L (ref 12–78)
ANION GAP SERPL CALCULATED.3IONS-SCNC: 6 MMOL/L (ref 4–13)
AST SERPL W P-5'-P-CCNC: 21 U/L (ref 5–45)
BILIRUB SERPL-MCNC: 0.84 MG/DL (ref 0.2–1)
BUN SERPL-MCNC: 11 MG/DL (ref 5–25)
CALCIUM SERPL-MCNC: 9.7 MG/DL (ref 8.3–10.1)
CHLORIDE SERPL-SCNC: 108 MMOL/L (ref 100–108)
CHOLEST SERPL-MCNC: 190 MG/DL (ref 50–200)
CO2 SERPL-SCNC: 29 MMOL/L (ref 21–32)
CREAT SERPL-MCNC: 0.86 MG/DL (ref 0.6–1.3)
EST. AVERAGE GLUCOSE BLD GHB EST-MCNC: 128 MG/DL
GFR SERPL CREATININE-BSD FRML MDRD: 61 ML/MIN/1.73SQ M
GLUCOSE P FAST SERPL-MCNC: 115 MG/DL (ref 65–99)
HBA1C MFR BLD: 6.1 %
HDLC SERPL-MCNC: 59 MG/DL
LDLC SERPL CALC-MCNC: 116 MG/DL (ref 0–100)
NONHDLC SERPL-MCNC: 131 MG/DL
POTASSIUM SERPL-SCNC: 3.6 MMOL/L (ref 3.5–5.3)
PROT SERPL-MCNC: 8.1 G/DL (ref 6.4–8.2)
SODIUM SERPL-SCNC: 143 MMOL/L (ref 136–145)
TRIGL SERPL-MCNC: 77 MG/DL

## 2020-10-06 PROCEDURE — 80053 COMPREHEN METABOLIC PANEL: CPT

## 2020-10-06 PROCEDURE — 36415 COLL VENOUS BLD VENIPUNCTURE: CPT

## 2020-10-06 PROCEDURE — 83036 HEMOGLOBIN GLYCOSYLATED A1C: CPT

## 2020-10-06 PROCEDURE — 80061 LIPID PANEL: CPT

## 2020-10-19 ENCOUNTER — OFFICE VISIT (OUTPATIENT)
Dept: GERIATRICS | Age: 85
End: 2020-10-19

## 2020-10-19 ENCOUNTER — OFFICE VISIT (OUTPATIENT)
Dept: FAMILY MEDICINE CLINIC | Facility: CLINIC | Age: 85
End: 2020-10-19
Payer: COMMERCIAL

## 2020-10-19 VITALS
HEIGHT: 61 IN | WEIGHT: 179.2 LBS | TEMPERATURE: 97.3 F | BODY MASS INDEX: 33.83 KG/M2 | DIASTOLIC BLOOD PRESSURE: 80 MMHG | OXYGEN SATURATION: 99 % | HEART RATE: 68 BPM | SYSTOLIC BLOOD PRESSURE: 146 MMHG

## 2020-10-19 VITALS
SYSTOLIC BLOOD PRESSURE: 122 MMHG | WEIGHT: 178 LBS | DIASTOLIC BLOOD PRESSURE: 84 MMHG | HEART RATE: 66 BPM | BODY MASS INDEX: 32.76 KG/M2 | TEMPERATURE: 96.9 F | OXYGEN SATURATION: 95 % | HEIGHT: 62 IN

## 2020-10-19 DIAGNOSIS — K21.9 GASTROESOPHAGEAL REFLUX DISEASE WITHOUT ESOPHAGITIS: Primary | ICD-10-CM

## 2020-10-19 DIAGNOSIS — E78.2 MIXED HYPERLIPIDEMIA: ICD-10-CM

## 2020-10-19 DIAGNOSIS — F32.5 MAJOR DEPRESSIVE DISORDER WITH SINGLE EPISODE, IN FULL REMISSION (HCC): ICD-10-CM

## 2020-10-19 DIAGNOSIS — R41.3 MEMORY LOSS: ICD-10-CM

## 2020-10-19 DIAGNOSIS — Z23 NEED FOR INFLUENZA VACCINATION: ICD-10-CM

## 2020-10-19 DIAGNOSIS — E11.9 TYPE 2 DIABETES MELLITUS WITHOUT COMPLICATION, WITHOUT LONG-TERM CURRENT USE OF INSULIN (HCC): ICD-10-CM

## 2020-10-19 DIAGNOSIS — I10 BENIGN ESSENTIAL HYPERTENSION: Chronic | ICD-10-CM

## 2020-10-19 DIAGNOSIS — I10 BENIGN ESSENTIAL HYPERTENSION: Primary | ICD-10-CM

## 2020-10-19 DIAGNOSIS — F32.A DEPRESSION, UNSPECIFIED DEPRESSION TYPE: ICD-10-CM

## 2020-10-19 PROCEDURE — G0008 ADMIN INFLUENZA VIRUS VAC: HCPCS | Performed by: NURSE PRACTITIONER

## 2020-10-19 PROCEDURE — 99215 OFFICE O/P EST HI 40 MIN: CPT | Performed by: INTERNAL MEDICINE

## 2020-10-19 PROCEDURE — 90662 IIV NO PRSV INCREASED AG IM: CPT | Performed by: NURSE PRACTITIONER

## 2020-10-19 PROCEDURE — 3079F DIAST BP 80-89 MM HG: CPT | Performed by: INTERNAL MEDICINE

## 2020-10-19 PROCEDURE — 99214 OFFICE O/P EST MOD 30 MIN: CPT | Performed by: NURSE PRACTITIONER

## 2020-10-19 PROCEDURE — 1036F TOBACCO NON-USER: CPT | Performed by: NURSE PRACTITIONER

## 2020-10-19 PROCEDURE — 1160F RVW MEDS BY RX/DR IN RCRD: CPT | Performed by: NURSE PRACTITIONER

## 2020-10-22 ENCOUNTER — TELEPHONE (OUTPATIENT)
Dept: GERIATRICS | Age: 85
End: 2020-10-22

## 2020-10-23 ENCOUNTER — TELEPHONE (OUTPATIENT)
Dept: GERIATRICS | Age: 85
End: 2020-10-23

## 2020-10-23 DIAGNOSIS — F32.5 MAJOR DEPRESSIVE DISORDER WITH SINGLE EPISODE, IN FULL REMISSION (HCC): Primary | ICD-10-CM

## 2020-10-23 RX ORDER — MIRTAZAPINE 7.5 MG/1
7.5 TABLET, FILM COATED ORAL
Qty: 30 TABLET | Refills: 1 | Status: SHIPPED | OUTPATIENT
Start: 2020-10-23 | End: 2020-11-18 | Stop reason: SDUPTHER

## 2020-11-15 DIAGNOSIS — F32.5 MAJOR DEPRESSIVE DISORDER WITH SINGLE EPISODE, IN FULL REMISSION (HCC): ICD-10-CM

## 2020-11-18 ENCOUNTER — TELEPHONE (OUTPATIENT)
Dept: GERIATRICS | Facility: CLINIC | Age: 85
End: 2020-11-18

## 2020-11-18 DIAGNOSIS — F32.5 MAJOR DEPRESSIVE DISORDER WITH SINGLE EPISODE, IN FULL REMISSION (HCC): ICD-10-CM

## 2020-11-18 RX ORDER — MIRTAZAPINE 7.5 MG/1
TABLET, FILM COATED ORAL
Qty: 30 TABLET | Refills: 1 | OUTPATIENT
Start: 2020-11-18

## 2020-11-18 RX ORDER — MIRTAZAPINE 7.5 MG/1
7.5 TABLET, FILM COATED ORAL
Qty: 30 TABLET | Refills: 1 | Status: SHIPPED | OUTPATIENT
Start: 2020-11-18 | End: 2021-02-08

## 2020-12-02 DIAGNOSIS — I10 BENIGN ESSENTIAL HYPERTENSION: Chronic | ICD-10-CM

## 2020-12-02 RX ORDER — METOPROLOL SUCCINATE 100 MG/1
100 TABLET, EXTENDED RELEASE ORAL EVERY EVENING
Qty: 90 TABLET | Refills: 1 | Status: SHIPPED | OUTPATIENT
Start: 2020-12-02 | End: 2021-05-19 | Stop reason: SDUPTHER

## 2020-12-10 ENCOUNTER — TELEPHONE (OUTPATIENT)
Dept: FAMILY MEDICINE CLINIC | Facility: CLINIC | Age: 85
End: 2020-12-10

## 2020-12-17 DIAGNOSIS — F32.A DEPRESSION, UNSPECIFIED DEPRESSION TYPE: ICD-10-CM

## 2020-12-22 DIAGNOSIS — I10 HYPERTENSION, UNSPECIFIED TYPE: ICD-10-CM

## 2020-12-22 RX ORDER — AMLODIPINE BESYLATE 5 MG/1
TABLET ORAL
Qty: 90 TABLET | Refills: 1 | Status: SHIPPED | OUTPATIENT
Start: 2020-12-22 | End: 2021-05-19 | Stop reason: SDUPTHER

## 2021-01-15 ENCOUNTER — TELEPHONE (OUTPATIENT)
Dept: GERIATRICS | Age: 86
End: 2021-01-15

## 2021-01-15 NOTE — TELEPHONE ENCOUNTER
Contacted YOSEPH'D daughter, Dorina Marks, to confirm appointment Monday Jan 18th at [address] inquired whether she can Teams call into [facility] appointment while Nichole and her daughter, Eri Ambrocio are present in the office  Confirmed that yes, she would be welcome to do so  Dorina Marks reports that her son tested positive for COVID-19 and her  is getting tested  She reported having a cough herself  Dorina Marks did see Nichole on Damon 1/10 - she herself was masked, while Toone was not  Noted plan to speak with  regarding our recommendation as far as Monday's appointment  Per practice administrator Jarred Naranjo, appointment should be held virtually or rescheduled

## 2021-01-15 NOTE — TELEPHONE ENCOUNTER
Called Sherri Buenrostro to offer any Monday in February at [facility] requested that we place a hold on Monday, February 8th  She will be in touch to confirm after she speaks to her family

## 2021-01-20 DIAGNOSIS — Z23 ENCOUNTER FOR IMMUNIZATION: ICD-10-CM

## 2021-01-21 ENCOUNTER — IMMUNIZATIONS (OUTPATIENT)
Dept: FAMILY MEDICINE CLINIC | Facility: HOSPITAL | Age: 86
End: 2021-01-21

## 2021-01-21 DIAGNOSIS — Z23 ENCOUNTER FOR IMMUNIZATION: Primary | ICD-10-CM

## 2021-01-21 PROCEDURE — 0011A SARS-COV-2 / COVID-19 MRNA VACCINE (MODERNA) 100 MCG: CPT

## 2021-01-21 PROCEDURE — 91301 SARS-COV-2 / COVID-19 MRNA VACCINE (MODERNA) 100 MCG: CPT

## 2021-02-03 DIAGNOSIS — F32.A DEPRESSION, UNSPECIFIED DEPRESSION TYPE: ICD-10-CM

## 2021-02-03 NOTE — TELEPHONE ENCOUNTER
Last OV 10/19/20    Daughter informed that patient was a no-show for appointment 1/26/21  She states she will call back to reschedule

## 2021-02-05 ENCOUNTER — TELEPHONE (OUTPATIENT)
Dept: GERIATRICS | Age: 86
End: 2021-02-05

## 2021-02-05 NOTE — TELEPHONE ENCOUNTER
Left voicemail message for Sharlene Gastelum to call office  Need confirm moving 4pm appt to 3pm on 2/8/2  Dr Becky Caban is unavailable at [place] [id number] on 2/8/21

## 2021-02-05 NOTE — TELEPHONE ENCOUNTER
Appt stays as 2/8/21 at 4pm per provider  Emailed daughter, Tuan Leonard, with this information and to request email confirmation back

## 2021-02-05 NOTE — TELEPHONE ENCOUNTER
Savannah Draper returned call; will check other dates (2/15 and 2/22) to see if available  Waiting to see if Dr Carrington Ribeiro would be available at 4pm on 2/15 or 2/22

## 2021-02-08 ENCOUNTER — OFFICE VISIT (OUTPATIENT)
Dept: GERIATRICS | Age: 86
End: 2021-02-08
Payer: COMMERCIAL

## 2021-02-08 VITALS
OXYGEN SATURATION: 97 % | TEMPERATURE: 97.2 F | HEART RATE: 73 BPM | RESPIRATION RATE: 18 BRPM | BODY MASS INDEX: 33.49 KG/M2 | DIASTOLIC BLOOD PRESSURE: 88 MMHG | SYSTOLIC BLOOD PRESSURE: 144 MMHG | HEIGHT: 61 IN | WEIGHT: 177.4 LBS

## 2021-02-08 DIAGNOSIS — F01.50 MIXED DEMENTIA (HCC): Primary | ICD-10-CM

## 2021-02-08 DIAGNOSIS — G30.9 MIXED DEMENTIA (HCC): Primary | ICD-10-CM

## 2021-02-08 DIAGNOSIS — F02.80 MIXED DEMENTIA (HCC): Primary | ICD-10-CM

## 2021-02-08 DIAGNOSIS — M81.0 AGE-RELATED OSTEOPOROSIS WITHOUT CURRENT PATHOLOGICAL FRACTURE: Chronic | ICD-10-CM

## 2021-02-08 DIAGNOSIS — F32.5 MAJOR DEPRESSIVE DISORDER WITH SINGLE EPISODE, IN FULL REMISSION (HCC): ICD-10-CM

## 2021-02-08 DIAGNOSIS — K21.9 GASTROESOPHAGEAL REFLUX DISEASE WITHOUT ESOPHAGITIS: ICD-10-CM

## 2021-02-08 DIAGNOSIS — E78.2 MIXED HYPERLIPIDEMIA: ICD-10-CM

## 2021-02-08 DIAGNOSIS — F51.01 PRIMARY INSOMNIA: ICD-10-CM

## 2021-02-08 DIAGNOSIS — E55.9 VITAMIN D DEFICIENCY: Chronic | ICD-10-CM

## 2021-02-08 DIAGNOSIS — E11.9 TYPE 2 DIABETES MELLITUS WITHOUT COMPLICATION, WITHOUT LONG-TERM CURRENT USE OF INSULIN (HCC): ICD-10-CM

## 2021-02-08 PROBLEM — R41.89 COGNITIVE IMPAIRMENT: Status: ACTIVE | Noted: 2021-02-08

## 2021-02-08 PROCEDURE — 1036F TOBACCO NON-USER: CPT | Performed by: INTERNAL MEDICINE

## 2021-02-08 PROCEDURE — 1160F RVW MEDS BY RX/DR IN RCRD: CPT | Performed by: INTERNAL MEDICINE

## 2021-02-08 PROCEDURE — 99215 OFFICE O/P EST HI 40 MIN: CPT | Performed by: INTERNAL MEDICINE

## 2021-02-08 NOTE — TELEPHONE ENCOUNTER
Can you please clarify what dose she is taking  At her last visit, the daughter said the gerontologist was going to be making adjustments  Please also verify that Chelsea Nash is getting assistance with her medications   Thank you

## 2021-02-08 NOTE — PROGRESS NOTES
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)  - Will obtain Mindstream  - patient encouraged to stay active physically, mentally and socially  - driving safety concerns:  Patient is currently not driving  - patient has fall alert, encouraged wearing it at all times  - caregiver burnout addressed  - delirium precautions provided  - current plan discuss at length with patient and patient's daughters, Kraig Ganser and Helena, all questions were answered  - patient will return in 6 months and family will call in interim with questions or concerns  - maintain good control of all chronic medical conditions with close follow up with PCP and specialist  - TU seconds, low risk for falls  - Medications reviewed, appears appropriate at this time    Paranoia  -2 episodes of paranoia noted by family  - most likely secondary to underlying dementia progression  - discussed about seroquel including side effects of hyponatremia, dizziness, cardiac arrhythmias and Sudden cardiac death  At this time, Family does not want to start medications  Patient education pamphlet on  seroquel provided  Insomnia  - discussed about sleep hygiene  - discussed about starting Melatonin 5mg at night to help with sleep  - follow up in 6 months     Major depressive disorder   - Currently on Zoloft 75mg once daily   - She was started on Remeron 7 5mg once daily at bedtime but it was stopped due to sleep walking at night  - Denies any suicidal or homicidal ideations  - GDS: 1/15  - discussed with family about trazodone but at this time family would like to continue with zoloft  Patient education pamphlet on trazodone provided        Mixed hyperlipidemia  - Lipid panel done in 10/6/20 showed Total cholesterol of 190, HDL: 59, LDL: 116 and TAG of 77  - continue Atorvastatin 10mg once daily    Vitamin D deficiency  - continue Vitamin d 2000 units daily     Age-related osteoporosis without current pathological fracture  - no recent dexa seen   - continue Vitamin d supplementation  - follow up with PCP for further management    Gastroesophageal reflux disease without esophagitis  - currently stable, patient is not complaining of any symptoms  - continue to monitor     Type 2 diabetes mellitus without complication, without long-term current use of insulin ([facility] )  -hgba1c: 6 1 in 10/6/20  - currently diet controlled  - continue management with PCP  - Avoid tight control       - Counseling Documentation: patient was counseled regarding: instructions for management, patient and family education and risks and benefits of treatment options    Chief Complaint     Follow up    History of Present Illness     I had the pleasure of evaluating  Shan Bustillos who is a 80 y o  female  in Geriatric follow-up today  Since our last follow-up she has not had any falls, has nothad any hospitalizations and has not had medication changes or major life events  She was accompanied by her daughters, Geraldine Olvera and Fernanda Gomez  Cristofermontana Eddies is her primary caregiver  Family notes worsening confusion, short term memory difficulties and long term memory difficulties  Patient at times keep stating that her   is cheating on her  She had an episode where she got ready one day to leave for her dad's   Her dad passed away in the 80s  She was started on Remeron 7 5mg once daily but it was stopped because patient was sleep walking  She is independent of her ADLs but dependent on her family for her IADLs  Family notes difficulty sleeping at night  Patient reports that her short term memory is intact and she denies any difficulties with sleep, agitation, depression, or changes in appetite   She reports that she is happy to be living with Geraldine Olvera and she enjoys spending time with her family  She did report that her  left and she feels sad when she thinks about him  Denies any suicidal or homicidal ideations  The following portions of the patient's history were reviewed and updated as appropriate: allergies, current medications, past family history, past medical history, past social history, past surgical history and problem list       Review of Systems     Review of Systems   Constitutional: Negative for activity change, appetite change, fatigue and fever  HENT: Negative for congestion, ear discharge, rhinorrhea and sore throat  Eyes: Negative for pain and discharge  Respiratory: Negative for cough, chest tightness and shortness of breath  Cardiovascular: Negative for chest pain, palpitations and leg swelling  Gastrointestinal: Negative for abdominal distention, abdominal pain, constipation, diarrhea, nausea and vomiting  Genitourinary: Negative for difficulty urinating, dysuria and frequency  Musculoskeletal: Negative for arthralgias, back pain and myalgias  Skin: Negative for rash  Neurological: Negative for dizziness, weakness, numbness and headaches  Psychiatric/Behavioral: Negative for sleep disturbance and suicidal ideas         Active Problem List     Patient Active Problem List   Diagnosis    Type 2 diabetes mellitus without complication, without long-term current use of insulin ([facility])    Benign essential hypertension    Mixed hyperlipidemia    Gastroesophageal reflux disease without esophagitis    Leiomyoma of uterus    Pancreatic cyst    Right bundle-branch block    Resting tremor    Closed fracture of right hip with routine healing    Vitamin D deficiency    Memory loss    Age related osteoporosis    Constipation, slow transit    Major depressive disorder with single episode, in full remission ([facility] )       Objective     /88 (BP Location: Left arm, Patient Position: Sitting, Cuff Size: Standard) Pulse 73   Temp (!) 97 2 °F (36 2 °C) (Temporal)   Resp 18   Ht 5' 1" (1 549 m)   Wt 80 5 kg (177 lb 6 4 oz)   SpO2 97%   BMI 33 52 kg/m²     Physical Exam  Constitutional:       General: She is not in acute distress  Appearance: She is well-developed  She is not diaphoretic  Comments: Pleasant elderly female sitting comfortably in the chair   HENT:      Head: Normocephalic and atraumatic  Right Ear: There is no impacted cerumen  Left Ear: There is no impacted cerumen  Nose: Nose normal    Eyes:      General:         Right eye: No discharge  Left eye: No discharge  Conjunctiva/sclera: Conjunctivae normal       Pupils: Pupils are equal, round, and reactive to light  Neck:      Musculoskeletal: Normal range of motion and neck supple  Cardiovascular:      Rate and Rhythm: Normal rate and regular rhythm  Heart sounds: Normal heart sounds  No murmur  Pulmonary:      Effort: Pulmonary effort is normal  No respiratory distress  Breath sounds: Normal breath sounds  Abdominal:      General: Bowel sounds are normal  There is no distension  Palpations: Abdomen is soft  Tenderness: There is no abdominal tenderness  Musculoskeletal:         General: No tenderness  Comments: TU seconds   Skin:     General: Skin is warm  Neurological:      Mental Status: She is alert        Comments: Oriented to person and year, not to place  Reported that the president is Obama   Psychiatric:      Comments: GDS:1 /15         Pertinent Laboratory/Diagnostic Studies:  CBC:   Lab Results   Component Value Date/Time    WBC 5 88 2020 10:29 AM    WBC 8 67 2015 05:43 AM    RBC 4 77 2020 10:29 AM    RBC 4 29 2015 05:43 AM    HGB 14 2 2020 10:29 AM    HGB 12 7 2015 05:43 AM    HCT 43 8 2020 10:29 AM    HCT 38 3 2015 05:43 AM    MCV 92 2020 10:29 AM    MCV 89 2015 05:43 AM    MCH 29 8 2020 10:29 AM    MCH 29 6 04/20/2015 05:43 AM    MCHC 32 4 06/25/2020 10:29 AM    MCHC 33 2 04/20/2015 05:43 AM    RDW 13 4 06/25/2020 10:29 AM    RDW 13 9 04/20/2015 05:43 AM    MPV 10 4 06/25/2020 10:29 AM    MPV 12 7 04/20/2015 05:43 AM     06/25/2020 10:29 AM    PLT Not reported du 04/20/2015 05:43 AM    NRBC 0 06/25/2020 10:29 AM    NEUTOPHILPCT 65 06/25/2020 10:29 AM    NEUTOPHILPCT 68 04/20/2015 05:43 AM    LYMPHOPCT 24 06/25/2020 10:29 AM    LYMPHOPCT 24 04/20/2015 05:43 AM    MONOPCT 8 06/25/2020 10:29 AM    MONOPCT 7 04/20/2015 05:43 AM    EOSPCT 1 06/25/2020 10:29 AM    EOSPCT 1 04/20/2015 05:43 AM    BASOPCT 1 06/25/2020 10:29 AM    BASOPCT 0 10/24/2013 10:02 AM    NEUTROABS 3 93 06/25/2020 10:29 AM    NEUTROABS 5 90 04/20/2015 05:43 AM    LYMPHSABS 1 40 06/25/2020 10:29 AM    LYMPHSABS 2 08 04/20/2015 05:43 AM    MONOSABS 0 44 06/25/2020 10:29 AM    MONOSABS 0 61 04/20/2015 05:43 AM    EOSABS 0 05 06/25/2020 10:29 AM    EOSABS 0 09 04/20/2015 05:43 AM     Chemistry Profile:   Lab Results   Component Value Date/Time     12/21/2015 02:08 PM    K 3 6 10/06/2020 10:46 AM    K 3 8 12/21/2015 02:08 PM     10/06/2020 10:46 AM     12/21/2015 02:08 PM    CO2 29 10/06/2020 10:46 AM    CO2 30 12/21/2015 02:08 PM    ANIONGAP 7 12/21/2015 02:08 PM    BUN 11 10/06/2020 10:46 AM    BUN 9 12/21/2015 02:08 PM    CREATININE 0 86 10/06/2020 10:46 AM    CREATININE 0 64 12/21/2015 02:08 PM    GLUC 180 (H) 07/11/2019 05:32 AM    GLUF 115 (H) 10/06/2020 10:46 AM    GLUCOSE 96 12/21/2015 02:08 PM    CALCIUM 9 7 10/06/2020 10:46 AM    CALCIUM 9 4 12/21/2015 02:08 PM    MG 2 0 07/09/2019 07:15 PM    MG 1 9 04/20/2015 05:43 AM    PHOS 3 2 04/20/2015 05:43 AM    AST 21 10/06/2020 10:46 AM    AST 30 12/21/2015 02:08 PM    ALT 19 10/06/2020 10:46 AM    ALT 24 12/21/2015 02:08 PM    ALKPHOS 95 10/06/2020 10:46 AM    ALKPHOS 110 12/21/2015 02:08 PM    PROT 7 4 12/21/2015 02:08 PM    BILITOT 1 04 (H) 12/21/2015 02:08 PM    EGFR 61 10/06/2020 10:46 AM     Current Medications     Current Outpatient Medications:     amLODIPine (NORVASC) 5 mg tablet, TAKE 1 TABLET BY MOUTH EVERY DAY, Disp: 90 tablet, Rfl: 1    amoxicillin (AMOXIL) 500 mg capsule, TAKE 4 CAPSULES BY MOUTH 1 HOUR PRIOR TO DENTAL TREATMENT , Disp: , Rfl:     atorvastatin (LIPITOR) 10 mg tablet, Take 10 mg by mouth daily, Disp: , Rfl:     Cholecalciferol (CVS VITAMIN D) 2000 units CAPS, Take 1 capsule by mouth daily, Disp: , Rfl:     hydrochlorothiazide (HYDRODIURIL) 25 mg tablet, Take 1 tablet (25 mg total) by mouth daily, Disp: 90 tablet, Rfl: 0    metoprolol succinate (TOPROL-XL) 100 mg 24 hr tablet, Take 1 tablet (100 mg total) by mouth every evening, Disp: 90 tablet, Rfl: 1    mirtazapine (REMERON) 7 5 MG tablet, Take 1 tablet (7 5 mg total) by mouth daily at bedtime, Disp: 30 tablet, Rfl: 1    sertraline (ZOLOFT) 50 mg tablet, Take 1 5 tablets (75 mg total) by mouth daily, Disp: 135 tablet, Rfl: 0

## 2021-02-08 NOTE — PROGRESS NOTES
[address]  [phone]  Social Work Follow-Up    LCSW met with Tamara Washington today for follow up visit  Completed Geriatric Depression Screen, 1/15 (previously 5/15 in October 2020)  MoCA completed previously (October 2020) was 20/30  Tamara Washington reports she believes she is here for a check-up because she is "feeling tired" and "not herself " She notes she does not have a sore throat or runny nose  She notes her daughters are here with her today and they look after her  She notes she enjoys reading and going on her iPad and her daughters encourage her to relax  She did report that she was  in 1970 and her  left her two years ago, which she states hurt her feelings  There is a noted change in Hilary's mood today as evidenced by her calm attitude throughout completion of the GDS; at previous appointment she was tearful throughout and was fixated on her  having left her  Today she spoke of fond memories, describing raising five children, working in an office, and enjoying time spent with her daughters  She smiled and laughed at times throughout our interaction  Per review with Dr Deshaun Aguilar is having a "good day" today and continues to have crying spells at home  LCSW to remain available as needed

## 2021-02-08 NOTE — TELEPHONE ENCOUNTER
Voicemail from daughter asking if we can refill Sertraline  She states she is out of medication because she lost her bottle  Please advise

## 2021-02-08 NOTE — TELEPHONE ENCOUNTER
I spoke to daughter  She said that she manages Hilary's medications for her and that she is the one who lost the bottle of Sertraline  She states that Gerontology is not making any changes to the medication  She will let us know if anything changes  She states she is currently at the Gerontologist right now with her

## 2021-02-19 ENCOUNTER — IMMUNIZATIONS (OUTPATIENT)
Dept: FAMILY MEDICINE CLINIC | Facility: HOSPITAL | Age: 86
End: 2021-02-19

## 2021-02-19 DIAGNOSIS — Z23 ENCOUNTER FOR IMMUNIZATION: Primary | ICD-10-CM

## 2021-02-19 PROCEDURE — 91301 SARS-COV-2 / COVID-19 MRNA VACCINE (MODERNA) 100 MCG: CPT

## 2021-02-19 PROCEDURE — 0012A SARS-COV-2 / COVID-19 MRNA VACCINE (MODERNA) 100 MCG: CPT

## 2021-02-23 ENCOUNTER — CLINICAL SUPPORT (OUTPATIENT)
Dept: GERIATRICS | Age: 86
End: 2021-02-23
Payer: COMMERCIAL

## 2021-02-23 DIAGNOSIS — R41.3 MEMORY LOSS: ICD-10-CM

## 2021-02-23 PROCEDURE — 96139 PSYCL/NRPSYC TST TECH EA: CPT | Performed by: INTERNAL MEDICINE

## 2021-02-23 PROCEDURE — 96138 PSYCL/NRPSYC TECH 1ST: CPT | Performed by: INTERNAL MEDICINE

## 2021-02-23 NOTE — PROGRESS NOTES
[address]  [phone]    Patient was here today for mind stream comprehensive test it took the patient 78 minutes to complete

## 2021-04-12 ENCOUNTER — OFFICE VISIT (OUTPATIENT)
Dept: FAMILY MEDICINE CLINIC | Facility: CLINIC | Age: 86
End: 2021-04-12
Payer: COMMERCIAL

## 2021-04-12 VITALS
HEART RATE: 79 BPM | BODY MASS INDEX: 33.19 KG/M2 | DIASTOLIC BLOOD PRESSURE: 78 MMHG | HEIGHT: 61 IN | WEIGHT: 175.8 LBS | SYSTOLIC BLOOD PRESSURE: 142 MMHG | OXYGEN SATURATION: 97 % | TEMPERATURE: 97.6 F

## 2021-04-12 DIAGNOSIS — F32.A DEPRESSION, UNSPECIFIED DEPRESSION TYPE: ICD-10-CM

## 2021-04-12 DIAGNOSIS — I10 BENIGN ESSENTIAL HYPERTENSION: Chronic | ICD-10-CM

## 2021-04-12 DIAGNOSIS — E78.2 MIXED HYPERLIPIDEMIA: ICD-10-CM

## 2021-04-12 DIAGNOSIS — E11.9 TYPE 2 DIABETES MELLITUS WITHOUT COMPLICATION, WITHOUT LONG-TERM CURRENT USE OF INSULIN (HCC): ICD-10-CM

## 2021-04-12 DIAGNOSIS — R35.0 URINARY FREQUENCY: Primary | ICD-10-CM

## 2021-04-12 PROCEDURE — 1160F RVW MEDS BY RX/DR IN RCRD: CPT | Performed by: NURSE PRACTITIONER

## 2021-04-12 PROCEDURE — 99214 OFFICE O/P EST MOD 30 MIN: CPT | Performed by: NURSE PRACTITIONER

## 2021-04-12 NOTE — PROGRESS NOTES
[facility]    ASSESSMENT AND PLAN     1  Urinary frequency   patient unable to provide urine sample today  Sent home with supplies  She will return urine to lab either today or tomorrow  Will treat if indicated  - POCT urine dip auto non-scope  - UA w Reflex to Microscopic w Reflex to Culture -Lab Collect    2  Type 2 diabetes mellitus without complication, without long-term current use of insulin (HCC)    Stable  Last hemoglobin A1c in October:  6 1  Consider increased urination as above could be sugar related  Discussed with patient and daughter  Check renal function with CMP  Orders placed as below  - Comprehensive metabolic panel; Future  - HEMOGLOBIN A1C W/ EAG ESTIMATION; Future    3  Benign essential hypertension    Stable today  142/78  Compliant with Toprol  mg, amlodipine 10 and hydrochlorothiazide daily  No change in treatment today  - Comprehensive metabolic panel; Future    4  Mixed hyperlipidemia    Stable  Last lipid panel in October  Total:  190  HDL: 59, LDL: 116, trigs:  77   Compliant with atorvastatin 10  Fasting orders placed  Dose adjust if indicated    - Lipid panel; Future    5  Depression, unspecified depression type   compliant with sertraline 75 daily  Denies depression today  Continues following with geriatric Medicine Dr Beyer with her daughter  Daughter present today        SUBJECTIVE       Patient ID: Meera Santizo is a 80 y o  female  Chief Complaint   Patient presents with    Possible UTI     Urinary frequency       HISTORY OF PRESENT ILLNESS    Patient presents today with complaint of urinary frequency  Symptoms present for the last days to weeks  Complains of just increased frequency  No burning or pain with urination  No suprapubic pain  No low back pain    No blood noted in urine        The following portions of the patient's history were reviewed and updated as appropriate: allergies, current medications, past family history, past medical history, past social history, past surgical history and problem list     REVIEW OF SYSTEMS  Review of Systems   Constitutional: Negative  HENT: Negative  Respiratory: Negative  Cardiovascular: Negative  Gastrointestinal: Negative  Genitourinary: Positive for frequency  Negative for decreased urine volume, difficulty urinating, dysuria, flank pain, hematuria, urgency and vaginal pain  Musculoskeletal: Negative  Neurological: Negative  Psychiatric/Behavioral: Negative  OBJECTIVE      VITAL SIGNS  /78 (BP Location: Right arm)   Pulse 79   Temp 97 6 °F (36 4 °C)   Ht 5' 1 42" (1 56 m)   Wt 79 7 kg (175 lb 12 8 oz)   SpO2 97%   BMI 32 77 kg/m²     CURRENT MEDICATIONS    Current Outpatient Medications:     amLODIPine (NORVASC) 5 mg tablet, TAKE 1 TABLET BY MOUTH EVERY DAY, Disp: 90 tablet, Rfl: 1    amoxicillin (AMOXIL) 500 mg capsule, TAKE 4 CAPSULES BY MOUTH 1 HOUR PRIOR TO DENTAL TREATMENT , Disp: , Rfl:     atorvastatin (LIPITOR) 10 mg tablet, Take 10 mg by mouth daily, Disp: , Rfl:     Cholecalciferol (CVS VITAMIN D) 2000 units CAPS, Take 1 capsule by mouth daily, Disp: , Rfl:     hydrochlorothiazide (HYDRODIURIL) 25 mg tablet, Take 1 tablet (25 mg total) by mouth daily, Disp: 90 tablet, Rfl: 0    metoprolol succinate (TOPROL-XL) 100 mg 24 hr tablet, Take 1 tablet (100 mg total) by mouth every evening, Disp: 90 tablet, Rfl: 1    sertraline (ZOLOFT) 50 mg tablet, Take 1 5 tablets (75 mg total) by mouth daily, Disp: 135 tablet, Rfl: 0      PHYSICAL EXAMINATION   Physical Exam  Vitals signs and nursing note reviewed  Constitutional:       General: She is not in acute distress  Appearance: Normal appearance  She is not ill-appearing  Cardiovascular:      Rate and Rhythm: Normal rate and regular rhythm  Pulmonary:      Effort: Pulmonary effort is normal  No respiratory distress  Breath sounds: Normal breath sounds and air entry  Abdominal:      Tenderness: There is no abdominal tenderness  There is no right CVA tenderness or left CVA tenderness  Musculoskeletal:      Right lower leg: No edema  Left lower leg: No edema  Neurological:      Mental Status: She is alert and oriented to person, place, and time     Psychiatric:         Attention and Perception: Attention normal          Mood and Affect: Mood normal          Speech: Speech normal          Behavior: Behavior normal

## 2021-04-13 ENCOUNTER — APPOINTMENT (OUTPATIENT)
Dept: LAB | Facility: CLINIC | Age: 86
End: 2021-04-13
Payer: COMMERCIAL

## 2021-04-13 LAB
BACTERIA UR QL AUTO: NORMAL /HPF
BILIRUB UR QL STRIP: NEGATIVE
CLARITY UR: CLEAR
COLOR UR: YELLOW
GLUCOSE UR STRIP-MCNC: NEGATIVE MG/DL
HGB UR QL STRIP.AUTO: NEGATIVE
KETONES UR STRIP-MCNC: NEGATIVE MG/DL
LEUKOCYTE ESTERASE UR QL STRIP: ABNORMAL
NITRITE UR QL STRIP: NEGATIVE
NON-SQ EPI CELLS URNS QL MICRO: NORMAL /HPF
PH UR STRIP.AUTO: 6 [PH]
PROT UR STRIP-MCNC: NEGATIVE MG/DL
RBC #/AREA URNS AUTO: NORMAL /HPF
SP GR UR STRIP.AUTO: 1.02 (ref 1–1.03)
UROBILINOGEN UR QL STRIP.AUTO: 0.2 E.U./DL
WBC #/AREA URNS AUTO: NORMAL /HPF

## 2021-04-13 PROCEDURE — 81001 URINALYSIS AUTO W/SCOPE: CPT | Performed by: NURSE PRACTITIONER

## 2021-04-22 ENCOUNTER — APPOINTMENT (OUTPATIENT)
Dept: LAB | Facility: CLINIC | Age: 86
End: 2021-04-22
Payer: COMMERCIAL

## 2021-04-22 DIAGNOSIS — E11.9 TYPE 2 DIABETES MELLITUS WITHOUT COMPLICATION, WITHOUT LONG-TERM CURRENT USE OF INSULIN (HCC): ICD-10-CM

## 2021-04-22 DIAGNOSIS — I10 BENIGN ESSENTIAL HYPERTENSION: Chronic | ICD-10-CM

## 2021-04-22 DIAGNOSIS — E78.2 MIXED HYPERLIPIDEMIA: ICD-10-CM

## 2021-04-22 LAB
ALBUMIN SERPL BCP-MCNC: 3.5 G/DL (ref 3.5–5)
ALP SERPL-CCNC: 92 U/L (ref 46–116)
ALT SERPL W P-5'-P-CCNC: 17 U/L (ref 12–78)
ANION GAP SERPL CALCULATED.3IONS-SCNC: 6 MMOL/L (ref 4–13)
AST SERPL W P-5'-P-CCNC: 22 U/L (ref 5–45)
BILIRUB SERPL-MCNC: 0.65 MG/DL (ref 0.2–1)
BUN SERPL-MCNC: 12 MG/DL (ref 5–25)
CALCIUM SERPL-MCNC: 9.3 MG/DL (ref 8.3–10.1)
CHLORIDE SERPL-SCNC: 106 MMOL/L (ref 100–108)
CHOLEST SERPL-MCNC: 187 MG/DL (ref 50–200)
CO2 SERPL-SCNC: 30 MMOL/L (ref 21–32)
CREAT SERPL-MCNC: 0.76 MG/DL (ref 0.6–1.3)
EST. AVERAGE GLUCOSE BLD GHB EST-MCNC: 128 MG/DL
GFR SERPL CREATININE-BSD FRML MDRD: 71 ML/MIN/1.73SQ M
GLUCOSE P FAST SERPL-MCNC: 113 MG/DL (ref 65–99)
HBA1C MFR BLD: 6.1 %
HDLC SERPL-MCNC: 57 MG/DL
LDLC SERPL CALC-MCNC: 113 MG/DL (ref 0–100)
NONHDLC SERPL-MCNC: 130 MG/DL
POTASSIUM SERPL-SCNC: 3.5 MMOL/L (ref 3.5–5.3)
PROT SERPL-MCNC: 7.6 G/DL (ref 6.4–8.2)
SODIUM SERPL-SCNC: 142 MMOL/L (ref 136–145)
TRIGL SERPL-MCNC: 83 MG/DL

## 2021-04-22 PROCEDURE — 83036 HEMOGLOBIN GLYCOSYLATED A1C: CPT

## 2021-04-22 PROCEDURE — 80061 LIPID PANEL: CPT

## 2021-04-22 PROCEDURE — 36415 COLL VENOUS BLD VENIPUNCTURE: CPT

## 2021-04-22 PROCEDURE — 80053 COMPREHEN METABOLIC PANEL: CPT

## 2021-05-03 DIAGNOSIS — F32.A DEPRESSION, UNSPECIFIED DEPRESSION TYPE: ICD-10-CM

## 2021-05-19 DIAGNOSIS — I10 HYPERTENSION, UNSPECIFIED TYPE: ICD-10-CM

## 2021-05-19 DIAGNOSIS — I10 BENIGN ESSENTIAL HYPERTENSION: Chronic | ICD-10-CM

## 2021-05-19 RX ORDER — METOPROLOL SUCCINATE 100 MG/1
100 TABLET, EXTENDED RELEASE ORAL EVERY EVENING
Qty: 90 TABLET | Refills: 1 | Status: SHIPPED | OUTPATIENT
Start: 2021-05-19 | End: 2021-08-09

## 2021-05-19 RX ORDER — AMLODIPINE BESYLATE 5 MG/1
5 TABLET ORAL DAILY
Qty: 90 TABLET | Refills: 1 | Status: SHIPPED | OUTPATIENT
Start: 2021-05-19 | End: 2022-01-07

## 2021-06-01 ENCOUNTER — OFFICE VISIT (OUTPATIENT)
Dept: FAMILY MEDICINE CLINIC | Facility: CLINIC | Age: 86
End: 2021-06-01
Payer: COMMERCIAL

## 2021-06-01 VITALS
TEMPERATURE: 98.5 F | DIASTOLIC BLOOD PRESSURE: 74 MMHG | SYSTOLIC BLOOD PRESSURE: 144 MMHG | WEIGHT: 175 LBS | BODY MASS INDEX: 33.04 KG/M2 | OXYGEN SATURATION: 97 % | HEART RATE: 77 BPM | HEIGHT: 61 IN

## 2021-06-01 DIAGNOSIS — F41.0 PANIC ATTACKS: ICD-10-CM

## 2021-06-01 DIAGNOSIS — L02.01 CUTANEOUS ABSCESS OF FACE: Primary | ICD-10-CM

## 2021-06-01 PROCEDURE — 99213 OFFICE O/P EST LOW 20 MIN: CPT | Performed by: NURSE PRACTITIONER

## 2021-06-01 PROCEDURE — 1036F TOBACCO NON-USER: CPT | Performed by: NURSE PRACTITIONER

## 2021-06-01 PROCEDURE — 1160F RVW MEDS BY RX/DR IN RCRD: CPT | Performed by: NURSE PRACTITIONER

## 2021-06-01 RX ORDER — AMOXICILLIN AND CLAVULANATE POTASSIUM 875; 125 MG/1; MG/1
1 TABLET, FILM COATED ORAL EVERY 12 HOURS SCHEDULED
Qty: 20 TABLET | Refills: 0 | Status: SHIPPED | OUTPATIENT
Start: 2021-06-01 | End: 2021-06-11

## 2021-06-01 RX ORDER — HYDROXYZINE HYDROCHLORIDE 10 MG/1
10 TABLET, FILM COATED ORAL EVERY 6 HOURS PRN
Qty: 30 TABLET | Refills: 0 | Status: SHIPPED | OUTPATIENT
Start: 2021-06-01

## 2021-06-01 NOTE — PROGRESS NOTES
[facility]    ASSESSMENT AND PLAN     1  Cutaneous abscess of face    Assessment positive for moderate sized abscess, left brow bone, within the eyebrow itself  Not affecting the eye or vision  Small to moderate amount purulence drainage expressed today  Patient stated relief of pressure  Rx today for Augmentin times 10 days  Dose/use reviewed  Take with food  Maintain good hydration  Probiotic  Continue warm compresses throughout the day  S/S of infection reviewed:  Warmth, drainage, pain  Evaluation of any eye pain or visual disturbance immediately  Otherwise, follow-up with any further problems or concerns     note:  Patient daughter takes care of her, and will monitor closely    - amoxicillin-clavulanate (Augmentin) 875-125 mg per tablet; Take 1 tablet by mouth every 12 (twelve) hours for 10 days  Dispense: 20 tablet; Refill: 0    2  Panic attacks   note medical assistant BP on rooming significantly elevated  Repeat after 10- 15 minutes by me  normalized:  144/78  Patient has severe healthcare/visit anxiety  Daughter notes elevated same at dentist, to the point where sometimes they will not provide care that day  Will trial low-dose hydroxyzine 1 hour prior to any medical or dental visits  Advised daughter to trial at home first to ensure good response prior to utilizing it before leaving the house  Discussed may cause drowsiness     note: Compliant with daily BP meds:  Amlodipine 5, hydrochlorothiazide 25 daily  BP normally within normal limits per daughter  Continue to monitor closely    - hydrOXYzine HCL (ATARAX) 10 mg tablet; Take 1 tablet (10 mg total) by mouth every 6 (six) hours as needed for itching or anxiety  Dispense: 30 tablet; Refill: 0            SUBJECTIVE       Patient ID: Jane Eli is a 80 y o  female      Chief Complaint   Patient presents with    Follow-up     Sore on left eyebrow       HISTORY OF PRESENT ILLNESS     Patient presents today with complaint lump, on brow bone above left eye  Has been present for the last several days  Notes some pressure and pain in that area, but denies any eye pain  No change in vision  No eye drainage  She has been using warm compresses to the lump  No drainage  Denies any recent injury or trauma to the area        The following portions of the patient's history were reviewed and updated as appropriate: allergies, current medications, past family history, past medical history, past social history, past surgical history and problem list     REVIEW OF SYSTEMS  Review of Systems   Constitutional: Negative for fever  Eyes: Negative for photophobia, pain, discharge, redness, itching and visual disturbance  Lump in brow line above left eye as described in HPI   Respiratory: Negative  Cardiovascular: Negative  Psychiatric/Behavioral: The patient is nervous/anxious (healthcare anxiety today)          OBJECTIVE      VITAL SIGNS  /74 (BP Location: Right arm)   Pulse 77   Temp 98 5 °F (36 9 °C)   Ht 5' 1" (1 549 m)   Wt 79 4 kg (175 lb)   SpO2 97%   BMI 33 07 kg/m²     CURRENT MEDICATIONS    Current Outpatient Medications:     amLODIPine (NORVASC) 5 mg tablet, Take 1 tablet (5 mg total) by mouth daily, Disp: 90 tablet, Rfl: 1    amoxicillin (AMOXIL) 500 mg capsule, TAKE 4 CAPSULES BY MOUTH 1 HOUR PRIOR TO DENTAL TREATMENT , Disp: , Rfl:     atorvastatin (LIPITOR) 10 mg tablet, Take 10 mg by mouth daily, Disp: , Rfl:     Cholecalciferol (CVS VITAMIN D) 2000 units CAPS, Take 1 capsule by mouth daily, Disp: , Rfl:     hydrochlorothiazide (HYDRODIURIL) 25 mg tablet, Take 1 tablet (25 mg total) by mouth daily, Disp: 90 tablet, Rfl: 0    metoprolol succinate (TOPROL-XL) 100 mg 24 hr tablet, Take 1 tablet (100 mg total) by mouth every evening, Disp: 90 tablet, Rfl: 1    sertraline (ZOLOFT) 50 mg tablet, TAKE 1 AND 1/2 TABLETS BY MOUTH DAILY, Disp: 135 tablet, Rfl: 0    amoxicillin-clavulanate (Augmentin) 875-125 mg per tablet, Take 1 tablet by mouth every 12 (twelve) hours for 10 days, Disp: 20 tablet, Rfl: 0    hydrOXYzine HCL (ATARAX) 10 mg tablet, Take 1 tablet (10 mg total) by mouth every 6 (six) hours as needed for itching or anxiety, Disp: 30 tablet, Rfl: 0      PHYSICAL EXAMINATION   Physical Exam  Vitals signs reviewed  Constitutional:       General: She is not in acute distress  Appearance: Normal appearance  She is not ill-appearing  Eyes:      General: Lids are normal          Right eye: No foreign body, discharge or hordeolum  Left eye: No foreign body, discharge or hordeolum  Conjunctiva/sclera: Conjunctivae normal       Pupils: Pupils are equal, round, and reactive to light  Neurological:      Mental Status: She is alert and oriented to person, place, and time  Mental status is at baseline     Psychiatric:         Attention and Perception: Attention normal          Mood and Affect: Mood and affect normal          Speech: Speech normal          Behavior: Behavior normal

## 2021-07-12 ENCOUNTER — TELEPHONE (OUTPATIENT)
Dept: FAMILY MEDICINE CLINIC | Facility: CLINIC | Age: 86
End: 2021-07-12

## 2021-07-12 NOTE — TELEPHONE ENCOUNTER
Daughter Caesar Perez called for refill of Amlodipine  Informed her there is still a refill left on prescription and to contact pharmacy  Call back if any problems

## 2021-07-17 ENCOUNTER — APPOINTMENT (EMERGENCY)
Dept: RADIOLOGY | Facility: HOSPITAL | Age: 86
End: 2021-07-17
Payer: COMMERCIAL

## 2021-07-17 ENCOUNTER — APPOINTMENT (EMERGENCY)
Dept: CT IMAGING | Facility: HOSPITAL | Age: 86
End: 2021-07-17
Payer: COMMERCIAL

## 2021-07-17 ENCOUNTER — HOSPITAL ENCOUNTER (EMERGENCY)
Facility: HOSPITAL | Age: 86
Discharge: HOME | End: 2021-07-17
Attending: EMERGENCY MEDICINE | Admitting: EMERGENCY MEDICINE
Payer: COMMERCIAL

## 2021-07-17 VITALS
RESPIRATION RATE: 18 BRPM | OXYGEN SATURATION: 95 % | HEART RATE: 74 BPM | SYSTOLIC BLOOD PRESSURE: 170 MMHG | TEMPERATURE: 98.1 F | DIASTOLIC BLOOD PRESSURE: 71 MMHG

## 2021-07-17 DIAGNOSIS — S79.102A: Primary | ICD-10-CM

## 2021-07-17 PROCEDURE — 73700 CT LOWER EXTREMITY W/O DYE: CPT

## 2021-07-17 PROCEDURE — 99284 EMERGENCY DEPT VISIT MOD MDM: CPT

## 2021-07-17 PROCEDURE — 73560 X-RAY EXAM OF KNEE 1 OR 2: CPT

## 2021-07-17 PROCEDURE — 99284 EMERGENCY DEPT VISIT MOD MDM: CPT | Performed by: PHYSICIAN ASSISTANT

## 2021-07-17 RX ORDER — ACETAMINOPHEN 325 MG/1
650 TABLET ORAL EVERY 6 HOURS PRN
Status: DISCONTINUED | OUTPATIENT
Start: 2021-07-17 | End: 2021-07-17 | Stop reason: HOSPADM

## 2021-07-17 RX ADMIN — ACETAMINOPHEN 650 MG: 325 TABLET, FILM COATED ORAL at 17:37
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
## 2021-07-18 NOTE — DISCHARGE INSTRUCTIONS
Fracture distal left posterior femoral metaphysis   -Rest, ice, elevate lower extremity  -keep knee immobilizer on left lower extremity  -Use wheelchair, non-weight bearing  -Tylenol and ibuprofen for pain control  -Follow-up with Orthopedics/knee surgeon on Monday

## 2021-07-25 ENCOUNTER — HOSPITAL ENCOUNTER (OUTPATIENT)
Dept: NUCLEAR MEDICINE | Facility: HOSPITAL | Age: 86
Discharge: HOME | End: 2021-07-25
Payer: COMMERCIAL

## 2021-07-25 DIAGNOSIS — M25.562 PAIN IN LEFT KNEE: ICD-10-CM

## 2021-07-25 PROCEDURE — 78315 BONE IMAGING 3 PHASE: CPT

## 2021-07-25 PROCEDURE — A9503 TC99M MEDRONATE: HCPCS

## 2021-07-25 PROCEDURE — G1004 CDSM NDSC: HCPCS

## 2021-07-28 DIAGNOSIS — F32.A DEPRESSION, UNSPECIFIED DEPRESSION TYPE: ICD-10-CM

## 2021-08-09 DIAGNOSIS — I10 BENIGN ESSENTIAL HYPERTENSION: Chronic | ICD-10-CM

## 2021-08-09 RX ORDER — METOPROLOL SUCCINATE 100 MG/1
TABLET, EXTENDED RELEASE ORAL
Qty: 90 TABLET | Refills: 1 | Status: SHIPPED | OUTPATIENT
Start: 2021-08-09 | End: 2022-02-24

## 2021-08-11 ENCOUNTER — TELEPHONE (OUTPATIENT)
Dept: HEMATOLOGY ONCOLOGY | Facility: CLINIC | Age: 86
End: 2021-08-11

## 2021-08-11 NOTE — TELEPHONE ENCOUNTER
Patient's daughter Caesar Perez called to cancel appt for 8-16-21 with Michell Gill  Patient broke her Femur   Patient's daughter will call back to reschedule

## 2021-08-16 ENCOUNTER — OFFICE VISIT (OUTPATIENT)
Dept: GERIATRICS | Age: 86
End: 2021-08-16
Payer: COMMERCIAL

## 2021-08-16 VITALS
OXYGEN SATURATION: 95 % | BODY MASS INDEX: 31.76 KG/M2 | TEMPERATURE: 96.4 F | HEIGHT: 62 IN | WEIGHT: 172.6 LBS | DIASTOLIC BLOOD PRESSURE: 76 MMHG | RESPIRATION RATE: 18 BRPM | HEART RATE: 61 BPM | SYSTOLIC BLOOD PRESSURE: 158 MMHG

## 2021-08-16 DIAGNOSIS — K21.9 GASTROESOPHAGEAL REFLUX DISEASE WITHOUT ESOPHAGITIS: ICD-10-CM

## 2021-08-16 DIAGNOSIS — G30.9 MIXED DEMENTIA (HCC): ICD-10-CM

## 2021-08-16 DIAGNOSIS — F03.90: Primary | ICD-10-CM

## 2021-08-16 DIAGNOSIS — E78.2 MIXED HYPERLIPIDEMIA: ICD-10-CM

## 2021-08-16 DIAGNOSIS — E55.9 VITAMIN D DEFICIENCY: Chronic | ICD-10-CM

## 2021-08-16 DIAGNOSIS — M81.0 AGE-RELATED OSTEOPOROSIS WITHOUT CURRENT PATHOLOGICAL FRACTURE: Chronic | ICD-10-CM

## 2021-08-16 DIAGNOSIS — R73.03 PREDIABETES: ICD-10-CM

## 2021-08-16 DIAGNOSIS — F01.50 MIXED DEMENTIA (HCC): ICD-10-CM

## 2021-08-16 DIAGNOSIS — F02.80 MIXED DEMENTIA (HCC): ICD-10-CM

## 2021-08-16 DIAGNOSIS — I10 BENIGN ESSENTIAL HYPERTENSION: Chronic | ICD-10-CM

## 2021-08-16 DIAGNOSIS — F32.5 MAJOR DEPRESSIVE DISORDER WITH SINGLE EPISODE, IN FULL REMISSION (HCC): ICD-10-CM

## 2021-08-16 DIAGNOSIS — F51.01 PRIMARY INSOMNIA: ICD-10-CM

## 2021-08-16 PROCEDURE — 99214 OFFICE O/P EST MOD 30 MIN: CPT | Performed by: INTERNAL MEDICINE

## 2021-08-16 PROCEDURE — 1160F RVW MEDS BY RX/DR IN RCRD: CPT | Performed by: INTERNAL MEDICINE

## 2021-08-16 PROCEDURE — 1036F TOBACCO NON-USER: CPT | Performed by: INTERNAL MEDICINE

## 2021-08-16 NOTE — PROGRESS NOTES
[facility]  Follow-up  [address] [address]  [phone]    NAME: Domingo Marsh  AGE: 80 y o  SEX: female    DATE OF ENCOUNTER: 8/16/2021    Assessment and Plan     Problem List Items Addressed This Visit        Digestive    Gastroesophageal reflux disease without esophagitis       Cardiovascular and Mediastinum    Benign essential hypertension (Chronic)    Mixed dementia (HCC)       Musculoskeletal and Integument    Age related osteoporosis (Chronic)       Other    Vitamin D deficiency (Chronic)    Prediabetes    Mixed hyperlipidemia    Memory loss - Primary    Major depressive disorder with single episode, in full remission ([facility] [address] )    Primary insomnia        Dementia  - MoCA was 20/30 at last visit and is 16/30 today  -Progressive, most likely mixed (Alzheimers + Vascular)  - patient encouraged to stay active physically, mentally and socially  -  Patient is currently not driving  - current plan discuss at length with patient and patient's daughter, Wing Villalpando, all questions were answered  - patient will return in 6 months and family will call in interim with questions or concerns  - maintain good control of all chronic medical conditions with close follow up with PCP and specialist  - TUG: not done today as patient's left leg in a cast  - Medications reviewed, appears appropriate at this time     Insomnia  - improved since last visit   - patient started on Melatonin OTC and it has been working well      Major depressive disorder   - Currently on Zoloft 75mg once daily   - Denies any suicidal or homicidal ideations  - GDS: 5/15 however states multiple times during her visit that she is happy to be alive and that she is grateful       Mixed hyperlipidemia  - Lipid panel done April 2021 in showed Total cholesterol of 187, HDL: 57, LDL: 113 and TAG of 83  - continue Atorvastatin 10mg once daily     Vitamin D deficiency  - continue Vitamin d 2000 units daily      Age-related osteoporosis without current pathological fracture  - no recent dexa seen   - continue Vitamin d supplementation  - follow up with PCP for further management     Gastroesophageal reflux disease without esophagitis  - currently stable, patient is not complaining of any symptoms  - continue to monitor      Prediabetes   -hgba1c: 6 1 in 4/22/2021  - currently diet controlled  - continue management with PCP  - Avoid tight glucose control       - Counseling Documentation: patient was counseled regarding: instructions for management, prognosis, patient and family education, impressions and No changes to management at this time  Follow up in 6 months  - Counseling Time: counseling time more than 50% of visit: 20 minutes    Chief Complaint     Chief Complaint   Patient presents with    Follow-up     6 month        History of Present Illness       We had the pleasure of evaluating  Matthew Colon who is a 80 y o  female  in Geriatric follow-up today  Since our last follow-up she was in the [facility] ED on 7/17 for a physeal fracture of the distal end of the left femur  She followed up with orthopedics who note that she does not need surgery  She was given a split and is following up appropriately (per daughter)  This fracture was secondary to a fall that she had in the bathroom in the middle of the night  Patient does not remember what happened or whether or not she felt dizzy/lightheaded or if she tripped over something in the bathroom  has not had any hospitalizations and has not had medication changes or major life events  Memory issues were first noticed byfamily  They have been present since >2018 (patient started being evaluated here in 2018) and include  short term memory loss  Symptoms mainly affect short term memory loss, and have worsened  Daughter Ebenezer Curry (lives with patient) notes that patient seems to be gradually declining  Matthew Colon  does not  Drive   She does not manage her own affairs such as balancing her checkbook, paying bills, and managing investments  Daugther does not notice changes in her mood or personality and has been treated in the past for depression (currently on Zoloft)  no note any hearing or vision issues and no report any sleep issues  They yes have a living will and yes have an appointed POA  (This history was obtained from daughter  Patient states that she was driving up until her leg was in a splint and that she continues to manage her own financing)  Family members accompanying the patient today include daughterDe  They report the patients behaviors include the following: gradual decline in memory  Family Notes Behaviors wandering  The following portions of the patient's history were reviewed and updated as appropriate: allergies, current medications, past family history, past medical history, past social history, past surgical history and problem list       Review of Systems     Review of Systems   Constitutional: Positive for unexpected weight change (patient has had 5 lb weight loss in last 6 months)  Negative for chills and fever  Eyes: Negative for visual disturbance  Respiratory: Negative for cough, choking, shortness of breath and wheezing  Cardiovascular: Negative for chest pain and palpitations  Gastrointestinal: Negative for abdominal pain, constipation, diarrhea, nausea and vomiting  Genitourinary: Negative for difficulty urinating and dysuria  Musculoskeletal: Positive for gait problem (currently in splint)  Neurological: Negative for dizziness, weakness and headaches  Psychiatric/Behavioral: Negative for suicidal ideas         Active Problem List     Patient Active Problem List   Diagnosis    Prediabetes    Benign essential hypertension    Mixed hyperlipidemia    Gastroesophageal reflux disease without esophagitis    Leiomyoma of uterus    Pancreatic cyst    Right bundle-branch block    Resting tremor    Closed fracture of right hip with routine healing    Vitamin D deficiency    Memory loss    Age related osteoporosis    Constipation, slow transit    Major depressive disorder with single episode, in full remission ([facility] )    Cognitive impairment    Mixed dementia ([facility] )    Primary insomnia       Objective     /76 (BP Location: Left arm, Patient Position: Sitting, Cuff Size: Adult)   Pulse 61   Temp (!) 96 4 °F (35 8 °C) (Temporal)   Resp 18   Ht 5' 2" (1 575 m)   Wt 78 3 kg (172 lb 9 6 oz)   SpO2 95%   BMI 31 57 kg/m²     Physical Exam  Constitutional:       General: She is awake  She is not in acute distress  Appearance: Normal appearance  She is well-developed  Eyes:      Pupils: Pupils are equal, round, and reactive to light  Cardiovascular:      Rate and Rhythm: Normal rate  Heart sounds: Normal heart sounds  Pulmonary:      Effort: Pulmonary effort is normal       Breath sounds: Normal breath sounds  Musculoskeletal:      Left lower leg: No edema  Comments: Left leg in splint   Neurological:      Mental Status: She is alert  She is disoriented  Cranial Nerves: Cranial nerves are intact  No cranial nerve deficit, dysarthria or facial asymmetry  Motor: No weakness or abnormal muscle tone  Gait: Gait abnormal (currently in split, using walker for balance)  Comments: On MoCA not oriented to day or year  She is oriented to person and place  Psychiatric:         Attention and Perception: Attention normal          Mood and Affect: Mood normal          Speech: Speech normal          Behavior: Behavior is cooperative  Cognition and Memory: She exhibits impaired recent memory           Pertinent Laboratory/Diagnostic Studies:  Chemistry Profile:   Lab Results   Component Value Date/Time     12/21/2015 02:08 PM    K 3 5 04/22/2021 08:06 AM    K 3 8 12/21/2015 02:08 PM     04/22/2021 08:06 AM     12/21/2015 02:08 PM    CO2 30 04/22/2021 08:06 AM    CO2 30 12/21/2015 02:08 PM ANIONGAP 7 12/21/2015 02:08 PM    BUN 12 04/22/2021 08:06 AM    BUN 9 12/21/2015 02:08 PM    CREATININE 0 76 04/22/2021 08:06 AM    CREATININE 0 64 12/21/2015 02:08 PM    GLUC 180 (H) 07/11/2019 05:32 AM    GLUF 113 (H) 04/22/2021 08:06 AM    GLUCOSE 96 12/21/2015 02:08 PM    CALCIUM 9 3 04/22/2021 08:06 AM    CALCIUM 9 4 12/21/2015 02:08 PM    MG 2 0 07/09/2019 07:15 PM    MG 1 9 04/20/2015 05:43 AM    PHOS 3 2 04/20/2015 05:43 AM    AST 22 04/22/2021 08:06 AM    AST 30 12/21/2015 02:08 PM    ALT 17 04/22/2021 08:06 AM    ALT 24 12/21/2015 02:08 PM    ALKPHOS 92 04/22/2021 08:06 AM    ALKPHOS 110 12/21/2015 02:08 PM    PROT 7 4 12/21/2015 02:08 PM    BILITOT 1 04 (H) 12/21/2015 02:08 PM    EGFR 71 04/22/2021 08:06 AM       Current Medications     Current Outpatient Medications:     amLODIPine (NORVASC) 5 mg tablet, Take 1 tablet (5 mg total) by mouth daily, Disp: 90 tablet, Rfl: 1    amoxicillin (AMOXIL) 500 mg capsule, TAKE 4 CAPSULES BY MOUTH 1 HOUR PRIOR TO DENTAL TREATMENT , Disp: , Rfl:     Cholecalciferol ([employer] VITAMIN D) 2000 units CAPS, Take 1 capsule by mouth daily, Disp: , Rfl:     hydrochlorothiazide (HYDRODIURIL) 25 mg tablet, Take 1 tablet (25 mg total) by mouth daily, Disp: 90 tablet, Rfl: 0    hydrOXYzine HCL (ATARAX) 10 mg tablet, Take 1 tablet (10 mg total) by mouth every 6 (six) hours as needed for itching or anxiety, Disp: 30 tablet, Rfl: 0    metoprolol succinate (TOPROL-XL) 100 mg 24 hr tablet, TAKE 1 TABLET BY MOUTH EVERY EVENING, Disp: 90 tablet, Rfl: 1    sertraline (ZOLOFT) 50 mg tablet, TAKE 1 AND 1/2 TABLETS BY MOUTH DAILY, Disp: 135 tablet, Rfl: 0    atorvastatin (LIPITOR) 10 mg tablet, Take 10 mg by mouth daily (Patient not taking: Reported on 8/16/2021), Disp: , Rfl:     Health Maintenance     Health Maintenance   Topic Date Due    URINE MICROALBUMIN  06/25/2021    Influenza Vaccine (1) 09/01/2021    DM Eye Exam  09/04/2021    Fall Risk  09/08/2021    Medicare Annual Wellness Visit (AWV)  09/08/2021    BMI: Followup Plan  09/08/2021    Depression Remission PHQ  09/08/2021    Diabetic Foot Exam  10/19/2021    HEMOGLOBIN A1C  10/22/2021    BMI: Adult  06/01/2022    DTaP,Tdap,and Td Vaccines (3 - Td or Tdap) 09/20/2022    Pneumococcal Vaccine: 65+ Years  Completed    COVID-19 Vaccine  Completed    HIB Vaccine  Aged Out    Hepatitis B Vaccine  Aged Out    IPV Vaccine  Aged Out    Hepatitis A Vaccine  Aged Out    Meningococcal ACWY Vaccine  Aged Out    HPV Vaccine  Aged [employer] History   Administered Date(s) Administered    INFLUENZA 01/01/2007, 11/04/2018    Influenza Injectable, MDCK, Preservative Free, Quadrivalent 11/07/2019    Influenza Split High Dose Preservative Free IM 05/11/2016, 10/17/2016    Influenza, high dose seasonal 0 7 mL 11/04/2018, 10/19/2020    Influenza, seasonal, injectable 10/06/2014    Influenza, seasonal, injectable, preservative free 10/01/2012, 10/09/2013    Pneumococcal Conjugate 13-Valent 12/18/2014    Pneumococcal Polysaccharide PPV23 01/01/2006, 10/01/2012    SARS-CoV-2 / COVID-19 mRNA IM (Colton Feng) 01/21/2021, 02/19/2021    Tdap 03/01/2012, 09/20/2012

## 2021-08-16 NOTE — PATIENT INSTRUCTIONS
- Continue to stay active physically, mentally and socially  - Follow up with specialist and PCP periodically for management of your chronic conditions and preventative testing    - Contact PCP prior to starting Over the counter medications  Avoid OTC medications that can affect cognition including Benadryl, Tyelnol PM, etc    - Consider brain stimulating activities including crossword puzzles, word search, reading or even learning a new hobby  - Maintain a well balanced diet  Incorporate lots of vegetables and fruits in your diet  - Follow up in 6 month  If symptoms worsen or new symptoms arise, please inform provider    Things that we know are helpful for thinking and memory   1  Exercise program: gradually increase your physical activity over time  Start small and be patient  Aerobic (cardio) activity is best but incorporate balance, strength and flexibility training as well    a  Try to get at least 30min 3 times per week   2  Diet: Mediterranean diet (colorful fruits and vegetables, olive oil, fish, whole grains, very little red meet is any), MIND diet, anti-inflammatory diet  a  Stay well hydrated: drink 6-8 glasses of water per day   b  What's good for your heart is good for your brain  c  Avoid high-salt foods   3  Sleep: aim for at least 7-8 hours per night   a  Avoid alcohol and medications like Benadryl (Tylenol PM) or other sedating drugs  4  Stress management/mindfulness practice:   a  Talk with our social workers about finding a cognitive behavioral therapists  b  Try a smart phone annabella like [employer] or [employer] for beginners   i  Try curable for pain    c  Take a course in Mindfulness Based Stress Reduction (MBSR)   brendon [place]  Read or listen to an audiobook about it:  i  Mindfulness for beginners   ii  10% happier  iii  The happiness advantage   5   Social engagement:   a  Stay in touch with family and friends   b  Plan a few specific activities for your social health every week   c  Join a local support group

## 2021-08-16 NOTE — PROGRESS NOTES
[address]  [phone]  Social Work Follow-Up    LCSW met with Ra Viveros for follow up visit  [place] Cognitive Assessment completed today, score 16/30 (previously 20/30 in October 2020), as well as Geriatric Depression Screen, 5/15 (previously 1/15 in February 2021)  [place] Cognitive Assessment (MoCA) Version 8 2  Education: High School    Points Earned POSSIBLE Points   Visuospatial/Executive   Alternating [place] Making 0 1   Visuoconstructional skills 0 1   Visuoconstructional skills (clock) 2 3   Naming   Naming Animals 3 3   Attention   Digit Span 1 2   Vigilance (letters) 1 1   Serial 7 subtraction 3 3   Language   Sentence Repetition 1 2   Verbal fluency 1 1   Abstraction   Abstraction (word pairings) 0 2   Delayed recall   Delayed recall 0 5   Memory index score: 3/15   Orientation   Orientation 3 6   TOTAL SCORE: 16/30  (Normal ?26/30)   Additional notes:         LCSW also met with Hilary's daughter, Mahesh Leong, who reports no concerns today  She describes that things are going well  Her sister has been caring for Vani hCen once a month for about a week which has been a positive arrangement for the family  Mahesh Leong is aware of caregiver support groups, however, feels very supported by family and friends  She reports that Sharmins mood has been good and she is eating well  Caregiver Profile  Do you understand Alzheimer's disease and other dementias? Yes   Are you able and willing to provide care and/or assistance? Yes   Do you know where you can receive support as a caregiver? Yes     LCSW to remain available as needed

## 2021-08-24 ENCOUNTER — TELEPHONE (OUTPATIENT)
Dept: SURGICAL ONCOLOGY | Facility: CLINIC | Age: 86
End: 2021-08-24

## 2021-08-24 NOTE — TELEPHONE ENCOUNTER
Rescheduled follow up with Jorge White for Dec 3rd at 3 PM  Transferred to central scheduling to reschedule MRI

## 2021-09-08 ENCOUNTER — RA CDI HCC (OUTPATIENT)
Dept: OTHER | Facility: HOSPITAL | Age: 86
End: 2021-09-08

## 2021-09-08 NOTE — PROGRESS NOTES
[facility] [[email or web address]  coding opportunities       Chart reviewed, no opportunity found: CHART REVIEWED, NO OPPORTUNITY FOUND                        Patients insurance company: [employer] (Medicare Advantage and Commercial)

## 2021-09-10 ENCOUNTER — TELEPHONE (OUTPATIENT)
Dept: SURGICAL ONCOLOGY | Facility: HOSPITAL | Age: 86
End: 2021-09-10

## 2021-09-10 NOTE — TELEPHONE ENCOUNTER
Called to remind Fabian Nguyen of her mother's MRI that needs to be scheduled for apt with Mony Michelle in Dec  Transferred to central scheduling

## 2021-09-28 ENCOUNTER — OFFICE VISIT (OUTPATIENT)
Dept: FAMILY MEDICINE CLINIC | Facility: CLINIC | Age: 86
End: 2021-09-28
Payer: COMMERCIAL

## 2021-09-28 VITALS
HEIGHT: 61 IN | WEIGHT: 176 LBS | DIASTOLIC BLOOD PRESSURE: 74 MMHG | TEMPERATURE: 98.5 F | SYSTOLIC BLOOD PRESSURE: 146 MMHG | BODY MASS INDEX: 33.23 KG/M2 | OXYGEN SATURATION: 96 % | HEART RATE: 65 BPM

## 2021-09-28 DIAGNOSIS — G30.9 MIXED DEMENTIA (HCC): ICD-10-CM

## 2021-09-28 DIAGNOSIS — Z23 NEED FOR INFLUENZA VACCINATION: ICD-10-CM

## 2021-09-28 DIAGNOSIS — F02.80 MIXED DEMENTIA (HCC): ICD-10-CM

## 2021-09-28 DIAGNOSIS — I10 BENIGN ESSENTIAL HYPERTENSION: ICD-10-CM

## 2021-09-28 DIAGNOSIS — E78.2 MIXED HYPERLIPIDEMIA: ICD-10-CM

## 2021-09-28 DIAGNOSIS — E11.9 TYPE 2 DIABETES MELLITUS WITHOUT COMPLICATION, WITHOUT LONG-TERM CURRENT USE OF INSULIN (HCC): ICD-10-CM

## 2021-09-28 DIAGNOSIS — Z00.00 MEDICARE ANNUAL WELLNESS VISIT, SUBSEQUENT: ICD-10-CM

## 2021-09-28 DIAGNOSIS — K21.9 GASTROESOPHAGEAL REFLUX DISEASE WITHOUT ESOPHAGITIS: ICD-10-CM

## 2021-09-28 DIAGNOSIS — F01.50 MIXED DEMENTIA (HCC): ICD-10-CM

## 2021-09-28 DIAGNOSIS — F32.A DEPRESSION, UNSPECIFIED DEPRESSION TYPE: Primary | ICD-10-CM

## 2021-09-28 PROCEDURE — G0008 ADMIN INFLUENZA VIRUS VAC: HCPCS | Performed by: NURSE PRACTITIONER

## 2021-09-28 PROCEDURE — 90662 IIV NO PRSV INCREASED AG IM: CPT | Performed by: NURSE PRACTITIONER

## 2021-09-28 PROCEDURE — G0439 PPPS, SUBSEQ VISIT: HCPCS | Performed by: NURSE PRACTITIONER

## 2021-09-28 PROCEDURE — 99214 OFFICE O/P EST MOD 30 MIN: CPT | Performed by: NURSE PRACTITIONER

## 2021-09-28 RX ORDER — ATORVASTATIN CALCIUM 10 MG/1
10 TABLET, FILM COATED ORAL DAILY
Qty: 90 TABLET | Refills: 1 | Status: SHIPPED | OUTPATIENT
Start: 2021-09-28 | End: 2022-03-30

## 2021-09-28 RX ORDER — DESLORATADINE 5 MG/1
TABLET ORAL AS NEEDED
COMMUNITY

## 2021-12-01 ENCOUNTER — TELEPHONE (OUTPATIENT)
Dept: SURGICAL ONCOLOGY | Facility: CLINIC | Age: 86
End: 2021-12-01

## 2021-12-05 DIAGNOSIS — F32.A DEPRESSION, UNSPECIFIED DEPRESSION TYPE: ICD-10-CM

## 2022-01-07 DIAGNOSIS — I10 HYPERTENSION, UNSPECIFIED TYPE: ICD-10-CM

## 2022-01-07 RX ORDER — AMLODIPINE BESYLATE 5 MG/1
TABLET ORAL
Qty: 90 TABLET | Refills: 1 | Status: SHIPPED | OUTPATIENT
Start: 2022-01-07 | End: 2022-04-22

## 2022-02-21 ENCOUNTER — OFFICE VISIT (OUTPATIENT)
Dept: GERIATRICS | Age: 87
End: 2022-02-21
Payer: COMMERCIAL

## 2022-02-21 VITALS
SYSTOLIC BLOOD PRESSURE: 142 MMHG | TEMPERATURE: 97.7 F | HEART RATE: 62 BPM | DIASTOLIC BLOOD PRESSURE: 66 MMHG | HEIGHT: 61 IN | RESPIRATION RATE: 18 BRPM | BODY MASS INDEX: 34.17 KG/M2 | OXYGEN SATURATION: 98 % | WEIGHT: 181 LBS

## 2022-02-21 DIAGNOSIS — G30.9 MIXED DEMENTIA (HCC): Primary | ICD-10-CM

## 2022-02-21 DIAGNOSIS — F01.50 MIXED DEMENTIA (HCC): Primary | ICD-10-CM

## 2022-02-21 DIAGNOSIS — F02.80 MIXED DEMENTIA (HCC): Primary | ICD-10-CM

## 2022-02-21 DIAGNOSIS — F32.5 MAJOR DEPRESSIVE DISORDER WITH SINGLE EPISODE, IN FULL REMISSION (HCC): ICD-10-CM

## 2022-02-21 DIAGNOSIS — E55.9 VITAMIN D DEFICIENCY: Chronic | ICD-10-CM

## 2022-02-21 DIAGNOSIS — E78.2 MIXED HYPERLIPIDEMIA: ICD-10-CM

## 2022-02-21 DIAGNOSIS — I10 BENIGN ESSENTIAL HYPERTENSION: Chronic | ICD-10-CM

## 2022-02-21 DIAGNOSIS — M81.0 AGE-RELATED OSTEOPOROSIS WITHOUT CURRENT PATHOLOGICAL FRACTURE: Chronic | ICD-10-CM

## 2022-02-21 PROCEDURE — 1036F TOBACCO NON-USER: CPT | Performed by: INTERNAL MEDICINE

## 2022-02-21 PROCEDURE — 99215 OFFICE O/P EST HI 40 MIN: CPT | Performed by: INTERNAL MEDICINE

## 2022-02-21 PROCEDURE — 1160F RVW MEDS BY RX/DR IN RCRD: CPT | Performed by: INTERNAL MEDICINE

## 2022-02-21 NOTE — PROGRESS NOTES
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   TU seconds, increased risk for falls  Encouraged to continue maintaining a healthy lifestyle and staying active physically, mentally and socially   Current plan discuss at length with patient and patient's daughter, Moreno Pride, all questions were answered  Patient will return in 6 months and family will call in interim with questions or concerns  Current ADLS:   Patient does not drive, has assistance with her fiances, lives with her daugther, and have care and assistance with showering, dressing and cooking/cleaning  Medications reviewed and discussed  No changes made to medications today   Recommend continuing follow up with PCP regarding chronic medical conditions       2  Sleep Disorder/ Insomnia  · Worse since last visit, patient has increased night time wandering   Encourage regular Sleep hygiene   Follow up with PCP  Continue with OTC melatonin as needed     3  Major depressive disorder   · Currently on Zoloft 50mg once daily   · GDS: 5/15      4  GERD  Continue regular follow up with PCP  Encourage healthy diet and low sodium diet    5  Age-related osteoporosis  · Continue Vitamin d supplementation  · Follow up with PCP for further management      No orders of the defined types were placed in this encounter          Chief Complaint     Chief Complaint   Patient presents with    Geriatric Evaluation       History of Present Illness     I had the pleasure of evaluating  Meera Santizo who is a 80 y o  female  in Geriatric follow-up today  Since our last follow-up she has not had any falls, has not had any hospitalizations and has not had medication changes or major life events  Memory issues were first noticed byfamily  They have been present since prior to 2018, when patient had initially been evaluated here  Memory trouble includes  short term memory loss  Symptoms mainly affect short term memory loss, and have worsened  Daughter Silvio Smart (lives with patient) notes that patient seems to continue to gradually declining  Indra Chandler does not  drive, does  get lost in familiar settings, and has not had recent citations or accidents  She does not manage  own affairs such as balancing  checkbook, paying bills, and managing investments and does  have any difficulties with handling these financial affairs  does not notice changes in  own mood or personality and has been treated in the past for depression and is currently on Zoloft 50 mg daily  does not note any hearing or vision issues and does  report any sleep issues  They do have a living will and  have an appointed POA  On getting further history from family, patient has been declining, she wandering at night and has decreased sleep at night  She will take multiple naps in the daytime, and sleeps more In the day  No recent changes to personality or mood  Family has some concern she may have some hallucinations  Medications are managed by her daughter  She continues to thinks  is still alive, then gets upset when she is told he passed away  She still thinks her parents and grandparents are alive  In terms of her daily activites she continues to have a good appetite, she does not cook or clean  She does watch TV, reads newspaper and books  In terms of social activities, her daugther takes her out to eat, and to visit family  She continues to have some trouble with urinary  incontenence, No recent weight loss    She will go to her other daugther's house once a month for a few days, and does appear to be more confused afterwards  At her daughter's house, she has a chair lift in her back staircase   Family members accompanying the patient today include daughter, Payal Graves    They report the patients behaviors include the following: Family Notes Behaviors medication errors, wandering, driving and cooking  The following portions of the patient's history were reviewed and updated as appropriate: allergies, current medications, past family history, past medical history, past social history, past surgical history and problem list       Review of Systems     Review of Systems   Constitutional: Negative  Negative for activity change, appetite change, chills, fever and unexpected weight change  HENT: Negative  Eyes: Negative  Negative for photophobia and visual disturbance  Respiratory: Negative  Negative for chest tightness and shortness of breath  Cardiovascular: Negative  Negative for chest pain, palpitations and leg swelling  Musculoskeletal: Negative  Skin: Negative  Neurological: Negative  Psychiatric/Behavioral: Positive for confusion and hallucinations         Active Problem List     Patient Active Problem List   Diagnosis    Prediabetes    Benign essential hypertension    Mixed hyperlipidemia    Gastroesophageal reflux disease without esophagitis    Leiomyoma of uterus    Pancreatic cyst    Right bundle-branch block    Resting tremor    Closed fracture of right hip with routine healing    Vitamin D deficiency    Memory loss    Age related osteoporosis    Constipation, slow transit    Major depressive disorder with single episode, in full remission ([address] )    Cognitive impairment    Mixed dementia ([address] )    Primary insomnia       Objective     /66 (BP Location: Left arm, Patient Position: Sitting, Cuff Size: Standard)   Pulse 62   Temp 97 7 °F (36 5 °C) (Temporal)   Resp 18   Ht 5' 1" (1 549 m)   Wt 82 1 kg (181 lb) SpO2 98%   BMI 34 20 kg/m²     Physical Exam  Vitals and nursing note reviewed  Constitutional:       Appearance: Normal appearance  HENT:      Head: Normocephalic and atraumatic  Mouth/Throat:      Mouth: Mucous membranes are moist    Eyes:      Extraocular Movements: Extraocular movements intact  Pupils: Pupils are equal, round, and reactive to light  Cardiovascular:      Rate and Rhythm: Normal rate and regular rhythm  Pulses: Normal pulses  Pulmonary:      Effort: Pulmonary effort is normal  No respiratory distress  Breath sounds: Normal breath sounds  Abdominal:      General: Abdomen is flat  Bowel sounds are normal  There is no distension  Palpations: Abdomen is soft  Tenderness: There is no abdominal tenderness  Musculoskeletal:         General: Normal range of motion  Neurological:      General: No focal deficit present  Mental Status: She is alert  Cranial Nerves: No cranial nerve deficit  Motor: No weakness     Psychiatric:         Mood and Affect: Mood normal          Pertinent Laboratory/Diagnostic Studies:  CBC:   Lab Results   Component Value Date/Time    WBC 5 88 06/25/2020 10:29 AM    WBC 8 67 04/20/2015 05:43 AM    RBC 4 77 06/25/2020 10:29 AM    RBC 4 29 04/20/2015 05:43 AM    HGB 14 2 06/25/2020 10:29 AM    HGB 12 7 04/20/2015 05:43 AM    HCT 43 8 06/25/2020 10:29 AM    HCT 38 3 04/20/2015 05:43 AM    MCV 92 06/25/2020 10:29 AM    MCV 89 04/20/2015 05:43 AM    MCH 29 8 06/25/2020 10:29 AM    MCH 29 6 04/20/2015 05:43 AM    MCHC 32 4 06/25/2020 10:29 AM    MCHC 33 2 04/20/2015 05:43 AM    RDW 13 4 06/25/2020 10:29 AM    RDW 13 9 04/20/2015 05:43 AM    MPV 10 4 06/25/2020 10:29 AM    MPV 12 7 04/20/2015 05:43 AM     06/25/2020 10:29 AM    PLT Not reported du 04/20/2015 05:43 AM    NRBC 0 06/25/2020 10:29 AM    NEUTOPHILPCT 65 06/25/2020 10:29 AM    NEUTOPHILPCT 68 04/20/2015 05:43 AM    LYMPHOPCT 24 06/25/2020 10:29 AM    LYMPHOPCT 24 04/20/2015 05:43 AM    MONOPCT 8 06/25/2020 10:29 AM    MONOPCT 7 04/20/2015 05:43 AM    EOSPCT 1 06/25/2020 10:29 AM    EOSPCT 1 04/20/2015 05:43 AM    BASOPCT 1 06/25/2020 10:29 AM    BASOPCT 0 10/24/2013 10:02 AM    NEUTROABS 3 93 06/25/2020 10:29 AM    NEUTROABS 5 90 04/20/2015 05:43 AM    LYMPHSABS 1 40 06/25/2020 10:29 AM    LYMPHSABS 2 08 04/20/2015 05:43 AM    MONOSABS 0 44 06/25/2020 10:29 AM    MONOSABS 0 61 04/20/2015 05:43 AM    EOSABS 0 05 06/25/2020 10:29 AM    EOSABS 0 09 04/20/2015 05:43 AM     Chemistry Profile:   Lab Results   Component Value Date/Time     12/21/2015 02:08 PM    K 3 5 04/22/2021 08:06 AM    K 3 8 12/21/2015 02:08 PM     04/22/2021 08:06 AM     12/21/2015 02:08 PM    CO2 30 04/22/2021 08:06 AM    CO2 30 12/21/2015 02:08 PM    ANIONGAP 7 12/21/2015 02:08 PM    BUN 12 04/22/2021 08:06 AM    BUN 9 12/21/2015 02:08 PM    CREATININE 0 76 04/22/2021 08:06 AM    CREATININE 0 64 12/21/2015 02:08 PM    GLUC 180 (H) 07/11/2019 05:32 AM    GLUF 113 (H) 04/22/2021 08:06 AM    GLUCOSE 96 12/21/2015 02:08 PM    CALCIUM 9 3 04/22/2021 08:06 AM    CALCIUM 9 4 12/21/2015 02:08 PM    MG 2 0 07/09/2019 07:15 PM    MG 1 9 04/20/2015 05:43 AM    PHOS 3 2 04/20/2015 05:43 AM    AST 22 04/22/2021 08:06 AM    AST 30 12/21/2015 02:08 PM    ALT 17 04/22/2021 08:06 AM    ALT 24 12/21/2015 02:08 PM    ALKPHOS 92 04/22/2021 08:06 AM    ALKPHOS 110 12/21/2015 02:08 PM    PROT 7 4 12/21/2015 02:08 PM    BILITOT 1 04 (H) 12/21/2015 02:08 PM    EGFR 71 04/22/2021 08:06 AM     Urinalysis:   Lab Results   Component Value Date/Time    COLORU Yellow 04/13/2021 12:29 PM    COLORU Yellow 04/18/2015 09:32 PM    CLARITYU Clear 04/13/2021 12:29 PM    CLARITYU Clear 04/18/2015 09:32 PM    SPECGRAV 1 022 04/13/2021 12:29 PM    SPECGRAV 1 024 04/18/2015 09:32 PM    PHUR 6 0 04/13/2021 12:29 PM    PHUR 5 0 02/11/2017 06:25 AM    PHUR 6 0 04/18/2015 09:32 PM    LEUKOCYTESUR Small (A) 04/13/2021 12:29 PM LEUKOCYTESUR Small (A) 04/18/2015 09:32 PM    NITRITE Negative 04/13/2021 12:29 PM    NITRITE Negative 04/18/2015 09:32 PM    PROTEINUA Negative 04/18/2015 09:32 PM    GLUCOSEU Negative 04/13/2021 12:29 PM    GLUCOSEU Negative 04/18/2015 09:32 PM    KETONESU Negative 04/13/2021 12:29 PM    KETONESU Negative 04/18/2015 09:32 PM    BILIRUBINUR Negative 04/13/2021 12:29 PM    BILIRUBINUR Negative 04/18/2015 09:32 PM    BLOODU Negative 04/13/2021 12:29 PM    BLOODU Negative 04/18/2015 09:32 PM           Current Medications     Current Outpatient Medications:     Acetaminophen (Tylenol) 325 MG CAPS, Take by mouth, Disp: , Rfl:     amLODIPine (NORVASC) 5 mg tablet, TAKE 1 TABLET BY MOUTH EVERY DAY, Disp: 90 tablet, Rfl: 1    amoxicillin (AMOXIL) 500 mg capsule, TAKE 4 CAPSULES BY MOUTH 1 HOUR PRIOR TO DENTAL TREATMENT , Disp: , Rfl:     atorvastatin (LIPITOR) 10 mg tablet, Take 1 tablet (10 mg total) by mouth daily, Disp: 90 tablet, Rfl: 1    Cholecalciferol ([employer]S VITAMIN D) 2000 units CAPS, Take 1 capsule by mouth daily, Disp: , Rfl:     desloratadine (Clarinex) 5 MG tablet, as needed, Disp: , Rfl:     hydrOXYzine HCL (ATARAX) 10 mg tablet, Take 1 tablet (10 mg total) by mouth every 6 (six) hours as needed for itching or anxiety, Disp: 30 tablet, Rfl: 0    metoprolol succinate (TOPROL-XL) 100 mg 24 hr tablet, TAKE 1 TABLET BY MOUTH EVERY EVENING, Disp: 90 tablet, Rfl: 1    sertraline (ZOLOFT) 50 mg tablet, TAKE 1 AND 1/2 TABLETS BY MOUTH DAILY, Disp: 135 tablet, Rfl: 0

## 2022-02-21 NOTE — PROGRESS NOTES
[facility]  [address] [place] [address], [address] [place] [address]  [phone]    Social Work Follow-Up    LCSW met with Esther Ordonez for follow up visit  Completed [place] Cognitive Assessment, score 21/30 (previously 16/30 in August 2021), and Geriatric Depression Screen, 5/15 (previously 5/15 in August 2021)  LCSW also met with Hilary's daughter, Esau Connell, and Bianca's friend  Esau Connell reports she has added a hotel lock to the door due to recent instances of wandering  Tani Garnett does wear a bracelet daily which identifies her, two family contacts, and her diagnosis  When she does wander within the home at night, she is reportedly easily redirectable  She continues to have ups and downs with her mood, though is "up more than she is down " She becomes down most when she realizes her  is not there  Family is interested in a home care agency to come in the afternoons around 2 hours, 3x/week  Private pay costs of around $25-30/hour were discussed and [employer] information was provided  Esau Connell is hopeful that an aide would assist Tani Garnett with her shower, companionship, etc and allow her to go out to run errands while knowing that Tani Garnett is safe at home  Also provided Alzheimer's Association guide, "Choosing an In-Home Care Provider " [employer] referral faxed today       [place] Cognitive Assessment (MoCA) Version 8 3  Education: High School    Points Earned POSSIBLE Points   Visuospatial/Executive   Alternating [place] Making 1 1   Visuoconstructional skills 0 1   Visuoconstructional skills (clock) 3 3   Naming   Naming Animals 3 3   Attention   Digit Span 1 2   Vigilance (letters) 1 1   Serial 7 subtraction 3 3   Language   Sentence Repetition 2 2   Verbal fluency 1 1   Abstraction   Abstraction (word pairings) 2 2   Delayed recall   Delayed recall 0 5   Memory index score: 2/15   Orientation   Orientation 3 6   TOTAL SCORE: 21/30  (Normal ?26/30)   Additional notes:      LCSW to remain available as needed

## 2022-02-24 DIAGNOSIS — I10 BENIGN ESSENTIAL HYPERTENSION: Chronic | ICD-10-CM

## 2022-02-24 RX ORDER — METOPROLOL SUCCINATE 100 MG/1
TABLET, EXTENDED RELEASE ORAL
Qty: 90 TABLET | Refills: 1 | Status: SHIPPED | OUTPATIENT
Start: 2022-02-24

## 2022-03-01 DIAGNOSIS — F32.A DEPRESSION, UNSPECIFIED DEPRESSION TYPE: ICD-10-CM

## 2022-03-02 ENCOUNTER — TELEPHONE (OUTPATIENT)
Dept: GERIATRICS | Age: 87
End: 2022-03-02

## 2022-03-02 NOTE — TELEPHONE ENCOUNTER
LCSW received email update regarding [employer] referral  Jeny Ramirez reports that "Abigail Medina has found a friend that will be providing care for her mom "

## 2022-03-30 DIAGNOSIS — E78.2 MIXED HYPERLIPIDEMIA: ICD-10-CM

## 2022-03-30 RX ORDER — ATORVASTATIN CALCIUM 10 MG/1
TABLET, FILM COATED ORAL
Qty: 90 TABLET | Refills: 1 | Status: SHIPPED | OUTPATIENT
Start: 2022-03-30

## 2022-04-20 DIAGNOSIS — I10 HYPERTENSION, UNSPECIFIED TYPE: ICD-10-CM

## 2022-04-22 RX ORDER — AMLODIPINE BESYLATE 5 MG/1
TABLET ORAL
Qty: 90 TABLET | Refills: 1 | Status: SHIPPED | OUTPATIENT
Start: 2022-04-22

## 2022-05-23 DIAGNOSIS — F32.A DEPRESSION, UNSPECIFIED DEPRESSION TYPE: ICD-10-CM

## 2022-06-24 ENCOUNTER — TELEPHONE (OUTPATIENT)
Dept: GERIATRICS | Age: 87
End: 2022-06-24

## 2022-06-24 NOTE — TELEPHONE ENCOUNTER
Left message for patient's daughter, Jessica Loera, to contact this office regarding 8/22/2022 appointment  We would like to change from Provider to NP, if possible

## 2022-08-02 ENCOUNTER — RA CDI HCC (OUTPATIENT)
Dept: OTHER | Facility: HOSPITAL | Age: 87
End: 2022-08-02

## 2022-08-02 NOTE — PROGRESS NOTES
[facility]  coding opportunities       Chart reviewed, no opportunity found: CHART REVIEWED, NO OPPORTUNITY FOUND        Patients Insurance     Medicare Insurance: [employer] [facility] Advantage

## 2022-08-08 ENCOUNTER — OFFICE VISIT (OUTPATIENT)
Dept: FAMILY MEDICINE CLINIC | Facility: CLINIC | Age: 87
End: 2022-08-08
Payer: COMMERCIAL

## 2022-08-08 DIAGNOSIS — M81.0 AGE-RELATED OSTEOPOROSIS WITHOUT CURRENT PATHOLOGICAL FRACTURE: ICD-10-CM

## 2022-08-08 DIAGNOSIS — F02.80 MIXED DEMENTIA (HCC): ICD-10-CM

## 2022-08-08 DIAGNOSIS — I10 BENIGN ESSENTIAL HYPERTENSION: ICD-10-CM

## 2022-08-08 DIAGNOSIS — F01.50 MIXED DEMENTIA (HCC): ICD-10-CM

## 2022-08-08 DIAGNOSIS — Z12.31 ENCOUNTER FOR SCREENING MAMMOGRAM FOR MALIGNANT NEOPLASM OF BREAST: ICD-10-CM

## 2022-08-08 DIAGNOSIS — E78.2 MIXED HYPERLIPIDEMIA: ICD-10-CM

## 2022-08-08 DIAGNOSIS — Z23 NEED FOR TDAP VACCINATION: ICD-10-CM

## 2022-08-08 DIAGNOSIS — E11.9 TYPE 2 DIABETES MELLITUS WITHOUT COMPLICATION, WITHOUT LONG-TERM CURRENT USE OF INSULIN (HCC): ICD-10-CM

## 2022-08-08 DIAGNOSIS — G30.9 MIXED DEMENTIA (HCC): ICD-10-CM

## 2022-08-08 DIAGNOSIS — K21.9 GASTROESOPHAGEAL REFLUX DISEASE WITHOUT ESOPHAGITIS: Primary | ICD-10-CM

## 2022-08-08 DIAGNOSIS — Z13.29 SCREENING FOR THYROID DISORDER: ICD-10-CM

## 2022-08-08 PROCEDURE — 99214 OFFICE O/P EST MOD 30 MIN: CPT | Performed by: NURSE PRACTITIONER

## 2022-08-08 PROCEDURE — 1160F RVW MEDS BY RX/DR IN RCRD: CPT | Performed by: NURSE PRACTITIONER

## 2022-08-19 VITALS
SYSTOLIC BLOOD PRESSURE: 140 MMHG | HEIGHT: 62 IN | BODY MASS INDEX: 32.57 KG/M2 | OXYGEN SATURATION: 94 % | DIASTOLIC BLOOD PRESSURE: 84 MMHG | WEIGHT: 177 LBS | HEART RATE: 68 BPM | TEMPERATURE: 99 F

## 2022-08-19 PROBLEM — E11.9 TYPE 2 DIABETES MELLITUS WITHOUT COMPLICATION, WITHOUT LONG-TERM CURRENT USE OF INSULIN (HCC): Status: ACTIVE | Noted: 2022-08-19

## 2022-08-19 NOTE — ASSESSMENT & PLAN NOTE
BP suboptimal   140/84 today  States compliant with metoprolol 100 and amlodipine 5  Remains asymptomatic  Trends reviewed, and it does seem her pressures have been slowly increasing  Have her follow-up in 2-4 weeks for blood pressure recheck  May need to increase amlodipine  Encourage heart healthy-low sodium diet  Given her advanced age and high fall risk, caution with medication changes   Possible increase amlodipine by 2 5

## 2022-08-19 NOTE — ASSESSMENT & PLAN NOTE
Lab Results   Component Value Date    HGBA1C 6 1 (H) 04/22/2021   Overdue for labs  Last A1c in 2021  Does not follow a particular diabetic diet  Not currently on medication    Check A1c today

## 2022-08-19 NOTE — ASSESSMENT & PLAN NOTE
Stable at last check  2021: Total 187;   Recheck lipid panel today  Has been consistent and compliant with atorvastatin 10   Seems to be tolerating-no myalgias

## 2022-08-19 NOTE — PROGRESS NOTES
Yvon MARIA/ Alejandro Bronson- [facility]    ASSESSMENT AND PLAN     1  Gastroesophageal reflux disease without esophagitis  Assessment & Plan:  Stable off medications  Continue managing with diet      2  Benign essential hypertension  Assessment & Plan:  BP suboptimal   140/84 today  States compliant with metoprolol 100 and amlodipine 5  Remains asymptomatic  Trends reviewed, and it does seem her pressures have been slowly increasing  Have her follow-up in 2-4 weeks for blood pressure recheck  May need to increase amlodipine  Encourage heart healthy-low sodium diet  Given her advanced age and high fall risk, caution with medication changes  Possible increase amlodipine by 2 5      3  Mixed dementia [facility])  Assessment & Plan:  Managed by geriatric medicine  Next follow-up appointment scheduled      4  Age-related osteoporosis without current pathological fracture  -     DXA bone density spine hip and pelvis; Future; Expected date: 08/08/2022    5  Mixed hyperlipidemia  Assessment & Plan:  Stable at last check  2021: Total 187;   Recheck lipid panel today  Has been consistent and compliant with atorvastatin 10  Seems to be tolerating-no myalgias     Orders:  -     Lipid panel; Future    6  Type 2 diabetes mellitus without complication, without long-term current use of insulin [facility])  Assessment & Plan:    Lab Results   Component Value Date    HGBA1C 6 1 (H) 04/22/2021   Overdue for labs  Last A1c in 2021  Does not follow a particular diabetic diet  Not currently on medication  Check A1c today    Orders:  -     Comprehensive metabolic panel; Future  -     HEMOGLOBIN A1C W/ EAG ESTIMATION; Future    7  Screening for thyroid disorder  -     TSH, 3rd generation with Free T4 reflex; Future    8  Encounter for screening mammogram for malignant neoplasm of breast  -     Mammo screening bilateral w 3d & cad; Future; Expected date: 08/08/2022    9  Need for Tdap vaccination  Comments:  Patient Medicare    Rx today to obtain local pharmacy  Orders:  -     tetanus-diphtheria-acellular pertussis (ADACEL) 5-2-15 5 LF-mcg/0 5 injection; Inject 0 5 mL into a muscle once for 1 dose     BMI Counseling: Body mass index is 32 37 kg/m²  The BMI is above normal  Nutrition recommendations include moderation in carbohydrate intake  Exercise recommendations include exercising 3-5 times per week  Rationale for BMI follow-up plan is due to patient being overweight or obese  Healthy lifestyle counseling provided  Falls Plan of Care: balance, strength, and gait training instructions were provided  Fall risk reviewed  Urinary Incontinence Plan of Care: counseling topics discussed: limit alcohol, caffeine, spicy foods, and acidic foods  SUBJECTIVE       Patient ID: Radha Cox is a 80 y o  female  Chief Complaint   Patient presents with    Follow-up     Bump on finger, left hand       HISTORY OF PRESENT ILLNESS    Routine check  No new concerns        The following portions of the patient's history were reviewed and updated as appropriate: allergies, current medications, past family history, past medical history, past social history, past surgical history, and problem list     REVIEW OF SYSTEMS  Review of Systems   Constitutional: Negative  HENT: Negative  Respiratory: Negative  Cardiovascular: Negative  Gastrointestinal: Negative  Genitourinary: Negative  Musculoskeletal: Negative  Neurological: Negative  Psychiatric/Behavioral: Negative          OBJECTIVE      VITAL SIGNS  /84 (BP Location: Right arm, Patient Position: Sitting, Cuff Size: Adult)   Pulse 68   Temp 99 °F (37 2 °C)   Ht 5' 2" (1 575 m)   Wt 80 3 kg (177 lb)   SpO2 94%   BMI 32 37 kg/m²     CURRENT MEDICATIONS    Current Outpatient Medications:     Acetaminophen (Tylenol) 325 MG CAPS, Take by mouth, Disp: , Rfl:     amLODIPine (NORVASC) 5 mg tablet, TAKE 1 TABLET BY MOUTH EVERY DAY, Disp: 90 tablet, Rfl: 1    amoxicillin (AMOXIL) 500 mg capsule, TAKE 4 CAPSULES BY MOUTH 1 HOUR PRIOR TO DENTAL TREATMENT , Disp: , Rfl:     atorvastatin (LIPITOR) 10 mg tablet, TAKE 1 TABLET BY MOUTH EVERY DAY, Disp: 90 tablet, Rfl: 1    Cholecalciferol 50 MCG (2000 UT) CAPS, Take 1 capsule by mouth daily, Disp: , Rfl:     desloratadine (CLARINEX) 5 MG tablet, as needed, Disp: , Rfl:     hydrOXYzine HCL (ATARAX) 10 mg tablet, Take 1 tablet (10 mg total) by mouth every 6 (six) hours as needed for itching or anxiety, Disp: 30 tablet, Rfl: 0    metoprolol succinate (TOPROL-XL) 100 mg 24 hr tablet, TAKE 1 TABLET BY MOUTH EVERY EVENING, Disp: 90 tablet, Rfl: 1    sertraline (ZOLOFT) 50 mg tablet, TAKE 1 AND 1/2 TABLETS DAILY BY MOUTH, Disp: 135 tablet, Rfl: 0      PHYSICAL EXAMINATION   Physical Exam  Vitals and nursing note reviewed  Constitutional:       General: She is not in acute distress  Appearance: Normal appearance  She is well-developed and well-groomed  She is not ill-appearing  HENT:      Head: Normocephalic and atraumatic  Right Ear: Tympanic membrane, ear canal and external ear normal       Left Ear: Tympanic membrane, ear canal and external ear normal       Mouth/Throat:      Mouth: Mucous membranes are moist    Eyes:      Pupils: Pupils are equal, round, and reactive to light  Neck:      Vascular: No carotid bruit  Cardiovascular:      Rate and Rhythm: Normal rate and regular rhythm  Pulmonary:      Effort: Pulmonary effort is normal  No respiratory distress  Breath sounds: Normal breath sounds and air entry  Lymphadenopathy:      Cervical: No cervical adenopathy  Skin:     General: Skin is warm and dry  Neurological:      Mental Status: She is alert and oriented to person, place, and time  Mental status is at baseline     Psychiatric:         Attention and Perception: Attention normal          Mood and Affect: Mood normal          Behavior: Behavior normal

## 2022-08-20 DIAGNOSIS — F32.A DEPRESSION, UNSPECIFIED DEPRESSION TYPE: ICD-10-CM

## 2022-08-25 DIAGNOSIS — I10 BENIGN ESSENTIAL HYPERTENSION: Chronic | ICD-10-CM

## 2022-08-25 RX ORDER — METOPROLOL SUCCINATE 100 MG/1
TABLET, EXTENDED RELEASE ORAL
Qty: 90 TABLET | Refills: 1 | Status: SHIPPED | OUTPATIENT
Start: 2022-08-25

## 2022-09-01 ENCOUNTER — OFFICE VISIT (OUTPATIENT)
Dept: GERIATRICS | Age: 87
End: 2022-09-01
Payer: COMMERCIAL

## 2022-09-01 VITALS
BODY MASS INDEX: 32.78 KG/M2 | HEIGHT: 62 IN | OXYGEN SATURATION: 95 % | WEIGHT: 178.13 LBS | TEMPERATURE: 97.3 F | DIASTOLIC BLOOD PRESSURE: 70 MMHG | HEART RATE: 70 BPM | SYSTOLIC BLOOD PRESSURE: 150 MMHG | RESPIRATION RATE: 16 BRPM

## 2022-09-01 DIAGNOSIS — K59.01 CONSTIPATION, SLOW TRANSIT: ICD-10-CM

## 2022-09-01 DIAGNOSIS — G30.9 MIXED DEMENTIA (HCC): Primary | ICD-10-CM

## 2022-09-01 DIAGNOSIS — F02.80 MIXED DEMENTIA (HCC): Primary | ICD-10-CM

## 2022-09-01 DIAGNOSIS — M81.0 AGE-RELATED OSTEOPOROSIS WITHOUT CURRENT PATHOLOGICAL FRACTURE: Chronic | ICD-10-CM

## 2022-09-01 DIAGNOSIS — F51.01 PRIMARY INSOMNIA: ICD-10-CM

## 2022-09-01 DIAGNOSIS — F32.5 MAJOR DEPRESSIVE DISORDER WITH SINGLE EPISODE, IN FULL REMISSION (HCC): ICD-10-CM

## 2022-09-01 DIAGNOSIS — F01.50 MIXED DEMENTIA (HCC): Primary | ICD-10-CM

## 2022-09-01 DIAGNOSIS — I10 BENIGN ESSENTIAL HYPERTENSION: Chronic | ICD-10-CM

## 2022-09-01 DIAGNOSIS — S72.001D CLOSED FRACTURE OF RIGHT HIP WITH ROUTINE HEALING: Chronic | ICD-10-CM

## 2022-09-01 PROCEDURE — 99214 OFFICE O/P EST MOD 30 MIN: CPT | Performed by: NURSE PRACTITIONER

## 2022-09-01 PROCEDURE — 1160F RVW MEDS BY RX/DR IN RCRD: CPT | Performed by: NURSE PRACTITIONER

## 2022-09-01 RX ORDER — PHENOL 1.4 %
10 AEROSOL, SPRAY (ML) MUCOUS MEMBRANE
COMMUNITY

## 2022-09-01 NOTE — ASSESSMENT & PLAN NOTE
· Stable on zoloft  · Continue to encourage doing things that pt enjoys doing  · Likes to pet her dog  · Cont emotional support, relaxation techniques

## 2022-09-01 NOTE — PROGRESS NOTES
[facility]  [address], [address], [address] [facility]  [phone]    Social Work Follow-Up    LSW met with Luisito Retana for follow up visit  Completed Yonatan Cognitive Assessment, score 17/30 (previously 21/30 in 02/21/22), and Geriatric Depression Screen, 4/15 (previously 5/15 in 02/21/22)  Yonatan Cognitive Assessment (MoCA) Version 8 1  Education: High School    Points Earned POSSIBLE Points   Visuospatial/Executive   Alternating [place] Making 1 1   Visuoconstructional skills 0 1   Visuoconstructional skills (clock) 2 3   Naming   Naming Animals 3 3   Attention   Digit Span 2 2   Vigilance (letters) 1 1   Serial 7 subtraction 3 3   Language   Sentence Repetition 1 2   Verbal fluency 1 1   Abstraction   Abstraction (word pairings) 1 2   Delayed recall   Delayed recall 0 5   Memory index score: /15   Orientation   Orientation 1 6   TOTAL SCORE: 17/30  (Normal ?26/30)   Additional notes:     LSW to remain available as needed

## 2022-09-01 NOTE — ASSESSMENT & PLAN NOTE
· Patient with short term memory loss, increased confusions per daughter  · [facility] 17/30 - deficits in visuospatial/executive, recall, orientation  Engage in regular social, physical, cognitive activity  Optimize acute and chronic conditions  Frequent reminders and close monitoring for safety  Avoid deliriogenic medications - ask pharmacy prior to using OTC medications if they can worsen confusion  Discussed pros and cons of aricept/namenda - no new meds at this time  Monitor for behavioral or mental status changes and notify provider for eval if noted
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
## 2022-09-01 NOTE — ASSESSMENT & PLAN NOTE
· Patient reporting chronic insomnia  · Sleep better with melatonin  Encourage good sleep hygiene techniques:  decreased noise distractions at night, quiet/calm environment, limit day time sleep  Discourage hs use of caffeine      Avoid OTC sleep meds that contain diphenhydramine (can increase confusion)

## 2022-09-01 NOTE — ASSESSMENT & PLAN NOTE
· Stable without ha/dizziness  · Cont on amlodipine and melatonin  · Cont dietary and lifestyle interventions

## 2022-09-24 DIAGNOSIS — E78.2 MIXED HYPERLIPIDEMIA: ICD-10-CM

## 2022-09-28 RX ORDER — ATORVASTATIN CALCIUM 10 MG/1
TABLET, FILM COATED ORAL
Qty: 90 TABLET | Refills: 1 | Status: SHIPPED | OUTPATIENT
Start: 2022-09-28

## 2022-11-14 DIAGNOSIS — F32.A DEPRESSION, UNSPECIFIED DEPRESSION TYPE: ICD-10-CM

## 2022-11-21 ENCOUNTER — TELEPHONE (OUTPATIENT)
Age: 87
End: 2022-11-21

## 2022-11-21 ENCOUNTER — TELEPHONE (OUTPATIENT)
Dept: FAMILY MEDICINE CLINIC | Facility: CLINIC | Age: 87
End: 2022-11-21

## 2022-11-21 NOTE — TELEPHONE ENCOUNTER
Returned call to daughter  She states that her mom has become increasingly combative and confused, cursing (which is not her usual self)  This just started over the weekend  Recommend f/u with pcp or urgicenter today for eval to r/o  UTI or dehydration or other acute cause of confusion  If safety concern, recommend ED visit  If there is no acute finding, and this is progression of dementia, will set up virtual appt to discuss possible medication intervention

## 2022-11-21 NOTE — TELEPHONE ENCOUNTER
Call daughter - I read the note from Geriatrics  If there are no available appointments with anyone today, I  advise she take pt to Urgent care so she can be physically assessed as well as provide urine sample

## 2022-11-21 NOTE — TELEPHONE ENCOUNTER
Pt's daughter called asking for an order to get UTI testing at a  since we do not have any openings today   [phone]

## 2022-11-21 NOTE — TELEPHONE ENCOUNTER
Patient's daughter, Lequita Boeck ([phone]) called with concerns about mothers behavior  Advised I will advise provider

## 2022-11-25 NOTE — TELEPHONE ENCOUNTER
Patient's Rosie Rey ([phone]) called back to advise patient has an appointment with [facility] on Monday 11/28  Nery Quans was not sure if her sister took her mother for blood work today or not    Patient had a good day yesterday (11/24/)

## 2022-11-28 ENCOUNTER — OFFICE VISIT (OUTPATIENT)
Dept: FAMILY MEDICINE CLINIC | Facility: CLINIC | Age: 87
End: 2022-11-28

## 2022-11-28 VITALS
HEART RATE: 64 BPM | WEIGHT: 174.6 LBS | BODY MASS INDEX: 31.93 KG/M2 | DIASTOLIC BLOOD PRESSURE: 76 MMHG | SYSTOLIC BLOOD PRESSURE: 140 MMHG | TEMPERATURE: 97.6 F | OXYGEN SATURATION: 96 %

## 2022-11-28 DIAGNOSIS — R39.9 UTI SYMPTOMS: Primary | ICD-10-CM

## 2022-11-28 DIAGNOSIS — Z79.2 PROPHYLACTIC ANTIBIOTIC: ICD-10-CM

## 2022-11-28 LAB
SL AMB  POCT GLUCOSE, UA: NEGATIVE
SL AMB LEUKOCYTE ESTERASE,UA: ABNORMAL
SL AMB POCT BILIRUBIN,UA: NEGATIVE
SL AMB POCT BLOOD,UA: NEGATIVE
SL AMB POCT CLARITY,UA: CLEAR
SL AMB POCT COLOR,UA: YELLOW
SL AMB POCT KETONES,UA: ABNORMAL
SL AMB POCT NITRITE,UA: NEGATIVE
SL AMB POCT PH,UA: 5.5
SL AMB POCT SPECIFIC GRAVITY,UA: 1.02
SL AMB POCT URINE PROTEIN: NEGATIVE
SL AMB POCT UROBILINOGEN: 0.2

## 2022-11-28 RX ORDER — AMOXICILLIN AND CLAVULANATE POTASSIUM 875; 125 MG/1; MG/1
1 TABLET, FILM COATED ORAL EVERY 12 HOURS SCHEDULED
Qty: 10 TABLET | Refills: 0 | Status: SHIPPED | OUTPATIENT
Start: 2022-11-28 | End: 2022-12-03

## 2022-11-28 RX ORDER — AMOXICILLIN 500 MG/1
2000 CAPSULE ORAL ONCE
Qty: 12 CAPSULE | Refills: 0 | Status: SHIPPED | OUTPATIENT
Start: 2022-11-28 | End: 2022-11-28

## 2022-12-01 ENCOUNTER — TELEPHONE (OUTPATIENT)
Dept: FAMILY MEDICINE CLINIC | Facility: CLINIC | Age: 87
End: 2022-12-01

## 2022-12-01 LAB — BACTERIA UR CULT: NORMAL

## 2022-12-01 NOTE — TELEPHONE ENCOUNTER
Phone call from daughter regarding urine culture results  I informed her it was not reviewed by provider yet, but preliminary showed mixed contaminants, not true positive result  Patient symptoms have much improved  Advised to complete antibiotics and would contact her if additional recommendations from provider once reviewed

## 2022-12-01 NOTE — TELEPHONE ENCOUNTER
Call pt  Final result: mixed contaminants  Advise she still follow back up with Geriatric doc  Not sure what caused the mental status change and aggressive behavior  She may need medication adjustments

## 2022-12-14 PROBLEM — R39.9 UTI SYMPTOMS: Status: ACTIVE | Noted: 2022-12-14

## 2022-12-14 NOTE — PROGRESS NOTES
[facility]    ASSESSMENT AND PLAN     1  UTI symptoms  Assessment & Plan:  Urine dip with trace Leuks, Ketones  Possible contaminant, but will send for culture and prophylactical  treat for UTI today  Advise good hydration  Routine voiding schedule  Advise to follow back up with Geriatric doctor if urine negative, as her recent change in mental status may be progressing dementia  Daughter agreeable with plan  Orders:  -     POCT urine dip auto non-scope  -     Urine culture  -     amoxicillin-clavulanate (Augmentin) 875-125 mg per tablet; Take 1 tablet by mouth every 12 (twelve) hours for 5 days    2  Prophylactic antibiotic  Comments:  rx for daughter to have on hand  for prior to dental work  Orders:  -     amoxicillin (AMOXIL) 500 mg capsule; Take 4 capsules (2,000 mg total) by mouth 1 (one) time for 1 dose         SUBJECTIVE       Patient ID: Latha Harrington is a 80 y o  female  Chief Complaint   Patient presents with   • Possible UTI     Per daughter, she is having altered mental status (using profanity)       [address] with daughter  Recently experienced mental status changes: random confusion, using profanity, angry/agressive with daughter (which is not usually like her)  Symptoms have been imrpoving, but daughter would like to check urine today to r/o UTI as cause of symptoms  The following portions of the patient's history were reviewed and updated as appropriate: allergies, current medications, past family history, past medical history, past social history, past surgical history, and problem list     REVIEW OF SYSTEMS  Review of Systems   Constitutional: Negative  Respiratory: Negative  Cardiovascular: Negative  Genitourinary: Negative  Neurological: Negative for syncope, speech difficulty and headaches  Psychiatric/Behavioral: Positive for agitation, behavioral problems and confusion          Note history of dementia OBJECTIVE      VITAL SIGNS  /76 (BP Location: Right arm, Patient Position: Sitting, Cuff Size: Adult)   Pulse 64   Temp 97 6 °F (36 4 °C)   Wt 79 2 kg (174 lb 9 6 oz)   SpO2 96%   BMI 31 93 kg/m²     CURRENT MEDICATIONS    Current Outpatient Medications:   •  Acetaminophen (Tylenol) 325 MG CAPS, Take by mouth as needed, Disp: , Rfl:   •  amLODIPine (NORVASC) 5 mg tablet, TAKE 1 TABLET BY MOUTH EVERY DAY, Disp: 90 tablet, Rfl: 1  •  atorvastatin (LIPITOR) 10 mg tablet, TAKE 1 TABLET BY MOUTH EVERY DAY, Disp: 90 tablet, Rfl: 1  •  Cholecalciferol 50 MCG (2000 UT) CAPS, Take 1 capsule by mouth daily, Disp: , Rfl:   •  hydrOXYzine HCL (ATARAX) 10 mg tablet, Take 1 tablet (10 mg total) by mouth every 6 (six) hours as needed for itching or anxiety, Disp: 30 tablet, Rfl: 0  •  Melatonin 10 MG TABS, Take 10 mg by mouth daily at bedtime, Disp: , Rfl:   •  metoprolol succinate (TOPROL-XL) 100 mg 24 hr tablet, TAKE 1 TABLET BY MOUTH EVERY DAY IN THE EVENING, Disp: 90 tablet, Rfl: 1  •  sertraline (ZOLOFT) 50 mg tablet, TAKE 1 AND 1/2 TABLETS DAILY BY MOUTH, Disp: 135 tablet, Rfl: 0  •  desloratadine (CLARINEX) 5 MG tablet, as needed (Patient not taking: Reported on 9/1/2022), Disp: , Rfl:       PHYSICAL EXAMINATION   Physical Exam  Vitals and nursing note reviewed  Constitutional:       General: She is not in acute distress  Appearance: Normal appearance  She is not ill-appearing  HENT:      Head: Normocephalic  Pulmonary:      Effort: Pulmonary effort is normal  No respiratory distress  Neurological:      Mental Status: She is alert and oriented to person, place, and time  Psychiatric:         Attention and Perception: Attention normal          Mood and Affect: Mood normal          Speech: Speech normal          Behavior: Behavior normal  Behavior is not agitated, aggressive or hyperactive

## 2022-12-14 NOTE — ASSESSMENT & PLAN NOTE
Urine dip with trace Leuks, Ketones  Possible contaminant, but will send for culture and prophylactical  treat for UTI today  Advise good hydration  Routine voiding schedule  Advise to follow back up with Geriatric doctor if urine negative, as her recent change in mental status may be progressing dementia  Daughter agreeable with plan

## 2022-12-16 DIAGNOSIS — I10 HYPERTENSION, UNSPECIFIED TYPE: ICD-10-CM

## 2022-12-16 RX ORDER — AMLODIPINE BESYLATE 5 MG/1
TABLET ORAL
Qty: 90 TABLET | Refills: 1 | Status: SHIPPED | OUTPATIENT
Start: 2022-12-16

## 2022-12-19 NOTE — PROGRESS NOTES
Daily Note     Today's date: 10/9/2019  Patient name: Ray Che  : 1934  MRN: [id number]  Referring provider: Elena Padron MD  Dx:   Encounter Diagnosis     ICD-10-CM    1  S/P musculoskeletal system surgery [id number]                   Subjective: Patient reports minimal DOMS following last tx session but states that her right leg is giving out with  Ambulation on occasion  Objective: See treatment diary below      Assessment: Patient demonstrating improvement in hip flexor strength as per increased AROM with SLR  Significant improvement in ambulation noted with SPC with normalized gait mechanics  With step ups, weakness evident in the right LE compensated by contralateral vaulting  Plan: Continue per plan of care  Precautions: Memory loss, HTN, HLD, Depression, DM, Anxiety      Manual  9/27 10/2 10/3 10/7 10/9        R hip PROM  10' 10' 10' 10'        R hip flexor stretch  4x30" 4x30" 4x30" 4x30"        R glut maximum stretch  4x30" 4x30" 4x30" 4x30"                                      Exercise Diary  9/27 10/2 10/3 10/7 10/9        Supine bridge 2x10 2x10 2x10 2x10  2x10        Standing hip abduction  2x10 B/L 2x10 B/L 2x10 B/L 2x10 B/L        SLR 2x6 2x10 2x10 2x10 2x10        Gait training  SPC  75'x4 SPC  75'x4 SPC  75'x4 SPC  75'x4        Gastroc stretch in standing  4x20" 4x30" 4x30" 4x30"        Stair training  4"  2x10 4" and 6" non recipricole and recipricole pattern  BL rails  4" and 6" non recipricole and recipricole pattern  BL rails   4" and 6" recrip, B/L rails        Mini squats  2x10 2x10 2x10 2x10         S/L clamshells 2x10 2x10 x2" 2x10 x2" 2x10 x2" 2x10 x2"        Biodex balance  NV LOS easy 3x LOS easy 3x LOS easy                                                                                                                                                           Modalities Decision made by Dr Marco Michael to perform surgery open.

## 2023-02-08 ENCOUNTER — TELEMEDICINE (OUTPATIENT)
Dept: FAMILY MEDICINE CLINIC | Facility: CLINIC | Age: 88
End: 2023-02-08

## 2023-02-08 DIAGNOSIS — U07.1 COVID-19: Primary | ICD-10-CM

## 2023-02-08 RX ORDER — NIRMATRELVIR AND RITONAVIR 300-100 MG
3 KIT ORAL 2 TIMES DAILY
Qty: 30 TABLET | Refills: 0 | Status: SHIPPED | OUTPATIENT
Start: 2023-02-08 | End: 2023-02-13

## 2023-02-08 NOTE — PROGRESS NOTES
COVID-19 Outpatient Progress Note    Assessment/Plan:    Problem List Items Addressed This Visit    None  Visit Diagnoses     COVID-19    -  Primary    Relevant Medications    nirmatrelvir & ritonavir (Paxlovid, 300/100,) tablet therapy pack      Patient advised to refrain from taking atorvastatin for 10 days while taking Paxlovid      Disposition:     Patient has asymptomatic or mild COVID-19 infection  Based off CDC guidelines, they were recommended to isolate for 5 days  If they are asymptomatic or symptoms are improving with no fevers in the past 24 hours, isolation may be ended followed by 5 days of wearing a mask when around othes to minimize risk of infecting others  If still have a fever or other symptoms have not improved, continue to isolate until they improve  Regardless of when they end isolation, avoid being around people who are more likely to get very sick from COVID-19 until at least day 11  Discussed symptom directed medication options with patient  Patient meets criteria for PAXLOVID and they have been counseled appropriately according to EUA documentation released by the FDA  After discussion, patient agrees to treatment  Fayetta Bank is an investigational medicine used to treat mild-to-moderate COVID-19 in adults and children (15years of age and older weighing at least 80 pounds (40 kg)) with positive results of direct SARS-CoV-2 viral testing, and who are at high risk for progression to severe COVID-19, including hospitalization or death  PAXLOVID is investigational because it is still being studied  There is limited information about the safety and effectiveness of using PAXLOVID to treat people with mild-to-moderate COVID-19      The FDA has authorized the emergency use of PAXLOVID for the treatment of mild-tomoderate COVID-19 in adults and children (15years of age and older weighing at least 80 pounds (40 kg)) with a positive test for the virus that causes COVID-19, and who are at high risk for progression to severe COVID-19, including hospitalization or death, under an EUA  What should I tell my healthcare provider before I take PAXLOVID? Tell your healthcare provider if you:  - Have any allergies  - Have liver or kidney disease  - Are pregnant or plan to become pregnant  - Are breastfeeding a child  - Have any serious illnesses    Tell your healthcare provider about all the medicines you take, including prescription and over-the-counter medicines, vitamins, and herbal supplements  Some medicines may interact with PAXLOVID and may cause serious side effects  Keep a list of your medicines to show your healthcare provider and pharmacist when you get a new medicine  You can ask your healthcare provider or pharmacist for a list of medicines that interact with PAXLOVID  Do not start taking a new medicine without telling your healthcare provider  Your healthcare provider can tell you if it is safe to take PAXLOVID with other medicines  Tell your healthcare provider if you are taking combined hormonal contraceptive  PAXLOVID may affect how your birth control pills work  Females who are able to become pregnant should use another effective alternative form of contraception or an additional barrier method of contraception  Talk to your healthcare provider if you have any questions about contraceptive methods that might be right for you  How do I take PAXLOVID? PAXLOVID consists of 2 medicines: nirmatrelvir and ritonavir  - Take 2 pink tablets of nirmatrelvir with 1 white tablet of ritonavir by mouth 2 times each day (in the morning and in the evening) for 5 days  For each dose, take all 3 tablets at the same time  - If you have kidney disease, talk to your healthcare provider  You may need a different dose  - Swallow the tablets whole  Do not chew, break, or crush the tablets  - Take PAXLOVID with or without food    - Do not stop taking PAXLOVID without talking to your healthcare provider, even if you feel better  - If you miss a dose of PAXLOVID within 8 hours of the time it is usually taken, take it as soon as you remember  If you miss a dose by more than 8 hours, skip the missed dose and take the next dose at your regular time  Do not take 2 doses of PAXLOVID at the same time  - If you take too much PAXLOVID, call your healthcare provider or go to the nearest hospital emergency room right away  - If you are taking a ritonavir- or cobicistat-containing medicine to treat hepatitis C or Human Immunodeficiency Virus (HIV), you should continue to take your medicine as prescribed by your healthcare provider   - Talk to your healthcare provider if you do not feel better or if you feel worse after 5 days  Who should generally not take PAXLOVID? Do not take PAXLOVID if:  You are allergic to nirmatrelvir, ritonavir, or any of the ingredients in PAXLOVID  You are taking any of the following medicines:  - Alfuzosin  - Pethidine, piroxicam, propoxyphene  - Ranolazine  - Amiodarone, dronedarone, flecainide, propafenone, quinidine  - Colchicine  - Lurasidone, pimozide, clozapine  - Dihydroergotamine, ergotamine, methylergonovine  - Lovastatin, simvastatin  - Sildenafil (Revatio®) for pulmonary arterial hypertension (PAH)  - Triazolam, oral midazolam  - Apalutamide  - Carbamazepine, phenobarbital, phenytoin  - Rifampin  - St  Gilmer’s Wort (hypericum perforatum)    What are the important possible side effects of PAXLOVID? Possible side effects of PAXLOVID are:  - Liver Problems  Tell your healthcare provider right away if you have any of these signs and symptoms of liver problems: loss of appetite, yellowing of your skin and the whites of eyes (jaundice), dark-colored urine, pale colored stools and itchy skin, stomach area (abdominal) pain  - Resistance to HIV Medicines   If you have untreated HIV infection, PAXLOVID may lead to some HIV medicines not working as well in the future  - Other possible side effects include: altered sense of taste, diarrhea, high blood pressure, or muscle aches    These are not all the possible side effects of PAXLOVID  Not many people have taken PAXLOVID  Serious and unexpected side effects may happen  Amy Villa is still being studied, so it is possible that all of the risks are not known at this time  What other treatment choices are there? Like Nohemi Hair may allow for the emergency use of other medicines to treat people with COVID-19  Go to https://[employer]/ for information on the emergency use of other medicines that are authorized by FDA to treat people with COVID-19  Your healthcare provider may talk with you about clinical trials for which you may be eligible  It is your choice to be treated or not to be treated with PAXLOVID  Should you decide not to receive it or for your child not to receive it, it will not change your standard medical care  What if I am pregnant or breastfeeding? There is no experience treating pregnant women or breastfeeding mothers with PAXLOVID  For a mother and unborn baby, the benefit of taking PAXLOVID may be greater than the risk from the treatment  If you are pregnant, discuss your options and specific situation with your healthcare provider  It is recommended that you use effective barrier contraception or do not have sexual activity while taking PAXLOVID  If you are breastfeeding, discuss your options and specific situation with your healthcare provider  How do I report side effects with PAXLOVID? Contact your healthcare provider if you have any side effects that bother you or do not go away  Report side effects to FDA MedWatch at www fda gov/medwatch or call 0-727-DXB0702 or you can report side effects to [facility]  at the contact information provided below      Website Fax number Telephone number   www [employer] [phone]     How should I store [address]? Store PAXLOVID tablets at room temperature between 68°F to 77°F (20°C to 25°C)  Full fact sheet for patients, parents, and caregivers can be found at: [employer]    I have spent 15 minutes directly with the patient  Greater than 50% of this time was spent in counseling/coordination of care regarding: diagnostic results, prognosis, risks and benefits of treatment options, instructions for management, patient and family education and importance of treatment compliance  Encounter provider: Ryann Ramos DO     Provider located at: [address] [facility] [address]  [address] [facility]   [address] [place] [address]  [phone]     Recent Visits  No visits were found meeting these conditions  Showing recent visits within past 7 days and meeting all other requirements  Today's Visits  Date Type Provider Dept   02/08/23 Telemedicine Ryann Ramos DO [facility]   Showing today's visits and meeting all other requirements  Future Appointments  No visits were found meeting these conditions  Showing future appointments within next 150 days and meeting all other requirements     This virtual check-in was done via COTA and patient was informed that this is a secure, HIPAA-compliant platform  She agrees to proceed  Patient agrees to participate in a virtual check in via telephone or video visit instead of presenting to the office to address urgent/immediate medical needs  Patient is aware this is a billable service  She acknowledged consent and understanding of privacy and security of the video platform  The patient has agreed to participate and understands they can discontinue the visit at any time  After connecting through [facility] and [facility], the patient was identified by name and date of birth   Filiberto Rg was informed that this was a telemedicine visit and that the exam was being conducted confidentially over secure lines  Sonya Armstrong acknowledged consent and understanding of privacy and security of the telemedicine visit  I informed the patient that I have reviewed her record in Epic and presented the opportunity for her to ask any questions regarding the visit today  The patient agreed to participate  Verification of patient location:  Patient is located in the following state in which I hold an active license: [place]    Subjective:   Sonya Armstrong is a 80 y o  female who has been screened for COVID-19  Patient's symptoms include fatigue, sore throat, cough and myalgias  Patient denies fever      - Date of symptom onset: 2/7/2023  - Date of positive COVID-19 test: 2/8/2023  Type of test: Home antigen  COVID-19 vaccination status: Fully vaccinated with booster    Manuel Flynn has been staying home and has isolated themselves in her home  She is taking care to not share personal items and is cleaning all surfaces that are touched often, like counters, tabletops, and doorknobs using household cleaning sprays or wipes  She is wearing a mask when she leaves her room  Lab Results   Component Value Date    SARSCOV2 Not Detected 07/21/2020       Review of Systems   Constitutional: Positive for fatigue  Negative for fever  HENT: Positive for sore throat  Respiratory: Positive for cough  Musculoskeletal: Positive for myalgias       Current Outpatient Medications on File Prior to Visit   Medication Sig   • Acetaminophen (Tylenol) 325 MG CAPS Take by mouth as needed   • amLODIPine (NORVASC) 5 mg tablet TAKE 1 TABLET BY MOUTH EVERY DAY   • atorvastatin (LIPITOR) 10 mg tablet TAKE 1 TABLET BY MOUTH EVERY DAY   • Cholecalciferol 50 MCG (2000 UT) CAPS Take 1 capsule by mouth daily   • desloratadine (CLARINEX) 5 MG tablet as needed (Patient not taking: Reported on 9/1/2022)   • hydrOXYzine HCL (ATARAX) 10 mg tablet Take 1 tablet (10 mg total) by mouth every 6 (six) hours as needed for itching or anxiety   • Melatonin 10 MG TABS Take 10 mg by mouth daily at bedtime   • metoprolol succinate (TOPROL-XL) 100 mg 24 hr tablet TAKE 1 TABLET BY MOUTH EVERY DAY IN THE EVENING   • sertraline (ZOLOFT) 50 mg tablet TAKE 1 AND 1/2 TABLETS DAILY BY MOUTH       Objective: There were no vitals taken for this visit  Physical Exam  Constitutional:       General: She is not in acute distress  Appearance: She is well-developed  HENT:      Head: Normocephalic and atraumatic  Eyes:      Pupils: Pupils are equal, round, and reactive to light  Pulmonary:      Effort: Pulmonary effort is normal    Musculoskeletal:         General: Normal range of motion  Cervical back: Normal range of motion  Skin:     Coloration: Skin is not pale  Findings: No erythema  Psychiatric:         Behavior: Behavior normal          Thought Content:  Thought content normal          Judgment: Judgment normal        Rise Showers, DO

## 2023-02-11 DIAGNOSIS — F32.A DEPRESSION, UNSPECIFIED DEPRESSION TYPE: ICD-10-CM

## 2023-02-13 ENCOUNTER — TELEPHONE (OUTPATIENT)
Age: 88
End: 2023-02-13

## 2023-02-13 NOTE — TELEPHONE ENCOUNTER
Patient's daughter-Sharon called regarding patient's increase in sleep walking  Daughter stated that she had thought patient was prescribed Remeron or Seroquel to help with this but does not see this medication listed for her  She would like to speak with provider regarding medication to assist with sleep walking  NANCY sent message to provider

## 2023-02-14 NOTE — TELEPHONE ENCOUNTER
Spoke with Jorge Luis Gomez  Noted upcoming appt 3/1 with no sooner availability  Will place Jake Szymanski on a wait list for sooner appt (any day/time other than 3/23)  Jorge Luis Gomez reports the "sleep walking" has been going on for some time but they have noticed it more as Jake Szymanski is staying at her home 2-3 nights/week  They do use a baby monitor and have locked the basement door as they are worried she could have a fall at night  When they speak to her during the night, she is very disoriented/unsure where she is  Pallavi's sister has caught Jake Szymanski outside twice during the night  Both Jorge Luis Gomez and her sister have home alarms that go off if Jake Szymanski exits their homes  Jake Szymanski also has a bracelet with their contact information on it in the event that she might wander/become list  We discussed that patients with cognitive changes may experience wandering, trying to go home/find help or waking up at night to get dressed for the day  I commended her work in identifying safety measures  I noted that it looks like remeron was previously prescribed  Jorge Luis Gomez does want Doreen Severin to be aware that if there are any medication recommendations that Jake Szymanski could trial over the next two weeks prior to the appointment, she would be open to this  I noted that Doreen Severin would likely prefer to discuss medications in person, review concerns/previous medications and discuss options before making a medication decision  She expressed understanding  I also mentioned I would notify social work that Jorge Luis Gomez is interested in any wandering information/resources

## 2023-02-14 NOTE — TELEPHONE ENCOUNTER
LSW spoke with daughter-Pallavi  LSW stated that LSW can mail some wandering resources: [employer] alert system and [employer] information   LSW mailed resources to:  [address], [address] [place] [address]

## 2023-02-22 DIAGNOSIS — I10 BENIGN ESSENTIAL HYPERTENSION: Chronic | ICD-10-CM

## 2023-02-22 RX ORDER — METOPROLOL SUCCINATE 100 MG/1
TABLET, EXTENDED RELEASE ORAL
Qty: 90 TABLET | Refills: 1 | Status: SHIPPED | OUTPATIENT
Start: 2023-02-22

## 2023-03-01 ENCOUNTER — OFFICE VISIT (OUTPATIENT)
Age: 88
End: 2023-03-01

## 2023-03-01 VITALS
DIASTOLIC BLOOD PRESSURE: 70 MMHG | BODY MASS INDEX: 32.2 KG/M2 | SYSTOLIC BLOOD PRESSURE: 136 MMHG | TEMPERATURE: 97.4 F | WEIGHT: 175 LBS | HEIGHT: 62 IN | HEART RATE: 88 BPM

## 2023-03-01 DIAGNOSIS — G30.9 MIXED DEMENTIA (HCC): Primary | ICD-10-CM

## 2023-03-01 DIAGNOSIS — F32.5 MAJOR DEPRESSIVE DISORDER WITH SINGLE EPISODE, IN FULL REMISSION (HCC): ICD-10-CM

## 2023-03-01 DIAGNOSIS — F02.80 MIXED DEMENTIA (HCC): Primary | ICD-10-CM

## 2023-03-01 DIAGNOSIS — I10 BENIGN ESSENTIAL HYPERTENSION: Chronic | ICD-10-CM

## 2023-03-01 DIAGNOSIS — F01.50 MIXED DEMENTIA (HCC): Primary | ICD-10-CM

## 2023-03-01 DIAGNOSIS — K59.01 CONSTIPATION, SLOW TRANSIT: ICD-10-CM

## 2023-03-01 DIAGNOSIS — H91.93 BILATERAL HEARING LOSS, UNSPECIFIED HEARING LOSS TYPE: ICD-10-CM

## 2023-03-01 DIAGNOSIS — F51.01 PRIMARY INSOMNIA: ICD-10-CM

## 2023-03-01 RX ORDER — MIRTAZAPINE 7.5 MG/1
7.5 TABLET, FILM COATED ORAL
Qty: 30 TABLET | Refills: 0 | Status: SHIPPED | OUTPATIENT
Start: 2023-03-01

## 2023-03-01 NOTE — ASSESSMENT & PLAN NOTE
· BP stable    Denies headache/dizziness  · Continues on amlodipine, Toprol-XL  · Continue dietary and lifestyle interventions

## 2023-03-01 NOTE — ASSESSMENT & PLAN NOTE
· No problems with constipation per daughters  · Patient occasionally has loose stool  · Continue adequate dietary fiber, fluids, mobility as able

## 2023-03-01 NOTE — ASSESSMENT & PLAN NOTE
· Patient requiring more assistance for ADLs, dependent on IADLs  She lives with daughters, goes between both houses  · MoCA: 15/30 with deficits in executive functioning, attention, abstraction, recall and orientation  · Primary care has evaluated for any infections or changes, none noted  · Biggest concern for daughters is nighttime awakenings and how it is affecting them and her during the day  · Continue to reorient, redirect, reassure    · Patient now requires 24/7 care, daughters are in doing well with this, goal is to keep at home as long as possible  · Patient has HHA twice a week that helps with showers and companionship  · Fall safety is a concern, wandering safety (they have instituted multiple safety features in their homes)  · They would like some information on additional health aids for relief- SW aware  · Continue to optimize all acute chronic conditions  · Continue to keep active: Cognitive, physical, social  · Ensure adequate hydration, good nutrition  · Check with pharmacist/provider before starting any new OTC or prescription medication for potential cognitive side effects

## 2023-03-01 NOTE — ASSESSMENT & PLAN NOTE
· Patient having increased anxiety/? Depression    · Continues on Zoloft  · Continue emotional support, relaxation techniques

## 2023-03-01 NOTE — ASSESSMENT & PLAN NOTE
· Pt and dgts note increased hearing loss  · This can affect confusions  · Audiology referral placed

## 2023-03-01 NOTE — ASSESSMENT & PLAN NOTE
· Patient having increased night time wanderings, paranoia, increased anxiety at night  · Melatonin is not sufficient  · Options reviewed (seroquel, Remeron, trazodone) and discussed with daughters, notes from previous visit reviewed  · We have opted for Remeron at this time  It was previously stopped because it was thought to be coinciding with new sleepwalking however sleepwalking continues despite stopping it  We will trial it again  · Daughters are aware of potential side effects of confusion, sleepiness, hyponatremia  They will notify office if any concerns  · Continue to keep active during the day  · Cont good sleep hygiene techniques
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
## 2023-03-01 NOTE — PATIENT INSTRUCTIONS
Global Cognition Activity Apps:  30/30- time management, set up lists of tasks to accomplish and time needed to complete them  Awesome Memory- cognition and memory  Bejeweled- clear the gems by matching the same colors  Brain HQ- tests your brain in the area of memory, speed, and attention  Brain Workout- test your brain in the area of memory, focus, reaction, and accuracy   Classic Neftali- tests memory and attention as you repeat the colors and sounds in the correct order  Drawesome- copy the image without picking up the pencil  Fit Brains Trainer- addresses memory and recognition, concentration, attention span, processing speed, problem-solving, hand-eye coordination, and reaction time  Lumosity Brain Trainer- personalized brain workouts for memory, attention, and brain performance (can also be accessed online)  Mind Games (Pro)- abstraction, attention, memory, executive functioning, etc   R[place]-sliding block traffic jam puzzle  Skill Game [place]/Skills Game- sustain attention to complete trail-making with numbers   Skill Training- visual motor activity annabella requiring eye-to-hand coordination, foresight, and concentration  [employer]- four color memory game for cognition  Sudoku- puzzle game that exercises the brain, logical thinking, and memory  Feliz Mings of [address] challenging attention, memory, and executive functioning  Unblock Me- get to the red block by sliding other blocks out of the way  Word Association!- scanning to find associated words  Wordsworth Pro- word-making game  [employer]- anagram word game    Attention Activities:  Catch Me-FlashPad- fast-paced, light-up game (touch all of the red lights, skip the green lights)  Metronome Beats- practice divided attention by performing a task with metronome running    Memory Activities:  Memory Block- test your memory and reflexes  Memory Trainer- memory training and games for visualization, chunking, focus, and spatial memory, etc   My Personal Memory Trainer- activities to address working memory      Please access your [place] Store (Apple devices) or [employer] (Android devices) to see if you can access these apps and if there is any cost associated with downloading the cheyenne

## 2023-03-01 NOTE — PROGRESS NOTES
[facility]  [address], [address], [facility]  [phone]    Social Work Follow-Up    LSW met with Mukund Contreras for follow up visit  Completed [place] Cognitive Assessment, score 15/30 (previously 17/30 in 9/1/22), and Geriatric Depression Screen, 7/15 (previously 4/15 in 9/1/22)  LSW also met with daughters Rommel Dave and Pastora Johnson who report that patient has been struggling to sleep through the night and has been wandering  LSW provided Alzheimer's [employer] on Sleeping, Getting Lost, and Keeping Home Safe, NIH booklet on Older Adults and Depression, Home Safety Tips for Older Adults from [facility]  [employer], MedicAlert and Alzheimer's Association Safe Return sheet, and home health aide list      [place] Cognitive Assessment (MoCA) Version 8 2  Education: HS    Points Earned POSSIBLE Points   Visuospatial/Executive   [place]ating [place] Making 0 1   Visuoconstructional skills 1 1   Visuoconstructional skills (clock) 2 3   Naming   Naming Animals 3 3   Attention   Digit Span 1 2   Vigilance (letters) 1 1   Serial 7 subtraction 2 3   Language   Sentence Repetition 2 2   Verbal fluency 1 1   Abstraction   Abstraction (word pairings) 0 2   Delayed recall   Delayed recall 0 5   Memory index score: 2/15   Orientation   Orientation 2 6   TOTAL SCORE: 16/30  (Normal ?26/30)   Additional notes:      LSW to remain available as needed

## 2023-03-09 ENCOUNTER — OFFICE VISIT (OUTPATIENT)
Dept: AUDIOLOGY | Age: 88
End: 2023-03-09

## 2023-03-09 DIAGNOSIS — H91.93 BILATERAL HEARING LOSS, UNSPECIFIED HEARING LOSS TYPE: ICD-10-CM

## 2023-03-09 NOTE — PROGRESS NOTES
HEARING EVALUATION    Name:  Odin Mcqueen  :  1934  Age:  80 y o  Date of Evaluation: 23     History: Difficulty Understanding  Reason for visit: Odin Mcqueen is being seen today at the request of Dr Jennie Cole for an evaluation of hearing  Daughter expressed concerns for hearing and understanding ability  Patient denies otalgia, otorrhea, dizziness, fullness, and tinnitus  Patient denies a family history of hearing loss and noise exposure  EVALUATION:    Otoscopic Evaluation:   Right Ear: Clear and healthy ear canal and tympanic membrane   Left Ear: Occluded    Tympanometry:   Right: Type A - normal middle ear pressure and compliance   Left: Type C - negative pressure    Audiogram Results:  Waiting on ear cleaning  *see attached audiogram      RECOMMENDATIONS:  1 month hearing eval, Consult ENT, Return to [facility]  for F/U, Ear Cleaning and Copy to Patient/Caregiver    PATIENT EDUCATION:   Discussed results and recommendations with patient and daughter  Questions were addressed and the patient was encouraged to contact our department should concerns arise        Cristal Robertson , CCC-A  Clinical Audiologist

## 2023-03-16 ENCOUNTER — OFFICE VISIT (OUTPATIENT)
Dept: FAMILY MEDICINE CLINIC | Facility: CLINIC | Age: 88
End: 2023-03-16

## 2023-03-16 VITALS
BODY MASS INDEX: 33.08 KG/M2 | TEMPERATURE: 97.4 F | HEART RATE: 86 BPM | OXYGEN SATURATION: 96 % | WEIGHT: 175.2 LBS | SYSTOLIC BLOOD PRESSURE: 132 MMHG | HEIGHT: 61 IN | DIASTOLIC BLOOD PRESSURE: 88 MMHG

## 2023-03-16 DIAGNOSIS — H61.22 IMPACTED CERUMEN OF LEFT EAR: Primary | ICD-10-CM

## 2023-03-16 NOTE — PROGRESS NOTES
[facility]    ASSESSMENT AND PLAN     1  Impacted cerumen of left ear  Comments:  Lavage as below           SUBJECTIVE       Patient ID: Rebeka Gandhi is a 80 y o  female  Chief Complaint   Patient presents with   • Follow-up     lavage       HISTORY OF PRESENT ILLNESS    Presents with left ear fullness  Was seen at Audiologist (for hearing aides)  and advised she needed lavage        The following portions of the patient's history were reviewed and updated as appropriate: allergies, current medications, past family history, past medical history, past social history, past surgical history, and problem list     REVIEW OF SYSTEMS  Review of Systems   HENT: Ear pain: fullness - left  OBJECTIVE       VITAL SIGNS  /88 (BP Location: Left arm, Patient Position: Sitting, Cuff Size: Standard)   Pulse 86   Temp (!) 97 4 °F (36 3 °C)   Ht 5' 1" (1 549 m)   Wt 79 5 kg (175 lb 3 2 oz)   SpO2 96%   BMI 33 10 kg/m²     CURRENT MEDICATIONS    Current Outpatient Medications:   •  Acetaminophen (Tylenol) 325 MG CAPS, Take by mouth as needed, Disp: , Rfl:   •  amLODIPine (NORVASC) 5 mg tablet, TAKE 1 TABLET BY MOUTH EVERY DAY, Disp: 90 tablet, Rfl: 1  •  atorvastatin (LIPITOR) 10 mg tablet, TAKE 1 TABLET BY MOUTH EVERY DAY, Disp: 90 tablet, Rfl: 1  •  Cholecalciferol 50 MCG (2000 UT) CAPS, Take 1 capsule by mouth daily, Disp: , Rfl:   •  metoprolol succinate (TOPROL-XL) 100 mg 24 hr tablet, TAKE 1 TABLET BY MOUTH EVERY DAY IN THE EVENING, Disp: 90 tablet, Rfl: 1  •  mirtazapine (REMERON) 7 5 MG tablet, Take 1 tablet (7 5 mg total) by mouth daily at bedtime, Disp: 30 tablet, Rfl: 0  •  sertraline (ZOLOFT) 50 mg tablet, TAKE 1 AND 1/2 TABLETS BY MOUTH DAILY, Disp: 135 tablet, Rfl: 0  •  Melatonin 10 MG TABS, Take 10 mg by mouth daily at bedtime (Patient not taking: Reported on 3/16/2023), Disp: , Rfl:       PHYSICAL EXAMINATION   Physical Exam  Vitals and nursing note reviewed   Chaperone present: daughter present  Constitutional:       Appearance: Normal appearance  HENT:      Head: Normocephalic  Right Ear: Tympanic membrane and ear canal normal  Decreased hearing noted  Left Ear: Decreased hearing noted  There is impacted cerumen  Ears:      Comments: E[place]p - seeing audiologist for hearing aides  Pulmonary:      Effort: Pulmonary effort is normal  No respiratory distress  Neurological:      Mental Status: She is alert  Mental status is at baseline  Psychiatric:         Attention and Perception: Attention normal          Mood and Affect: Mood normal          Behavior: Behavior normal              Ear cerumen removal    Date/Time: 3/16/2023 1:51 PM  Performed by: RUBY Reardon  Authorized by: RUBY Reardon   Universal Protocol:  Consent given by: patient (daughter)  Patient understanding: patient states understanding of the procedure being performed      Procedure details:     Location:  L ear    Procedure type: irrigation with instrumentation      Instrumentation: curette and forceps      Approach:  External  Post-procedure details:     Complication:  None    Hearing quality:  Improved    Patient tolerance of procedure: Tolerated well, no immediate complications  Comments:      Large amount hard cerumen removed without issue    Tolerated well  Noted improved hearing

## 2023-03-23 ENCOUNTER — TELEPHONE (OUTPATIENT)
Age: 88
End: 2023-03-23

## 2023-03-23 DIAGNOSIS — F01.50 MIXED DEMENTIA (HCC): Primary | ICD-10-CM

## 2023-03-23 DIAGNOSIS — F02.80 MIXED DEMENTIA (HCC): Primary | ICD-10-CM

## 2023-03-23 DIAGNOSIS — G30.9 MIXED DEMENTIA (HCC): Primary | ICD-10-CM

## 2023-03-23 RX ORDER — QUETIAPINE FUMARATE 25 MG/1
12.5 TABLET, FILM COATED ORAL
Qty: 15 TABLET | Refills: 0 | Status: SHIPPED | OUTPATIENT
Start: 2023-03-23

## 2023-03-23 NOTE — TELEPHONE ENCOUNTER
Left message for pt's daughter Enoc Mireles  Cancelled appointment on 3/30  Scheduled nurse telephone call on 4/7 at 10 AM      Mai Delacruz to call if that does not work for pt  Also instructed to call if need help scheduling EKG

## 2023-03-23 NOTE — TELEPHONE ENCOUNTER
Spoke with Erick Walker Andrea continues to sleep walk and family is not getting much sleep  Stopped remeron for few days and seemed to be a little better, restarted and walking worse  Would like to try something else  We discussed possiblilities and have decided to trial seroquel - risk and benefits reviewed  1   Stop remeron  2  Start seroquel 12 5mg at bedtime  3  EKG on Monday or Tuesday to check qtc interval  4  Cancel 3/30 appt  5  Nursing med check on 4/7    6   Family to notify office if any concerns when starting seroquel - monitor for dizziness, confusion, sleeping changes

## 2023-03-23 NOTE — TELEPHONE ENCOUNTER
Patient's daughter, Cornelio Lynch ([phone]) called to say there are two MyChart message directed to the provider that she is seeking response to      Patient is up during the night sleep walking (3 times last night)  Caller feels patient needs an adjustment to her medicatons

## 2023-04-04 ENCOUNTER — PATIENT MESSAGE (OUTPATIENT)
Age: 88
End: 2023-04-04

## 2023-04-05 ENCOUNTER — CLINICAL SUPPORT (OUTPATIENT)
Dept: URGENT CARE | Facility: CLINIC | Age: 88
End: 2023-04-05

## 2023-04-05 DIAGNOSIS — F02.80 ALZHEIMER'S DISEASE (HCC): Primary | ICD-10-CM

## 2023-04-05 DIAGNOSIS — G30.9 ALZHEIMER'S DISEASE (HCC): Primary | ICD-10-CM

## 2023-04-05 LAB
ATRIAL RATE: 60 BPM
P AXIS: 25 DEGREES
PR INTERVAL: 226 MS
QRS AXIS: -10 DEGREES
QRSD INTERVAL: 126 MS
QT INTERVAL: 478 MS
QTC INTERVAL: 478 MS
T WAVE AXIS: 17 DEGREES
VENTRICULAR RATE: 60 BPM

## 2023-04-07 ENCOUNTER — TELEMEDICINE (OUTPATIENT)
Age: 88
End: 2023-04-07

## 2023-04-07 DIAGNOSIS — F02.80 MIXED DEMENTIA (HCC): ICD-10-CM

## 2023-04-07 DIAGNOSIS — F01.50 MIXED DEMENTIA (HCC): ICD-10-CM

## 2023-04-07 DIAGNOSIS — G30.9 MIXED DEMENTIA (HCC): ICD-10-CM

## 2023-04-07 DIAGNOSIS — F51.01 PRIMARY INSOMNIA: Primary | ICD-10-CM

## 2023-04-07 RX ORDER — QUETIAPINE FUMARATE 25 MG/1
25 TABLET, FILM COATED ORAL
Qty: 30 TABLET | Refills: 0 | Status: SHIPPED | OUTPATIENT
Start: 2023-04-07

## 2023-04-07 NOTE — ASSESSMENT & PLAN NOTE
· Pt pleasant, cooperative, forgetful  · Cont regular activity  Cont close monitoring  · Cont to reorient as needed

## 2023-04-07 NOTE — ASSESSMENT & PLAN NOTE
· Improvement with seroquel 12 5mg, but still having cont behaviors at night  · Qtc 478ms on ekg  · Will increase seroquel to 25mg qhs  Again, risks vs benefits of medication reviewed  · Recommend good sleep hygiene techniques including keeping active during the day, no at bedtime caffeine use, limit daytime sleeping, quiet environment at night  · We will refer to psychiatry for continued medication management in case this dose does not help  · Did discuss having nighttime care givers if patient continues to have nighttime walking and behaviors, as continued increase of medications puts pt at potentional increased risk for fall  At this point her daughters feels trial dose increase will be sufficient  Daughter does have safety mechanisms in place

## 2023-04-07 NOTE — PATIENT INSTRUCTIONS
Increase seroquel to 25mg qhs  Office will call in 2 weeks to see how pt is tolerating  If any concerns prior to call, please notify the office
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
## 2023-05-06 DIAGNOSIS — F02.80 MIXED DEMENTIA (HCC): ICD-10-CM

## 2023-05-06 DIAGNOSIS — G30.9 MIXED DEMENTIA (HCC): ICD-10-CM

## 2023-05-06 DIAGNOSIS — F01.50 MIXED DEMENTIA (HCC): ICD-10-CM

## 2023-05-08 RX ORDER — QUETIAPINE FUMARATE 25 MG/1
TABLET, FILM COATED ORAL
Qty: 90 TABLET | Refills: 1 | Status: SHIPPED | OUTPATIENT
Start: 2023-05-08

## 2023-05-16 DIAGNOSIS — F32.A DEPRESSION, UNSPECIFIED DEPRESSION TYPE: ICD-10-CM

## 2023-05-22 ENCOUNTER — OFFICE VISIT (OUTPATIENT)
Dept: AUDIOLOGY | Age: 88
End: 2023-05-22

## 2023-05-22 DIAGNOSIS — H90.3 SENSORY HEARING LOSS, BILATERAL: Primary | ICD-10-CM

## 2023-05-22 NOTE — PROGRESS NOTES
[id number] [address] EVALUATION    Name:  Miguelina Cisneros  :  1934  Age:  80 y o  Date of Evaluation: 23     History: Difficulty Understanding  Reason for visit: Miguelina Cisneros is being seen today at the request of Dr Humble Fregoso for an evaluation of hearing  Daughter reports trouble hearing  Patient has been diagnosed with Dementia  She had her ears cleaned out recently  EVALUATION:    Otoscopic Evaluation:   Right Ear: Clear and healthy ear canal and tympanic membrane   Left Ear: Clear and healthy ear canal and tympanic membrane    Tympanometry:   Right: Type A - normal middle ear pressure and compliance   Left: Type A - normal middle ear pressure and compliance    Audiogram Results:  Pure tone testing revealed a severe sensorineural hearing loss in the  right ear and a mild sloping to moderately severe sensorineural hearing loss in the  left  ear  SRT and PTA are in agreement indicating good test reliability  Word recognition scores were fair in the left ear and unable to be obtained in the right ear  *see attached audiogram      RECOMMENDATIONS:  Annual hearing eval, Consult ENT, [facility] to [facility]  for F/U and Hearing Aid Evaluation    PATIENT EDUCATION:   Discussed results and recommendations with Adilia Ozuna and her daughter  Recommended an ENT consult due to asymmetry  Questions were addressed and the patient was encouraged to contact our department should concerns arise        Cristal Scott , CCC-A  Clinical Audiologist

## 2023-06-18 DIAGNOSIS — I10 HYPERTENSION, UNSPECIFIED TYPE: ICD-10-CM

## 2023-06-19 ENCOUNTER — TELEPHONE (OUTPATIENT)
Dept: PSYCHIATRY | Facility: CLINIC | Age: 88
End: 2023-06-19

## 2023-06-19 RX ORDER — AMLODIPINE BESYLATE 5 MG/1
TABLET ORAL
Qty: 90 TABLET | Refills: 1 | Status: SHIPPED | OUTPATIENT
Start: 2023-06-19

## 2023-06-22 NOTE — TELEPHONE ENCOUNTER
Contacted Patient in regards to routine referral, Someone other then patient answer  Writer stated at this time I was unable to disclose information and to have patient contact intake when able  Person stated it would not for 10 days

## 2023-06-30 NOTE — TELEPHONE ENCOUNTER
Contacted patient in regards to routine referral and placing patient on proper wait list  Unable to lvm for patent to contact intake department

## 2023-07-17 DIAGNOSIS — E78.2 MIXED HYPERLIPIDEMIA: ICD-10-CM

## 2023-07-17 RX ORDER — ATORVASTATIN CALCIUM 10 MG/1
TABLET, FILM COATED ORAL
Qty: 90 TABLET | Refills: 1 | Status: SHIPPED | OUTPATIENT
Start: 2023-07-17

## 2023-07-26 ENCOUNTER — APPOINTMENT (OUTPATIENT)
Dept: LAB | Facility: CLINIC | Age: 88
End: 2023-07-26
Payer: COMMERCIAL

## 2023-07-26 DIAGNOSIS — H90.A21 SENSORINEURAL HEARING LOSS (SNHL) OF RIGHT EAR WITH RESTRICTED HEARING OF LEFT EAR: ICD-10-CM

## 2023-07-26 LAB
BUN SERPL-MCNC: 14 MG/DL (ref 5–25)
CREAT SERPL-MCNC: 1.02 MG/DL (ref 0.6–1.3)
GFR SERPL CREATININE-BSD FRML MDRD: 48 ML/MIN/1.73SQ M

## 2023-07-26 PROCEDURE — 36415 COLL VENOUS BLD VENIPUNCTURE: CPT

## 2023-07-26 PROCEDURE — 82565 ASSAY OF CREATININE: CPT

## 2023-07-26 PROCEDURE — 84520 ASSAY OF UREA NITROGEN: CPT

## 2023-07-31 ENCOUNTER — HOSPITAL ENCOUNTER (OUTPATIENT)
Facility: MEDICAL CENTER | Age: 88
Discharge: HOME | End: 2023-07-31

## 2023-07-31 DIAGNOSIS — H90.A21 SENSORINEURAL HEARING LOSS (SNHL) OF RIGHT EAR WITH RESTRICTED HEARING OF LEFT EAR: ICD-10-CM

## 2023-08-11 ENCOUNTER — HOSPITAL ENCOUNTER (OUTPATIENT)
Facility: MEDICAL CENTER | Age: 88
Discharge: HOME | End: 2023-08-11
Payer: COMMERCIAL

## 2023-08-11 PROCEDURE — G1004 CDSM NDSC: HCPCS

## 2023-08-11 PROCEDURE — 70553 MRI BRAIN STEM W/O & W/DYE: CPT

## 2023-08-11 PROCEDURE — A9585 GADOBUTROL INJECTION: HCPCS | Performed by: PHYSICIAN ASSISTANT

## 2023-08-11 RX ORDER — GADOBUTROL 604.72 MG/ML
8 INJECTION INTRAVENOUS
Status: COMPLETED | OUTPATIENT
Start: 2023-08-11 | End: 2023-08-11

## 2023-08-11 RX ADMIN — GADOBUTROL 8 ML: 604.72 INJECTION INTRAVENOUS at 13:37

## 2023-08-28 DIAGNOSIS — I10 BENIGN ESSENTIAL HYPERTENSION: Chronic | ICD-10-CM

## 2023-08-28 RX ORDER — METOPROLOL SUCCINATE 100 MG/1
TABLET, EXTENDED RELEASE ORAL
Qty: 90 TABLET | Refills: 1 | Status: SHIPPED | OUTPATIENT
Start: 2023-08-28

## 2023-09-01 ENCOUNTER — OFFICE VISIT (OUTPATIENT)
Age: 88
End: 2023-09-01
Payer: COMMERCIAL

## 2023-09-01 VITALS
HEIGHT: 62 IN | WEIGHT: 179.4 LBS | OXYGEN SATURATION: 98 % | SYSTOLIC BLOOD PRESSURE: 132 MMHG | DIASTOLIC BLOOD PRESSURE: 78 MMHG | BODY MASS INDEX: 33.01 KG/M2 | TEMPERATURE: 98.1 F | HEART RATE: 56 BPM

## 2023-09-01 DIAGNOSIS — F51.01 PRIMARY INSOMNIA: ICD-10-CM

## 2023-09-01 DIAGNOSIS — F32.5 MAJOR DEPRESSIVE DISORDER WITH SINGLE EPISODE, IN FULL REMISSION (HCC): ICD-10-CM

## 2023-09-01 DIAGNOSIS — H91.93 BILATERAL HEARING LOSS, UNSPECIFIED HEARING LOSS TYPE: ICD-10-CM

## 2023-09-01 DIAGNOSIS — I10 BENIGN ESSENTIAL HYPERTENSION: Chronic | ICD-10-CM

## 2023-09-01 DIAGNOSIS — F02.80 MIXED DEMENTIA (HCC): Primary | ICD-10-CM

## 2023-09-01 DIAGNOSIS — M81.0 AGE-RELATED OSTEOPOROSIS WITHOUT CURRENT PATHOLOGICAL FRACTURE: Chronic | ICD-10-CM

## 2023-09-01 DIAGNOSIS — F01.50 MIXED DEMENTIA (HCC): Primary | ICD-10-CM

## 2023-09-01 DIAGNOSIS — G30.9 MIXED DEMENTIA (HCC): Primary | ICD-10-CM

## 2023-09-01 PROCEDURE — 99214 OFFICE O/P EST MOD 30 MIN: CPT | Performed by: NURSE PRACTITIONER

## 2023-09-01 RX ORDER — AMOXICILLIN 500 MG/1
CAPSULE ORAL
COMMUNITY
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
## 2023-09-01 NOTE — ASSESSMENT & PLAN NOTE
· MOCA 18/30 - deficits in all domains but naming  · Had recent MRI by ENT - showed moderate, chronic microangiopathy which is increased from prior study. Progressive parenchymal involution/atrophy with ventricular prominence likely on the basis of atrophy. No acute infarction, intracranial hemorrhage, mass  MOCA: 18/30. Deficits in: All domains except for naming  Pt is dependent adl/dependent iadls. Lives with daughters. Pt's current cognitive level is consistent with mild dementia  Memory medications: None at present  Mobility: No AD, had 1 recent fall  Continue to stay active. Current activity level: Fair. Participate in social, cognitive, physical activity. Monitor for changes in mood, sleep, pain control. Notify provider if any concerns  Medication review:  Seems appropriate for current conditions  Check with pharmacist/provider before starting any new OTC/prescription medications for potential cognitive side effects  Optimize all acute and chronic conditions.   Continue to follow-up with PCP and specialist as directed for management  Safety concerns:  None at present  Caregiver stress:  Transitions between daughter's homes  Ensure adequate hydration, good nutrition

## 2023-09-06 NOTE — ASSESSMENT & PLAN NOTE
· Patient doing good with use of Seroquel  · Taking melatonin  · Continue good sleep hygiene techniques

## 2023-09-06 NOTE — ASSESSMENT & PLAN NOTE
· Mood stable at present  · Patient remains on sertraline and Seroquel  · Continue emotional support, relaxation techniques  · Geriatric therapist available if needed

## 2023-09-06 NOTE — ASSESSMENT & PLAN NOTE
· Continues with mild hearing loss, no hearing aids. ·  Speak clearly towards patient when communicating. Decrease outside noise distractions.     · Follow-up with audiology as needed

## 2023-09-06 NOTE — ASSESSMENT & PLAN NOTE
· BP stable without headache or dizziness  · Patient remains on amlodipine, metoprolol  · Continue dietary lifestyle interventions  · Continue follow-up with PCP for chronic management

## 2023-09-06 NOTE — ASSESSMENT & PLAN NOTE
· DEXA scan not yet complete  · Continue dietary intake CA/D3, supplement as needed. Continue weightbearing and strengthening exercises.     · Follow-up with PCP for osteoporotic management treatment plan

## 2023-09-25 ENCOUNTER — APPOINTMENT (EMERGENCY)
Dept: RADIOLOGY | Facility: HOSPITAL | Age: 88
End: 2023-09-25
Payer: COMMERCIAL

## 2023-09-25 ENCOUNTER — HOSPITAL ENCOUNTER (INPATIENT)
Facility: HOSPITAL | Age: 88
LOS: 1 days | Discharge: HOME | End: 2023-09-27
Attending: EMERGENCY MEDICINE | Admitting: INTERNAL MEDICINE
Payer: COMMERCIAL

## 2023-09-25 DIAGNOSIS — B37.89 CANDIDA RASH OF GROIN: ICD-10-CM

## 2023-09-25 DIAGNOSIS — K52.9 GASTROENTERITIS: ICD-10-CM

## 2023-09-25 DIAGNOSIS — R53.1 GENERALIZED WEAKNESS: ICD-10-CM

## 2023-09-25 DIAGNOSIS — N39.0 UTI (URINARY TRACT INFECTION): Primary | ICD-10-CM

## 2023-09-25 LAB
2HR DELTA HS TROPONIN: -1 NG/L
ALBUMIN SERPL BCP-MCNC: 3.7 G/DL (ref 3.5–5)
ALP SERPL-CCNC: 57 U/L (ref 34–104)
ALT SERPL W P-5'-P-CCNC: 8 U/L (ref 7–52)
ANION GAP SERPL CALCULATED.3IONS-SCNC: 9 MMOL/L
APTT PPP: 31 SECONDS (ref 23–37)
AST SERPL W P-5'-P-CCNC: 18 U/L (ref 13–39)
BACTERIA UR QL AUTO: ABNORMAL /HPF
BASOPHILS # BLD AUTO: 0.02 THOUSANDS/ÂΜL (ref 0–0.1)
BASOPHILS NFR BLD AUTO: 0 % (ref 0–1)
BILIRUB SERPL-MCNC: 1.91 MG/DL (ref 0.2–1)
BILIRUB UR QL STRIP: NEGATIVE
BUN SERPL-MCNC: 11 MG/DL (ref 5–25)
CALCIUM SERPL-MCNC: 8.2 MG/DL (ref 8.4–10.2)
CARDIAC TROPONIN I PNL SERPL HS: 27 NG/L
CARDIAC TROPONIN I PNL SERPL HS: 28 NG/L
CHLORIDE SERPL-SCNC: 99 MMOL/L (ref 96–108)
CLARITY UR: CLEAR
CO2 SERPL-SCNC: 25 MMOL/L (ref 21–32)
COLOR UR: YELLOW
CREAT SERPL-MCNC: 0.71 MG/DL (ref 0.6–1.3)
EOSINOPHIL # BLD AUTO: 0 THOUSAND/ÂΜL (ref 0–0.61)
EOSINOPHIL NFR BLD AUTO: 0 % (ref 0–6)
ERYTHROCYTE [DISTWIDTH] IN BLOOD BY AUTOMATED COUNT: 14.1 % (ref 11.6–15.1)
GFR SERPL CREATININE-BSD FRML MDRD: 75 ML/MIN/1.73SQ M
GLUCOSE SERPL-MCNC: 152 MG/DL (ref 65–140)
GLUCOSE UR STRIP-MCNC: NEGATIVE MG/DL
HCT VFR BLD AUTO: 38.1 % (ref 34.8–46.1)
HGB BLD-MCNC: 12.1 G/DL (ref 11.5–15.4)
HGB UR QL STRIP.AUTO: NEGATIVE
IMM GRANULOCYTES # BLD AUTO: 0.09 THOUSAND/UL (ref 0–0.2)
IMM GRANULOCYTES NFR BLD AUTO: 1 % (ref 0–2)
INR PPP: 1.22 (ref 0.84–1.19)
KETONES UR STRIP-MCNC: NEGATIVE MG/DL
LACTATE SERPL-SCNC: 0.6 MMOL/L (ref 0.5–2)
LEUKOCYTE ESTERASE UR QL STRIP: ABNORMAL
LYMPHOCYTES # BLD AUTO: 1.04 THOUSANDS/ÂΜL (ref 0.6–4.47)
LYMPHOCYTES NFR BLD AUTO: 7 % (ref 14–44)
MAGNESIUM SERPL-MCNC: 1.6 MG/DL (ref 1.9–2.7)
MCH RBC QN AUTO: 28 PG (ref 26.8–34.3)
MCHC RBC AUTO-ENTMCNC: 31.8 G/DL (ref 31.4–37.4)
MCV RBC AUTO: 88 FL (ref 82–98)
MONOCYTES # BLD AUTO: 1.2 THOUSAND/ÂΜL (ref 0.17–1.22)
MONOCYTES NFR BLD AUTO: 8 % (ref 4–12)
NEUTROPHILS # BLD AUTO: 13.24 THOUSANDS/ÂΜL (ref 1.85–7.62)
NEUTS SEG NFR BLD AUTO: 84 % (ref 43–75)
NITRITE UR QL STRIP: NEGATIVE
NON-SQ EPI CELLS URNS QL MICRO: ABNORMAL /HPF
NRBC BLD AUTO-RTO: 0 /100 WBCS
PH UR STRIP.AUTO: 5.5 [PH] (ref 4.5–8)
PLATELET # BLD AUTO: 222 THOUSANDS/UL (ref 149–390)
PMV BLD AUTO: 10.3 FL (ref 8.9–12.7)
POTASSIUM SERPL-SCNC: 3 MMOL/L (ref 3.5–5.3)
PROT SERPL-MCNC: 6.6 G/DL (ref 6.4–8.4)
PROT UR STRIP-MCNC: NEGATIVE MG/DL
PROTHROMBIN TIME: 15.4 SECONDS (ref 11.6–14.5)
RBC # BLD AUTO: 4.32 MILLION/UL (ref 3.81–5.12)
RBC #/AREA URNS AUTO: ABNORMAL /HPF
SODIUM SERPL-SCNC: 133 MMOL/L (ref 135–147)
SP GR UR STRIP.AUTO: 1.01 (ref 1–1.03)
TSH SERPL DL<=0.05 MIU/L-ACNC: 1.4 UIU/ML (ref 0.45–4.5)
UROBILINOGEN UR QL STRIP.AUTO: 0.2 E.U./DL
WBC # BLD AUTO: 15.59 THOUSAND/UL (ref 4.31–10.16)
WBC #/AREA URNS AUTO: ABNORMAL /HPF

## 2023-09-25 PROCEDURE — 87040 BLOOD CULTURE FOR BACTERIA: CPT | Performed by: EMERGENCY MEDICINE

## 2023-09-25 PROCEDURE — 83605 ASSAY OF LACTIC ACID: CPT | Performed by: EMERGENCY MEDICINE

## 2023-09-25 PROCEDURE — 71045 X-RAY EXAM CHEST 1 VIEW: CPT

## 2023-09-25 PROCEDURE — 85610 PROTHROMBIN TIME: CPT | Performed by: EMERGENCY MEDICINE

## 2023-09-25 PROCEDURE — 81001 URINALYSIS AUTO W/SCOPE: CPT

## 2023-09-25 PROCEDURE — 99285 EMERGENCY DEPT VISIT HI MDM: CPT

## 2023-09-25 PROCEDURE — 84145 PROCALCITONIN (PCT): CPT

## 2023-09-25 PROCEDURE — 85025 COMPLETE CBC W/AUTO DIFF WBC: CPT | Performed by: EMERGENCY MEDICINE

## 2023-09-25 PROCEDURE — 93005 ELECTROCARDIOGRAM TRACING: CPT

## 2023-09-25 PROCEDURE — 84443 ASSAY THYROID STIM HORMONE: CPT | Performed by: EMERGENCY MEDICINE

## 2023-09-25 PROCEDURE — 85730 THROMBOPLASTIN TIME PARTIAL: CPT | Performed by: EMERGENCY MEDICINE

## 2023-09-25 PROCEDURE — 99285 EMERGENCY DEPT VISIT HI MDM: CPT | Performed by: EMERGENCY MEDICINE

## 2023-09-25 PROCEDURE — 99222 1ST HOSP IP/OBS MODERATE 55: CPT

## 2023-09-25 PROCEDURE — 82248 BILIRUBIN DIRECT: CPT

## 2023-09-25 PROCEDURE — 84484 ASSAY OF TROPONIN QUANT: CPT | Performed by: EMERGENCY MEDICINE

## 2023-09-25 PROCEDURE — 80053 COMPREHEN METABOLIC PANEL: CPT | Performed by: EMERGENCY MEDICINE

## 2023-09-25 PROCEDURE — 83735 ASSAY OF MAGNESIUM: CPT

## 2023-09-25 PROCEDURE — 96374 THER/PROPH/DIAG INJ IV PUSH: CPT

## 2023-09-25 PROCEDURE — 36415 COLL VENOUS BLD VENIPUNCTURE: CPT | Performed by: EMERGENCY MEDICINE

## 2023-09-25 PROCEDURE — 96361 HYDRATE IV INFUSION ADD-ON: CPT

## 2023-09-25 RX ORDER — POTASSIUM CHLORIDE 20 MEQ/1
40 TABLET, EXTENDED RELEASE ORAL ONCE
Status: COMPLETED | OUTPATIENT
Start: 2023-09-25 | End: 2023-09-25

## 2023-09-25 RX ORDER — CEFEPIME HYDROCHLORIDE 2 G/50ML
2000 INJECTION, SOLUTION INTRAVENOUS ONCE
Status: COMPLETED | OUTPATIENT
Start: 2023-09-25 | End: 2023-09-25

## 2023-09-25 RX ADMIN — SODIUM CHLORIDE 1000 ML: 0.9 INJECTION, SOLUTION INTRAVENOUS at 19:59

## 2023-09-25 RX ADMIN — CEFEPIME HYDROCHLORIDE 2000 MG: 2 INJECTION, SOLUTION INTRAVENOUS at 22:23

## 2023-09-25 RX ADMIN — POTASSIUM CHLORIDE 40 MEQ: 1500 TABLET, EXTENDED RELEASE ORAL at 22:18
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
## 2023-09-26 PROBLEM — R39.9 UTI SYMPTOMS: Status: RESOLVED | Noted: 2022-12-14 | Resolved: 2023-09-26

## 2023-09-26 PROBLEM — R41.3 MEMORY LOSS: Status: RESOLVED | Noted: 2017-08-08 | Resolved: 2023-09-26

## 2023-09-26 PROBLEM — R41.89 COGNITIVE IMPAIRMENT: Status: RESOLVED | Noted: 2021-02-08 | Resolved: 2023-09-26

## 2023-09-26 PROBLEM — F51.01 PRIMARY INSOMNIA: Status: RESOLVED | Noted: 2021-02-08 | Resolved: 2023-09-26

## 2023-09-26 LAB
ALBUMIN SERPL BCP-MCNC: 3.6 G/DL (ref 3.5–5)
ALP SERPL-CCNC: 58 U/L (ref 34–104)
ALT SERPL W P-5'-P-CCNC: 8 U/L (ref 7–52)
ANION GAP SERPL CALCULATED.3IONS-SCNC: 9 MMOL/L
AST SERPL W P-5'-P-CCNC: 18 U/L (ref 13–39)
ATRIAL RATE: 67 BPM
ATRIAL RATE: 68 BPM
ATRIAL RATE: 71 BPM
BILIRUB DIRECT SERPL-MCNC: 0.36 MG/DL (ref 0–0.2)
BILIRUB DIRECT SERPL-MCNC: 0.4 MG/DL (ref 0–0.2)
BILIRUB SERPL-MCNC: 2.08 MG/DL (ref 0.2–1)
BUN SERPL-MCNC: 10 MG/DL (ref 5–25)
CALCIUM SERPL-MCNC: 8.6 MG/DL (ref 8.4–10.2)
CHLORIDE SERPL-SCNC: 103 MMOL/L (ref 96–108)
CO2 SERPL-SCNC: 25 MMOL/L (ref 21–32)
CREAT SERPL-MCNC: 0.63 MG/DL (ref 0.6–1.3)
ERYTHROCYTE [DISTWIDTH] IN BLOOD BY AUTOMATED COUNT: 14.2 % (ref 11.6–15.1)
GFR SERPL CREATININE-BSD FRML MDRD: 79 ML/MIN/1.73SQ M
GLUCOSE SERPL-MCNC: 118 MG/DL (ref 65–140)
GLUCOSE SERPL-MCNC: 122 MG/DL (ref 65–140)
GLUCOSE SERPL-MCNC: 143 MG/DL (ref 65–140)
GLUCOSE SERPL-MCNC: 145 MG/DL (ref 65–140)
GLUCOSE SERPL-MCNC: 165 MG/DL (ref 65–140)
HCT VFR BLD AUTO: 36.7 % (ref 34.8–46.1)
HGB BLD-MCNC: 11.8 G/DL (ref 11.5–15.4)
MAGNESIUM SERPL-MCNC: 2.2 MG/DL (ref 1.9–2.7)
MCH RBC QN AUTO: 28.4 PG (ref 26.8–34.3)
MCHC RBC AUTO-ENTMCNC: 32.2 G/DL (ref 31.4–37.4)
MCV RBC AUTO: 88 FL (ref 82–98)
P AXIS: -13 DEGREES
P AXIS: 30 DEGREES
P AXIS: 49 DEGREES
PLATELET # BLD AUTO: 228 THOUSANDS/UL (ref 149–390)
PMV BLD AUTO: 10.4 FL (ref 8.9–12.7)
POTASSIUM SERPL-SCNC: 3.4 MMOL/L (ref 3.5–5.3)
PR INTERVAL: 192 MS
PR INTERVAL: 198 MS
PR INTERVAL: 212 MS
PROCALCITONIN SERPL-MCNC: 0.17 NG/ML
PROCALCITONIN SERPL-MCNC: 0.49 NG/ML
PROT SERPL-MCNC: 6.6 G/DL (ref 6.4–8.4)
QRS AXIS: 0 DEGREES
QRS AXIS: 3 DEGREES
QRS AXIS: 4 DEGREES
QRSD INTERVAL: 136 MS
QRSD INTERVAL: 136 MS
QRSD INTERVAL: 138 MS
QT INTERVAL: 442 MS
QT INTERVAL: 478 MS
QT INTERVAL: 488 MS
QTC INTERVAL: 480 MS
QTC INTERVAL: 505 MS
QTC INTERVAL: 518 MS
RBC # BLD AUTO: 4.16 MILLION/UL (ref 3.81–5.12)
SODIUM SERPL-SCNC: 137 MMOL/L (ref 135–147)
T WAVE AXIS: -13 DEGREES
T WAVE AXIS: -18 DEGREES
T WAVE AXIS: -5 DEGREES
VENTRICULAR RATE: 67 BPM
VENTRICULAR RATE: 68 BPM
VENTRICULAR RATE: 71 BPM
WBC # BLD AUTO: 15.4 THOUSAND/UL (ref 4.31–10.16)

## 2023-09-26 PROCEDURE — 99232 SBSQ HOSP IP/OBS MODERATE 35: CPT | Performed by: INTERNAL MEDICINE

## 2023-09-26 PROCEDURE — 80076 HEPATIC FUNCTION PANEL: CPT

## 2023-09-26 PROCEDURE — 84145 PROCALCITONIN (PCT): CPT

## 2023-09-26 PROCEDURE — 85027 COMPLETE CBC AUTOMATED: CPT

## 2023-09-26 PROCEDURE — 82948 REAGENT STRIP/BLOOD GLUCOSE: CPT

## 2023-09-26 PROCEDURE — 83735 ASSAY OF MAGNESIUM: CPT

## 2023-09-26 PROCEDURE — 93010 ELECTROCARDIOGRAM REPORT: CPT | Performed by: INTERNAL MEDICINE

## 2023-09-26 PROCEDURE — 80048 BASIC METABOLIC PNL TOTAL CA: CPT

## 2023-09-26 RX ORDER — LANOLIN ALCOHOL/MO/W.PET/CERES
6 CREAM (GRAM) TOPICAL
Status: DISCONTINUED | OUTPATIENT
Start: 2023-09-26 | End: 2023-09-27 | Stop reason: HOSPADM

## 2023-09-26 RX ORDER — POTASSIUM CHLORIDE 20 MEQ/1
40 TABLET, EXTENDED RELEASE ORAL ONCE
Status: COMPLETED | OUTPATIENT
Start: 2023-09-26 | End: 2023-09-26

## 2023-09-26 RX ORDER — AMLODIPINE BESYLATE 5 MG/1
5 TABLET ORAL DAILY
Status: DISCONTINUED | OUTPATIENT
Start: 2023-09-26 | End: 2023-09-27 | Stop reason: HOSPADM

## 2023-09-26 RX ORDER — MAGNESIUM HYDROXIDE/ALUMINUM HYDROXICE/SIMETHICONE 120; 1200; 1200 MG/30ML; MG/30ML; MG/30ML
30 SUSPENSION ORAL EVERY 6 HOURS PRN
Status: DISCONTINUED | OUTPATIENT
Start: 2023-09-26 | End: 2023-09-27 | Stop reason: HOSPADM

## 2023-09-26 RX ORDER — INSULIN LISPRO 100 [IU]/ML
1-6 INJECTION, SOLUTION INTRAVENOUS; SUBCUTANEOUS
Status: DISCONTINUED | OUTPATIENT
Start: 2023-09-26 | End: 2023-09-27 | Stop reason: HOSPADM

## 2023-09-26 RX ORDER — POLYETHYLENE GLYCOL 3350 17 G/17G
17 POWDER, FOR SOLUTION ORAL DAILY
Status: DISCONTINUED | OUTPATIENT
Start: 2023-09-26 | End: 2023-09-26

## 2023-09-26 RX ORDER — MAGNESIUM SULFATE HEPTAHYDRATE 40 MG/ML
2 INJECTION, SOLUTION INTRAVENOUS ONCE
Status: COMPLETED | OUTPATIENT
Start: 2023-09-26 | End: 2023-09-26

## 2023-09-26 RX ORDER — ENOXAPARIN SODIUM 100 MG/ML
40 INJECTION SUBCUTANEOUS DAILY
Status: DISCONTINUED | OUTPATIENT
Start: 2023-09-26 | End: 2023-09-27 | Stop reason: HOSPADM

## 2023-09-26 RX ORDER — ACETAMINOPHEN 325 MG/1
650 TABLET ORAL EVERY 6 HOURS PRN
Status: DISCONTINUED | OUTPATIENT
Start: 2023-09-26 | End: 2023-09-27 | Stop reason: HOSPADM

## 2023-09-26 RX ORDER — ATORVASTATIN CALCIUM 10 MG/1
10 TABLET, FILM COATED ORAL
Status: DISCONTINUED | OUTPATIENT
Start: 2023-09-26 | End: 2023-09-27 | Stop reason: HOSPADM

## 2023-09-26 RX ORDER — METOPROLOL SUCCINATE 100 MG/1
100 TABLET, EXTENDED RELEASE ORAL EVERY EVENING
Status: DISCONTINUED | OUTPATIENT
Start: 2023-09-26 | End: 2023-09-27 | Stop reason: HOSPADM

## 2023-09-26 RX ADMIN — ATORVASTATIN CALCIUM 10 MG: 10 TABLET, FILM COATED ORAL at 17:35

## 2023-09-26 RX ADMIN — MAGNESIUM SULFATE HEPTAHYDRATE 2 G: 40 INJECTION, SOLUTION INTRAVENOUS at 01:35

## 2023-09-26 RX ADMIN — POTASSIUM CHLORIDE 40 MEQ: 1500 TABLET, EXTENDED RELEASE ORAL at 09:35

## 2023-09-26 RX ADMIN — INSULIN LISPRO 1 UNITS: 100 INJECTION, SOLUTION INTRAVENOUS; SUBCUTANEOUS at 12:30

## 2023-09-26 RX ADMIN — AMLODIPINE BESYLATE 5 MG: 5 TABLET ORAL at 09:35

## 2023-09-26 RX ADMIN — SODIUM CHLORIDE 3 G: 9 INJECTION, SOLUTION INTRAVENOUS at 15:03

## 2023-09-26 RX ADMIN — POLYETHYLENE GLYCOL 3350 17 G: 17 POWDER, FOR SOLUTION ORAL at 08:23

## 2023-09-26 RX ADMIN — ENOXAPARIN SODIUM 40 MG: 40 INJECTION SUBCUTANEOUS at 09:35

## 2023-09-26 RX ADMIN — SODIUM CHLORIDE 3 G: 9 INJECTION, SOLUTION INTRAVENOUS at 20:52

## 2023-09-26 RX ADMIN — Medication 6 MG: at 20:52

## 2023-09-26 RX ADMIN — METOPROLOL SUCCINATE 100 MG: 100 TABLET, EXTENDED RELEASE ORAL at 17:35

## 2023-09-26 NOTE — NURSING NOTE
Pt alert and oriented to self. She was unable to answer admission questions.   Daughter told nurse in ED she will be back in the AM. [phone]

## 2023-09-26 NOTE — ASSESSMENT & PLAN NOTE
Lab Results   Component Value Date    HGBA1C 6.1 (H) 04/22/2021       No results for input(s): "POCGLU" in the last 72 hours.     Blood Sugar Average: Last 72 hrs:     Plan:  • BG goal 140-200 while inpatient, ACHS sliding scale, carb coverage diet, holding oral antihyperglycemics  • Long acting: none  • PTA regimen:

## 2023-09-26 NOTE — PLAN OF CARE
Problem: PAIN - ADULT  Goal: Verbalizes/displays adequate comfort level or baseline comfort level  Description: Interventions:  - Encourage patient to monitor pain and request assistance  - Assess pain using appropriate pain scale  - Administer analgesics based on type and severity of pain and evaluate response  - Implement non-pharmacological measures as appropriate and evaluate response  - Consider cultural and social influences on pain and pain management  - Notify physician/advanced practitioner if interventions unsuccessful or patient reports new pain  Outcome: Progressing     Problem: INFECTION - ADULT  Goal: Absence or prevention of progression during hospitalization  Description: INTERVENTIONS:  - Assess and monitor for signs and symptoms of infection  - Monitor lab/diagnostic results  - Monitor all insertion sites, i.e. indwelling lines, tubes, and drains  - Monitor endotracheal if appropriate and nasal secretions for changes in amount and color  - [place] appropriate cooling/warming therapies per order  - Administer medications as ordered  - Instruct and encourage patient and family to use good hand hygiene technique  - Identify and instruct in appropriate isolation precautions for identified infection/condition  Outcome: Progressing  Goal: Absence of fever/infection during neutropenic period  Description: INTERVENTIONS:  - Monitor WBC    Outcome: Progressing     Problem: SAFETY ADULT  Goal: Patient will remain free of falls  Description: INTERVENTIONS:  - Educate patient/family on patient safety including physical limitations  - Instruct patient to call for assistance with activity   - Consult OT/PT to assist with strengthening/mobility   - Keep Call bell within reach  - Keep bed low and locked with side rails adjusted as appropriate  - Keep care items and personal belongings within reach  - Initiate and maintain comfort rounds  - Make Fall Risk Sign visible to staff  - Apply yellow socks and bracelet for high fall risk patients  - Consider moving patient to room near nurses station  Outcome: Progressing  Goal: Maintain or return to baseline ADL function  Description: INTERVENTIONS:  -  Assess patient's ability to carry out ADLs; assess patient's baseline for ADL function and identify physical deficits which impact ability to perform ADLs (bathing, care of mouth/teeth, toileting, grooming, dressing, etc.)  - Assess/evaluate cause of self-care deficits   - Assess range of motion  - Assess patient's mobility; develop plan if impaired  - Assess patient's need for assistive devices and provide as appropriate  - Encourage maximum independence but intervene and supervise when necessary  - Involve family in performance of ADLs  - Assess for home care needs following discharge   - Consider OT consult to assist with ADL evaluation and planning for discharge  - Provide patient education as appropriate  Outcome: Progressing  Goal: Maintains/Returns to pre admission functional level  Description: INTERVENTIONS:  - Perform BMAT or MOVE assessment daily.   - Set and communicate daily mobility goal to care team and patient/family/caregiver. - Collaborate with rehabilitation services on mobility goals if consulted  - Perform Range of Motion 3 times a day. - Reposition patient every 2 hours.   - Dangle patient 3 times a day  - Stand patient 3 times a day  - Ambulate patient 3 times a day  - Out of bed to chair 3 times a day   - Out of bed for meals 3 times a day  - Out of bed for toileting  - Record patient progress and toleration of activity level   Outcome: Progressing     Problem: DISCHARGE PLANNING  Goal: Discharge to home or other facility with appropriate resources  Description: INTERVENTIONS:  - Identify barriers to discharge w/patient and caregiver  - Arrange for needed discharge resources and transportation as appropriate  - Identify discharge learning needs (meds, wound care, etc.)  - Arrange for interpretive services to assist at discharge as needed  - Refer to Case Management Department for coordinating discharge planning if the patient needs post-hospital services based on physician/advanced practitioner order or complex needs related to functional status, cognitive ability, or social support system  Outcome: Progressing     Problem: Knowledge Deficit  Goal: Patient/family/caregiver demonstrates understanding of disease process, treatment plan, medications, and discharge instructions  Description: Complete learning assessment and assess knowledge base. Interventions:  - Provide teaching at level of understanding  - Provide teaching via preferred learning methods  Outcome: Progressing     Problem: MOBILITY - ADULT  Goal: Maintain or return to baseline ADL function  Description: INTERVENTIONS:  -  Assess patient's ability to carry out ADLs; assess patient's baseline for ADL function and identify physical deficits which impact ability to perform ADLs (bathing, care of mouth/teeth, toileting, grooming, dressing, etc.)  - Assess/evaluate cause of self-care deficits   - Assess range of motion  - Assess patient's mobility; develop plan if impaired  - Assess patient's need for assistive devices and provide as appropriate  - Encourage maximum independence but intervene and supervise when necessary  - Involve family in performance of ADLs  - Assess for home care needs following discharge   - Consider OT consult to assist with ADL evaluation and planning for discharge  - Provide patient education as appropriate  Outcome: Progressing  Goal: Maintains/Returns to pre admission functional level  Description: INTERVENTIONS:  - Perform BMAT or MOVE assessment daily.   - Set and communicate daily mobility goal to care team and patient/family/caregiver. - Collaborate with rehabilitation services on mobility goals if consulted  - Perform Range of Motion 3 times a day. - Reposition patient every 2 hours.   - Dangle patient 3 times a day  - Stand patient 3 times a day  - Ambulate patient 3 times a day  - Out of bed to chair 3 times a day   - Out of bed for meals 3 times a day  - Out of bed for toileting  - Record patient progress and toleration of activity level   Outcome: Progressing     Problem: Prexisting or High Potential for Compromised Skin Integrity  Goal: Skin integrity is maintained or improved  Description: INTERVENTIONS:  - Identify patients at risk for skin breakdown  - Assess and monitor skin integrity  - Assess and monitor nutrition and hydration status  - Monitor labs   - Assess for incontinence   - Turn and reposition patient  - Assist with mobility/ambulation  - Relieve pressure over bony prominences  - Avoid friction and shearing  - Provide appropriate hygiene as needed including keeping skin clean and dry  - Evaluate need for skin moisturizer/barrier cream  - Collaborate with interdisciplinary team   - Patient/family teaching  - Consider wound care consult   Outcome: Progressing

## 2023-09-26 NOTE — ASSESSMENT & PLAN NOTE
Follows outpatient geriatrics  [facility] 18/30  Seroquel on hold given prolonged QTc on admission we will recheck EKG and resume if normalized  Delirium precautions  Virtual sitter due to history of impulsiveness

## 2023-09-26 NOTE — ASSESSMENT & PLAN NOTE
Presenting with confusion, possible fever. Episodes of diarrhea/diaphoresis. Patient without abdominal pain, urinary symptoms. CBC with mild leukocytosis, CMP with mild hyponatremia, hypokalemia, hypomagnesemia. Elevated bilirubin     Plan:  • Appeared dry. Family reports GI illness at home.  No current abdominal pain  • Empiric cefepime given in ED, hold further antibiotics unless worsening fever/WBC/procal  • Check direct bili, trend in AM  • IVF: isolyte overnight
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
## 2023-09-26 NOTE — RESTORATIVE TECHNICIAN NOTE
Restorative Technician Note      Patient Name: Brielle Adame     Restorative Tech Visit Date: 09/26/23  Note Type: Mobility  Patient Position Upon Consult: Supine  Activity Performed: Range of motion  Patient Position at End of Consult: Supine;  All needs within reach

## 2023-09-26 NOTE — APP STUDENT NOTE
STUART STUDENT  Inpatient Progress Note for TRAINING ONLY  Not Part of Legal Medical Record       Progress Note - Anna Marie Silva 80 y.o. female MRN: [id number]    Unit/Bed#: [place] 2 -01 Encounter: [id number]      Assessment/Plan:  Gastroenteritis  Patient originally presented with diarrhea, increased confusion, and diaphoresis  Procalcitonin up today to 0.49 (0.17 yesterday) restart antibiotics  Electrolytes normalized after isolyte overnight  Order RUQ ultrasound and consider abdominal CT due to pain in multiple areas on physical exam.  Recheck INR, CBC, CMP in the morning. Consider acetaminophen level due to elevated bilirubin, elevated INR and diarrhea. Blood cultures pending  Consider stool culture if diarrhea continues    Dementia  Last MOCA 18/30 on 9/01/2023  Mg level has normalized but continue to hold Seroquel due to abnormal EKG: prolonged QT  Consult physical therapy to keep patient moving while she is inpatient. Turn patient q2 hours to prevent bed sores. DM Type 2  Insulin with sliding scale while inpatient  Glucose checks achs  Carb coverage diet    Hypertension  Continue amlodipine and metoprolol succinate     Insomnia  Hold Seroquel due to prolonged QT. Take other measures to help with insomnia  Melatonin, lights low, TV off. MDD  Continue Zoloft      Subjective:   Yosef Wyatt is an 80year old female with a past medical history of dementia, diabetes, and hypertension who presented with confusion and diarrhea on hospital day 1. Today upon interview the daughter was not in the room and the patient was only able to answer a few questions. She reports that she is not having any abdominal pain, chest pain, dizziness, shortness of breath. She has no complaints and said that she slept well. She said she isn't aware if she still is having diarrhea. No recent changes to medications. Objective:   Gen: alert to self, but not time or place. Pleasant upon interview. HEENT: Normocephalic, atraumatic. CV: Regular rate and rhythm no M/R/G  Pulm: Clear to auscultation bilaterally, no wheezes/rales/rhonchi  Abd: Soft, non-distended. Bowel sounds present in all 4 quadrants. Mild tenderness in the LLQ to palpation and RUQ. Skin: Pulses 2+ bilaterally, capillary refill < 2 seconds. Legs mildly swollen. Neuro: Consistent with dementia  Psych: Mood normal      Vitals: Blood pressure 147/66, pulse 95, temperature 98.3 °F (36.8 °C), resp. rate 16, height 5' 1" (1.549 m), weight 81.4 kg (179 lb 7.3 oz), SpO2 96 %. ,Body mass index is 33.91 kg/m².       Intake/Output Summary (Last 24 hours) at 9/26/2023 1120  Last data filed at 9/26/2023 0308  Gross per 24 hour   Intake 1600 ml   Output --   Net 1600 ml       Physical Exam: {Exam, Complete:69387}     Invasive Devices     Peripheral Intravenous Line  Duration           Peripheral IV 09/25/23 Distal;Right;Ventral (anterior) Forearm <1 day                Lab, Imaging and other studies: {Results Review Statement:50293}  VTE Pharmacologic Prophylaxis: {Pharmacologic VTE Prophylaxis:185256939}  VTE Mechanical Prophylaxis: {Mechanical VTE Prophylaxis:60147}

## 2023-09-26 NOTE — ASSESSMENT & PLAN NOTE
Presenting with confusion, possible fever. Episodes of diarrhea/diaphoresis. Reported by family. Patient is a poor historian and does not express symptoms of abdominal pain  Patient has persistent leukocytosis, elevated procalcitonin, elevated T. Bili  On exam patient does have tenderness to palpation of right upper and right lower quadrant. Otherwise appears well and nontoxic.   We will check right upper quadrant ultrasound  We will start antibiotics for presumed colitis versus gallbladder pathology

## 2023-09-26 NOTE — PLAN OF CARE
Problem: PAIN - ADULT  Goal: Verbalizes/displays adequate comfort level or baseline comfort level  Description: Interventions:  - Encourage patient to monitor pain and request assistance  - Assess pain using appropriate pain scale  - Administer analgesics based on type and severity of pain and evaluate response  - Implement non-pharmacological measures as appropriate and evaluate response  - Consider cultural and social influences on pain and pain management  - Notify physician/advanced practitioner if interventions unsuccessful or patient reports new pain  Outcome: Progressing     Problem: INFECTION - ADULT  Goal: Absence or prevention of progression during hospitalization  Description: INTERVENTIONS:  - Assess and monitor for signs and symptoms of infection  - Monitor lab/diagnostic results  - Monitor all insertion sites, i.e. indwelling lines, tubes, and drains  - Monitor endotracheal if appropriate and nasal secretions for changes in amount and color  - [place] appropriate cooling/warming therapies per order  - Administer medications as ordered  - Instruct and encourage patient and family to use good hand hygiene technique  - Identify and instruct in appropriate isolation precautions for identified infection/condition  Outcome: Progressing  Goal: Absence of fever/infection during neutropenic period  Description: INTERVENTIONS:  - Monitor WBC    Outcome: Progressing     Problem: SAFETY ADULT  Goal: Patient will remain free of falls  Description: INTERVENTIONS:  - Educate patient/family on patient safety including physical limitations  - Instruct patient to call for assistance with activity   - Consult OT/PT to assist with strengthening/mobility   - Keep Call bell within reach  - Keep bed low and locked with side rails adjusted as appropriate  - Keep care items and personal belongings within reach  - Initiate and maintain comfort rounds  - Make Fall Risk Sign visible to staff  - Offer Toileting every 2 Hours, in advance of need  - Initiate/Maintain bed alarm  - Obtain necessary fall risk management equipment. - Apply yellow socks and bracelet for high fall risk patients  - Consider moving patient to room near nurses station  Outcome: Progressing  Goal: Maintain or return to baseline ADL function  Description: INTERVENTIONS:  -  Assess patient's ability to carry out ADLs; assess patient's baseline for ADL function and identify physical deficits which impact ability to perform ADLs (bathing, care of mouth/teeth, toileting, grooming, dressing, etc.)  - Assess/evaluate cause of self-care deficits   - Assess range of motion  - Assess patient's mobility; develop plan if impaired  - Assess patient's need for assistive devices and provide as appropriate  - Encourage maximum independence but intervene and supervise when necessary  - Involve family in performance of ADLs  - Assess for home care needs following discharge   - Consider OT consult to assist with ADL evaluation and planning for discharge  - Provide patient education as appropriate  Outcome: Progressing  Goal: Maintains/Returns to pre admission functional level  Description: INTERVENTIONS:  - Perform BMAT or MOVE assessment daily.   - Set and communicate daily mobility goal to care team and patient/family/caregiver. - Collaborate with rehabilitation services on mobility goals if consulted  - Perform Range of Motion 2 times a day. - Reposition patient every 2 hours.   - Dangle patient 2 times a day  - Stand patient 2 times a day  - Ambulate patient 2 times a day  - Out of bed to chair 2 times a day   - Out of bed for meals 2 times a day  - Out of bed for toileting  - Record patient progress and toleration of activity level   Outcome: Progressing     Problem: DISCHARGE PLANNING  Goal: Discharge to home or other facility with appropriate resources  Description: INTERVENTIONS:  - Identify barriers to discharge w/patient and caregiver  - Arrange for needed discharge resources and transportation as appropriate  - Identify discharge learning needs (meds, wound care, etc.)  - Arrange for interpretive services to assist at discharge as needed  - Refer to Case Management Department for coordinating discharge planning if the patient needs post-hospital services based on physician/advanced practitioner order or complex needs related to functional status, cognitive ability, or social support system  Outcome: Progressing     Problem: Knowledge Deficit  Goal: Patient/family/caregiver demonstrates understanding of disease process, treatment plan, medications, and discharge instructions  Description: Complete learning assessment and assess knowledge base.   Interventions:  - Provide teaching at level of understanding  - Provide teaching via preferred learning methods  Outcome: Progressing

## 2023-09-26 NOTE — PROGRESS NOTES
[address] [facility]  Progress Note  Name: Gonsalo Zavala  MRN: [id number]  Unit/Bed#: Tj Matson 2 [address] [facility] [place] 223-01 I Date of Admission: 9/25/2023   Date of Service: 9/26/2023 I Hospital Day: 0    Assessment/Plan   * Enteritis  Assessment & Plan  Presenting with confusion, possible fever. Episodes of diarrhea/diaphoresis. Reported by family. Patient is a poor historian and does not express symptoms of abdominal pain  Patient has persistent leukocytosis, elevated procalcitonin, elevated T. Bili  On exam patient does have tenderness to palpation of right upper and right lower quadrant. Otherwise appears well and nontoxic. We will check right upper quadrant ultrasound  We will start antibiotics for presumed colitis versus gallbladder pathology      Type 2 diabetes mellitus without complication, without long-term current use of insulin ([facility])  Assessment & Plan  • Sliding scale insulin    Mixed dementia [facility])  Assessment & Plan  Follows outpatient geriatrics  [facility] 18/30  Seroquel on hold given prolonged QTc on admission we will recheck EKG and resume if normalized  Delirium precautions  Virtual sitter due to history of impulsiveness    Benign essential hypertension  Assessment & Plan  Continue amlodipine and metoprolol           VTE Pharmacologic Prophylaxis: lovenox     Patient Centered Rounds:  Patient care rounds were performed with nursing    Education and Discussions with Family / Patient: Updated daughter on plan of care     Time Spent for Care: I have spent a total time of 41 minutes on 09/26/23 in caring for this patient including Diagnostic results, Instructions for management, Patient and family education, Counseling / Coordination of care, Documenting in the medical record, Reviewing / ordering tests, medicine, procedures   and Obtaining or reviewing history  .       Current Length of Stay: 0 day(s)    Current Patient Status: Observation   Certification Statement: The patient will continue to require additional inpatient hospital stay due to IV medications     Discharge Plan: Hopeful discharge in next 24-48 hours    Code Status: Level 1 - Full Code      Subjective:   Patient seen and evaluated at bedside. She feels well. Eating lunch. No complaints. Objective:     Vitals:   Temp (24hrs), Av.4 °F (36.9 °C), Min:97.9 °F (36.6 °C), Max:99 °F (37.2 °C)    Temp:  [97.9 °F (36.6 °C)-99 °F (37.2 °C)] 98.3 °F (36.8 °C)  HR:  [60-95] 95  Resp:  [16-22] 16  BP: (118-182)/(58-79) 147/66  SpO2:  [91 %-99 %] 96 %  Body mass index is 33.91 kg/m². Input and Output Summary (last 24 hours): Intake/Output Summary (Last 24 hours) at 2023 1312  Last data filed at 2023 0308  Gross per 24 hour   Intake 1600 ml   Output --   Net 1600 ml       Physical Exam:     Physical Exam  Vitals reviewed. Constitutional:       General: She is not in acute distress. Appearance: She is well-developed. She is not ill-appearing, toxic-appearing or diaphoretic. HENT:      Head: Normocephalic and atraumatic. Mouth/Throat:      Mouth: Mucous membranes are moist.   Eyes:      General: No scleral icterus. Extraocular Movements: Extraocular movements intact. Cardiovascular:      Rate and Rhythm: Normal rate and regular rhythm. Heart sounds: Normal heart sounds. Pulmonary:      Effort: Pulmonary effort is normal. No respiratory distress. Breath sounds: Normal breath sounds. No wheezing or rales. Abdominal:      General: There is no distension. Palpations: Abdomen is soft. Tenderness: There is no abdominal tenderness. There is no guarding or rebound. Comments: Mild tenderness to palpation of right upper quadrant and right lower quadrant   Musculoskeletal:         General: No swelling, tenderness or deformity. Skin:     General: Skin is warm and dry. Neurological:      General: No focal deficit present. Mental Status: She is alert.    Psychiatric:         Mood and Affect: Mood normal.         Behavior: Behavior normal.         Thought Content: Thought content normal.         Judgment: Judgment normal.         Additional Data:     Labs: I have reviewed pertinent results     Results from last 7 days   Lab Units 09/26/23 0442 09/25/23 1959   WBC Thousand/uL 15.40* 15.59*   HEMOGLOBIN g/dL 11.8 12.1   HEMATOCRIT % 36.7 38.1   PLATELETS Thousands/uL 228 222   NEUTROS PCT %  --  84*   LYMPHS PCT %  --  7*   MONOS PCT %  --  8   EOS PCT %  --  0     Results from last 7 days   Lab Units 09/26/23 0442   SODIUM mmol/L 137   POTASSIUM mmol/L 3.4*   CHLORIDE mmol/L 103   CO2 mmol/L 25   BUN mg/dL 10   CREATININE mg/dL 0.63   ANION GAP mmol/L 9   CALCIUM mg/dL 8.6   ALBUMIN g/dL 3.6   TOTAL BILIRUBIN mg/dL 2.08*   ALK PHOS U/L 58   ALT U/L 8   AST U/L 18   GLUCOSE RANDOM mg/dL 143*     Results from last 7 days   Lab Units 09/25/23 1959   INR  1.22*     Results from last 7 days   Lab Units 09/26/23  1120 09/26/23  0746   POC GLUCOSE mg/dl 165* 145*         Results from last 7 days   Lab Units 09/26/23 0442 09/25/23 2220 09/25/23 1959   LACTIC ACID mmol/L  --  0.6  --    PROCALCITONIN ng/ml 0.49*  --  0.17         Imaging: I have reviewed pertinent imaging       Recent Cultures (last 7 days):     Results from last 7 days   Lab Units 09/25/23 1925   BLOOD CULTURE  Received in Microbiology Lab. Culture in Progress. Received in Microbiology Lab. Culture in Progress.        Last 24 Hours Medication List:   Current Facility-Administered Medications   Medication Dose Route Frequency Provider Last Rate   • acetaminophen  650 mg Oral Q6H PRN Leila Armas PA-C     • aluminum-magnesium hydroxide-simethicone  30 mL Oral Q6H PRN Leila Armas PA-C     • amLODIPine  5 mg Oral Daily Leila Armas PA-C     • ampicillin-sulbactam  3 g Intravenous Q6H Israel Zeb, DO     • atorvastatin  10 mg Oral Daily With Judy Becerra PA-C     • enoxaparin  40 mg Subcutaneous Daily Leila Armas PA-C • insulin lispro  1-6 Units Subcutaneous 4x Daily (AC & HS) Brenda Gomez PA-C     • melatonin  6 mg Oral HS Brenda Gomez PA-C     • metoprolol succinate  100 mg Oral QPM Brenda Gomez PA-C          Today, Patient Was Seen By: Leeanna Randolph DO    ** Please Note: Dictation voice to text software may have been used in the creation of this document.  **
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
## 2023-09-26 NOTE — ASSESSMENT & PLAN NOTE
Follows outpatient geriatrics  [facility] 18/30    Plan:  • Lives with daughters, cognition consistent with dementia on admission  • Holding HS seroquel given prolonged qTC and hypomagnesemia  High risk for hospital acquired delirium: ear plugs/eye shades at night/TV out, avoid unnecessary sleep interruption, use of glasses/hearing aids during day, frequent reorientation, aggressive physical therapy/mobilization, avoid opioids, benzodiazepines, antihistamines. If agitated risk to self/others use antipsychotic early evening/safety sitter.

## 2023-09-27 ENCOUNTER — APPOINTMENT (INPATIENT)
Dept: ULTRASOUND IMAGING | Facility: HOSPITAL | Age: 88
End: 2023-09-27
Payer: COMMERCIAL

## 2023-09-27 ENCOUNTER — TRANSITIONAL CARE MANAGEMENT (OUTPATIENT)
Dept: FAMILY MEDICINE CLINIC | Facility: CLINIC | Age: 88
End: 2023-09-27

## 2023-09-27 VITALS
HEART RATE: 78 BPM | BODY MASS INDEX: 33.88 KG/M2 | OXYGEN SATURATION: 94 % | WEIGHT: 179.45 LBS | RESPIRATION RATE: 18 BRPM | TEMPERATURE: 98.7 F | HEIGHT: 61 IN | SYSTOLIC BLOOD PRESSURE: 146 MMHG | DIASTOLIC BLOOD PRESSURE: 68 MMHG

## 2023-09-27 PROBLEM — G93.41 METABOLIC ENCEPHALOPATHY: Status: ACTIVE | Noted: 2021-02-08

## 2023-09-27 LAB
ALBUMIN SERPL BCP-MCNC: 3.1 G/DL (ref 3.5–5)
ALP SERPL-CCNC: 61 U/L (ref 34–104)
ALT SERPL W P-5'-P-CCNC: 8 U/L (ref 7–52)
ANION GAP SERPL CALCULATED.3IONS-SCNC: 8 MMOL/L
AST SERPL W P-5'-P-CCNC: 26 U/L (ref 13–39)
ATRIAL RATE: 71 BPM
ATRIAL RATE: 72 BPM
BILIRUB SERPL-MCNC: 1.5 MG/DL (ref 0.2–1)
BUN SERPL-MCNC: 11 MG/DL (ref 5–25)
CALCIUM ALBUM COR SERPL-MCNC: 9 MG/DL (ref 8.3–10.1)
CALCIUM SERPL-MCNC: 8.3 MG/DL (ref 8.4–10.2)
CHLORIDE SERPL-SCNC: 101 MMOL/L (ref 96–108)
CO2 SERPL-SCNC: 25 MMOL/L (ref 21–32)
CREAT SERPL-MCNC: 0.52 MG/DL (ref 0.6–1.3)
ERYTHROCYTE [DISTWIDTH] IN BLOOD BY AUTOMATED COUNT: 14.3 % (ref 11.6–15.1)
GFR SERPL CREATININE-BSD FRML MDRD: 84 ML/MIN/1.73SQ M
GLUCOSE SERPL-MCNC: 112 MG/DL (ref 65–140)
GLUCOSE SERPL-MCNC: 75 MG/DL (ref 65–140)
GLUCOSE SERPL-MCNC: 78 MG/DL (ref 65–140)
HCT VFR BLD AUTO: 33.5 % (ref 34.8–46.1)
HGB BLD-MCNC: 11 G/DL (ref 11.5–15.4)
MCH RBC QN AUTO: 28.5 PG (ref 26.8–34.3)
MCHC RBC AUTO-ENTMCNC: 32.8 G/DL (ref 31.4–37.4)
MCV RBC AUTO: 87 FL (ref 82–98)
P AXIS: 23 DEGREES
P AXIS: 34 DEGREES
PLATELET # BLD AUTO: 190 THOUSANDS/UL (ref 149–390)
PMV BLD AUTO: 10.7 FL (ref 8.9–12.7)
POTASSIUM SERPL-SCNC: 3.6 MMOL/L (ref 3.5–5.3)
PR INTERVAL: 196 MS
PR INTERVAL: 200 MS
PROT SERPL-MCNC: 6.1 G/DL (ref 6.4–8.4)
QRS AXIS: 50 DEGREES
QRS AXIS: 54 DEGREES
QRSD INTERVAL: 130 MS
QRSD INTERVAL: 132 MS
QT INTERVAL: 454 MS
QT INTERVAL: 456 MS
QTC INTERVAL: 493 MS
QTC INTERVAL: 499 MS
RBC # BLD AUTO: 3.86 MILLION/UL (ref 3.81–5.12)
SODIUM SERPL-SCNC: 134 MMOL/L (ref 135–147)
T WAVE AXIS: 12 DEGREES
T WAVE AXIS: 3 DEGREES
VENTRICULAR RATE: 71 BPM
VENTRICULAR RATE: 72 BPM
WBC # BLD AUTO: 11.24 THOUSAND/UL (ref 4.31–10.16)

## 2023-09-27 PROCEDURE — 80053 COMPREHEN METABOLIC PANEL: CPT | Performed by: INTERNAL MEDICINE

## 2023-09-27 PROCEDURE — 76705 ECHO EXAM OF ABDOMEN: CPT

## 2023-09-27 PROCEDURE — 85027 COMPLETE CBC AUTOMATED: CPT | Performed by: INTERNAL MEDICINE

## 2023-09-27 PROCEDURE — 99239 HOSP IP/OBS DSCHRG MGMT >30: CPT | Performed by: INTERNAL MEDICINE

## 2023-09-27 PROCEDURE — 93005 ELECTROCARDIOGRAM TRACING: CPT

## 2023-09-27 PROCEDURE — 93010 ELECTROCARDIOGRAM REPORT: CPT

## 2023-09-27 PROCEDURE — 82948 REAGENT STRIP/BLOOD GLUCOSE: CPT

## 2023-09-27 RX ORDER — NYSTATIN 100000 [USP'U]/G
POWDER TOPICAL 2 TIMES DAILY
Qty: 30 G | Refills: 0 | Status: ON HOLD | OUTPATIENT
Start: 2023-09-27 | End: 2023-10-04

## 2023-09-27 RX ORDER — NYSTATIN 100000 [USP'U]/G
POWDER TOPICAL 2 TIMES DAILY
Status: DISCONTINUED | OUTPATIENT
Start: 2023-09-27 | End: 2023-09-27 | Stop reason: HOSPADM

## 2023-09-27 RX ORDER — AMOXICILLIN AND CLAVULANATE POTASSIUM 875; 125 MG/1; MG/1
1 TABLET, FILM COATED ORAL EVERY 12 HOURS SCHEDULED
Qty: 14 TABLET | Refills: 0 | Status: SHIPPED | OUTPATIENT
Start: 2023-09-27 | End: 2023-10-04

## 2023-09-27 RX ADMIN — AMLODIPINE BESYLATE 5 MG: 5 TABLET ORAL at 08:36

## 2023-09-27 RX ADMIN — SODIUM CHLORIDE 3 G: 9 INJECTION, SOLUTION INTRAVENOUS at 02:47

## 2023-09-27 RX ADMIN — ENOXAPARIN SODIUM 40 MG: 40 INJECTION SUBCUTANEOUS at 08:36

## 2023-09-27 RX ADMIN — SODIUM CHLORIDE 3 G: 9 INJECTION, SOLUTION INTRAVENOUS at 08:35

## 2023-09-27 RX ADMIN — NYSTATIN: 100000 POWDER TOPICAL at 11:01

## 2023-09-27 NOTE — NURSING NOTE
Discharge paperwork and medications reviewed with son-in-law. IV and masimo removed. Family to provide transportation home.

## 2023-09-27 NOTE — DISCHARGE SUMMARY
[address] [facility]  Discharge- Ernestine Perkins 1934, 80 y.o. female MRN: [id number]  Unit/Bed#: [address] [place] 223-01 Encounter: [id number]  Primary Care Provider: RUBY King   Date and time admitted to hospital: 9/25/2023  7:17 PM    * Enteritis  Assessment & Plan  Presenting with confusion, possible fever. Episodes of diarrhea/diaphoresis. Reported by family.   Patient is a poor historian and does not express symptoms of abdominal pain  Patient was tender to palpation in right upper and right lower quadrant which resolved during hospitalization  Clinical improvement with initiation of antibiotics  Right upper quadrant ultrasound unremarkable for gallbladder pathology  Symptoms likely related to colitis/enteritis  Discharged home on 7-day course of Augmentin      Type 2 diabetes mellitus without complication, without long-term current use of insulin ([address])  Assessment & Plan  Diet controlled    Metabolic encephalopathy  Assessment & Plan  Related to electrolyte disturbances, enteritis with underlying dementia  Patient was on virtual sitter due to history of impulsiveness  Seroquel initially on hold due to prolonged QTc which improved and is stable near her baseline  Recommend outpatient monitoring of QTc  Improved, pleasant and conversational.  Appears near baseline    Benign essential hypertension  Assessment & Plan  Continue amlodipine and metoprolol        Discharging Physician / Practitioner: Bao Roach DO  PCP: Camryn Luke [place]  Admission Date:   Admission Orders (From admission, onward)     Ordered        09/26/23 1745  Inpatient Admission  Once            09/25/23 2228  Place in Observation  Once                      Discharge Date: 09/27/23    Medical Problems     Resolved Problems  Date Reviewed: 9/26/2023   None         Consultations During Hospital Stay:   None    Procedures Performed: None    Significant Findings / Test Results:     US right upper quadrant    Result Date: 9/27/2023  Impression: 1. Cholelithiasis with no signs of acute cholecystitis or biliary ductal dilatation. 2. Simple right renal cyst. 3. Pancreas is obscured. Workstation performed: [id number]     XR chest 1 view portable    Result Date: 9/26/2023  Impression: No acute cardiopulmonary disease. Workstation performed: [id number]       Incidental Findings: None    Test Results Pending at Discharge (will require follow up): None     Outpatient Tests Requested: None    Reason for Admission: Confusion    Hospital Course:     Ernestine Perkins is a 80 y.o. female Past medical history of dementia, hypertension, type 2 diabetes who presents to the hospital with confusion, reported diarrheal illness. Patient was a poor historian and appears clinically well during hospitalization. She did have some right quadrant pain and leukocytosis. Patient clinically improved with empiric antibiotics. Imaging unremarkable. Patient's confusion resolved and she was near baseline on day of discharge. Discharged on empiric course of antibiotics for enteritis. Please see above list of diagnoses and related plan for additional information. Condition at Discharge: stable     Discharge Day Visit / Exam:     Subjective: Patient seen and evaluated at bedside. Vitals: Blood Pressure: 146/68 (09/27/23 0715)  Pulse: 78 (09/27/23 0715)  Temperature: 98.7 °F (37.1 °C) (09/27/23 0715)  Temp Source: Oral (09/27/23 0715)  Respirations: 18 (09/27/23 0715)  Height: 5' 1" (154.9 cm) (09/26/23 0324)  Weight - Scale: 81.4 kg (179 lb 7.3 oz) (09/26/23 0324)  SpO2: 94 % (09/27/23 0715)  Exam:   Physical Exam  Vitals reviewed. Constitutional:       General: She is not in acute distress. Appearance: She is well-developed. She is not ill-appearing, toxic-appearing or diaphoretic. HENT:      Head: Normocephalic and atraumatic.       Mouth/Throat:      Mouth: Mucous membranes are moist.   Eyes:      General: No scleral icterus. Extraocular Movements: Extraocular movements intact. Cardiovascular:      Rate and Rhythm: Normal rate and regular rhythm. Heart sounds: Normal heart sounds. Pulmonary:      Effort: Pulmonary effort is normal. No respiratory distress. Breath sounds: Normal breath sounds. No wheezing or rales. Abdominal:      General: There is no distension. Palpations: Abdomen is soft. Tenderness: There is no abdominal tenderness. There is no guarding or rebound. Musculoskeletal:         General: No swelling, tenderness or deformity. Skin:     General: Skin is warm and dry. Neurological:      General: No focal deficit present. Mental Status: She is alert. Psychiatric:         Mood and Affect: Mood normal.         Behavior: Behavior normal.         Thought Content: Thought content normal.         Judgment: Judgment normal.           Discharge instructions/Information to patient and family:   See after visit summary for information provided to patient and family. Provisions for Follow-Up Care:  See after visit summary for information related to follow-up care and any pertinent home health orders. Disposition:     Home     Discharge Statement:  I spent 60 minutes discharging the patient. This time was spent on the day of discharge. I had direct contact with the patient on the day of discharge. Greater than 50% of the total time was spent examining patient, answering all patient questions, arranging and discussing plan of care with patient as well as directly providing post-discharge instructions. Additional time then spent on discharge activities. Discharge Medications:  See after visit summary for reconciled discharge medications provided to patient and family.       ** Please Note: This note has been constructed using a voice recognition system **

## 2023-09-27 NOTE — ASSESSMENT & PLAN NOTE
Related to electrolyte disturbances, enteritis with underlying dementia  Patient was on virtual sitter due to history of impulsiveness  Seroquel initially on hold due to prolonged QTc which improved and is stable near her baseline  Recommend outpatient monitoring of QTc  Improved, pleasant and conversational.  Appears near baseline

## 2023-09-27 NOTE — ASSESSMENT & PLAN NOTE
Presenting with confusion, possible fever. Episodes of diarrhea/diaphoresis. Reported by family.   Patient is a poor historian and does not express symptoms of abdominal pain  Patient was tender to palpation in right upper and right lower quadrant which resolved during hospitalization  Clinical improvement with initiation of antibiotics  Right upper quadrant ultrasound unremarkable for gallbladder pathology  Symptoms likely related to colitis/enteritis  Discharged home on 7-day course of Augmentin

## 2023-09-27 NOTE — APP STUDENT NOTE
STUART STUDENT  Inpatient Progress Note for TRAINING ONLY  Not Part of Legal Medical Record       Progress Note - Lysle Kawasaki 80 y.o. female MRN: [id number]    Unit/Bed#: 15[address] 223-01 Encounter: [id number]      Assessment/Plan:  Gastroenteritis  Patient originally presented with diarrhea, increased confusion, and diaphoresis  RUQ ultrasound showed no obstructions, a simple right renal cyst and cholelithiasis. Does not have reported symptoms of RUQ pain today on history or physical exam.  Vitals are WNL  Blood cultures show no growth at 24 hours. WBC are continuing to trend down 15.40 -> 11.24. Continue antibiotics.     Dementia  Last MOCA 18/30 on 9/01/2023  Mg level has normalized but continue to hold Seroquel due to abnormal EKG: prolonged QT  Consult physical therapy to keep patient moving while she is inpatient. Turn patient q2 hours to prevent bed sores.     DM Type 2  Insulin with sliding scale while inpatient  Glucose checks achs  Carb coverage diet     Hypertension  Continue amlodipine and metoprolol succinate      Insomnia  Continue to hold Seroquel due to prolonged QT. Repeat EKG to see if we can restart. Take other measures to help with insomnia. .. Melatonin, lights low, TV off.     MDD  Continue Zoloft    Subjective:   Belkis Maldonado is an 80year old female with a past medical history of dementia, diabetes, and hypertension who presented with confusion and diarrhea on hospital day 2. Today upon interview the daughter was not in the room and the patient was only able to answer a few questions. The nursing staff reports she is not continuing to have diarrhea today. The patient denies being in any discomfort at the moment and has no complaints. She denies feeling short of breath, dizziness, chest pain, abdominal pain, nausea, or vomiting. Objective:   Gen: alert to self and place. Pleasant upon interview. Forgetful, repeatedly asking what she is here for. HEENT: Normocephalic, atraumatic.  Pupils equal, round, reactive to light. CV: Regular rate and rhythm no M/R/G  Pulm: Clear to auscultation bilaterally, no wheezes/rales/rhonchi  Abd: Soft, non-distended. Bowel sounds present in all 4 quadrants. No tenderness to palpation  Skin: Pulses 2+ bilaterally, capillary refill < 2 seconds. Legs mildly swollen. Neuro: Consistent with dementia  Psych: Mood normal    Vitals: Blood pressure 146/68, pulse 78, temperature 98.7 °F (37.1 °C), temperature source Oral, resp. rate 18, height 5' 1" (1.549 m), weight 81.4 kg (179 lb 7.3 oz), SpO2 94 %. ,Body mass index is 33.91 kg/m².     No intake or output data in the 24 hours ending 09/27/23 1256    Physical Exam: {Exam, Complete:35391}     Invasive Devices     Peripheral Intravenous Line  Duration           Peripheral IV 09/25/23 Distal;Right;Ventral (anterior) Forearm 1 day                Lab, Imaging and other studies: {Results Review Statement:98971}  VTE Pharmacologic Prophylaxis: {Pharmacologic VTE Prophylaxis:166003286}  VTE Mechanical Prophylaxis: {Mechanical VTE Prophylaxis:80155}

## 2023-09-27 NOTE — UTILIZATION REVIEW
Date: 9/27  Day 3: Has surpassed a 2nd midnight with active treatments and services, which include IV abx , monitoring of mental status .

## 2023-09-28 NOTE — UTILIZATION REVIEW
NOTIFICATION OF ADMISSION DISCHARGE   This is a Notification of Discharge from [address] [place] [address]. Please be advised that this patient has been discharge from our facility. Below you will find the admission and discharge date and time including the patient’s disposition. UTILIZATION REVIEW CONTACT:  Brandon Enciso  Utilization   Network Utilization Review Department  Phone: [phone] x carefully listen to the prompts. All voicemails are confidential.  Email: [email or web address]     ADMISSION INFORMATION  PRESENTATION DATE: 9/25/2023  7:17 PM  OBERVATION ADMISSION DATE:   INPATIENT ADMISSION DATE: 9/26/23  5:45 PM   DISCHARGE DATE: 9/27/2023  3:54 PM   DISPOSITION:Home/Self Care    IMPORTANT INFORMATION:  Send all requests for admission clinical reviews, approved or denied determinations and any other requests to dedicated fax number below belonging to the campus where the patient is receiving treatment.  List of dedicated fax numbers:  [place] DENIALS (Administrative/Medical Necessity) [phone] [address] [facility] (Maternity/NICU/Pediatrics) [phone]   [facility] [phone]   [facility] [phone] [address] [phone]   [address] [place] [address] [phone]   [facility] [phone]   [address] [place] [address] [address] [facility] [phone]   [address] [place] [address] [phone] [address] [facility] [phone] [address] [facility] [address] [facility] [phone]

## 2023-10-01 LAB
BACTERIA BLD CULT: NORMAL
BACTERIA BLD CULT: NORMAL

## 2023-10-03 ENCOUNTER — TELEPHONE (OUTPATIENT)
Dept: FAMILY MEDICINE CLINIC | Facility: CLINIC | Age: 88
End: 2023-10-03

## 2023-10-03 NOTE — TELEPHONE ENCOUNTER
Ciera Barreto called asking if pt can be seen sometime this week in the afternoon, she has an appt 10/9 but is not eating very much.  Call [phone], she asked to talk to Tylene Galeazzi

## 2023-10-04 ENCOUNTER — RA CDI HCC (OUTPATIENT)
Dept: OTHER | Facility: HOSPITAL | Age: 88
End: 2023-10-04

## 2023-10-04 NOTE — PROGRESS NOTES
[address] [facility] coding opportunities       Chart reviewed, no opportunity found: CHART REVIEWED, NO OPPORTUNITY FOUND        Patients Insurance        Commercial Insurance: Jonas Tariq

## 2023-10-05 ENCOUNTER — APPOINTMENT (EMERGENCY)
Dept: RADIOLOGY | Facility: HOSPITAL | Age: 88
DRG: 872 | End: 2023-10-05
Payer: COMMERCIAL

## 2023-10-05 ENCOUNTER — APPOINTMENT (EMERGENCY)
Dept: CT IMAGING | Facility: HOSPITAL | Age: 88
DRG: 872 | End: 2023-10-05
Payer: COMMERCIAL

## 2023-10-05 ENCOUNTER — HOSPITAL ENCOUNTER (INPATIENT)
Facility: HOSPITAL | Age: 88
LOS: 7 days | Discharge: NURSING, REHAB OR LONG-TERM CARE | DRG: 872 | End: 2023-10-13
Attending: EMERGENCY MEDICINE | Admitting: INTERNAL MEDICINE
Payer: COMMERCIAL

## 2023-10-05 DIAGNOSIS — R10.9 ABDOMINAL PAIN: ICD-10-CM

## 2023-10-05 DIAGNOSIS — R19.00 INTRAABDOMINAL MASS: Primary | ICD-10-CM

## 2023-10-05 DIAGNOSIS — R11.2 NAUSEA AND VOMITING: ICD-10-CM

## 2023-10-05 DIAGNOSIS — C19 COLORECTAL CANCER (HCC): ICD-10-CM

## 2023-10-05 DIAGNOSIS — A04.72 C. DIFFICILE DIARRHEA: ICD-10-CM

## 2023-10-05 DIAGNOSIS — D72.829 ELEVATED WBC COUNT: ICD-10-CM

## 2023-10-05 LAB
2HR DELTA HS TROPONIN: 6 NG/L
ALBUMIN SERPL BCP-MCNC: 3.8 G/DL (ref 3.5–5)
ALP SERPL-CCNC: 91 U/L (ref 34–104)
ALT SERPL W P-5'-P-CCNC: 11 U/L (ref 7–52)
ANION GAP SERPL CALCULATED.3IONS-SCNC: 19 MMOL/L
ANISOCYTOSIS BLD QL SMEAR: PRESENT
APTT PPP: 31 SECONDS (ref 23–37)
AST SERPL W P-5'-P-CCNC: 22 U/L (ref 13–39)
BASOPHILS # BLD MANUAL: 0 THOUSAND/UL (ref 0–0.1)
BASOPHILS NFR MAR MANUAL: 0 % (ref 0–1)
BILIRUB SERPL-MCNC: 1.29 MG/DL (ref 0.2–1)
BILIRUB UR QL STRIP: NEGATIVE
BUN SERPL-MCNC: 16 MG/DL (ref 5–25)
CALCIUM SERPL-MCNC: 9 MG/DL (ref 8.4–10.2)
CARDIAC TROPONIN I PNL SERPL HS: 20 NG/L
CARDIAC TROPONIN I PNL SERPL HS: 26 NG/L
CHLORIDE SERPL-SCNC: 96 MMOL/L (ref 96–108)
CLARITY UR: CLEAR
CO2 SERPL-SCNC: 19 MMOL/L (ref 21–32)
COLOR UR: YELLOW
CREAT SERPL-MCNC: 0.94 MG/DL (ref 0.6–1.3)
EOSINOPHIL # BLD MANUAL: 0 THOUSAND/UL (ref 0–0.4)
EOSINOPHIL NFR BLD MANUAL: 0 % (ref 0–6)
ERYTHROCYTE [DISTWIDTH] IN BLOOD BY AUTOMATED COUNT: 14.1 % (ref 11.6–15.1)
GFR SERPL CREATININE-BSD FRML MDRD: 53 ML/MIN/1.73SQ M
GLUCOSE SERPL-MCNC: 219 MG/DL (ref 65–140)
GLUCOSE UR STRIP-MCNC: NEGATIVE MG/DL
HCT VFR BLD AUTO: 39.4 % (ref 34.8–46.1)
HGB BLD-MCNC: 13 G/DL (ref 11.5–15.4)
HGB UR QL STRIP.AUTO: NEGATIVE
INR PPP: 1.31 (ref 0.84–1.19)
KETONES UR STRIP-MCNC: ABNORMAL MG/DL
LACTATE SERPL-SCNC: 1.6 MMOL/L (ref 0.5–2)
LEUKOCYTE ESTERASE UR QL STRIP: NEGATIVE
LIPASE SERPL-CCNC: 12 U/L (ref 11–82)
LYMPHOCYTES # BLD AUTO: 1.85 THOUSAND/UL (ref 0.6–4.47)
LYMPHOCYTES # BLD AUTO: 4 % (ref 14–44)
MCH RBC QN AUTO: 28.7 PG (ref 26.8–34.3)
MCHC RBC AUTO-ENTMCNC: 33 G/DL (ref 31.4–37.4)
MCV RBC AUTO: 87 FL (ref 82–98)
MONOCYTES # BLD AUTO: 0.92 THOUSAND/UL (ref 0–1.22)
MONOCYTES NFR BLD: 2 % (ref 4–12)
NEUTROPHILS # BLD MANUAL: 43.36 THOUSAND/UL (ref 1.85–7.62)
NEUTS BAND NFR BLD MANUAL: 2 % (ref 0–8)
NEUTS SEG NFR BLD AUTO: 92 % (ref 43–75)
NITRITE UR QL STRIP: NEGATIVE
PH UR STRIP.AUTO: 6 [PH]
PLATELET # BLD AUTO: 414 THOUSANDS/UL (ref 149–390)
PLATELET BLD QL SMEAR: ADEQUATE
PMV BLD AUTO: 9.5 FL (ref 8.9–12.7)
POTASSIUM SERPL-SCNC: 3.8 MMOL/L (ref 3.5–5.3)
PROCALCITONIN SERPL-MCNC: 0.36 NG/ML
PROT SERPL-MCNC: 7.8 G/DL (ref 6.4–8.4)
PROT UR STRIP-MCNC: ABNORMAL MG/DL
PROTHROMBIN TIME: 17 SECONDS (ref 11.6–14.5)
RBC # BLD AUTO: 4.53 MILLION/UL (ref 3.81–5.12)
RBC MORPH BLD: PRESENT
SODIUM SERPL-SCNC: 134 MMOL/L (ref 135–147)
SP GR UR STRIP.AUTO: >=1.05 (ref 1–1.03)
UROBILINOGEN UR STRIP-ACNC: 4 MG/DL
WBC # BLD AUTO: 46.13 THOUSAND/UL (ref 4.31–10.16)

## 2023-10-05 PROCEDURE — 36415 COLL VENOUS BLD VENIPUNCTURE: CPT

## 2023-10-05 PROCEDURE — 83036 HEMOGLOBIN GLYCOSYLATED A1C: CPT

## 2023-10-05 PROCEDURE — 85027 COMPLETE CBC AUTOMATED: CPT | Performed by: EMERGENCY MEDICINE

## 2023-10-05 PROCEDURE — 83690 ASSAY OF LIPASE: CPT | Performed by: EMERGENCY MEDICINE

## 2023-10-05 PROCEDURE — 85610 PROTHROMBIN TIME: CPT

## 2023-10-05 PROCEDURE — 74177 CT ABD & PELVIS W/CONTRAST: CPT

## 2023-10-05 PROCEDURE — 96368 THER/DIAG CONCURRENT INF: CPT

## 2023-10-05 PROCEDURE — 80053 COMPREHEN METABOLIC PANEL: CPT | Performed by: EMERGENCY MEDICINE

## 2023-10-05 PROCEDURE — 84484 ASSAY OF TROPONIN QUANT: CPT

## 2023-10-05 PROCEDURE — 96367 TX/PROPH/DG ADDL SEQ IV INF: CPT

## 2023-10-05 PROCEDURE — 85007 BL SMEAR W/DIFF WBC COUNT: CPT | Performed by: EMERGENCY MEDICINE

## 2023-10-05 PROCEDURE — 93005 ELECTROCARDIOGRAM TRACING: CPT

## 2023-10-05 PROCEDURE — 84145 PROCALCITONIN (PCT): CPT

## 2023-10-05 PROCEDURE — 85730 THROMBOPLASTIN TIME PARTIAL: CPT

## 2023-10-05 PROCEDURE — G1004 CDSM NDSC: HCPCS

## 2023-10-05 PROCEDURE — 83605 ASSAY OF LACTIC ACID: CPT

## 2023-10-05 PROCEDURE — 71046 X-RAY EXAM CHEST 2 VIEWS: CPT

## 2023-10-05 PROCEDURE — 99285 EMERGENCY DEPT VISIT HI MDM: CPT

## 2023-10-05 PROCEDURE — 87040 BLOOD CULTURE FOR BACTERIA: CPT

## 2023-10-05 PROCEDURE — 96365 THER/PROPH/DIAG IV INF INIT: CPT

## 2023-10-05 PROCEDURE — 81001 URINALYSIS AUTO W/SCOPE: CPT

## 2023-10-05 PROCEDURE — 82248 BILIRUBIN DIRECT: CPT

## 2023-10-05 PROCEDURE — 96361 HYDRATE IV INFUSION ADD-ON: CPT

## 2023-10-05 PROCEDURE — 96375 TX/PRO/DX INJ NEW DRUG ADDON: CPT

## 2023-10-05 PROCEDURE — 80143 DRUG ASSAY ACETAMINOPHEN: CPT

## 2023-10-05 RX ORDER — METRONIDAZOLE 500 MG/100ML
500 INJECTION, SOLUTION INTRAVENOUS EVERY 8 HOURS
Status: DISCONTINUED | OUTPATIENT
Start: 2023-10-05 | End: 2023-10-06

## 2023-10-05 RX ORDER — ONDANSETRON 2 MG/ML
4 INJECTION INTRAMUSCULAR; INTRAVENOUS ONCE
Status: COMPLETED | OUTPATIENT
Start: 2023-10-05 | End: 2023-10-05

## 2023-10-05 RX ORDER — KETOROLAC TROMETHAMINE 30 MG/ML
15 INJECTION, SOLUTION INTRAMUSCULAR; INTRAVENOUS ONCE
Status: COMPLETED | OUTPATIENT
Start: 2023-10-05 | End: 2023-10-05

## 2023-10-05 RX ADMIN — SODIUM CHLORIDE 1000 ML: 0.9 INJECTION, SOLUTION INTRAVENOUS at 20:28

## 2023-10-05 RX ADMIN — METRONIDAZOLE 500 MG: 500 INJECTION, SOLUTION INTRAVENOUS at 22:40

## 2023-10-05 RX ADMIN — IOHEXOL 100 ML: 350 INJECTION, SOLUTION INTRAVENOUS at 22:10

## 2023-10-05 RX ADMIN — ONDANSETRON 4 MG: 2 INJECTION INTRAMUSCULAR; INTRAVENOUS at 20:32

## 2023-10-05 RX ADMIN — CEFEPIME 1000 MG: 1 INJECTION, POWDER, FOR SOLUTION INTRAMUSCULAR; INTRAVENOUS at 23:20

## 2023-10-05 RX ADMIN — VANCOMYCIN HYDROCHLORIDE 1500 MG: 10 INJECTION, POWDER, LYOPHILIZED, FOR SOLUTION INTRAVENOUS at 23:16

## 2023-10-05 RX ADMIN — KETOROLAC TROMETHAMINE 15 MG: 30 INJECTION INTRAMUSCULAR; INTRAVENOUS at 21:43

## 2023-10-05 NOTE — LETTER
Thank you for allowing us to participate in the care of your patient, Germaine Pickering, who was hospitalized from [unfilled] through 10/13/2023 with the admitting diagnosis of C. difficile infection. Teressa Bai was admitted to the hospital due to abdominal pain, poor oral intake, and emesis. She began to develop diarrhea was diagnosed with C. difficile and treated appropriately. She was found on CTA to have a mass concerning for malignancy in her transverse colon. She also was found to have signs of metastatic disease in the form of lung nodules and a thickened endometrium. She has been advised to follow-up with GI upon discharge from rehab. She also is following with palliative care. If you have any additional questions or would like to discuss further, please feel free to contact me.     Nicholas Long, [address] [facility] Internal Medicine, Hospitalist  [phone]

## 2023-10-06 PROBLEM — R65.10 SIRS (SYSTEMIC INFLAMMATORY RESPONSE SYNDROME) (HCC): Status: ACTIVE | Noted: 2023-10-06

## 2023-10-06 PROBLEM — R91.1 LUNG NODULE SEEN ON IMAGING STUDY: Status: ACTIVE | Noted: 2023-10-06

## 2023-10-06 PROBLEM — C19 COLORECTAL CANCER (HCC): Status: ACTIVE | Noted: 2023-10-06

## 2023-10-06 PROBLEM — R19.7 DIARRHEA: Status: ACTIVE | Noted: 2023-10-06

## 2023-10-06 PROBLEM — E80.6 HYPERBILIRUBINEMIA: Status: ACTIVE | Noted: 2023-10-06

## 2023-10-06 PROBLEM — E87.29 HIGH ANION GAP METABOLIC ACIDOSIS: Status: ACTIVE | Noted: 2023-10-06

## 2023-10-06 LAB
4HR DELTA HS TROPONIN: -4 NG/L
ALBUMIN SERPL BCP-MCNC: 3.2 G/DL (ref 3.5–5)
ALP SERPL-CCNC: 82 U/L (ref 34–104)
ALT SERPL W P-5'-P-CCNC: 9 U/L (ref 7–52)
ANION GAP SERPL CALCULATED.3IONS-SCNC: 14 MMOL/L
APAP SERPL-MCNC: <10 UG/ML (ref 10–20)
AST SERPL W P-5'-P-CCNC: 16 U/L (ref 13–39)
ATRIAL RATE: 100 BPM
BACTERIA UR QL AUTO: ABNORMAL /HPF
BASE EX.OXY STD BLDV CALC-SCNC: 94.8 % (ref 60–80)
BASE EXCESS BLDV CALC-SCNC: -4.7 MMOL/L
BETA-HYDROXYBUTYRATE: 1.1 MMOL/L
BILIRUB DIRECT SERPL-MCNC: 0.27 MG/DL (ref 0–0.2)
BILIRUB SERPL-MCNC: 1.01 MG/DL (ref 0.2–1)
BUN SERPL-MCNC: 21 MG/DL (ref 5–25)
CALCIUM ALBUM COR SERPL-MCNC: 8.8 MG/DL (ref 8.3–10.1)
CALCIUM SERPL-MCNC: 8.2 MG/DL (ref 8.4–10.2)
CARDIAC TROPONIN I PNL SERPL HS: 16 NG/L
CEA SERPL-MCNC: 1.5 NG/ML (ref 0–3)
CHLORIDE SERPL-SCNC: 101 MMOL/L (ref 96–108)
CO2 SERPL-SCNC: 20 MMOL/L (ref 21–32)
CREAT SERPL-MCNC: 0.75 MG/DL (ref 0.6–1.3)
ERYTHROCYTE [DISTWIDTH] IN BLOOD BY AUTOMATED COUNT: 14.3 % (ref 11.6–15.1)
EST. AVERAGE GLUCOSE BLD GHB EST-MCNC: 143 MG/DL
GFR SERPL CREATININE-BSD FRML MDRD: 70 ML/MIN/1.73SQ M
GLUCOSE SERPL-MCNC: 141 MG/DL (ref 65–140)
GLUCOSE SERPL-MCNC: 144 MG/DL (ref 65–140)
GLUCOSE SERPL-MCNC: 161 MG/DL (ref 65–140)
GLUCOSE SERPL-MCNC: 171 MG/DL (ref 65–140)
HBA1C MFR BLD: 6.6 %
HCO3 BLDV-SCNC: 19.2 MMOL/L (ref 24–30)
HCT VFR BLD AUTO: 37.2 % (ref 34.8–46.1)
HGB BLD-MCNC: 12.1 G/DL (ref 11.5–15.4)
MCH RBC QN AUTO: 28.3 PG (ref 26.8–34.3)
MCHC RBC AUTO-ENTMCNC: 32.5 G/DL (ref 31.4–37.4)
MCV RBC AUTO: 87 FL (ref 82–98)
MUCOUS THREADS UR QL AUTO: ABNORMAL
NON-SQ EPI CELLS URNS QL MICRO: ABNORMAL /HPF
NRBC BLD AUTO-RTO: 0 /100 WBCS
O2 CT BLDV-SCNC: 17.7 ML/DL
P AXIS: -1 DEGREES
PCO2 BLDV: 32.3 MM HG (ref 42–50)
PH BLDV: 7.39 [PH] (ref 7.3–7.4)
PLATELET # BLD AUTO: 389 THOUSANDS/UL (ref 149–390)
PMV BLD AUTO: 9.5 FL (ref 8.9–12.7)
PO2 BLDV: 139.9 MM HG (ref 35–45)
POTASSIUM SERPL-SCNC: 3.2 MMOL/L (ref 3.5–5.3)
PR INTERVAL: 188 MS
PROCALCITONIN SERPL-MCNC: 0.66 NG/ML
PROT SERPL-MCNC: 6.4 G/DL (ref 6.4–8.4)
QRS AXIS: 57 DEGREES
QRSD INTERVAL: 120 MS
QT INTERVAL: 386 MS
QTC INTERVAL: 497 MS
RBC # BLD AUTO: 4.27 MILLION/UL (ref 3.81–5.12)
RBC #/AREA URNS AUTO: ABNORMAL /HPF
SALICYLATES SERPL-MCNC: <5 MG/DL (ref 3–20)
SODIUM SERPL-SCNC: 135 MMOL/L (ref 135–147)
T WAVE AXIS: -16 DEGREES
VENTRICULAR RATE: 100 BPM
WBC # BLD AUTO: 40.91 THOUSAND/UL (ref 4.31–10.16)
WBC #/AREA URNS AUTO: ABNORMAL /HPF

## 2023-10-06 PROCEDURE — 85027 COMPLETE CBC AUTOMATED: CPT | Performed by: HOSPITALIST

## 2023-10-06 PROCEDURE — 82378 CARCINOEMBRYONIC ANTIGEN: CPT | Performed by: HOSPITALIST

## 2023-10-06 PROCEDURE — 93010 ELECTROCARDIOGRAM REPORT: CPT | Performed by: INTERNAL MEDICINE

## 2023-10-06 PROCEDURE — 96366 THER/PROPH/DIAG IV INF ADDON: CPT

## 2023-10-06 PROCEDURE — 82805 BLOOD GASES W/O2 SATURATION: CPT

## 2023-10-06 PROCEDURE — NC001 PR NO CHARGE: Performed by: NURSE PRACTITIONER

## 2023-10-06 PROCEDURE — 80053 COMPREHEN METABOLIC PANEL: CPT | Performed by: HOSPITALIST

## 2023-10-06 PROCEDURE — 80179 DRUG ASSAY SALICYLATE: CPT

## 2023-10-06 PROCEDURE — 84145 PROCALCITONIN (PCT): CPT

## 2023-10-06 PROCEDURE — 82948 REAGENT STRIP/BLOOD GLUCOSE: CPT

## 2023-10-06 PROCEDURE — 36415 COLL VENOUS BLD VENIPUNCTURE: CPT

## 2023-10-06 PROCEDURE — 99222 1ST HOSP IP/OBS MODERATE 55: CPT | Performed by: COLON & RECTAL SURGERY

## 2023-10-06 PROCEDURE — 99223 1ST HOSP IP/OBS HIGH 75: CPT | Performed by: INTERNAL MEDICINE

## 2023-10-06 PROCEDURE — 84484 ASSAY OF TROPONIN QUANT: CPT

## 2023-10-06 PROCEDURE — 82010 KETONE BODYS QUAN: CPT

## 2023-10-06 PROCEDURE — 87493 C DIFF AMPLIFIED PROBE: CPT

## 2023-10-06 RX ORDER — ENOXAPARIN SODIUM 100 MG/ML
40 INJECTION SUBCUTANEOUS DAILY
Status: DISCONTINUED | OUTPATIENT
Start: 2023-10-06 | End: 2023-10-13 | Stop reason: HOSPADM

## 2023-10-06 RX ORDER — POTASSIUM CHLORIDE 20 MEQ/1
40 TABLET, EXTENDED RELEASE ORAL ONCE
Status: COMPLETED | OUTPATIENT
Start: 2023-10-06 | End: 2023-10-06

## 2023-10-06 RX ORDER — LANOLIN ALCOHOL/MO/W.PET/CERES
9 CREAM (GRAM) TOPICAL
Status: DISCONTINUED | OUTPATIENT
Start: 2023-10-06 | End: 2023-10-13 | Stop reason: HOSPADM

## 2023-10-06 RX ORDER — INSULIN LISPRO 100 [IU]/ML
1-6 INJECTION, SOLUTION INTRAVENOUS; SUBCUTANEOUS EVERY 6 HOURS SCHEDULED
Status: DISCONTINUED | OUTPATIENT
Start: 2023-10-06 | End: 2023-10-09

## 2023-10-06 RX ORDER — VANCOMYCIN HYDROCHLORIDE 125 MG/1
125 CAPSULE ORAL EVERY 6 HOURS SCHEDULED
Status: DISCONTINUED | OUTPATIENT
Start: 2023-10-06 | End: 2023-10-07

## 2023-10-06 RX ORDER — METOPROLOL SUCCINATE 100 MG/1
100 TABLET, EXTENDED RELEASE ORAL DAILY
Status: DISCONTINUED | OUTPATIENT
Start: 2023-10-06 | End: 2023-10-13 | Stop reason: HOSPADM

## 2023-10-06 RX ORDER — ATORVASTATIN CALCIUM 10 MG/1
10 TABLET, FILM COATED ORAL DAILY
Status: DISCONTINUED | OUTPATIENT
Start: 2023-10-06 | End: 2023-10-13 | Stop reason: HOSPADM

## 2023-10-06 RX ORDER — DOCUSATE SODIUM 100 MG/1
100 CAPSULE, LIQUID FILLED ORAL 2 TIMES DAILY
Status: DISCONTINUED | OUTPATIENT
Start: 2023-10-06 | End: 2023-10-09

## 2023-10-06 RX ORDER — AMLODIPINE BESYLATE 5 MG/1
5 TABLET ORAL DAILY
Status: DISCONTINUED | OUTPATIENT
Start: 2023-10-06 | End: 2023-10-13 | Stop reason: HOSPADM

## 2023-10-06 RX ORDER — QUETIAPINE FUMARATE 25 MG/1
25 TABLET, FILM COATED ORAL
Status: DISCONTINUED | OUTPATIENT
Start: 2023-10-06 | End: 2023-10-13 | Stop reason: HOSPADM

## 2023-10-06 RX ORDER — SODIUM CHLORIDE, SODIUM GLUCONATE, SODIUM ACETATE, POTASSIUM CHLORIDE, MAGNESIUM CHLORIDE, SODIUM PHOSPHATE, DIBASIC, AND POTASSIUM PHOSPHATE .53; .5; .37; .037; .03; .012; .00082 G/100ML; G/100ML; G/100ML; G/100ML; G/100ML; G/100ML; G/100ML
125 INJECTION, SOLUTION INTRAVENOUS CONTINUOUS
Status: DISPENSED | OUTPATIENT
Start: 2023-10-06 | End: 2023-10-07

## 2023-10-06 RX ORDER — VANCOMYCIN HYDROCHLORIDE 125 MG/1
125 CAPSULE ORAL EVERY 6 HOURS SCHEDULED
Status: CANCELLED | OUTPATIENT
Start: 2023-10-06

## 2023-10-06 RX ORDER — METRONIDAZOLE 500 MG/100ML
500 INJECTION, SOLUTION INTRAVENOUS EVERY 8 HOURS
Status: DISCONTINUED | OUTPATIENT
Start: 2023-10-06 | End: 2023-10-10

## 2023-10-06 RX ORDER — SODIUM CHLORIDE, SODIUM GLUCONATE, SODIUM ACETATE, POTASSIUM CHLORIDE, MAGNESIUM CHLORIDE, SODIUM PHOSPHATE, DIBASIC, AND POTASSIUM PHOSPHATE .53; .5; .37; .037; .03; .012; .00082 G/100ML; G/100ML; G/100ML; G/100ML; G/100ML; G/100ML; G/100ML
125 INJECTION, SOLUTION INTRAVENOUS CONTINUOUS
Status: DISPENSED | OUTPATIENT
Start: 2023-10-06 | End: 2023-10-06

## 2023-10-06 RX ORDER — ACETAMINOPHEN 325 MG/1
650 TABLET ORAL EVERY 6 HOURS PRN
Status: DISCONTINUED | OUTPATIENT
Start: 2023-10-06 | End: 2023-10-13 | Stop reason: HOSPADM

## 2023-10-06 RX ADMIN — METRONIDAZOLE 500 MG: 500 INJECTION, SOLUTION INTRAVENOUS at 13:34

## 2023-10-06 RX ADMIN — CEFEPIME 2000 MG: 2 INJECTION, POWDER, FOR SOLUTION INTRAVENOUS at 12:18

## 2023-10-06 RX ADMIN — POTASSIUM CHLORIDE 40 MEQ: 1500 TABLET, EXTENDED RELEASE ORAL at 09:56

## 2023-10-06 RX ADMIN — INSULIN LISPRO 1 UNITS: 100 INJECTION, SOLUTION INTRAVENOUS; SUBCUTANEOUS at 08:21

## 2023-10-06 RX ADMIN — METOPROLOL SUCCINATE 100 MG: 100 TABLET, EXTENDED RELEASE ORAL at 12:51

## 2023-10-06 RX ADMIN — ENOXAPARIN SODIUM 40 MG: 40 INJECTION SUBCUTANEOUS at 08:22

## 2023-10-06 RX ADMIN — METRONIDAZOLE 500 MG: 500 INJECTION, SOLUTION INTRAVENOUS at 20:38

## 2023-10-06 RX ADMIN — Medication 9 MG: at 21:24

## 2023-10-06 RX ADMIN — SODIUM CHLORIDE, SODIUM GLUCONATE, SODIUM ACETATE, POTASSIUM CHLORIDE, MAGNESIUM CHLORIDE, SODIUM PHOSPHATE, DIBASIC, AND POTASSIUM PHOSPHATE 125 ML/HR: .53; .5; .37; .037; .03; .012; .00082 INJECTION, SOLUTION INTRAVENOUS at 10:47

## 2023-10-06 RX ADMIN — SODIUM CHLORIDE, SODIUM GLUCONATE, SODIUM ACETATE, POTASSIUM CHLORIDE, MAGNESIUM CHLORIDE, SODIUM PHOSPHATE, DIBASIC, AND POTASSIUM PHOSPHATE 100 ML/HR: .53; .5; .37; .037; .03; .012; .00082 INJECTION, SOLUTION INTRAVENOUS at 13:33

## 2023-10-06 RX ADMIN — VANCOMYCIN HYDROCHLORIDE 125 MG: 125 CAPSULE ORAL at 12:18

## 2023-10-06 RX ADMIN — SODIUM CHLORIDE, SODIUM GLUCONATE, SODIUM ACETATE, POTASSIUM CHLORIDE, MAGNESIUM CHLORIDE, SODIUM PHOSPHATE, DIBASIC, AND POTASSIUM PHOSPHATE 125 ML/HR: .53; .5; .37; .037; .03; .012; .00082 INJECTION, SOLUTION INTRAVENOUS at 03:38

## 2023-10-06 RX ADMIN — ATORVASTATIN CALCIUM 10 MG: 10 TABLET, FILM COATED ORAL at 08:22

## 2023-10-06 RX ADMIN — AMLODIPINE BESYLATE 5 MG: 5 TABLET ORAL at 08:22

## 2023-10-06 RX ADMIN — CEFEPIME 2000 MG: 2 INJECTION, POWDER, FOR SOLUTION INTRAVENOUS at 19:29

## 2023-10-06 RX ADMIN — QUETIAPINE FUMARATE 25 MG: 25 TABLET ORAL at 21:24

## 2023-10-06 RX ADMIN — VANCOMYCIN HYDROCHLORIDE 125 MG: 125 CAPSULE ORAL at 18:11

## 2023-10-06 NOTE — PLAN OF CARE
Problem: Potential for Falls  Goal: Patient will remain free of falls  Description: INTERVENTIONS:  - Educate patient/family on patient safety including physical limitations  - Instruct patient to call for assistance with activity   - Consult OT/PT to assist with strengthening/mobility   - Keep Call bell within reach  - Keep bed low and locked with side rails adjusted as appropriate  - Keep care items and personal belongings within reach  - Initiate and maintain comfort rounds  - Make Fall Risk Sign visible to staff  - Offer Toileting every 2 Hours, in advance of need  - Initiate/Maintain Bed/ Chair alarm  - Obtain necessary fall risk management equipment: Frequent checks/ reorientation  - Apply yellow socks and bracelet for high fall risk patients  - Consider moving patient to room near nurses station  Outcome: Progressing     Problem: MOBILITY - ADULT  Goal: Maintain or return to baseline ADL function  Description: INTERVENTIONS:  -  Assess patient's ability to carry out ADLs; assess patient's baseline for ADL function and identify physical deficits which impact ability to perform ADLs (bathing, care of mouth/teeth, toileting, grooming, dressing, etc.)  - Assess/evaluate cause of self-care deficits   - Assess range of motion  - Assess patient's mobility; develop plan if impaired  - Assess patient's need for assistive devices and provide as appropriate  - Encourage maximum independence but intervene and supervise when necessary  - Involve family in performance of ADLs  - Assess for home care needs following discharge   - Consider OT consult to assist with ADL evaluation and planning for discharge  - Provide patient education as appropriate  Outcome: Progressing     Problem: PAIN - ADULT  Goal: Verbalizes/displays adequate comfort level or baseline comfort level  Description: Interventions:  - Encourage patient to monitor pain and request assistance  - Assess pain using appropriate pain scale  - Administer analgesics based on type and severity of pain and evaluate response  - Implement non-pharmacological measures as appropriate and evaluate response  - Consider cultural and social influences on pain and pain management  - Notify physician/advanced practitioner if interventions unsuccessful or patient reports new pain  Outcome: Progressing

## 2023-10-06 NOTE — ED NOTES
Patient is resting comfortably. Surgery Consult bedside talking to family.       Nellie Quezada RN  10/06/23 6198
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
## 2023-10-06 NOTE — ASSESSMENT & PLAN NOTE
CT A/P w con shows thickened endometrium measuring approximately 14 mm thick. This is abnormally prominent for a postmenopausal patient. Nonemergent pelvic ultrasound recommended for further evaluation.

## 2023-10-06 NOTE — ASSESSMENT & PLAN NOTE
Pt p/w abd pain, emesis, decreased PO intake. Pt is poor historian due to dementia. CT A/P w con shows 7 cm "apple core" lesion of the transverse colon concerning for neoplasm. 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 Moderate obstruction of the colon proximal to the lesion. Colorectal consulted from ED and discussed w family at bedside. Per surgery note, family does not desire aggressive treatment such as surgery or chemotherapy, but would like to know extent of disease and have palliative care on board.     Plan:  Colorectal on board, appreciate recommendations  Palliative Care consulted, appreciate recommendations  NPO plus sips with meds tonight, anticipate advancing to clears in the morning  Start isolyte 125 ml/h x8h  Start docusate BID, hold off laxatives

## 2023-10-06 NOTE — ASSESSMENT & PLAN NOTE
POA: HCO3 19, AG 19    Unclear etiology on the basis of existing labs, and pt is poor historian. Consider possible starvation ketosis in the setting of decreased PO intake vs acetaminophen or salicylates in the setting of abdominal pain.     Plan:  VBG  Beta-hydroxybutyrate  Acetaminophen level  Salicylate level  F/u HCO3 on repeat CMP in AM

## 2023-10-06 NOTE — ASSESSMENT & PLAN NOTE
CT A/P w con shows two pulmonary nodules within the left lower lobe of the lung, measuring 3 mm and 4 mm, respectively. Metastatic disease not excluded. Short-term follow-up recommended.

## 2023-10-06 NOTE — CONSULTS
Received message from Vasyl Gilman that consult is canceled. Please consider outpt referral if appropriate.

## 2023-10-06 NOTE — PLAN OF CARE
Problem: Potential for Falls  Goal: Patient will remain free of falls  Description: INTERVENTIONS:  - Educate patient/family on patient safety including physical limitations  - Instruct patient to call for assistance with activity   - Consult OT/PT to assist with strengthening/mobility   - Keep Call bell within reach  - Keep bed low and locked with side rails adjusted as appropriate  - Keep care items and personal belongings within reach  - Initiate and maintain comfort rounds  - Make Fall Risk Sign visible to staff  - Offer Toileting every 2 Hours, in advance of need  - Initiate/Maintain Bed/ Chair alarm  - Obtain necessary fall risk management equipment: Frequent checks/ reorientation  - Apply yellow socks and bracelet for high fall risk patients  - Consider moving patient to room near nurses station  Outcome: Progressing     Problem: Prexisting or High Potential for Compromised Skin Integrity  Goal: Skin integrity is maintained or improved  Description: INTERVENTIONS:  - Identify patients at risk for skin breakdown  - Assess and monitor skin integrity  - Assess and monitor nutrition and hydration status  - Monitor labs   - Assess for incontinence   - Turn and reposition patient  - Assist with mobility/ambulation  - Relieve pressure over bony prominences  - Avoid friction and shearing  - Provide appropriate hygiene as needed including keeping skin clean and dry  - Evaluate need for skin moisturizer/barrier cream  - Collaborate with interdisciplinary team   - Patient/family teaching  - Consider wound care consult   Outcome: Progressing     Problem: MOBILITY - ADULT  Goal: Maintain or return to baseline ADL function  Description: INTERVENTIONS:  -  Assess patient's ability to carry out ADLs; assess patient's baseline for ADL function and identify physical deficits which impact ability to perform ADLs (bathing, care of mouth/teeth, toileting, grooming, dressing, etc.)  - Assess/evaluate cause of self-care deficits - Assess range of motion  - Assess patient's mobility; develop plan if impaired  - Assess patient's need for assistive devices and provide as appropriate  - Encourage maximum independence but intervene and supervise when necessary  - Involve family in performance of ADLs  - Assess for home care needs following discharge   - Consider OT consult to assist with ADL evaluation and planning for discharge  - Provide patient education as appropriate  Outcome: Progressing  Goal: Maintains/Returns to pre admission functional level  Description: INTERVENTIONS:  - Perform BMAT or MOVE assessment daily.   - Set and communicate daily mobility goal to care team and patient/family/caregiver. - Collaborate with rehabilitation services on mobility goals if consulted  - Perform Range of Motion 4 times a day. - Reposition patient every 2 hours.   - Dangle patient 3 times a day  - Stand patient 3 times a day  - Ambulate patient 3 times a day  - Out of bed to chair 3 times a day   - Out of bed for meals 3 times a day  - Out of bed for toileting  - Record patient progress and toleration of activity level   Outcome: Progressing     Problem: PAIN - ADULT  Goal: Verbalizes/displays adequate comfort level or baseline comfort level  Description: Interventions:  - Encourage patient to monitor pain and request assistance  - Assess pain using appropriate pain scale  - Administer analgesics based on type and severity of pain and evaluate response  - Implement non-pharmacological measures as appropriate and evaluate response  - Consider cultural and social influences on pain and pain management  - Notify physician/advanced practitioner if interventions unsuccessful or patient reports new pain  Outcome: Progressing     Problem: INFECTION - ADULT  Goal: Absence or prevention of progression during hospitalization  Description: INTERVENTIONS:  - Assess and monitor for signs and symptoms of infection  - Monitor lab/diagnostic results  - Monitor all insertion sites, i.e. indwelling lines, tubes, and drains  - Monitor endotracheal if appropriate and nasal secretions for changes in amount and color  - [place] appropriate cooling/warming therapies per order  - Administer medications as ordered  - Instruct and encourage patient and family to use good hand hygiene technique  - Identify and instruct in appropriate isolation precautions for identified infection/condition  Outcome: Progressing     Problem: DISCHARGE PLANNING  Goal: Discharge to home or other facility with appropriate resources  Description: INTERVENTIONS:  - Identify barriers to discharge w/patient and caregiver  - Arrange for needed discharge resources and transportation as appropriate  - Identify discharge learning needs (meds, wound care, etc.)  - Arrange for interpretive services to assist at discharge as needed  - Refer to Case Management Department for coordinating discharge planning if the patient needs post-hospital services based on physician/advanced practitioner order or complex needs related to functional status, cognitive ability, or social support system  Outcome: Progressing     Problem: Knowledge Deficit  Goal: Patient/family/caregiver demonstrates understanding of disease process, treatment plan, medications, and discharge instructions  Description: Complete learning assessment and assess knowledge base.   Interventions:  - Provide teaching at level of understanding  - Provide teaching via preferred learning methods  Outcome: Progressing

## 2023-10-06 NOTE — ASSESSMENT & PLAN NOTE
Patient has been having uncontrollable diarrhea since admission. Patient was recently treated with Augmentin a week ago.      Plan:  · C Diff +   · Continuing IV and PO Antibiotics  · Cefepime 2g q8  · Vancomycin 125 mg q6 (switched to solution form of Vancomycin on 10/07 due to patient having difficulty swallowing pills  · Speech consulted for swallow study, needs f/u  · Flagyl 500mg q8

## 2023-10-06 NOTE — ASSESSMENT & PLAN NOTE
Unable to review medications with patient (dementia) or family (unable to reach). Per chart, pt home medications include amlodipine and metoprolol. Unclear indication for metoprolol. SIRS as noted above, unclear whether due to infectious or inflammatory etiology, monitoring closely for development of hypotension.     Continue amlodipine with hold parameters for SBP<130  Hold metoprolol at this time  Review home medications with family when able to contact

## 2023-10-06 NOTE — ASSESSMENT & PLAN NOTE
Pt p/w abd pain, emesis, decreased PO intake. Pt is poor historian due to dementia. CT A/P w con shows 7 cm "apple core" lesion of the transverse colon concerning for neoplasm. 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 Moderate obstruction of the colon proximal to the lesion. Colorectal consulted from ED and discussed w family at bedside. Per surgery note, family does not desire aggressive treatment such as surgery or chemotherapy, but would like to know extent of disease and have palliative care on board.     Plan:  · Colorectal on board  · Not currently recommending drain placement for possible abscess  · NPO plus sips with meds tonight, anticipate advancing to clears in the morning  · Start isolyte 125 ml/h x8h  · Start docusate BID, hold off laxatives

## 2023-10-06 NOTE — ASSESSMENT & PLAN NOTE
on admission  Last A1c 6.1 in April 2021  Not on anti-diabetic medications, per EMR    Check Hgb A1c  SSI q6h while NPO  Fingersticks Q6H while NPO
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
## 2023-10-06 NOTE — ASSESSMENT & PLAN NOTE
Meets SIRS criteria on admission for leukocytosis of 46 thousand and tachycardia ( at presentation, now 86)    Afebrile. BPs mildly to moderately hypertensive (131-143)/(60-67). Pt appears to have just completed 1-week course Augmentin 9/27-10/4/23, per chart review    Differential includes inflammatory 2/2 malignancy vs infectious (possible intra-abdominal abscess seen on CT vs gut translocation 2/2 moderate colonic obstruction). BCx x2 collected in ED, pt received cefepime 1000 mg, metronidazole 500 mg, vancomycin 1500 mg    Plan:  Monitor off additional abx at this time  Monitor fever curve and BP  Trend WBCs  F/u BCx  If pt develops fever or hypotension, would start empiric antibiotics and consult IR for evaluation of possible abscess drainage. Recommend Unasyn as first-line monotherapy in the event of fever alone, and broaden if no improvement. Would resume empiric broad coverage with vanc/cefepime/Flagy in the event of hypotension.

## 2023-10-06 NOTE — QUICK NOTE
Patient reporting continued diarrhea and abdominal pain this morning. At time of interview patient was having trouble controlling her bowels. Patient is disoriented to place and time. Spoke with colorectal surgery who is not recommending drain be placed on possible abscess at this time. Planning to continue cefepime and Flagyl empirically for coverage of intraabdominal infection and oral vancomycin for possible C. difficile infection. Still waiting for C. difficile toxin PCR results. We will continue trending white blood cells and procalcitonin.

## 2023-10-07 LAB
ANION GAP SERPL CALCULATED.3IONS-SCNC: 7 MMOL/L
BASOPHILS # BLD MANUAL: 0 THOUSAND/UL (ref 0–0.1)
BASOPHILS NFR MAR MANUAL: 0 % (ref 0–1)
BUN SERPL-MCNC: 29 MG/DL (ref 5–25)
C DIFF TOX A+B STL QL IA: POSITIVE
C DIFF TOX GENS STL QL NAA+PROBE: POSITIVE
CALCIUM SERPL-MCNC: 7.8 MG/DL (ref 8.4–10.2)
CHLORIDE SERPL-SCNC: 102 MMOL/L (ref 96–108)
CO2 SERPL-SCNC: 24 MMOL/L (ref 21–32)
CREAT SERPL-MCNC: 0.89 MG/DL (ref 0.6–1.3)
EOSINOPHIL # BLD MANUAL: 0 THOUSAND/UL (ref 0–0.4)
EOSINOPHIL NFR BLD MANUAL: 0 % (ref 0–6)
ERYTHROCYTE [DISTWIDTH] IN BLOOD BY AUTOMATED COUNT: 14.5 % (ref 11.6–15.1)
GFR SERPL CREATININE-BSD FRML MDRD: 57 ML/MIN/1.73SQ M
GLUCOSE SERPL-MCNC: 109 MG/DL (ref 65–140)
GLUCOSE SERPL-MCNC: 110 MG/DL (ref 65–140)
GLUCOSE SERPL-MCNC: 113 MG/DL (ref 65–140)
GLUCOSE SERPL-MCNC: 116 MG/DL (ref 65–140)
GLUCOSE SERPL-MCNC: 127 MG/DL (ref 65–140)
GLUCOSE SERPL-MCNC: 96 MG/DL (ref 65–140)
HCT VFR BLD AUTO: 33.7 % (ref 34.8–46.1)
HGB BLD-MCNC: 10.8 G/DL (ref 11.5–15.4)
LYMPHOCYTES # BLD AUTO: 1.32 THOUSAND/UL (ref 0.6–4.47)
LYMPHOCYTES # BLD AUTO: 5 % (ref 14–44)
MCH RBC QN AUTO: 28.3 PG (ref 26.8–34.3)
MCHC RBC AUTO-ENTMCNC: 32 G/DL (ref 31.4–37.4)
MCV RBC AUTO: 89 FL (ref 82–98)
MONOCYTES # BLD AUTO: 2.11 THOUSAND/UL (ref 0–1.22)
MONOCYTES NFR BLD: 8 % (ref 4–12)
NEUTROPHILS # BLD MANUAL: 22.92 THOUSAND/UL (ref 1.85–7.62)
NEUTS BAND NFR BLD MANUAL: 14 % (ref 0–8)
NEUTS SEG NFR BLD AUTO: 73 % (ref 43–75)
PLATELET # BLD AUTO: 311 THOUSANDS/UL (ref 149–390)
PLATELET BLD QL SMEAR: ADEQUATE
PMV BLD AUTO: 9.7 FL (ref 8.9–12.7)
POIKILOCYTOSIS BLD QL SMEAR: PRESENT
POTASSIUM SERPL-SCNC: 3.2 MMOL/L (ref 3.5–5.3)
PROCALCITONIN SERPL-MCNC: 1.19 NG/ML
RBC # BLD AUTO: 3.81 MILLION/UL (ref 3.81–5.12)
RBC MORPH BLD: PRESENT
SODIUM SERPL-SCNC: 133 MMOL/L (ref 135–147)
WBC # BLD AUTO: 26.35 THOUSAND/UL (ref 4.31–10.16)

## 2023-10-07 PROCEDURE — 97163 PT EVAL HIGH COMPLEX 45 MIN: CPT

## 2023-10-07 PROCEDURE — 97535 SELF CARE MNGMENT TRAINING: CPT

## 2023-10-07 PROCEDURE — 99233 SBSQ HOSP IP/OBS HIGH 50: CPT | Performed by: INTERNAL MEDICINE

## 2023-10-07 PROCEDURE — 85007 BL SMEAR W/DIFF WBC COUNT: CPT

## 2023-10-07 PROCEDURE — 92610 EVALUATE SWALLOWING FUNCTION: CPT

## 2023-10-07 PROCEDURE — 97167 OT EVAL HIGH COMPLEX 60 MIN: CPT

## 2023-10-07 PROCEDURE — 97530 THERAPEUTIC ACTIVITIES: CPT

## 2023-10-07 PROCEDURE — 82948 REAGENT STRIP/BLOOD GLUCOSE: CPT

## 2023-10-07 PROCEDURE — 85027 COMPLETE CBC AUTOMATED: CPT

## 2023-10-07 PROCEDURE — 80048 BASIC METABOLIC PNL TOTAL CA: CPT

## 2023-10-07 PROCEDURE — 84145 PROCALCITONIN (PCT): CPT

## 2023-10-07 RX ORDER — VANCOMYCIN HYDROCHLORIDE 125 MG/1
125 CAPSULE ORAL EVERY 6 HOURS SCHEDULED
Status: DISCONTINUED | OUTPATIENT
Start: 2023-10-07 | End: 2023-10-07

## 2023-10-07 RX ORDER — POTASSIUM CHLORIDE 20MEQ/15ML
40 LIQUID (ML) ORAL ONCE
Status: COMPLETED | OUTPATIENT
Start: 2023-10-07 | End: 2023-10-07

## 2023-10-07 RX ADMIN — METRONIDAZOLE 500 MG: 500 INJECTION, SOLUTION INTRAVENOUS at 12:24

## 2023-10-07 RX ADMIN — CEFEPIME 2000 MG: 2 INJECTION, POWDER, FOR SOLUTION INTRAVENOUS at 11:08

## 2023-10-07 RX ADMIN — ATORVASTATIN CALCIUM 10 MG: 10 TABLET, FILM COATED ORAL at 09:02

## 2023-10-07 RX ADMIN — Medication 125 MG: at 13:25

## 2023-10-07 RX ADMIN — POTASSIUM CHLORIDE 40 MEQ: 1.5 SOLUTION ORAL at 10:00

## 2023-10-07 RX ADMIN — AMLODIPINE BESYLATE 5 MG: 5 TABLET ORAL at 09:02

## 2023-10-07 RX ADMIN — QUETIAPINE FUMARATE 25 MG: 25 TABLET ORAL at 21:16

## 2023-10-07 RX ADMIN — Medication 125 MG: at 17:57

## 2023-10-07 RX ADMIN — CEFEPIME 2000 MG: 2 INJECTION, POWDER, FOR SOLUTION INTRAVENOUS at 21:12

## 2023-10-07 RX ADMIN — VANCOMYCIN HYDROCHLORIDE 125 MG: 125 CAPSULE ORAL at 01:30

## 2023-10-07 RX ADMIN — METRONIDAZOLE 500 MG: 500 INJECTION, SOLUTION INTRAVENOUS at 04:00

## 2023-10-07 RX ADMIN — METRONIDAZOLE 500 MG: 500 INJECTION, SOLUTION INTRAVENOUS at 20:03

## 2023-10-07 RX ADMIN — ENOXAPARIN SODIUM 40 MG: 40 INJECTION SUBCUTANEOUS at 09:02

## 2023-10-07 RX ADMIN — METOPROLOL SUCCINATE 100 MG: 100 TABLET, EXTENDED RELEASE ORAL at 09:02

## 2023-10-07 RX ADMIN — Medication 9 MG: at 21:15

## 2023-10-07 RX ADMIN — CEFEPIME 2000 MG: 2 INJECTION, POWDER, FOR SOLUTION INTRAVENOUS at 04:55

## 2023-10-07 RX ADMIN — VANCOMYCIN HYDROCHLORIDE 125 MG: 125 CAPSULE ORAL at 06:13

## 2023-10-07 RX ADMIN — SODIUM CHLORIDE, SODIUM GLUCONATE, SODIUM ACETATE, POTASSIUM CHLORIDE, MAGNESIUM CHLORIDE, SODIUM PHOSPHATE, DIBASIC, AND POTASSIUM PHOSPHATE 125 ML/HR: .53; .5; .37; .037; .03; .012; .00082 INJECTION, SOLUTION INTRAVENOUS at 10:03
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
## 2023-10-07 NOTE — PLAN OF CARE
Problem: Potential for Falls  Goal: Patient will remain free of falls  Description: INTERVENTIONS:  - Educate patient/family on patient safety including physical limitations  - Instruct patient to call for assistance with activity   - Consult OT/PT to assist with strengthening/mobility   - Keep Call bell within reach  - Keep bed low and locked with side rails adjusted as appropriate  - Keep care items and personal belongings within reach  - Initiate and maintain comfort rounds  - Make Fall Risk Sign visible to staff  - Offer Toileting every 2 Hours, in advance of need  - Initiate/Maintain Bed/ Chair alarm  - Obtain necessary fall risk management equipment: Frequent checks/ reorientation  - Apply yellow socks and bracelet for high fall risk patients  - Consider moving patient to room near nurses station  Outcome: Progressing     Problem: Prexisting or High Potential for Compromised Skin Integrity  Goal: Skin integrity is maintained or improved  Description: INTERVENTIONS:  - Identify patients at risk for skin breakdown  - Assess and monitor skin integrity  - Assess and monitor nutrition and hydration status  - Monitor labs   - Assess for incontinence   - Turn and reposition patient  - Assist with mobility/ambulation  - Relieve pressure over bony prominences  - Avoid friction and shearing  - Provide appropriate hygiene as needed including keeping skin clean and dry  - Evaluate need for skin moisturizer/barrier cream  - Collaborate with interdisciplinary team   - Patient/family teaching  - Consider wound care consult   Outcome: Progressing     Problem: MOBILITY - ADULT  Goal: Maintain or return to baseline ADL function  Description: INTERVENTIONS:  -  Assess patient's ability to carry out ADLs; assess patient's baseline for ADL function and identify physical deficits which impact ability to perform ADLs (bathing, care of mouth/teeth, toileting, grooming, dressing, etc.)  - Assess/evaluate cause of self-care deficits - Assess range of motion  - Assess patient's mobility; develop plan if impaired  - Assess patient's need for assistive devices and provide as appropriate  - Encourage maximum independence but intervene and supervise when necessary  - Involve family in performance of ADLs  - Assess for home care needs following discharge   - Consider OT consult to assist with ADL evaluation and planning for discharge  - Provide patient education as appropriate  Outcome: Progressing  Goal: Maintains/Returns to pre admission functional level  Description: INTERVENTIONS:  - Perform BMAT or MOVE assessment daily.   - Set and communicate daily mobility goal to care team and patient/family/caregiver. - Collaborate with rehabilitation services on mobility goals if consulted  - Perform Range of Motion 4 times a day. - Reposition patient every 2 hours.   - Dangle patient 3 times a day  - Stand patient 3 times a day  - Ambulate patient 3 times a day  - Out of bed to chair 3 times a day   - Out of bed for meals 3 times a day  - Out of bed for toileting  - Record patient progress and toleration of activity level   Outcome: Progressing     Problem: PAIN - ADULT  Goal: Verbalizes/displays adequate comfort level or baseline comfort level  Description: Interventions:  - Encourage patient to monitor pain and request assistance  - Assess pain using appropriate pain scale  - Administer analgesics based on type and severity of pain and evaluate response  - Implement non-pharmacological measures as appropriate and evaluate response  - Consider cultural and social influences on pain and pain management  - Notify physician/advanced practitioner if interventions unsuccessful or patient reports new pain  Outcome: Progressing     Problem: INFECTION - ADULT  Goal: Absence or prevention of progression during hospitalization  Description: INTERVENTIONS:  - Assess and monitor for signs and symptoms of infection  - Monitor lab/diagnostic results  - Monitor all insertion sites, i.e. indwelling lines, tubes, and drains  - Monitor endotracheal if appropriate and nasal secretions for changes in amount and color  - Ramer appropriate cooling/warming therapies per order  - Administer medications as ordered  - Instruct and encourage patient and family to use good hand hygiene technique  - Identify and instruct in appropriate isolation precautions for identified infection/condition  Outcome: Progressing     Problem: DISCHARGE PLANNING  Goal: Discharge to home or other facility with appropriate resources  Description: INTERVENTIONS:  - Identify barriers to discharge w/patient and caregiver  - Arrange for needed discharge resources and transportation as appropriate  - Identify discharge learning needs (meds, wound care, etc.)  - Arrange for interpretive services to assist at discharge as needed  - Refer to Case Management Department for coordinating discharge planning if the patient needs post-hospital services based on physician/advanced practitioner order or complex needs related to functional status, cognitive ability, or social support system  Outcome: Progressing     Problem: Knowledge Deficit  Goal: Patient/family/caregiver demonstrates understanding of disease process, treatment plan, medications, and discharge instructions  Description: Complete learning assessment and assess knowledge base.   Interventions:  - Provide teaching at level of understanding  - Provide teaching via preferred learning methods  Outcome: Progressing
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
## 2023-10-07 NOTE — PLAN OF CARE
Problem: Potential for Falls  Goal: Patient will remain free of falls  Description: INTERVENTIONS:  - Educate patient/family on patient safety including physical limitations  - Instruct patient to call for assistance with activity   - Consult OT/PT to assist with strengthening/mobility   - Keep Call bell within reach  - Keep bed low and locked with side rails adjusted as appropriate  - Keep care items and personal belongings within reach  - Initiate and maintain comfort rounds  - Make Fall Risk Sign visible to staff  - Offer Toileting every 2 Hours, in advance of need  - Initiate/Maintain Bed/ Chair alarm  - Obtain necessary fall risk management equipment: Frequent checks/ reorientation  - Apply yellow socks and bracelet for high fall risk patients  - Consider moving patient to room near nurses station  Outcome: Progressing     Problem: Prexisting or High Potential for Compromised Skin Integrity  Goal: Skin integrity is maintained or improved  Description: INTERVENTIONS:  - Identify patients at risk for skin breakdown  - Assess and monitor skin integrity  - Assess and monitor nutrition and hydration status  - Monitor labs   - Assess for incontinence   - Turn and reposition patient  - Assist with mobility/ambulation  - Relieve pressure over bony prominences  - Avoid friction and shearing  - Provide appropriate hygiene as needed including keeping skin clean and dry  - Evaluate need for skin moisturizer/barrier cream  - Collaborate with interdisciplinary team   - Patient/family teaching  - Consider wound care consult   Outcome: Progressing     Problem: MOBILITY - ADULT  Goal: Maintain or return to baseline ADL function  Description: INTERVENTIONS:  -  Assess patient's ability to carry out ADLs; assess patient's baseline for ADL function and identify physical deficits which impact ability to perform ADLs (bathing, care of mouth/teeth, toileting, grooming, dressing, etc.)  - Assess/evaluate cause of self-care deficits - Assess range of motion  - Assess patient's mobility; develop plan if impaired  - Assess patient's need for assistive devices and provide as appropriate  - Encourage maximum independence but intervene and supervise when necessary  - Involve family in performance of ADLs  - Assess for home care needs following discharge   - Consider OT consult to assist with ADL evaluation and planning for discharge  - Provide patient education as appropriate  Outcome: Progressing     Problem: PAIN - ADULT  Goal: Verbalizes/displays adequate comfort level or baseline comfort level  Description: Interventions:  - Encourage patient to monitor pain and request assistance  - Assess pain using appropriate pain scale  - Administer analgesics based on type and severity of pain and evaluate response  - Implement non-pharmacological measures as appropriate and evaluate response  - Consider cultural and social influences on pain and pain management  - Notify physician/advanced practitioner if interventions unsuccessful or patient reports new pain  Outcome: Progressing

## 2023-10-07 NOTE — PLAN OF CARE
Problem: OCCUPATIONAL THERAPY ADULT  Goal: Performs self-care activities at highest level of function for planned discharge setting. See evaluation for individualized goals. Description: Treatment Interventions: ADL retraining, Functional transfer training, Endurance training, Patient/family training, Neuromuscular reeducation, Compensatory technique education, Continued evaluation, Energy conservation, Activityengagement          See flowsheet documentation for full assessment, interventions and recommendations. Note: Limitation: Decreased ADL status, Decreased Safe judgement during ADL, Decreased endurance, Decreased fine motor control, Decreased self-care trans, Decreased cognition  Prognosis: Fair  Assessment: Patient evaluated by Occupational Therapy. Patient admitted with Colorectal cancer ([address] The patients occupational profile, medical and therapy history includes a expanded review of medical and/or therapy records and additional review of physical, cognitive, or psychosocial history related to current functional performance. Comorbidities affecting functional mobility and ADLS include: anxiety, cancer, hypertension and hyperlipidemia. Unable to get a previous history due to patients cognition decline. See able for performance levels. The evaluation identifies the following performance deficits: weakness, impaired balance, decreased endurance, decreased coordination, increased fall risk, new onset of impairment of functional mobility, decreased ADLS, decreased IADLS, decreased activity tolerance, decreased safety awareness and impaired judgement, that result in activity limitations and/or participation restrictions. This evaluation requires clinical decision making of high complexity, because the patient presents with comorbidites that affect occupational performance and required significant modification of tasks or assistance with consideration of multiple treatment options.   The Barthel Index was used as a functional outcome tool presenting with a score of Barthel Index Score: 20,  The patient's raw score on the -PAC Daily Activity Inpatient Short Form is 16. A raw score of less than 19 suggests the patient may benefit from discharge to post-acute rehabilitation services. Please refer to the recommendation of the Occupational Therapist for safe discharge planning. Patient will benefit from skilled Occupational Therapy services to address above deficits and facilitate a safe return to prior level of function.      OT Discharge Recommendation: Post acute rehabilitation services

## 2023-10-07 NOTE — ASSESSMENT & PLAN NOTE
Pt p/w abd pain, emesis, decreased PO intake. Pt is poor historian due to dementia. 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 Moderate obstruction of the colon proximal to the lesion. Colorectal consulted from ED and discussed w family at bedside. Per surgery note, family does not desire aggressive treatment such as surgery or chemotherapy, but would like to know extent of disease and have palliative care on board.     Plan:  - F/u repeat CT abdomen and family meeting on Monday 10/9 regarding treatment plan  · Colorectal on board  · Not currently recommending drain placement for possible abscess  · Continue clear liquid diet   · Start docusate BID, hold off laxatives
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
## 2023-10-07 NOTE — PLAN OF CARE
Problem: PHYSICAL THERAPY ADULT  Goal: Performs mobility at highest level of function for planned discharge setting. See evaluation for individualized goals. Description: Treatment/Interventions: Functional transfer training, LE strengthening/ROM, Therapeutic exercise, Endurance training, Patient/family training, Equipment eval/education, Bed mobility, Gait training, Continued evaluation, Spoke to nursing, OT          See flowsheet documentation for full assessment, interventions and recommendations. Note: Prognosis: Fair  Problem List: Decreased strength, Decreased endurance, Impaired balance, Decreased mobility, Decreased cognition, Impaired judgement, Decreased safety awareness, Impaired hearing, Obesity, Decreased skin integrity  Assessment: Pt is a 79 yo female admitted to [facility] 2* transverse colon mass, possibly colorectal CA,(+)D/N/V,ABD pain. Unknown social history at this time 2* pt unable to give therapist accurate history at this time. Pt reports she lives with her parents, the month is May, the  year is 2014 and the season is summer. pt currently is not at functional mobility baseline, needs Ax2 for mobility and use of RW, contact precautions 2* (+)CDiff, incontinent of bowel, multiple lines, dec cognition, ataxic and unsteady gait and movement pattern, masimo monitoring. pt demonstrates moderate deficits during functional mobility and gait including dec endurance, dec balance, dec BLE strength, dec cognition (baseline?), ataxic and unsteady gait and movement patterns and needs modAx2 for B rolling and bed mobility, modAx2 for transfers and modAx1 for GT with use of RW. Pt would cont to benefit from skilled inpt PT services to maximize functional independence and to dec caregiver burden upon being DC from the hospital.  Barriers to Discharge:  (unknown social history at this time)     PT Discharge Recommendation: Post acute rehabilitation services    See flowsheet documentation for full assessment.

## 2023-10-07 NOTE — ASSESSMENT & PLAN NOTE
Unable to review medications with patient (dementia) or family (unable to reach). Per chart, pt home medications include amlodipine and metoprolol. Unclear indication for metoprolol. SIRS as noted above, unclear whether due to infectious or inflammatory etiology, monitoring closely for development of hypotension.     Plan  - Continue amlodipine with hold parameters for SBP<130  - Hold metoprolol at this time  - Review home medications with family when able to contact

## 2023-10-07 NOTE — PLAN OF CARE
Problem: SLP ADULT - SWALLOWING, IMPAIRED  Goal: Advance to least restrictive diet without signs or symptoms of aspiration for planned discharge setting. See evaluation for individualized goals. Description: Patient will:    1. Safely swallow the least restrictive diet without overt signs or symptoms of aspiration or dysphagia. 1 day.   Outcome: Adequate for Discharge     Problem: SLP ADULT - SWALLOWING, IMPAIRED  Goal: Initial SLP swallow eval performed  Outcome: Completed

## 2023-10-07 NOTE — PROGRESS NOTES
[facility]  Progress Note  Name: Yoselin Rosales  MRN: [id number]  Unit/Bed#: W -01 I Date of Admission: 10/5/2023   Date of Service: 10/7/2023 I Hospital Day: 1    Assessment/Plan   * Colorectal cancer suspected  Assessment & Plan  Pt p/w abd pain, emesis, decreased PO intake. Pt is poor historian due to dementia. CT A/P w con shows 7 cm "apple core" lesion of the transverse colon concerning for neoplasm. 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 Moderate obstruction of the colon proximal to the lesion. Colorectal consulted from ED and discussed w family at bedside. Per surgery note, family does not desire aggressive treatment such as surgery or chemotherapy, but would like to know extent of disease and have palliative care on board. Plan:  · Colorectal on board  · Not currently recommending drain placement for possible abscess  · NPO plus sips with meds tonight, anticipate advancing to clears in the morning  · Start isolyte 125 ml/h x8h  · Start docusate BID, hold off laxatives    SIRS (systemic inflammatory response syndrome) (HCC)  Assessment & Plan  Meets SIRS criteria on admission for leukocytosis of 46 thousand and tachycardia ( at presentation, now 80)    Afebrile. BPs mildly to moderately hypertensive (131-143)/(60-67). Pt appears to have just completed 1-week course Augmentin 9/27-10/4/23, per chart review    Differential includes inflammatory 2/2 malignancy vs infectious (possible intra-abdominal abscess seen on CT vs gut translocation 2/2 moderate colonic obstruction).     BCx x2 collected in ED, pt received cefepime 1000 mg, metronidazole 500 mg, vancomycin 1500 mg    Plan:  Monitor off additional abx at this time  Monitor fever curve and BP  Trend WBCs  F/u BCx  If pt develops fever or hypotension, should consider consulting interventional radiology for abscess drainage. Diarrhea  Assessment & Plan  Patient has been having uncontrollable diarrhea since admission. Patient was recently treated with Augmentin a week ago. Plan:  · C Diff +   · Continuing IV and PO Antibiotics  · Cefepime 2g q8  · Vancomycin 125 mg q6 (switched to solution form of Vancomycin on 10/07 due to patient having difficulty swallowing pills  · Speech consulted for swallow study, needs f/u  · Flagyl 500mg q8      Lung nodule seen on imaging study  Assessment & Plan  CT A/P w con shows two pulmonary nodules within the left lower lobe of the lung, measuring 3 mm and 4 mm, respectively. Metastatic disease not excluded. Short-term follow-up recommended. High anion gap metabolic acidosis  Assessment & Plan  POA: HCO3 19, AG 19    Unclear etiology on the basis of existing labs, and pt is poor historian. Consider possible starvation ketosis in the setting of decreased PO intake vs acetaminophen or salicylates in the setting of abdominal pain. Plan:  VBG  Beta-hydroxybutyrate  Acetaminophen level  Salicylate level  F/u HCO3 on repeat CMP in AM    Thickened endometrium  Assessment & Plan  CT A/P w con shows thickened endometrium measuring approximately 14 mm thick. This is abnormally prominent for a postmenopausal patient. Nonemergent pelvic ultrasound recommended for further evaluation. Benign essential hypertension  Assessment & Plan  Unable to review medications with patient (dementia) or family (unable to reach). Per chart, pt home medications include amlodipine and metoprolol. Unclear indication for metoprolol. SIRS as noted above, unclear whether due to infectious or inflammatory etiology, monitoring closely for development of hypotension.     Continue amlodipine with hold parameters for SBP<130  Hold metoprolol at this time  Review home medications with family when able to contact    Prediabetes  Assessment & Plan   on admission  Last A1c 6.1 in April 2021  Not on anti-diabetic medications, per EMR    Check Hgb A1c  SSI q6h while NPO  Fingersticks Q6H while NPO             VTE Pharmacologic Prophylaxis: VTE Score: 11 High Risk (Score >/= 5) - Pharmacological DVT Prophylaxis Ordered: enoxaparin (Lovenox). Sequential Compression Devices Ordered. Patient Centered Rounds: I performed bedside rounds with nursing staff today. Discussions with Specialists or Other Care Team Provider: Colorectal surgery    Education and Discussions with Family / Patient: Updated  (son and daughter) at bedside. Current Length of Stay: 1 day(s)  Current Patient Status: Inpatient   Discharge Plan: Anticipate discharge in 48-72 hrs to home. Code Status: Level 3 - DNAR and DNI    Subjective:   Patient is endorsing improvement in her symptoms. She states she is going to the bathroom less. She is not currently complaining of any pain in her abdomen. She also is not having a feeling of abdominal fullness. Objective:     Vitals:   Temp (24hrs), Av.8 °F (36.6 °C), Min:97.4 °F (36.3 °C), Max:98.1 °F (36.7 °C)    Temp:  [97.4 °F (36.3 °C)-98.1 °F (36.7 °C)] 97.4 °F (36.3 °C)  HR:  [81-96] 81  Resp:  [12-17] 17  BP: (125-132)/(58-63) 125/63  SpO2:  [95 %] 95 %  Body mass index is 34.53 kg/m². Input and Output Summary (last 24 hours): Intake/Output Summary (Last 24 hours) at 10/7/2023 1418  Last data filed at 10/7/2023 1003  Gross per 24 hour   Intake 2160 ml   Output --   Net 2160 ml       Physical Exam:   Physical Exam  Cardiovascular:      Rate and Rhythm: Normal rate and regular rhythm. Heart sounds: Normal heart sounds. Pulmonary:      Effort: Pulmonary effort is normal.      Breath sounds: Normal breath sounds. Abdominal:      General: Bowel sounds are normal. There is no distension. Palpations: Abdomen is soft. Musculoskeletal:      Right lower leg: No edema. Left lower leg: No edema.           Additional Data:     Labs:  Results from last 7 days   Lab Units 10/07/23  0617   WBC Thousand/uL 26.35*   HEMOGLOBIN g/dL 10.8*   HEMATOCRIT % 33.7*   PLATELETS Thousands/uL 311   BANDS PCT % 14*   LYMPHO PCT % 5*   MONO PCT % 8   EOS PCT % 0     Results from last 7 days   Lab Units 10/07/23  0617 10/06/23  0519   SODIUM mmol/L 133* 135   POTASSIUM mmol/L 3.2* 3.2*   CHLORIDE mmol/L 102 101   CO2 mmol/L 24 20*   BUN mg/dL 29* 21   CREATININE mg/dL 0.89 0.75   ANION GAP mmol/L 7 14   CALCIUM mg/dL 7.8* 8.2*   ALBUMIN g/dL  --  3.2*   TOTAL BILIRUBIN mg/dL  --  1.01*   ALK PHOS U/L  --  82   ALT U/L  --  9   AST U/L  --  16   GLUCOSE RANDOM mg/dL 110 161*     Results from last 7 days   Lab Units 10/05/23  2225   INR  1.31*     Results from last 7 days   Lab Units 10/07/23  1153 10/07/23  0611 10/07/23  0003 10/06/23  1830 10/06/23  1229 10/06/23  0740   POC GLUCOSE mg/dl 116 109 127 144* 141* 171*     Results from last 7 days   Lab Units 10/05/23  2033   HEMOGLOBIN A1C % 6.6*     Results from last 7 days   Lab Units 10/07/23  0617 10/06/23  0519 10/05/23  2225   LACTIC ACID mmol/L  --   --  1.6   PROCALCITONIN ng/ml 1.19* 0.66* 0.36*       Lines/Drains:  Invasive Devices     Peripheral Intravenous Line  Duration           Peripheral IV 10/05/23 Right Antecubital 1 day                      Imaging: No pertinent imaging reviewed. Recent Cultures (last 7 days):   Results from last 7 days   Lab Units 10/06/23  0742 10/05/23  2239 10/05/23  2225   BLOOD CULTURE   --  No Growth at 24 hrs. No Growth at 24 hrs.    C DIFF TOXIN B BY PCR  Positive*  --   --        Last 24 Hours Medication List:   Current Facility-Administered Medications   Medication Dose Route Frequency Provider Last Rate   • acetaminophen  650 mg Oral Q6H PRN Ruddy Castaneda MD     • amLODIPine  5 mg Oral Daily Ruddy Castaneda MD     • atorvastatin  10 mg Oral Daily Ruddy Castaneda MD     • cefepime  2,000 mg Intravenous Burak Marte,  Stopped (10/07/23 1200)   • docusate sodium  100 mg Oral BID Ruddy Castaneda MD • enoxaparin  40 mg Subcutaneous Daily Isabel Alonso MD     • insulin lispro  1-6 Units Subcutaneous Q6H [address] Isabel Alonso MD     • melatonin  9 mg Oral HS Isabel Alonso MD     • metoprolol succinate  100 mg Oral Daily DO Ericka     • metroNIDAZOLE  500 mg Intravenous [place] DO Rosanna Stopped (10/07/23 1335)   • QUEtiapine  25 mg Oral HS Isabel Alonso MD     • trimethobenzamide  200 mg Intramuscular Q6H PRN Isabel Alonso MD     • vancomycin  125 mg Oral Q6H Ethan Rios MD          Today, Patient Was Seen By: DO Ericka    **Please Note: This note may have been constructed using a voice recognition system. **
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
## 2023-10-08 PROBLEM — E87.29 HIGH ANION GAP METABOLIC ACIDOSIS: Status: RESOLVED | Noted: 2023-10-06 | Resolved: 2023-10-08

## 2023-10-08 PROBLEM — A04.72 C. DIFFICILE DIARRHEA: Status: ACTIVE | Noted: 2023-10-06

## 2023-10-08 LAB
ACANTHOCYTES BLD QL SMEAR: PRESENT
ANION GAP SERPL CALCULATED.3IONS-SCNC: 7 MMOL/L
ANISOCYTOSIS BLD QL SMEAR: PRESENT
BASOPHILS # BLD MANUAL: 0 THOUSAND/UL (ref 0–0.1)
BASOPHILS NFR MAR MANUAL: 0 % (ref 0–1)
BUN SERPL-MCNC: 26 MG/DL (ref 5–25)
CALCIUM SERPL-MCNC: 7.7 MG/DL (ref 8.4–10.2)
CHLORIDE SERPL-SCNC: 104 MMOL/L (ref 96–108)
CO2 SERPL-SCNC: 24 MMOL/L (ref 21–32)
CREAT SERPL-MCNC: 0.78 MG/DL (ref 0.6–1.3)
EOSINOPHIL # BLD MANUAL: 0 THOUSAND/UL (ref 0–0.4)
EOSINOPHIL NFR BLD MANUAL: 0 % (ref 0–6)
ERYTHROCYTE [DISTWIDTH] IN BLOOD BY AUTOMATED COUNT: 14.6 % (ref 11.6–15.1)
GFR SERPL CREATININE-BSD FRML MDRD: 67 ML/MIN/1.73SQ M
GLUCOSE SERPL-MCNC: 103 MG/DL (ref 65–140)
GLUCOSE SERPL-MCNC: 106 MG/DL (ref 65–140)
GLUCOSE SERPL-MCNC: 121 MG/DL (ref 65–140)
GLUCOSE SERPL-MCNC: 148 MG/DL (ref 65–140)
GLUCOSE SERPL-MCNC: 85 MG/DL (ref 65–140)
HCT VFR BLD AUTO: 32.7 % (ref 34.8–46.1)
HGB BLD-MCNC: 10.3 G/DL (ref 11.5–15.4)
LYMPHOCYTES # BLD AUTO: 14 % (ref 14–44)
LYMPHOCYTES # BLD AUTO: 2.73 THOUSAND/UL (ref 0.6–4.47)
MCH RBC QN AUTO: 27.9 PG (ref 26.8–34.3)
MCHC RBC AUTO-ENTMCNC: 31.5 G/DL (ref 31.4–37.4)
MCV RBC AUTO: 89 FL (ref 82–98)
MONOCYTES # BLD AUTO: 1.37 THOUSAND/UL (ref 0–1.22)
MONOCYTES NFR BLD: 7 % (ref 4–12)
NEUTROPHILS # BLD MANUAL: 15.42 THOUSAND/UL (ref 1.85–7.62)
NEUTS SEG NFR BLD AUTO: 79 % (ref 43–75)
PLATELET # BLD AUTO: 311 THOUSANDS/UL (ref 149–390)
PLATELET BLD QL SMEAR: ADEQUATE
PMV BLD AUTO: 9.9 FL (ref 8.9–12.7)
POIKILOCYTOSIS BLD QL SMEAR: PRESENT
POTASSIUM SERPL-SCNC: 3.2 MMOL/L (ref 3.5–5.3)
RBC # BLD AUTO: 3.69 MILLION/UL (ref 3.81–5.12)
SODIUM SERPL-SCNC: 135 MMOL/L (ref 135–147)
WBC # BLD AUTO: 19.52 THOUSAND/UL (ref 4.31–10.16)

## 2023-10-08 PROCEDURE — 99232 SBSQ HOSP IP/OBS MODERATE 35: CPT | Performed by: INTERNAL MEDICINE

## 2023-10-08 PROCEDURE — 80048 BASIC METABOLIC PNL TOTAL CA: CPT

## 2023-10-08 PROCEDURE — 85027 COMPLETE CBC AUTOMATED: CPT

## 2023-10-08 PROCEDURE — 82948 REAGENT STRIP/BLOOD GLUCOSE: CPT

## 2023-10-08 PROCEDURE — 85007 BL SMEAR W/DIFF WBC COUNT: CPT

## 2023-10-08 RX ORDER — POTASSIUM CHLORIDE 20 MEQ/1
40 TABLET, EXTENDED RELEASE ORAL ONCE
Status: DISCONTINUED | OUTPATIENT
Start: 2023-10-08 | End: 2023-10-08

## 2023-10-08 RX ORDER — POTASSIUM CHLORIDE 14.9 MG/ML
20 INJECTION INTRAVENOUS
Status: COMPLETED | OUTPATIENT
Start: 2023-10-08 | End: 2023-10-08

## 2023-10-08 RX ADMIN — METRONIDAZOLE 500 MG: 500 INJECTION, SOLUTION INTRAVENOUS at 19:41

## 2023-10-08 RX ADMIN — POTASSIUM CHLORIDE 20 MEQ: 14.9 INJECTION, SOLUTION INTRAVENOUS at 09:50

## 2023-10-08 RX ADMIN — AMLODIPINE BESYLATE 5 MG: 5 TABLET ORAL at 10:00

## 2023-10-08 RX ADMIN — Medication 125 MG: at 19:41

## 2023-10-08 RX ADMIN — METOPROLOL SUCCINATE 100 MG: 100 TABLET, EXTENDED RELEASE ORAL at 10:01

## 2023-10-08 RX ADMIN — CEFEPIME 2000 MG: 2 INJECTION, POWDER, FOR SOLUTION INTRAVENOUS at 21:17

## 2023-10-08 RX ADMIN — METRONIDAZOLE 500 MG: 500 INJECTION, SOLUTION INTRAVENOUS at 03:49

## 2023-10-08 RX ADMIN — CEFEPIME 2000 MG: 2 INJECTION, POWDER, FOR SOLUTION INTRAVENOUS at 04:44

## 2023-10-08 RX ADMIN — Medication 125 MG: at 06:01

## 2023-10-08 RX ADMIN — Medication 125 MG: at 00:28

## 2023-10-08 RX ADMIN — POTASSIUM CHLORIDE 20 MEQ: 14.9 INJECTION, SOLUTION INTRAVENOUS at 11:46

## 2023-10-08 RX ADMIN — CEFEPIME 2000 MG: 2 INJECTION, POWDER, FOR SOLUTION INTRAVENOUS at 13:29

## 2023-10-08 RX ADMIN — METRONIDAZOLE 500 MG: 500 INJECTION, SOLUTION INTRAVENOUS at 13:29

## 2023-10-08 RX ADMIN — QUETIAPINE FUMARATE 25 MG: 25 TABLET ORAL at 21:17

## 2023-10-08 RX ADMIN — ENOXAPARIN SODIUM 40 MG: 40 INJECTION SUBCUTANEOUS at 10:01

## 2023-10-08 RX ADMIN — ATORVASTATIN CALCIUM 10 MG: 10 TABLET, FILM COATED ORAL at 10:01

## 2023-10-08 RX ADMIN — Medication 9 MG: at 21:17

## 2023-10-08 RX ADMIN — Medication 125 MG: at 12:47

## 2023-10-08 NOTE — PLAN OF CARE
Problem: Potential for Falls  Goal: Patient will remain free of falls  Description: INTERVENTIONS:  - Educate patient/family on patient safety including physical limitations  - Instruct patient to call for assistance with activity   - Consult OT/PT to assist with strengthening/mobility   - Keep Call bell within reach  - Keep bed low and locked with side rails adjusted as appropriate  - Keep care items and personal belongings within reach  - Initiate and maintain comfort rounds  - Make Fall Risk Sign visible to staff  - Offer Toileting every 2 Hours, in advance of need  - Initiate/Maintain Bed/ Chair alarm  - Obtain necessary fall risk management equipment: Frequent checks/ reorientation  - Apply yellow socks and bracelet for high fall risk patients  - Consider moving patient to room near nurses station  10/8/2023 1954 by Candyce Boeck, RN  Outcome: Progressing  10/8/2023 [address] [place] [address],  [address] by Candyce Boeck, RN  Outcome: Progressing     Problem: Prexisting or High Potential for Compromised Skin Integrity  Goal: Skin integrity is maintained or improved  Description: INTERVENTIONS:  - Identify patients at risk for skin breakdown  - Assess and monitor skin integrity  - Assess and monitor nutrition and hydration status  - Monitor labs   - Assess for incontinence   - Turn and reposition patient  - Assist with mobility/ambulation  - Relieve pressure over bony prominences  - Avoid friction and shearing  - Provide appropriate hygiene as needed including keeping skin clean and dry  - Evaluate need for skin moisturizer/barrier cream  - Collaborate with interdisciplinary team   - Patient/family teaching  - Consider wound care consult   10/8/2023 1954 by Candyce Boeck, RN  Outcome: Progressing  10/8/2023 [address] [place] [address],  [address] by Candyce Boeck, RN  Outcome: Progressing     Problem: MOBILITY - ADULT  Goal: Maintain or return to baseline ADL function  Description: INTERVENTIONS:  -  Assess patient's ability to carry out ADLs; assess patient's baseline for ADL function and identify physical deficits which impact ability to perform ADLs (bathing, care of mouth/teeth, toileting, grooming, dressing, etc.)  - Assess/evaluate cause of self-care deficits   - Assess range of motion  - Assess patient's mobility; develop plan if impaired  - Assess patient's need for assistive devices and provide as appropriate  - Encourage maximum independence but intervene and supervise when necessary  - Involve family in performance of ADLs  - Assess for home care needs following discharge   - Consider OT consult to assist with ADL evaluation and planning for discharge  - Provide patient education as appropriate  10/8/2023 1954 by Ronny Marie RN  Outcome: Progressing  10/8/2023 [address] [place] [address],  [address] by Ronny Marie RN  Outcome: Progressing  Goal: Maintains/Returns to pre admission functional level  Description: INTERVENTIONS:  - Perform BMAT or MOVE assessment daily.   - Set and communicate daily mobility goal to care team and patient/family/caregiver. - Collaborate with rehabilitation services on mobility goals if consulted  - Perform Range of Motion 4 times a day. - Reposition patient every 2 hours.   - Dangle patient 3 times a day  - Stand patient 3 times a day  - Ambulate patient 3 times a day  - Out of bed to chair 3 times a day   - Out of bed for meals 3 times a day  - Out of bed for toileting  - Record patient progress and toleration of activity level   10/8/2023 1954 by Ronny Marie RN  Outcome: Progressing  10/8/2023 [address] [place] [address],  [address] by Ronny Marie RN  Outcome: Progressing     Problem: PAIN - ADULT  Goal: Verbalizes/displays adequate comfort level or baseline comfort level  Description: Interventions:  - Encourage patient to monitor pain and request assistance  - Assess pain using appropriate pain scale  - Administer analgesics based on type and severity of pain and evaluate response  - Implement non-pharmacological measures as appropriate and evaluate response  - Consider cultural and social influences on pain and pain management  - Notify physician/advanced practitioner if interventions unsuccessful or patient reports new pain  10/8/2023 1954 by Candyce Boeck, RN  Outcome: Progressing  10/8/2023 [facility], [address] by Candyce Boeck, RN  Outcome: Progressing     Problem: INFECTION - ADULT  Goal: Absence or prevention of progression during hospitalization  Description: INTERVENTIONS:  - Assess and monitor for signs and symptoms of infection  - Monitor lab/diagnostic results  - Monitor all insertion sites, i.e. indwelling lines, tubes, and drains  - Monitor endotracheal if appropriate and nasal secretions for changes in amount and color  - [place] appropriate cooling/warming therapies per order  - Administer medications as ordered  - Instruct and encourage patient and family to use good hand hygiene technique  - Identify and instruct in appropriate isolation precautions for identified infection/condition  10/8/2023 1954 by Candyce Boeck, RN  Outcome: Progressing  10/8/2023 [facility], [address] by Candyce Boeck, RN  Outcome: Progressing     Problem: DISCHARGE PLANNING  Goal: Discharge to home or other facility with appropriate resources  Description: INTERVENTIONS:  - Identify barriers to discharge w/patient and caregiver  - Arrange for needed discharge resources and transportation as appropriate  - Identify discharge learning needs (meds, wound care, etc.)  - Arrange for interpretive services to assist at discharge as needed  - Refer to Case Management Department for coordinating discharge planning if the patient needs post-hospital services based on physician/advanced practitioner order or complex needs related to functional status, cognitive ability, or social support system  10/8/2023 1954 by Candyce Boeck, RN  Outcome: Progressing  10/8/2023 [address] [facility], [address] by Candyce Boeck, RN  Outcome: Progressing     Problem: Knowledge Deficit  Goal: Patient/family/caregiver demonstrates understanding of disease process, treatment plan, medications, and discharge instructions  Description: Complete learning assessment and assess knowledge base.   Interventions:  - Provide teaching at level of understanding  - Provide teaching via preferred learning methods  10/8/2023 1954 by Kasi Iverson RN  Outcome: Progressing  10/8/2023 1829 by Kasi Iverson RN  Outcome: Progressing

## 2023-10-08 NOTE — ASSESSMENT & PLAN NOTE
Lab Results   Component Value Date    HGBA1C 6.6 (H) 10/05/2023       Recent Labs     10/07/23  1756 10/07/23  1820 10/08/23  0006 10/08/23  0602   POCGLU 96 113 85 103       Blood Sugar Average: Last 72 hrs:  (P) 120.5     Not on anti-diabetic medications, per EMR    Plan  - Fingerstick QID w/ meals and at bedtime  - SSI

## 2023-10-08 NOTE — ASSESSMENT & PLAN NOTE
Pt p/w abd pain, emesis, decreased PO intake. Pt is poor historian due to dementia. 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 Moderate obstruction of the colon proximal to the lesion. Colorectal consulted from ED and discussed w family at bedside. Per surgery note, family does not desire aggressive treatment such as surgery or chemotherapy, but would like to know extent of disease and have palliative care on board. 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   - Reached out to surgery to discuss with family any possible surgical options

## 2023-10-08 NOTE — PLAN OF CARE
Problem: Potential for Falls  Goal: Patient will remain free of falls  Description: INTERVENTIONS:  - Educate patient/family on patient safety including physical limitations  - Instruct patient to call for assistance with activity   - Consult OT/PT to assist with strengthening/mobility   - Keep Call bell within reach  - Keep bed low and locked with side rails adjusted as appropriate  - Keep care items and personal belongings within reach  - Initiate and maintain comfort rounds  - Make Fall Risk Sign visible to staff  - Offer Toileting every 2 Hours, in advance of need  - Initiate/Maintain Bed/ Chair alarm  - Obtain necessary fall risk management equipment: Frequent checks/ reorientation  - Apply yellow socks and bracelet for high fall risk patients  - Consider moving patient to room near nurses station  Outcome: Progressing     Problem: Prexisting or High Potential for Compromised Skin Integrity  Goal: Skin integrity is maintained or improved  Description: INTERVENTIONS:  - Identify patients at risk for skin breakdown  - Assess and monitor skin integrity  - Assess and monitor nutrition and hydration status  - Monitor labs   - Assess for incontinence   - Turn and reposition patient  - Assist with mobility/ambulation  - Relieve pressure over bony prominences  - Avoid friction and shearing  - Provide appropriate hygiene as needed including keeping skin clean and dry  - Evaluate need for skin moisturizer/barrier cream  - Collaborate with interdisciplinary team   - Patient/family teaching  - Consider wound care consult   Outcome: Progressing     Problem: MOBILITY - ADULT  Goal: Maintain or return to baseline ADL function  Description: INTERVENTIONS:  -  Assess patient's ability to carry out ADLs; assess patient's baseline for ADL function and identify physical deficits which impact ability to perform ADLs (bathing, care of mouth/teeth, toileting, grooming, dressing, etc.)  - Assess/evaluate cause of self-care deficits - Assess range of motion  - Assess patient's mobility; develop plan if impaired  - Assess patient's need for assistive devices and provide as appropriate  - Encourage maximum independence but intervene and supervise when necessary  - Involve family in performance of ADLs  - Assess for home care needs following discharge   - Consider OT consult to assist with ADL evaluation and planning for discharge  - Provide patient education as appropriate  Outcome: Progressing  Goal: Maintains/Returns to pre admission functional level  Description: INTERVENTIONS:  - Perform BMAT or MOVE assessment daily.   - Set and communicate daily mobility goal to care team and patient/family/caregiver. - Collaborate with rehabilitation services on mobility goals if consulted  - Perform Range of Motion 4 times a day. - Reposition patient every 2 hours.   - Dangle patient 3 times a day  - Stand patient 3 times a day  - Ambulate patient 3 times a day  - Out of bed to chair 3 times a day   - Out of bed for meals 3 times a day  - Out of bed for toileting  - Record patient progress and toleration of activity level   Outcome: Progressing     Problem: PAIN - ADULT  Goal: Verbalizes/displays adequate comfort level or baseline comfort level  Description: Interventions:  - Encourage patient to monitor pain and request assistance  - Assess pain using appropriate pain scale  - Administer analgesics based on type and severity of pain and evaluate response  - Implement non-pharmacological measures as appropriate and evaluate response  - Consider cultural and social influences on pain and pain management  - Notify physician/advanced practitioner if interventions unsuccessful or patient reports new pain  Outcome: Progressing     Problem: INFECTION - ADULT  Goal: Absence or prevention of progression during hospitalization  Description: INTERVENTIONS:  - Assess and monitor for signs and symptoms of infection  - Monitor lab/diagnostic results  - Monitor all insertion sites, i.e. indwelling lines, tubes, and drains  - Monitor endotracheal if appropriate and nasal secretions for changes in amount and color  - [place] appropriate cooling/warming therapies per order  - Administer medications as ordered  - Instruct and encourage patient and family to use good hand hygiene technique  - Identify and instruct in appropriate isolation precautions for identified infection/condition  Outcome: Progressing     Problem: DISCHARGE PLANNING  Goal: Discharge to home or other facility with appropriate resources  Description: INTERVENTIONS:  - Identify barriers to discharge w/patient and caregiver  - Arrange for needed discharge resources and transportation as appropriate  - Identify discharge learning needs (meds, wound care, etc.)  - Arrange for interpretive services to assist at discharge as needed  - Refer to Case Management Department for coordinating discharge planning if the patient needs post-hospital services based on physician/advanced practitioner order or complex needs related to functional status, cognitive ability, or social support system  Outcome: Progressing     Problem: Knowledge Deficit  Goal: Patient/family/caregiver demonstrates understanding of disease process, treatment plan, medications, and discharge instructions  Description: Complete learning assessment and assess knowledge base.   Interventions:  - Provide teaching at level of understanding  - Provide teaching via preferred learning methods  Outcome: Progressing

## 2023-10-08 NOTE — ASSESSMENT & PLAN NOTE
Unable to review medications with patient (dementia) or family (unable to reach). Per chart, pt home medications include amlodipine and metoprolol. Unclear indication for metoprolol. SIRS as noted above, unclear whether due to infectious or inflammatory etiology, monitoring closely for development of hypotension.     Plan  - Continue amlodipine with hold parameters for SBP<130  - Home metoprolol continued  - Review home medications with family when able to contact

## 2023-10-08 NOTE — PROGRESS NOTES
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 Pt is poor historian due to dementia. 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 Moderate obstruction of the colon proximal to the lesion. Colorectal consulted from ED and discussed w family at bedside. Per surgery note, family does not desire aggressive treatment such as surgery or chemotherapy, but would like to know extent of disease and have palliative care on board. Plan:  - F/u repeat CT abdomen and family meeting on Monday 10/9 regarding treatment plan  - CT abdomen with PO and IV contrast scheduled 10/9   - Colorectal on board  · Not currently recommending drain placement for possible abscess  - Continue clear liquid diet   - Start docusate BID, hold off laxatives    Diarrhea  Assessment & Plan  Patient has been having uncontrollable diarrhea since admission. Patient was recently treated with Augmentin a week ago. - C Diff +  - ongoing loose watery stools 1x this morning     Plan:  · Continuing IV and PO Antibiotics  · Cefepime 2g q8 day #3  · Flagyl 500mg q8 day #3  · Vancomycin 125 mg q6 day #3/10 (switched to solution form of Vancomycin on 10/07 due to patient having difficulty swallowing pills  · Speech consulted recommending Clear liquid diet, Thin liquids; advance to include regular solids as per GI    SIRS (systemic inflammatory response syndrome) (HCC)  Assessment & Plan  Meets SIRS criteria on admission for leukocytosis of 46 thousand and tachycardia ( at presentation, now 85)    Afebrile.  BPs mildly to moderately hypertensive (131-143)/(60-67). Pt appears to have just completed 1-week course Augmentin 9/27-10/4/23, per chart review    Differential includes inflammatory 2/2 malignancy vs infectious (possible intra-abdominal abscess seen on CT vs gut translocation 2/2 moderate colonic obstruction). BCx x2 collected in ED, pt received cefepime 1000 mg, metronidazole 500 mg, vancomycin 1500 mg  - Bcx negative at 48 hrs     Plan:  Monitor off additional abx at this time  Monitor fever curve and BP  Trend WBCs  F/u BCx  If pt develops fever or hypotension, should consider consulting interventional radiology for abscess drainage. Lung nodule seen on imaging study  Assessment & Plan  CT A/P w con shows two pulmonary nodules within the left lower lobe of the lung, measuring 3 mm and 4 mm, respectively. Metastatic disease not excluded. Short-term follow-up recommended. Type 2 diabetes mellitus without complication, without long-term current use of insulin [facility])  Assessment & Plan  Lab Results   Component Value Date    HGBA1C 6.6 (H) 10/05/2023       Recent Labs     10/07/23  1756 10/07/23  1820 10/08/23  0006 10/08/23  0602   POCGLU 96 113 85 103       Blood Sugar Average: Last 72 hrs:  (P) 120.5     Not on anti-diabetic medications, per EMR    Plan  - Fingerstick QID w/ meals and at bedtime  - SSI       Thickened endometrium  Assessment & Plan  CT A/P w con shows thickened endometrium measuring approximately 14 mm thick. This is abnormally prominent for a postmenopausal patient. Nonemergent pelvic ultrasound recommended for further evaluation. Benign essential hypertension  Assessment & Plan  Unable to review medications with patient (dementia) or family (unable to reach). Per chart, pt home medications include amlodipine and metoprolol. Unclear indication for metoprolol.     SIRS as noted above, unclear whether due to infectious or inflammatory etiology, monitoring closely for development of hypotension. Plan  - Continue amlodipine with hold parameters for SBP<130  - Hold metoprolol at this time  - Review home medications with family when able to contact    High anion gap metabolic acidosis-resolved as of 10/8/2023  Assessment & Plan  POA: HCO3 19, AG 19    Unclear etiology on the basis of existing labs, and pt is poor historian. Consider possible starvation ketosis in the setting of decreased PO intake vs acetaminophen or salicylates in the setting of abdominal pain. Plan:  VBG  Beta-hydroxybutyrate  Acetaminophen level  Salicylate level  F/u HCO3 on repeat CMP in AM         VTE Pharmacologic Prophylaxis:   VTE Score: 11 High Risk (Score >/= 5) - Pharmacological DVT Prophylaxis Ordered: Enoxaparin (Lovenox). Sequential Compression Devices Ordered. Mechanical VTE Prophylaxis in Place: Yes    Patient Centered Rounds: I have performed bedside rounds with nursing staff today. Discussions with Specialists or Other Care Team Provider: Dr. Major Morse    Education and Discussions with Family / Patient: will attempt to update family this afternoon    Current Length of Stay: 2 day(s)    Current Patient Status: Inpatient     Discharge Plan / Estimated Discharge Date: Anticipate discharge in 48 hrs to home. Code Status: Level 3 - DNAR and DNI      Subjective:   Patient seen and examined in room while laying in bed. Patient without any acute complaints or concerns at this time. She is AAOx1 (wasn't aware she was in the hospital or what year/month it was); this appears to be her baseline. She reports no pain or abdominal discomfort on palpation. She reports one normal BM this morning that per documentation was reportedly loose and watery.      Objective:     Vitals:   Temp (24hrs), Av.5 °F (36.4 °C), Min:97.3 °F (36.3 °C), Max:97.8 °F (36.6 °C)    Temp:  [97.3 °F (36.3 °C)-97.8 °F (36.6 °C)] 97.3 °F (36.3 °C)  HR:  [76-85] 85  Resp:  [12-17] 17  BP: ()/(44-80) 118/68  SpO2:  [95 %-97 %] 96 %  Body mass index is 34.53 kg/m². Input and Output Summary (last 24 hours): Intake/Output Summary (Last 24 hours) at 10/8/2023 1057  Last data filed at 10/7/2023 1807  Gross per 24 hour   Intake 580 ml   Output --   Net 580 ml       Physical Exam:     Physical Exam  Vitals and nursing note reviewed. Constitutional:       General: She is not in acute distress. Appearance: Normal appearance. She is well-developed. She is not ill-appearing. HENT:      Head: Normocephalic and atraumatic. Right Ear: External ear normal.      Left Ear: External ear normal.   Eyes:      Conjunctiva/sclera: Conjunctivae normal.   Cardiovascular:      Rate and Rhythm: Normal rate and regular rhythm. Heart sounds: No murmur heard. Pulmonary:      Effort: Pulmonary effort is normal. No respiratory distress. Breath sounds: Normal breath sounds. Abdominal:      Palpations: Abdomen is soft. Tenderness: There is no abdominal tenderness. Musculoskeletal:         General: No swelling. Cervical back: Neck supple. Right lower leg: No edema. Left lower leg: No edema. Skin:     General: Skin is warm and dry. Capillary Refill: Capillary refill takes less than 2 seconds. Neurological:      Mental Status: She is alert. Comments: AAOx1 (person only)   Psychiatric:         Mood and Affect: Mood normal.         Speech: Speech is delayed. Behavior: Behavior is cooperative. Cognition and Memory: Memory is impaired.          Additional Data:     Labs:  Results from last 7 days   Lab Units 10/08/23  0447 10/07/23  0617   WBC Thousand/uL 19.52* 26.35*   HEMOGLOBIN g/dL 10.3* 10.8*   HEMATOCRIT % 32.7* 33.7*   PLATELETS Thousands/uL 311 311   BANDS PCT %  --  14*   LYMPHO PCT % 14 5*   MONO PCT % 7 8   EOS PCT % 0 0     Results from last 7 days   Lab Units 10/08/23  0447 10/07/23  0617 10/06/23  0519   SODIUM mmol/L 135   < > 135   POTASSIUM mmol/L 3.2*   < > 3.2*   CHLORIDE mmol/L 104 < > 101   CO2 mmol/L 24   < > 20*   BUN mg/dL 26*   < > 21   CREATININE mg/dL 0.78   < > 0.75   ANION GAP mmol/L 7   < > 14   CALCIUM mg/dL 7.7*   < > 8.2*   ALBUMIN g/dL  --   --  3.2*   TOTAL BILIRUBIN mg/dL  --   --  1.01*   ALK PHOS U/L  --   --  82   ALT U/L  --   --  9   AST U/L  --   --  16   GLUCOSE RANDOM mg/dL 106   < > 161*    < > = values in this interval not displayed. Results from last 7 days   Lab Units 10/05/23  2225   INR  1.31*     Results from last 7 days   Lab Units 10/08/23  0602 10/08/23  0006 10/07/23  1820 10/07/23  1756 10/07/23  1153 10/07/23  0611 10/07/23  0003 10/06/23  1830 10/06/23  1229 10/06/23  0740   POC GLUCOSE mg/dl 103 85 113 96 116 109 127 144* 141* 171*     Results from last 7 days   Lab Units 10/05/23  2033   HEMOGLOBIN A1C % 6.6*     Results from last 7 days   Lab Units 10/07/23  0617 10/06/23  0519 10/05/23  2225   LACTIC ACID mmol/L  --   --  1.6   PROCALCITONIN ng/ml 1.19* 0.66* 0.36*       Imaging: Reviewed radiology reports from this admission including: abdominal/pelvic CT scan    Recent Cultures (last 7 days):     Results from last 7 days   Lab Units 10/06/23  0742 10/05/23  2239 10/05/23  2225   BLOOD CULTURE   --  No Growth at 48 hrs. No Growth at 48 hrs.    C DIFF TOXIN B BY PCR  Positive*  --   --        Lines/Drains:  Invasive Devices     Peripheral Intravenous Line  Duration           Peripheral IV 10/05/23 Right Antecubital 2 days                Telemetry:        Last 24 Hours Medication List:   Current Facility-Administered Medications   Medication Dose Route Frequency Provider Last Rate   • acetaminophen  650 mg Oral Q6H PRN Kaushal More MD     • amLODIPine  5 mg Oral Daily Kaushal More MD     • atorvastatin  10 mg Oral Daily Kaushal More MD     • cefepime  2,000 mg Intravenous [facility], DO 2,000 mg (10/08/23 0444)   • docusate sodium  100 mg Oral BID Kaushal More MD     • enoxaparin  40 mg Subcutaneous Daily Kaushal More MD     • insulin lispro  1-6 Units Subcutaneous Q6H [facility] Ángel Carter MD     • melatonin  9 mg Oral HS Ángel Carter MD     • metoprolol succinate  100 mg Oral Daily DO Ericka     • metroNIDAZOLE  500 mg Intravenous [facility],  mg (10/08/23 0349)   • potassium chloride  20 mEq Intravenous Q2H Javier [facility], DO 20 mEq (10/08/23 0950)   • QUEtiapine  25 mg Oral HS Ángel Carter MD     • trimethobenzamide  200 mg Intramuscular Q6H PRN Ángel Carter MD     • vancomycin  125 mg Oral Q6H Antonio Waldrop MD          Today, Patient Was Seen By: Santana Ballesteros DO    ** Please Note: This note has been constructed using a voice recognition system.  **

## 2023-10-08 NOTE — ASSESSMENT & PLAN NOTE
on admission  A1c 6.6 in April 2021  Not on anti-diabetic medications, per EMR    Check Hgb A1c  SSI q6h while NPO  Fingersticks Q6H while NPO

## 2023-10-08 NOTE — ASSESSMENT & PLAN NOTE
Patient has been having uncontrollable diarrhea since admission. Patient was recently treated with Augmentin a week ago.    - C Diff +  - ongoing loose watery stools 1x this morning     Plan:  · Continuing IV and PO Antibiotics  · Cefepime 2g q8 day #3  · Flagyl 500mg q8 day #3  · Vancomycin 125 mg q6 day #3/10 (switched to solution form of Vancomycin on 10/07 due to patient having difficulty swallowing pills  · Speech consulted recommending Clear liquid diet, Thin liquids; advance to include regular solids as per GI

## 2023-10-08 NOTE — ASSESSMENT & PLAN NOTE
Patient has been having uncontrollable diarrhea since admission. Patient was recently treated with Augmentin a week ago.    - C Diff +  - ongoing loose watery stools 1x this morning     Plan:  · Continuing IV and PO Antibiotics  · Cefepime 2g q8 day #4  · Flagyl 500mg q8 day #4  · Vancomycin 125 mg q6 day #4/10 (switched to solution form of Vancomycin on 10/07 due to patient having difficulty swallowing pills  · Speech consulted recommending Clear liquid diet, Thin liquids; advance to include regular solids as per GI

## 2023-10-08 NOTE — ASSESSMENT & PLAN NOTE
Meets SIRS criteria on admission for leukocytosis of 46 thousand and tachycardia ( at presentation, now 85)    Afebrile. BPs mildly to moderately hypertensive (131-143)/(60-67). Pt appears to have just completed 1-week course Augmentin 9/27-10/4/23, per chart review    Differential includes inflammatory 2/2 malignancy vs infectious (possible intra-abdominal abscess seen on CT vs gut translocation 2/2 moderate colonic obstruction). BCx x2 collected in ED, pt received cefepime 1000 mg, metronidazole 500 mg, vancomycin 1500 mg  - Bcx negative at 48 hrs     Plan:  Monitor off additional abx at this time  Monitor fever curve and BP  Trend WBCs  F/u BCx  If pt develops fever or hypotension, should consider consulting interventional radiology for abscess drainage.

## 2023-10-08 NOTE — CASE MANAGEMENT
Case Management Assessment & Discharge Planning Note    Patient name Anna Marie Silva  Location W /W -16 MRN [id number]  : 1934 Date 10/8/2023       Current Admission Date: 10/5/2023  Current Admission Diagnosis:Colorectal cancer suspected   Patient Active Problem List    Diagnosis Date Noted   • SIRS (systemic inflammatory response syndrome) ([address]) 10/06/2023   • Colorectal cancer suspected 10/06/2023   • Lung nodule seen on imaging study 10/06/2023   • C. difficile diarrhea 10/06/2023   • Bilateral hearing loss 2023   • Type 2 diabetes mellitus without complication, without long-term current use of insulin ([address])    • Metabolic encephalopathy    • Major depressive disorder with single episode, in full remission ([address]) 10/19/2020   • Constipation, slow transit 2019   • Closed fracture of right hip with routine healing 2019   • Thickened endometrium 2017   • Pancreatic cyst 2017   • Enteritis 2017   • Age related osteoporosis 2016   • Resting tremor 05/10/2016   • Vitamin D deficiency 2014   • Right bundle-branch block 10/29/2013   • Leiomyoma of uterus 2013   • Benign essential hypertension 2012   • Mixed hyperlipidemia 2012   • Gastroesophageal reflux disease without esophagitis 2012      LOS (days): 2  Geometric Mean LOS (GMLOS) (days): 3.60  Days to GMLOS:0.9     OBJECTIVE:  PATIENT READMITTED TO HOSPITAL  Risk of Unplanned Readmission Score: 23.13         Current admission status: Inpatient       Preferred Pharmacy:   [id number] [place] [address] [address] [facility] [address]  Phone: [phone] Fax: [phone]    Primary Care Provider: RUBY Loya    Primary Insurance: [facility]  Secondary Insurance:     ASSESSMENT:  Hank Hameed 7[address] Silvestre A Representative - Daughter   Primary Phone: [phone] (Mobile)  Home Phone: [phone]               Advance Directives  Does patient have a [address]?: Yes  Primary Contact: Patient's daughter Norma Machuca         Readmission [facility]  30 Day Readmission: Yes  Who directed you to return to the hospital?: Family  Did you understand whom to contact if you had questions or problems?: No  Did you get your prescriptions before you left the hospital?: No  Reason[de-identified] Preference for own pharmacy  Were you able to get your prescriptions filled when you left the hospital?: Yes  Did you take your medications as prescribed?: Yes  Were you able to get to your follow-up appointments?: No  Reason[de-identified] Readmitted prior to appointment  During previous admission, was a post-acute recommendation made?: No  Patient was readmitted due to: Colorectal cancer suspected  Action Plan: TBD    Patient Information  Admitted from[de-identified] Home  Mental Status: Alert  During Assessment patient was accompanied by: Daughter, Other-Comment (RAFIA)  Assessment information provided by[de-identified] Daughter  Primary Caregiver: Child  Caregiver's Name[de-identified] Norma Machuca and RAFIA  Caregiver's Relationship to Patient[de-identified] Family Member  Support Systems: Self, Daughter, Family members  [facility]: [address] [place] [address] do you live in?: [address] entry access options.  Select all that apply.: Stairs  Number of steps to enter home.: 3  Do the steps have railings?: Yes  Type of Current Residence: [address] home  Upon entering residence, is there a bedroom on the main floor (no further steps)?: Yes  Upon entering residence, is there a bathroom on the main floor (no further steps)?: Yes  In the last 12 months, was there a time when you were not able to pay the mortgage or rent on time?: No  In the last 12 months, was there a time when you did not have a steady place to sleep or slept in a shelter (including now)?: No  Homeless/housing insecurity resource given?: No  Living Arrangements: Lives w/ Daughter    Activities of Daily Living Prior to Admission  Functional Status: Assistance  Completes ADLs independently?: No  Level of ADL dependence: Assistance  Ambulates independently?: No  Level of ambulatory dependence: Assistance  Does patient have a history of Outpatient Therapy (PT/OT)?: No  Does the patient have a history of Short-Term Rehab?: Yes ([facility])  Does patient have a history of HHC?: No  Does patient currently have [address]?: No         Patient Information Continued  Income Source: SSI/SSD  Does patient have prescription coverage?: Yes  Food insecurity resource given?: No  Does patient receive dialysis treatments?: No  Does patient have a history of substance abuse?: No  Does patient have a history of Mental Health Diagnosis?: Yes (HX Anxiety and depression)    PHQ 2/9 Screening   Reviewed PHQ 2/9 Depression Screening Score?: No    Means of Transportation  Means of Transport to Appts[de-identified] Family transport  In the past 12 months, has lack of transportation kept you from medical appointments or from getting medications?: No  In the past 12 months, has lack of transportation kept you from meetings, work, or from getting things needed for daily living?: No  Was application for public transport provided?: No        DISCHARGE DETAILS:    Discharge planning discussed with[de-identified] patient's daughter Stephon Cano  Freedom of Choice: Yes     CM contacted family/caregiver?: Yes  Were Treatment Team discharge recommendations reviewed with patient/caregiver?: Yes  Did patient/caregiver verbalize understanding of patient care needs?: Yes  Were patient/caregiver advised of the risks associated with not following Treatment Team discharge recommendations?: Yes    Contacts  Patient Contacts: Pallavi-daughter  Relationship to Patient[de-identified] Family  Contact Method: In Person  Reason/Outcome: Continuity of Care, Emergency Contact, Discharge Planning    CM spoke with patient's daughter Stephon Cano at the bedside. CM introduced self and role.  Patient lives with daughter Jennifer Chen but has been staying with Mag Guzman for past couple of weeks. Patient has slowly declined at home with mobility. Family provides assistance with ADLS. CM reviewed with family per PT/OT, the recommendation at this time is inpatient rehab. CM discussed inpatient rehab and [address] options with family. Family is waiting for scan results tomorrow to further determine plan of care. CM provided contact number for any questions/concerns. CM will continue to f/u to assist with DCP. CM reviewed discharge planning process including the following: identifying caregivers at home, preference for d/c planning needs,   availability of [facility] to [facility] program, availability of treatment team to discuss questions or concerns patient and/or family may have regarding diagnosis, plan of care, old or new medications and discharge planning . CM will continue to follow for care coordination and update assessment as appropriate.

## 2023-10-08 NOTE — PLAN OF CARE
Problem: Potential for Falls  Goal: Patient will remain free of falls  Description: INTERVENTIONS:  - Educate patient/family on patient safety including physical limitations  - Instruct patient to call for assistance with activity   - Consult OT/PT to assist with strengthening/mobility   - Keep Call bell within reach  - Keep bed low and locked with side rails adjusted as appropriate  - Keep care items and personal belongings within reach  - Initiate and maintain comfort rounds  - Make Fall Risk Sign visible to staff  - Offer Toileting every 2 Hours, in advance of need  - Initiate/Maintain Bed/ Chair alarm  - Obtain necessary fall risk management equipment: Frequent checks/ reorientation  - Apply yellow socks and bracelet for high fall risk patients  - Consider moving patient to room near nurses station  Outcome: Progressing     Problem: Prexisting or High Potential for Compromised Skin Integrity  Goal: Skin integrity is maintained or improved  Description: INTERVENTIONS:  - Identify patients at risk for skin breakdown  - Assess and monitor skin integrity  - Assess and monitor nutrition and hydration status  - Monitor labs   - Assess for incontinence   - Turn and reposition patient  - Assist with mobility/ambulation  - Relieve pressure over bony prominences  - Avoid friction and shearing  - Provide appropriate hygiene as needed including keeping skin clean and dry  - Evaluate need for skin moisturizer/barrier cream  - Collaborate with interdisciplinary team   - Patient/family teaching  - Consider wound care consult   Outcome: Progressing     Problem: MOBILITY - ADULT  Goal: Maintain or return to baseline ADL function  Description: INTERVENTIONS:  -  Assess patient's ability to carry out ADLs; assess patient's baseline for ADL function and identify physical deficits which impact ability to perform ADLs (bathing, care of mouth/teeth, toileting, grooming, dressing, etc.)  - Assess/evaluate cause of self-care deficits - Assess range of motion  - Assess patient's mobility; develop plan if impaired  - Assess patient's need for assistive devices and provide as appropriate  - Encourage maximum independence but intervene and supervise when necessary  - Involve family in performance of ADLs  - Assess for home care needs following discharge   - Consider OT consult to assist with ADL evaluation and planning for discharge  - Provide patient education as appropriate  Outcome: Progressing  Goal: Maintains/Returns to pre admission functional level  Description: INTERVENTIONS:  - Perform BMAT or MOVE assessment daily.   - Set and communicate daily mobility goal to care team and patient/family/caregiver. - Collaborate with rehabilitation services on mobility goals if consulted  - Perform Range of Motion 4 times a day. - Reposition patient every 2 hours.   - Dangle patient 3 times a day  - Stand patient 3 times a day  - Ambulate patient 3 times a day  - Out of bed to chair 3 times a day   - Out of bed for meals 3 times a day  - Out of bed for toileting  - Record patient progress and toleration of activity level   Outcome: Progressing     Problem: PAIN - ADULT  Goal: Verbalizes/displays adequate comfort level or baseline comfort level  Description: Interventions:  - Encourage patient to monitor pain and request assistance  - Assess pain using appropriate pain scale  - Administer analgesics based on type and severity of pain and evaluate response  - Implement non-pharmacological measures as appropriate and evaluate response  - Consider cultural and social influences on pain and pain management  - Notify physician/advanced practitioner if interventions unsuccessful or patient reports new pain  Outcome: Progressing     Problem: INFECTION - ADULT  Goal: Absence or prevention of progression during hospitalization  Description: INTERVENTIONS:  - Assess and monitor for signs and symptoms of infection  - Monitor lab/diagnostic results  - Monitor all insertion sites, i.e. indwelling lines, tubes, and drains  - Monitor endotracheal if appropriate and nasal secretions for changes in amount and color  - [place] appropriate cooling/warming therapies per order  - Administer medications as ordered  - Instruct and encourage patient and family to use good hand hygiene technique  - Identify and instruct in appropriate isolation precautions for identified infection/condition  Outcome: Progressing     Problem: DISCHARGE PLANNING  Goal: Discharge to home or other facility with appropriate resources  Description: INTERVENTIONS:  - Identify barriers to discharge w/patient and caregiver  - Arrange for needed discharge resources and transportation as appropriate  - Identify discharge learning needs (meds, wound care, etc.)  - Arrange for interpretive services to assist at discharge as needed  - Refer to Case Management Department for coordinating discharge planning if the patient needs post-hospital services based on physician/advanced practitioner order or complex needs related to functional status, cognitive ability, or social support system  Outcome: Progressing     Problem: Knowledge Deficit  Goal: Patient/family/caregiver demonstrates understanding of disease process, treatment plan, medications, and discharge instructions  Description: Complete learning assessment and assess knowledge base.   Interventions:  - Provide teaching at level of understanding  - Provide teaching via preferred learning methods  Outcome: Progressing

## 2023-10-09 ENCOUNTER — APPOINTMENT (INPATIENT)
Dept: CT IMAGING | Facility: HOSPITAL | Age: 88
DRG: 872 | End: 2023-10-09
Payer: COMMERCIAL

## 2023-10-09 LAB
ACANTHOCYTES BLD QL SMEAR: PRESENT
ANION GAP SERPL CALCULATED.3IONS-SCNC: 6 MMOL/L
ANISOCYTOSIS BLD QL SMEAR: PRESENT
BASOPHILS # BLD MANUAL: 0 THOUSAND/UL (ref 0–0.1)
BASOPHILS NFR MAR MANUAL: 0 % (ref 0–1)
BUN SERPL-MCNC: 16 MG/DL (ref 5–25)
CALCIUM SERPL-MCNC: 7.9 MG/DL (ref 8.4–10.2)
CHLORIDE SERPL-SCNC: 107 MMOL/L (ref 96–108)
CO2 SERPL-SCNC: 25 MMOL/L (ref 21–32)
CREAT SERPL-MCNC: 0.65 MG/DL (ref 0.6–1.3)
EOSINOPHIL # BLD MANUAL: 0.33 THOUSAND/UL (ref 0–0.4)
EOSINOPHIL NFR BLD MANUAL: 2 % (ref 0–6)
ERYTHROCYTE [DISTWIDTH] IN BLOOD BY AUTOMATED COUNT: 14.6 % (ref 11.6–15.1)
GFR SERPL CREATININE-BSD FRML MDRD: 78 ML/MIN/1.73SQ M
GLUCOSE SERPL-MCNC: 102 MG/DL (ref 65–140)
GLUCOSE SERPL-MCNC: 103 MG/DL (ref 65–140)
GLUCOSE SERPL-MCNC: 105 MG/DL (ref 65–140)
GLUCOSE SERPL-MCNC: 145 MG/DL (ref 65–140)
GLUCOSE SERPL-MCNC: 146 MG/DL (ref 65–140)
GLUCOSE SERPL-MCNC: 93 MG/DL (ref 65–140)
GLUCOSE SERPL-MCNC: 93 MG/DL (ref 65–140)
HCT VFR BLD AUTO: 36.2 % (ref 34.8–46.1)
HGB BLD-MCNC: 11.4 G/DL (ref 11.5–15.4)
LG PLATELETS BLD QL SMEAR: PRESENT
LYMPHOCYTES # BLD AUTO: 25 % (ref 14–44)
LYMPHOCYTES # BLD AUTO: 4.08 THOUSAND/UL (ref 0.6–4.47)
MCH RBC QN AUTO: 28 PG (ref 26.8–34.3)
MCHC RBC AUTO-ENTMCNC: 31.5 G/DL (ref 31.4–37.4)
MCV RBC AUTO: 89 FL (ref 82–98)
METAMYELOCYTES NFR BLD MANUAL: 2 % (ref 0–1)
MONOCYTES # BLD AUTO: 1.14 THOUSAND/UL (ref 0–1.22)
MONOCYTES NFR BLD: 7 % (ref 4–12)
MYELOCYTES NFR BLD MANUAL: 4 % (ref 0–1)
NEUTROPHILS # BLD MANUAL: 9.78 THOUSAND/UL (ref 1.85–7.62)
NEUTS BAND NFR BLD MANUAL: 2 % (ref 0–8)
NEUTS SEG NFR BLD AUTO: 58 % (ref 43–75)
PLATELET # BLD AUTO: 318 THOUSANDS/UL (ref 149–390)
PLATELET BLD QL SMEAR: ADEQUATE
PMV BLD AUTO: 9.2 FL (ref 8.9–12.7)
POIKILOCYTOSIS BLD QL SMEAR: PRESENT
POTASSIUM SERPL-SCNC: 3.5 MMOL/L (ref 3.5–5.3)
RBC # BLD AUTO: 4.07 MILLION/UL (ref 3.81–5.12)
RBC MORPH BLD: PRESENT
SODIUM SERPL-SCNC: 138 MMOL/L (ref 135–147)
WBC # BLD AUTO: 16.3 THOUSAND/UL (ref 4.31–10.16)

## 2023-10-09 PROCEDURE — 80048 BASIC METABOLIC PNL TOTAL CA: CPT | Performed by: FAMILY MEDICINE

## 2023-10-09 PROCEDURE — G1004 CDSM NDSC: HCPCS

## 2023-10-09 PROCEDURE — 99232 SBSQ HOSP IP/OBS MODERATE 35: CPT | Performed by: INTERNAL MEDICINE

## 2023-10-09 PROCEDURE — 82948 REAGENT STRIP/BLOOD GLUCOSE: CPT

## 2023-10-09 PROCEDURE — 85027 COMPLETE CBC AUTOMATED: CPT | Performed by: FAMILY MEDICINE

## 2023-10-09 PROCEDURE — 85007 BL SMEAR W/DIFF WBC COUNT: CPT | Performed by: FAMILY MEDICINE

## 2023-10-09 PROCEDURE — 74160 CT ABDOMEN W/CONTRAST: CPT

## 2023-10-09 RX ORDER — INSULIN LISPRO 100 [IU]/ML
1-5 INJECTION, SOLUTION INTRAVENOUS; SUBCUTANEOUS
Status: DISCONTINUED | OUTPATIENT
Start: 2023-10-09 | End: 2023-10-13 | Stop reason: HOSPADM

## 2023-10-09 RX ORDER — INSULIN LISPRO 100 [IU]/ML
1-6 INJECTION, SOLUTION INTRAVENOUS; SUBCUTANEOUS
Status: DISCONTINUED | OUTPATIENT
Start: 2023-10-09 | End: 2023-10-09

## 2023-10-09 RX ORDER — INSULIN LISPRO 100 [IU]/ML
1-6 INJECTION, SOLUTION INTRAVENOUS; SUBCUTANEOUS
Status: DISCONTINUED | OUTPATIENT
Start: 2023-10-09 | End: 2023-10-13 | Stop reason: HOSPADM

## 2023-10-09 RX ADMIN — IOHEXOL 85 ML: 350 INJECTION, SOLUTION INTRAVENOUS at 11:35

## 2023-10-09 RX ADMIN — Medication 125 MG: at 17:31

## 2023-10-09 RX ADMIN — IOHEXOL 50 ML: 240 INJECTION, SOLUTION INTRATHECAL; INTRAVASCULAR; INTRAVENOUS; ORAL at 11:35

## 2023-10-09 RX ADMIN — Medication 125 MG: at 00:36

## 2023-10-09 RX ADMIN — ATORVASTATIN CALCIUM 10 MG: 10 TABLET, FILM COATED ORAL at 09:14

## 2023-10-09 RX ADMIN — CEFEPIME 2000 MG: 2 INJECTION, POWDER, FOR SOLUTION INTRAVENOUS at 13:52

## 2023-10-09 RX ADMIN — QUETIAPINE FUMARATE 25 MG: 25 TABLET ORAL at 21:55

## 2023-10-09 RX ADMIN — METRONIDAZOLE 500 MG: 500 INJECTION, SOLUTION INTRAVENOUS at 04:40

## 2023-10-09 RX ADMIN — CEFEPIME 2000 MG: 2 INJECTION, POWDER, FOR SOLUTION INTRAVENOUS at 21:55

## 2023-10-09 RX ADMIN — Medication 9 MG: at 21:54

## 2023-10-09 RX ADMIN — METRONIDAZOLE 500 MG: 500 INJECTION, SOLUTION INTRAVENOUS at 19:53

## 2023-10-09 RX ADMIN — CEFEPIME 2000 MG: 2 INJECTION, POWDER, FOR SOLUTION INTRAVENOUS at 06:00

## 2023-10-09 RX ADMIN — Medication 125 MG: at 13:52

## 2023-10-09 RX ADMIN — AMLODIPINE BESYLATE 5 MG: 5 TABLET ORAL at 09:14

## 2023-10-09 RX ADMIN — METRONIDAZOLE 500 MG: 500 INJECTION, SOLUTION INTRAVENOUS at 13:10

## 2023-10-09 RX ADMIN — METOPROLOL SUCCINATE 100 MG: 100 TABLET, EXTENDED RELEASE ORAL at 09:14

## 2023-10-09 RX ADMIN — Medication 125 MG: at 06:00

## 2023-10-09 RX ADMIN — ENOXAPARIN SODIUM 40 MG: 40 INJECTION SUBCUTANEOUS at 09:14
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
## 2023-10-09 NOTE — PROGRESS NOTES
[address] [facility]  Progress Note  Name: Regan Verma  MRN: [id number]  Unit/Bed#: W -01 I Date of Admission: 10/5/2023   Date of Service: 10/9/2023 I Hospital Day: 3    Assessment/Plan   C. difficile diarrhea  Assessment & Plan  Patient has been having uncontrollable diarrhea since admission. Patient was recently treated with Augmentin a week ago. - C Diff +  - ongoing loose watery stools 1x this morning     Plan:  · Continuing IV and PO Antibiotics  · Cefepime 2g q8 day #4  · Flagyl 500mg q8 day #4  · Vancomycin 125 mg q6 day #4/10 (switched to solution form of Vancomycin on 10/07 due to patient having difficulty swallowing pills  · Speech consulted recommending Clear liquid diet, Thin liquids; advance to include regular solids as per GI    * Colorectal cancer suspected  Assessment & Plan  Pt p/w abd pain, emesis, decreased PO intake. Pt is poor historian due to dementia. 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 Moderate obstruction of the colon proximal to the lesion. Colorectal consulted from ED and discussed w family at bedside. Per surgery note, family does not desire aggressive treatment such as surgery or chemotherapy, but would like to know extent of disease and have palliative care on board. 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   - Reached out to surgery to discuss with family any possible surgical options    SIRS (systemic inflammatory response syndrome) (HCC)  Assessment & Plan  Meets SIRS criteria on admission for leukocytosis of 46 thousand and tachycardia ( at presentation, now 85)    Afebrile. BPs mildly to moderately hypertensive (131-143)/(60-67). Pt appears to have just completed 1-week course Augmentin 9/27-10/4/23, per chart review    Differential includes inflammatory 2/2 malignancy vs infectious (possible intra-abdominal abscess seen on CT vs gut translocation 2/2 moderate colonic obstruction). BCx x2 collected in ED, pt received cefepime 1000 mg, metronidazole 500 mg, vancomycin 1500 mg  - Bcx negative at 48 hrs     Plan:  Monitor off additional abx at this time  Monitor fever curve and BP  Trend WBCs  F/u BCx  If pt develops fever or hypotension, should consider consulting interventional radiology for abscess drainage. Lung nodule seen on imaging study  Assessment & Plan  CT A/P w con shows two pulmonary nodules within the left lower lobe of the lung, measuring 3 mm and 4 mm, respectively. Metastatic disease not excluded. Short-term follow-up recommended. Type 2 diabetes mellitus without complication, without long-term current use of insulin [facility])  Assessment & Plan  Lab Results   Component Value Date    HGBA1C 6.6 (H) 10/05/2023       Recent Labs     10/07/23  1756 10/07/23  1820 10/08/23  0006 10/08/23  0602   POCGLU 96 113 85 103       Blood Sugar Average: Last 72 hrs:  (P) 120.5     Not on anti-diabetic medications, per EMR    Plan  - Fingerstick QID w/ meals and at bedtime  - SSI       Thickened endometrium  Assessment & Plan  CT A/P w con shows thickened endometrium measuring approximately 14 mm thick. This is abnormally prominent for a postmenopausal patient. Nonemergent pelvic ultrasound recommended for further evaluation.     Benign essential hypertension  Assessment & Plan  Unable to review medications with patient (dementia) or family (unable to reach). Per chart, pt home medications include amlodipine and metoprolol. Unclear indication for metoprolol. SIRS as noted above, unclear whether due to infectious or inflammatory etiology, monitoring closely for development of hypotension. Plan  - Continue amlodipine with hold parameters for SBP<130  - Home metoprolol continued  - Review home medications with family when able to contact    High anion gap metabolic acidosis-resolved as of 10/8/2023  Assessment & Plan  POA: HCO3 19, AG 19    Unclear etiology on the basis of existing labs, and pt is poor historian. Consider possible starvation ketosis in the setting of decreased PO intake vs acetaminophen or salicylates in the setting of abdominal pain. Plan:  VBG  Beta-hydroxybutyrate  Acetaminophen level  Salicylate level  F/u HCO3 on repeat CMP in AM         VTE Pharmacologic Prophylaxis: VTE Score: 11 High Risk (Score >/= 5) - Pharmacological DVT Prophylaxis Ordered: enoxaparin (Lovenox). Sequential Compression Devices Ordered. Patient Centered Rounds: I performed bedside rounds with nursing staff today. Discussions with Specialists or Other Care Team Provider: Colorectal surgery    Education and Discussions with Family / Patient: Updated  (daughter and son in law) at bedside. Current Length of Stay: 3 day(s)  Current Patient Status: Inpatient   Discharge Plan: Anticipate discharge in 48 hrs to home. Code Status: Level 3 - DNAR and DNI    Subjective:   Patient seen at bedside this morning. Patient was reporting no pain, decreased bowel movement frequency, and no other discomforts at this time. Current condition was discussed with family at bedside. It was discussed with family that the current CTA is still showing signs of a mass in the transverse colon with possible tumor spread vs. infected sinus.   It was discussed with patient's family that they should discuss amongst themselves over whether to pursue conservative management and comfort care or more aggressive care to address the tumor. Objective:     Vitals:   Temp (24hrs), Av.4 °F (36.3 °C), Min:97.2 °F (36.2 °C), Max:97.6 °F (36.4 °C)    Temp:  [97.2 °F (36.2 °C)-97.6 °F (36.4 °C)] 97.2 °F (36.2 °C)  HR:  [80-85] 80  Resp:  [13-16] 13  BP: (122-129)/(73-76) 129/76  SpO2:  [96 %-97 %] 96 %  Body mass index is 34.56 kg/m². Input and Output Summary (last 24 hours): Intake/Output Summary (Last 24 hours) at 10/9/2023 1535  Last data filed at 10/9/2023 1300  Gross per 24 hour   Intake 180 ml   Output --   Net 180 ml       Physical Exam:   Physical Exam  Constitutional:       Appearance: Normal appearance. Cardiovascular:      Rate and Rhythm: Normal rate and regular rhythm. Heart sounds: Normal heart sounds. Pulmonary:      Effort: Pulmonary effort is normal.      Breath sounds: Normal breath sounds. Abdominal:      General: Bowel sounds are normal. There is no distension. Palpations: Abdomen is soft. Tenderness: There is no abdominal tenderness. There is no guarding. Neurological:      Mental Status: She is disoriented. Additional Data:     Labs:  Results from last 7 days   Lab Units 10/09/23  0446   WBC Thousand/uL 16.30*   HEMOGLOBIN g/dL 11.4*   HEMATOCRIT % 36.2   PLATELETS Thousands/uL 318   BANDS PCT % 2   LYMPHO PCT % 25   MONO PCT % 7   EOS PCT % 2     Results from last 7 days   Lab Units 10/09/23  0446 10/07/23  0617 10/06/23  0519   SODIUM mmol/L 138   < > 135   POTASSIUM mmol/L 3.5   < > 3.2*   CHLORIDE mmol/L 107   < > 101   CO2 mmol/L 25   < > 20*   BUN mg/dL 16   < > 21   CREATININE mg/dL 0.65   < > 0.75   ANION GAP mmol/L 6   < > 14   CALCIUM mg/dL 7.9*   < > 8.2*   ALBUMIN g/dL  --   --  3.2*   TOTAL BILIRUBIN mg/dL  --   --  1.01*   ALK PHOS U/L  --   --  82   ALT U/L  --   --  9   AST U/L  --   --  16   GLUCOSE RANDOM mg/dL 93   < > 161*    < > = values in this interval not displayed.      Results from last 7 days Lab Units 10/05/23  2225   INR  1.31*     Results from last 7 days   Lab Units 10/09/23  1343 10/09/23  1058 10/09/23  0756 10/09/23  0558 10/09/23  0003 10/08/23  1808 10/08/23  1153 10/08/23  0602 10/08/23  0006 10/07/23  1820 10/07/23  1756 10/07/23  1153   POC GLUCOSE mg/dl 146* 145* 105 93 103 148* 121 103 85 113 96 116     Results from last 7 days   Lab Units 10/05/23  2033   HEMOGLOBIN A1C % 6.6*     Results from last 7 days   Lab Units 10/07/23  0617 10/06/23  0519 10/05/23  2225   LACTIC ACID mmol/L  --   --  1.6   PROCALCITONIN ng/ml 1.19* 0.66* 0.36*       Lines/Drains:  Invasive Devices     Peripheral Intravenous Line  Duration           Peripheral IV 10/08/23 Left Forearm 1 day                      Imaging: Reviewed radiology reports from this admission including: abdominal/pelvic CT    Recent Cultures (last 7 days):   Results from last 7 days   Lab Units 10/06/23  0742 10/05/23  2239 10/05/23  2225   BLOOD CULTURE   --  No Growth at 72 hrs. No Growth at 72 hrs.    C DIFF TOXIN B BY PCR  Positive*  --   --        Last 24 Hours Medication List:   Current Facility-Administered Medications   Medication Dose Route Frequency Provider Last Rate   • acetaminophen  650 mg Oral Q6H PRN Aron Baeza MD     • amLODIPine  5 mg Oral Daily Aron Baeza MD     • atorvastatin  10 mg Oral Daily Aron Baeza MD     • cefepime  2,000 mg Intravenous [facility], DO 2,000 mg (10/09/23 1352)   • enoxaparin  40 mg Subcutaneous Daily Aron Baeza MD     • insulin lispro  1-5 Units Subcutaneous HS [facility], DO     • insulin lispro  1-6 Units Subcutaneous TID With [address], DO     • melatonin  9 mg Oral HS Aron Baeza MD     • metoprolol succinate  100 mg Oral Daily [facility], DO     • metroNIDAZOLE  500 mg Intravenous [facility],  mg (10/09/23 1310)   • QUEtiapine  25 mg Oral HS Aron Baeza MD     • trimethobenzamide  200 mg Intramuscular Q6H PRN Aron Baeza MD • vancomycin  125 mg Oral Q6H Parul Kaur MD          Today, Patient Was Seen By: Curtis Pearl DO    **Please Note: This note may have been constructed using a voice recognition system. **

## 2023-10-09 NOTE — PLAN OF CARE
Problem: Potential for Falls  Goal: Patient will remain free of falls  Description: INTERVENTIONS:  - Educate patient/family on patient safety including physical limitations  - Instruct patient to call for assistance with activity   - Consult OT/PT to assist with strengthening/mobility   - Keep Call bell within reach  - Keep bed low and locked with side rails adjusted as appropriate  - Keep care items and personal belongings within reach  - Initiate and maintain comfort rounds  - Make Fall Risk Sign visible to staff  - Offer Toileting every 2 Hours, in advance of need  - Initiate/Maintain Bed/ Chair alarm  - Obtain necessary fall risk management equipment: Frequent checks/ reorientation  - Apply yellow socks and bracelet for high fall risk patients  - Consider moving patient to room near nurses station  Outcome: Progressing     Problem: Prexisting or High Potential for Compromised Skin Integrity  Goal: Skin integrity is maintained or improved  Description: INTERVENTIONS:  - Identify patients at risk for skin breakdown  - Assess and monitor skin integrity  - Assess and monitor nutrition and hydration status  - Monitor labs   - Assess for incontinence   - Turn and reposition patient  - Assist with mobility/ambulation  - Relieve pressure over bony prominences  - Avoid friction and shearing  - Provide appropriate hygiene as needed including keeping skin clean and dry  - Evaluate need for skin moisturizer/barrier cream  - Collaborate with interdisciplinary team   - Patient/family teaching  - Consider wound care consult   Outcome: Progressing     Problem: MOBILITY - ADULT  Goal: Maintain or return to baseline ADL function  Description: INTERVENTIONS:  -  Assess patient's ability to carry out ADLs; assess patient's baseline for ADL function and identify physical deficits which impact ability to perform ADLs (bathing, care of mouth/teeth, toileting, grooming, dressing, etc.)  - Assess/evaluate cause of self-care deficits - Assess range of motion  - Assess patient's mobility; develop plan if impaired  - Assess patient's need for assistive devices and provide as appropriate  - Encourage maximum independence but intervene and supervise when necessary  - Involve family in performance of ADLs  - Assess for home care needs following discharge   - Consider OT consult to assist with ADL evaluation and planning for discharge  - Provide patient education as appropriate  Outcome: Progressing  Goal: Maintains/Returns to pre admission functional level  Description: INTERVENTIONS:  - Perform BMAT or MOVE assessment daily.   - Set and communicate daily mobility goal to care team and patient/family/caregiver. - Collaborate with rehabilitation services on mobility goals if consulted  - Perform Range of Motion 4 times a day. - Reposition patient every 2 hours.   - Dangle patient 3 times a day  - Stand patient 3 times a day  - Ambulate patient 3 times a day  - Out of bed to chair 3 times a day   - Out of bed for meals 3 times a day  - Out of bed for toileting  - Record patient progress and toleration of activity level   Outcome: Progressing     Problem: PAIN - ADULT  Goal: Verbalizes/displays adequate comfort level or baseline comfort level  Description: Interventions:  - Encourage patient to monitor pain and request assistance  - Assess pain using appropriate pain scale  - Administer analgesics based on type and severity of pain and evaluate response  - Implement non-pharmacological measures as appropriate and evaluate response  - Consider cultural and social influences on pain and pain management  - Notify physician/advanced practitioner if interventions unsuccessful or patient reports new pain  Outcome: Progressing     Problem: INFECTION - ADULT  Goal: Absence or prevention of progression during hospitalization  Description: INTERVENTIONS:  - Assess and monitor for signs and symptoms of infection  - Monitor lab/diagnostic results  - Monitor all insertion sites, i.e. indwelling lines, tubes, and drains  - Monitor endotracheal if appropriate and nasal secretions for changes in amount and color  - [place] appropriate cooling/warming therapies per order  - Administer medications as ordered  - Instruct and encourage patient and family to use good hand hygiene technique  - Identify and instruct in appropriate isolation precautions for identified infection/condition  Outcome: Progressing     Problem: DISCHARGE PLANNING  Goal: Discharge to home or other facility with appropriate resources  Description: INTERVENTIONS:  - Identify barriers to discharge w/patient and caregiver  - Arrange for needed discharge resources and transportation as appropriate  - Identify discharge learning needs (meds, wound care, etc.)  - Arrange for interpretive services to assist at discharge as needed  - Refer to Case Management Department for coordinating discharge planning if the patient needs post-hospital services based on physician/advanced practitioner order or complex needs related to functional status, cognitive ability, or social support system  Outcome: Progressing     Problem: Knowledge Deficit  Goal: Patient/family/caregiver demonstrates understanding of disease process, treatment plan, medications, and discharge instructions  Description: Complete learning assessment and assess knowledge base.   Interventions:  - Provide teaching at level of understanding  - Provide teaching via preferred learning methods  Outcome: Progressing

## 2023-10-10 ENCOUNTER — APPOINTMENT (INPATIENT)
Dept: CT IMAGING | Facility: HOSPITAL | Age: 88
DRG: 872 | End: 2023-10-10
Payer: COMMERCIAL

## 2023-10-10 ENCOUNTER — TELEPHONE (OUTPATIENT)
Dept: CT IMAGING | Facility: HOSPITAL | Age: 88
End: 2023-10-10

## 2023-10-10 LAB
ANION GAP SERPL CALCULATED.3IONS-SCNC: 7 MMOL/L
BASOPHILS # BLD MANUAL: 0 THOUSAND/UL (ref 0–0.1)
BASOPHILS NFR MAR MANUAL: 0 % (ref 0–1)
BUN SERPL-MCNC: 10 MG/DL (ref 5–25)
CALCIUM SERPL-MCNC: 7.9 MG/DL (ref 8.4–10.2)
CHLORIDE SERPL-SCNC: 104 MMOL/L (ref 96–108)
CO2 SERPL-SCNC: 26 MMOL/L (ref 21–32)
CREAT SERPL-MCNC: 0.55 MG/DL (ref 0.6–1.3)
EOSINOPHIL # BLD MANUAL: 0.15 THOUSAND/UL (ref 0–0.4)
EOSINOPHIL NFR BLD MANUAL: 1 % (ref 0–6)
ERYTHROCYTE [DISTWIDTH] IN BLOOD BY AUTOMATED COUNT: 14.6 % (ref 11.6–15.1)
GFR SERPL CREATININE-BSD FRML MDRD: 83 ML/MIN/1.73SQ M
GLUCOSE SERPL-MCNC: 117 MG/DL (ref 65–140)
GLUCOSE SERPL-MCNC: 135 MG/DL (ref 65–140)
GLUCOSE SERPL-MCNC: 144 MG/DL (ref 65–140)
GLUCOSE SERPL-MCNC: 94 MG/DL (ref 65–140)
GLUCOSE SERPL-MCNC: 97 MG/DL (ref 65–140)
HCT VFR BLD AUTO: 38.2 % (ref 34.8–46.1)
HGB BLD-MCNC: 12.4 G/DL (ref 11.5–15.4)
LG PLATELETS BLD QL SMEAR: PRESENT
LYMPHOCYTES # BLD AUTO: 2.9 THOUSAND/UL (ref 0.6–4.47)
LYMPHOCYTES # BLD AUTO: 20 % (ref 14–44)
MCH RBC QN AUTO: 28.8 PG (ref 26.8–34.3)
MCHC RBC AUTO-ENTMCNC: 32.5 G/DL (ref 31.4–37.4)
MCV RBC AUTO: 89 FL (ref 82–98)
METAMYELOCYTES NFR BLD MANUAL: 7 % (ref 0–1)
MONOCYTES # BLD AUTO: 1.31 THOUSAND/UL (ref 0–1.22)
MONOCYTES NFR BLD: 9 % (ref 4–12)
MYELOCYTES NFR BLD MANUAL: 5 % (ref 0–1)
NEUTROPHILS # BLD MANUAL: 8.42 THOUSAND/UL (ref 1.85–7.62)
NEUTS BAND NFR BLD MANUAL: 8 % (ref 0–8)
NEUTS SEG NFR BLD AUTO: 50 % (ref 43–75)
PLATELET # BLD AUTO: 348 THOUSANDS/UL (ref 149–390)
PLATELET BLD QL SMEAR: ADEQUATE
PMV BLD AUTO: 9.4 FL (ref 8.9–12.7)
POIKILOCYTOSIS BLD QL SMEAR: PRESENT
POTASSIUM SERPL-SCNC: 3.1 MMOL/L (ref 3.5–5.3)
RBC # BLD AUTO: 4.31 MILLION/UL (ref 3.81–5.12)
RBC MORPH BLD: PRESENT
SODIUM SERPL-SCNC: 137 MMOL/L (ref 135–147)
WBC # BLD AUTO: 14.51 THOUSAND/UL (ref 4.31–10.16)

## 2023-10-10 PROCEDURE — 82948 REAGENT STRIP/BLOOD GLUCOSE: CPT

## 2023-10-10 PROCEDURE — 97535 SELF CARE MNGMENT TRAINING: CPT

## 2023-10-10 PROCEDURE — 85027 COMPLETE CBC AUTOMATED: CPT

## 2023-10-10 PROCEDURE — 80048 BASIC METABOLIC PNL TOTAL CA: CPT

## 2023-10-10 PROCEDURE — 71260 CT THORAX DX C+: CPT

## 2023-10-10 PROCEDURE — 99223 1ST HOSP IP/OBS HIGH 75: CPT | Performed by: INTERNAL MEDICINE

## 2023-10-10 PROCEDURE — 99223 1ST HOSP IP/OBS HIGH 75: CPT | Performed by: NURSE PRACTITIONER

## 2023-10-10 PROCEDURE — 85007 BL SMEAR W/DIFF WBC COUNT: CPT

## 2023-10-10 PROCEDURE — 99232 SBSQ HOSP IP/OBS MODERATE 35: CPT | Performed by: INTERNAL MEDICINE

## 2023-10-10 PROCEDURE — G1004 CDSM NDSC: HCPCS

## 2023-10-10 RX ORDER — POTASSIUM CHLORIDE 20MEQ/15ML
40 LIQUID (ML) ORAL ONCE
Status: COMPLETED | OUTPATIENT
Start: 2023-10-10 | End: 2023-10-10

## 2023-10-10 RX ADMIN — Medication 125 MG: at 23:46

## 2023-10-10 RX ADMIN — METRONIDAZOLE 500 MG: 500 INJECTION, SOLUTION INTRAVENOUS at 03:59

## 2023-10-10 RX ADMIN — CEFEPIME 2000 MG: 2 INJECTION, POWDER, FOR SOLUTION INTRAVENOUS at 04:20

## 2023-10-10 RX ADMIN — ENOXAPARIN SODIUM 40 MG: 40 INJECTION SUBCUTANEOUS at 09:05

## 2023-10-10 RX ADMIN — Medication 125 MG: at 12:34

## 2023-10-10 RX ADMIN — CEFEPIME 2000 MG: 2 INJECTION, POWDER, FOR SOLUTION INTRAVENOUS at 13:32

## 2023-10-10 RX ADMIN — Medication 125 MG: at 05:42

## 2023-10-10 RX ADMIN — Medication 125 MG: at 00:19

## 2023-10-10 RX ADMIN — Medication 9 MG: at 21:43

## 2023-10-10 RX ADMIN — IOHEXOL 85 ML: 350 INJECTION, SOLUTION INTRAVENOUS at 14:56

## 2023-10-10 RX ADMIN — Medication 125 MG: at 17:32

## 2023-10-10 RX ADMIN — METRONIDAZOLE 500 MG: 500 INJECTION, SOLUTION INTRAVENOUS at 12:34

## 2023-10-10 RX ADMIN — POTASSIUM CHLORIDE 40 MEQ: 1.5 SOLUTION ORAL at 09:05

## 2023-10-10 RX ADMIN — METOPROLOL SUCCINATE 100 MG: 100 TABLET, EXTENDED RELEASE ORAL at 09:05

## 2023-10-10 RX ADMIN — QUETIAPINE FUMARATE 25 MG: 25 TABLET ORAL at 21:43

## 2023-10-10 RX ADMIN — AMLODIPINE BESYLATE 5 MG: 5 TABLET ORAL at 09:05

## 2023-10-10 RX ADMIN — ATORVASTATIN CALCIUM 10 MG: 10 TABLET, FILM COATED ORAL at 09:05

## 2023-10-10 NOTE — ASSESSMENT & PLAN NOTE
Patient has been having uncontrollable diarrhea since admission. Patient was recently treated with Augmentin a week ago.    - C Diff +  - ongoing loose watery stools 1x this morning     Plan:  · Continuing IV and PO Antibiotics  · Cefepime 2g q8 day #6  · Flagyl 500mg q8 day #6  · Vancomycin 125 mg q6 day #6/10 (switched to solution form of Vancomycin on 10/07 due to patient having difficulty swallowing pills  · Speech consulted recommending Clear liquid diet, Thin liquids; advance to include regular solids as per GI

## 2023-10-10 NOTE — PLAN OF CARE
Problem: OCCUPATIONAL THERAPY ADULT  Goal: Performs self-care activities at highest level of function for planned discharge setting. See evaluation for individualized goals. Description: Treatment Interventions: ADL retraining, Functional transfer training, Endurance training, Patient/family training, Neuromuscular reeducation, Compensatory technique education, Continued evaluation, Energy conservation, Activityengagement          See flowsheet documentation for full assessment, interventions and recommendations. Outcome: Progressing  Note: Limitation: Decreased ADL status, Decreased Safe judgement during ADL, Decreased endurance, Decreased fine motor control, Decreased self-care trans, Decreased cognition  Prognosis: Fair  Assessment: Patient seen for OT treatment this PM. Patient is pleasantly confused; agreeable to OT treatment. Patient limited by fatigue and generalized weakness; Required rest breaks between tasks and demonstrates limited activity tolerance; Currently required max-total assist for all ADLs; Mod assist with RW for functional transfers; The patient's raw score on the AM-PAC Daily Activity Inpatient Short Form is 15. A raw score of less than 19 suggests the patient may benefit from discharge to post-acute rehabilitation services. Please refer to the recommendation of the Occupational Therapist for safe discharge planning. Patient will continue to benefit from skilled inpatient OT services in order to maximize functional independence and prepare for safe discharge. Continue OT per POC.      OT Discharge Recommendation: Post acute rehabilitation services

## 2023-10-10 NOTE — OCCUPATIONAL THERAPY NOTE
Occupational Therapy Treatment Note     10/10/23 1350   OT Last Visit   OT Visit Date 10/10/23   Note Type   Note Type Treatment   Pain Assessment   Pain Assessment Tool 0-10   Pain Score No Pain   Restrictions/Precautions   Other Precautions Cognitive; Chair Alarm; Bed Alarm; Fall Risk;Contact/isolation   ADL   Eating Assistance 5  Supervision/Setup   Eating Deficit   (Finishing lunch upon OT entry into room)   Grooming Assistance 5  Supervision/Setup   Grooming Deficit Wash/dry hands; Wash/dry face; Teeth care;Brushing hair   Grooming Comments Patient completed all grooming tasks with set up assist while seated in recliner chair; Increased time to complete, patient required cues for redirection to task as patient is easily distracted   [address]  1  Total Assistance   Toileting Comments Patient incontinent of small amount of bowel upon standing; total assist for hygiene   Bed Mobility   Supine to Sit 3  Moderate assistance   Additional items Assist x 1; Increased time required;Verbal cues   Additional Comments Static sitting EOB approx 5 mins with supervision   Transfers   Sit to Stand 3  Moderate assistance   Additional items Assist x 1;Verbal cues   Stand to Sit 3  Moderate assistance   Additional items Assist x 1;Verbal cues   Additional Comments STS from EOB x 3 trials with support of RW; mod verbal cues for safe hand placement   Functional Mobility   Functional Mobility 3  Moderate assistance   Additional Comments Few steps bed to recliner chair with RW; mod verbal cues for safe transfer technique   Cognition   Overall Cognitive Status Impaired   Arousal/Participation Alert; Cooperative  [facility])   Attention Attends with cues to redirect   Orientation Level Oriented to person;Disoriented to place; Disoriented to time;Disoriented to situation   Memory Decreased recall of recent events   Following Commands Follows one step commands with increased time or repetition   Comments Patient is pleasantly confused; oriented to self only; very easily distracted and requires frequent verbal cues for sustained attention to task; Patient with poor insight into current functional and cognitive limitations   Activity Tolerance   Activity Tolerance Patient limited by fatigue; Other (Comment)  (Limited by generalized weakness, limited by cognition)   Assessment   Assessment Patient seen for OT treatment this PM. Patient is pleasantly confused; agreeable to OT treatment. Patient limited by fatigue and generalized weakness; Required rest breaks between tasks and demonstrates limited activity tolerance; Currently required max-total assist for all ADLs; Mod assist with RW for functional transfers; The patient's raw score on the AM-PAC Daily Activity Inpatient Short Form is 15. A raw score of less than 19 suggests the patient may benefit from discharge to post-acute rehabilitation services. Please refer to the recommendation of the Occupational Therapist for safe discharge planning. Patient will continue to benefit from skilled inpatient OT services in order to maximize functional independence and prepare for safe discharge. Continue OT per POC.    Plan   OT Frequency 3-5x/wk   Discharge Recommendation   OT Discharge Recommendation Post acute rehabilitation services   AM-PAC Daily Activity Inpatient   Lower Body Dressing 2   Bathing 2   Toileting 1   Upper Body Dressing 3   Grooming 3   Eating 4   Daily Activity Raw Score 15   Daily Activity Standardized Score (Calc for Raw Score >=11) 34.69   AM-PAC Applied Cognition Inpatient   Following a Speech/Presentation 1   Understanding Ordinary Conversation 3   Taking Medications 1   Remembering Where Things Are Placed or Put Away 2   Remembering List of 4-5 Errands 1   Taking Care of Complicated Tasks 1   Applied Cognition Raw Score 9   Applied Cognition Standardized Score 22.48       Anna Cintron OTR/L
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
## 2023-10-10 NOTE — PLAN OF CARE
Problem: Potential for Falls  Goal: Patient will remain free of falls  Description: INTERVENTIONS:  - Educate patient/family on patient safety including physical limitations  - Instruct patient to call for assistance with activity   - Consult OT/PT to assist with strengthening/mobility   - Keep Call bell within reach  - Keep bed low and locked with side rails adjusted as appropriate  - Keep care items and personal belongings within reach  - Initiate and maintain comfort rounds  - Make Fall Risk Sign visible to staff  - Offer Toileting every 2 Hours, in advance of need  - Initiate/Maintain Bed/ Chair alarm  - Obtain necessary fall risk management equipment: Frequent checks/ reorientation  - Apply yellow socks and bracelet for high fall risk patients  - Consider moving patient to room near nurses station  Outcome: Progressing     Problem: Prexisting or High Potential for Compromised Skin Integrity  Goal: Skin integrity is maintained or improved  Description: INTERVENTIONS:  - Identify patients at risk for skin breakdown  - Assess and monitor skin integrity  - Assess and monitor nutrition and hydration status  - Monitor labs   - Assess for incontinence   - Turn and reposition patient  - Assist with mobility/ambulation  - Relieve pressure over bony prominences  - Avoid friction and shearing  - Provide appropriate hygiene as needed including keeping skin clean and dry  - Evaluate need for skin moisturizer/barrier cream  - Collaborate with interdisciplinary team   - Patient/family teaching  - Consider wound care consult   Outcome: Progressing     Problem: MOBILITY - ADULT  Goal: Maintain or return to baseline ADL function  Description: INTERVENTIONS:  -  Assess patient's ability to carry out ADLs; assess patient's baseline for ADL function and identify physical deficits which impact ability to perform ADLs (bathing, care of mouth/teeth, toileting, grooming, dressing, etc.)  - Assess/evaluate cause of self-care deficits - Assess range of motion  - Assess patient's mobility; develop plan if impaired  - Assess patient's need for assistive devices and provide as appropriate  - Encourage maximum independence but intervene and supervise when necessary  - Involve family in performance of ADLs  - Assess for home care needs following discharge   - Consider OT consult to assist with ADL evaluation and planning for discharge  - Provide patient education as appropriate  Outcome: Progressing  Goal: Maintains/Returns to pre admission functional level  Description: INTERVENTIONS:  - Perform BMAT or MOVE assessment daily.   - Set and communicate daily mobility goal to care team and patient/family/caregiver. - Collaborate with rehabilitation services on mobility goals if consulted  - Perform Range of Motion 4 times a day. - Reposition patient every 2 hours.   - Dangle patient 3 times a day  - Stand patient 3 times a day  - Ambulate patient 3 times a day  - Out of bed to chair 3 times a day   - Out of bed for meals 3 times a day  - Out of bed for toileting  - Record patient progress and toleration of activity level   Outcome: Progressing     Problem: PAIN - ADULT  Goal: Verbalizes/displays adequate comfort level or baseline comfort level  Description: Interventions:  - Encourage patient to monitor pain and request assistance  - Assess pain using appropriate pain scale  - Administer analgesics based on type and severity of pain and evaluate response  - Implement non-pharmacological measures as appropriate and evaluate response  - Consider cultural and social influences on pain and pain management  - Notify physician/advanced practitioner if interventions unsuccessful or patient reports new pain  Outcome: Progressing     Problem: INFECTION - ADULT  Goal: Absence or prevention of progression during hospitalization  Description: INTERVENTIONS:  - Assess and monitor for signs and symptoms of infection  - Monitor lab/diagnostic results  - Monitor all insertion sites, i.e. indwelling lines, tubes, and drains  - Monitor endotracheal if appropriate and nasal secretions for changes in amount and color  - [place] appropriate cooling/warming therapies per order  - Administer medications as ordered  - Instruct and encourage patient and family to use good hand hygiene technique  - Identify and instruct in appropriate isolation precautions for identified infection/condition  Outcome: Progressing     Problem: DISCHARGE PLANNING  Goal: Discharge to home or other facility with appropriate resources  Description: INTERVENTIONS:  - Identify barriers to discharge w/patient and caregiver  - Arrange for needed discharge resources and transportation as appropriate  - Identify discharge learning needs (meds, wound care, etc.)  - Arrange for interpretive services to assist at discharge as needed  - Refer to Case Management Department for coordinating discharge planning if the patient needs post-hospital services based on physician/advanced practitioner order or complex needs related to functional status, cognitive ability, or social support system  Outcome: Progressing     Problem: Knowledge Deficit  Goal: Patient/family/caregiver demonstrates understanding of disease process, treatment plan, medications, and discharge instructions  Description: Complete learning assessment and assess knowledge base. Interventions:  - Provide teaching at level of understanding  - Provide teaching via preferred learning methods  Outcome: Progressing     Problem: Nutrition/Hydration-ADULT  Goal: Nutrient/Hydration intake appropriate for improving, restoring or maintaining nutritional needs  Description: Monitor and assess patient's nutrition/hydration status for malnutrition. Collaborate with interdisciplinary team and initiate plan and interventions as ordered. Monitor patient's weight and dietary intake as ordered or per policy. Utilize nutrition screening tool and intervene as necessary. Determine patient's food preferences and provide high-protein, high-caloric foods as appropriate.      INTERVENTIONS:  - Monitor oral intake, urinary output, labs, and treatment plans  - Assess nutrition and hydration status and recommend course of action  - Evaluate amount of meals eaten  - Assist patient with eating if necessary   - Allow adequate time for meals  - Recommend/ encourage appropriate diets, oral nutritional supplements, and vitamin/mineral supplements  - Order, calculate, and assess calorie counts as needed  - Recommend, monitor, and adjust tube feedings and TPN/PPN based on assessed needs  - Assess need for intravenous fluids  - Provide specific nutrition/hydration education as appropriate  - Include patient/family/caregiver in decisions related to nutrition  Outcome: Progressing

## 2023-10-10 NOTE — PROGRESS NOTES
[address] [facility]  Progress Note  Name: Tanisha Meyer  MRN: [id number]  Unit/Bed#: W -01 I Date of Admission: 10/5/2023   Date of Service: 10/10/2023 I Hospital Day: 4    Assessment/Plan   C. difficile diarrhea  Assessment & Plan  Patient has been having uncontrollable diarrhea since admission. Patient was recently treated with Augmentin a week ago. - C Diff +  - ongoing loose watery stools 1x this morning     Plan:  · Continuing IV and PO Antibiotics  · Cefepime 2g q8 day #6  · Flagyl 500mg q8 day #6  · Vancomycin 125 mg q6 day #6/10 (switched to solution form of Vancomycin on 10/07 due to patient having difficulty swallowing pills  · Speech consulted recommending Clear liquid diet, Thin liquids; advance to include regular solids as per GI    * Colorectal cancer suspected  Assessment & Plan  Pt p/w abd pain, emesis, decreased PO intake. Pt is poor historian due to dementia. 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 Moderate obstruction of the colon proximal to the lesion. Colorectal consulted from ED and discussed w family at bedside. Per surgery note, family does not desire aggressive treatment such as surgery or chemotherapy, but would like to know extent of disease and have palliative care on board. 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 - Surgery not recommending any surgical interventions at this time. -GI not recommending colonoscopy in hospital due to C. difficile infection. We will follow-up with patient in 4 weeks to assess need for colonoscopy.   - Consult to palliative care for recommendations on possible comfort care  - Obtaining CT chest to assess pulmonary nodule seen on CT A/P    SIRS (systemic inflammatory response syndrome) (HCC)  Assessment & Plan  Meets SIRS criteria on admission for leukocytosis of 46 thousand and tachycardia ( at presentation, now 85)    Afebrile. BPs mildly to moderately hypertensive (131-143)/(60-67). Pt appears to have just completed 1-week course Augmentin 9/27-10/4/23, per chart review    Differential includes inflammatory 2/2 malignancy vs infectious (possible intra-abdominal abscess seen on CT vs gut translocation 2/2 moderate colonic obstruction). BCx x2 collected in ED, pt received cefepime 1000 mg, metronidazole 500 mg, vancomycin 1500 mg  - Bcx negative at 48 hrs     Plan:  Monitor off additional abx at this time  Monitor fever curve and BP  Trend WBCs  F/u BCx  If pt develops fever or hypotension, should consider consulting interventional radiology for abscess drainage. Lung nodule seen on imaging study  Assessment & Plan  CT A/P w con shows two pulmonary nodules within the left lower lobe of the lung, measuring 3 mm and 4 mm, respectively. Metastatic disease not excluded. Short-term follow-up recommended. Type 2 diabetes mellitus without complication, without long-term current use of insulin [facility])  Assessment & Plan  Lab Results   Component Value Date    HGBA1C 6.6 (H) 10/05/2023       Recent Labs     10/09/23  1058 10/09/23  1343 10/09/23  2037 10/10/23  0807   POCGLU 145* 146* 102 97       Blood Sugar Average: Last 72 hrs:  (P) 113.0625     Not on anti-diabetic medications, per EMR    Plan  - Fingerstick QID w/ meals and at bedtime  - SSI       Thickened endometrium  Assessment & Plan  CT A/P w con shows thickened endometrium measuring approximately 14 mm thick. This is abnormally prominent for a postmenopausal patient. Nonemergent pelvic ultrasound recommended for further evaluation.     Benign essential hypertension  Assessment & Plan  Unable to review medications with patient (dementia) or family (unable to reach). Per chart, pt home medications include amlodipine and metoprolol. Unclear indication for metoprolol. SIRS as noted above, unclear whether due to infectious or inflammatory etiology, monitoring closely for development of hypotension. Plan  - Continue amlodipine with hold parameters for SBP<130  - Home metoprolol continued  - Review home medications with family when able to contact       VTE Pharmacologic Prophylaxis: VTE Score: 11 High Risk (Score >/= 5) - Pharmacological DVT Prophylaxis Ordered: enoxaparin (Lovenox). Sequential Compression Devices Ordered. Patient Centered Rounds: I performed bedside rounds with nursing staff today. Discussions with Specialists or Other Care Team Provider: Gastroenterology, colorectal,     Education and Discussions with Family / Patient: Updated  (daughter) via phone. Current Length of Stay: 4 day(s)  Current Patient Status: Inpatient   Discharge Plan: Anticipate discharge in 24-48 hrs to home. Code Status: Level 3 - DNAR and DNI    Subjective:   Patient today is not endorsing any pain in her abdomen. She states she has good been going to the bathroom less frequently. She is denying any chest pain or shortness of breath. She states that overall she feels well. Objective:     Vitals:   Temp (24hrs), Av.4 °F (36.3 °C), Min:97.3 °F (36.3 °C), Max:97.7 °F (36.5 °C)    Temp:  [97.3 °F (36.3 °C)-97.7 °F (36.5 °C)] 97.7 °F (36.5 °C)  Resp:  [12-16] 14  BP: (130-141)/(77-81) 141/81  Body mass index is 31.95 kg/m². Input and Output Summary (last 24 hours): Intake/Output Summary (Last 24 hours) at 10/10/2023 1418  Last data filed at 10/10/2023 0450  Gross per 24 hour   Intake 450 ml   Output --   Net 450 ml       Physical Exam:   Physical Exam  Constitutional:       Appearance: Normal appearance. Cardiovascular:      Rate and Rhythm: Normal rate and regular rhythm. Pulses: Normal pulses.       Heart sounds: Normal heart sounds. Pulmonary:      Breath sounds: Normal breath sounds. Abdominal:      General: Bowel sounds are normal. There is no distension. Palpations: Abdomen is soft. Tenderness: There is no abdominal tenderness. There is no guarding. Neurological:      Mental Status: Mental status is at baseline. Additional Data:     Labs:  Results from last 7 days   Lab Units 10/10/23  0628   WBC Thousand/uL 14.51*   HEMOGLOBIN g/dL 12.4   HEMATOCRIT % 38.2   PLATELETS Thousands/uL 348   BANDS PCT % 8   LYMPHO PCT % 20   MONO PCT % 9   EOS PCT % 1     Results from last 7 days   Lab Units 10/10/23  0628 10/07/23  0617 10/06/23  0519   SODIUM mmol/L 137   < > 135   POTASSIUM mmol/L 3.1*   < > 3.2*   CHLORIDE mmol/L 104   < > 101   CO2 mmol/L 26   < > 20*   BUN mg/dL 10   < > 21   CREATININE mg/dL 0.55*   < > 0.75   ANION GAP mmol/L 7   < > 14   CALCIUM mg/dL 7.9*   < > 8.2*   ALBUMIN g/dL  --   --  3.2*   TOTAL BILIRUBIN mg/dL  --   --  1.01*   ALK PHOS U/L  --   --  82   ALT U/L  --   --  9   AST U/L  --   --  16   GLUCOSE RANDOM mg/dL 94   < > 161*    < > = values in this interval not displayed. Results from last 7 days   Lab Units 10/05/23  2225   INR  1.31*     Results from last 7 days   Lab Units 10/10/23  1205 10/10/23  0807 10/09/23  2037 10/09/23  1343 10/09/23  1058 10/09/23  0756 10/09/23  0558 10/09/23  0003 10/08/23  1808 10/08/23  1153 10/08/23  0602 10/08/23  0006   POC GLUCOSE mg/dl 135 97 102 146* 145* 105 93 103 148* 121 103 85     Results from last 7 days   Lab Units 10/05/23  2033   HEMOGLOBIN A1C % 6.6*     Results from last 7 days   Lab Units 10/07/23  0617 10/06/23  0519 10/05/23  2225   LACTIC ACID mmol/L  --   --  1.6   PROCALCITONIN ng/ml 1.19* 0.66* 0.36*       Lines/Drains:  Invasive Devices     Peripheral Intravenous Line  Duration           Peripheral IV 10/10/23 Right Antecubital <1 day                      Imaging: No pertinent imaging reviewed.     Recent Cultures (last 7 days):   Results from last 7 days   Lab Units 10/06/23  0742 10/05/23  2239 10/05/23  2225   BLOOD CULTURE   --  No Growth After 4 Days. No Growth After 4 Days. C DIFF TOXIN B BY PCR  Positive*  --   --        Last 24 Hours Medication List:   Current Facility-Administered Medications   Medication Dose Route Frequency Provider Last Rate   • acetaminophen  650 mg Oral Q6H PRN Michael Kimball MD     • amLODIPine  5 mg Oral Daily Michael Kimball MD     • atorvastatin  10 mg Oral Daily Michael Kimball MD     • cefepime  2,000 mg Intravenous [facility], DO 2,000 mg (10/10/23 1332)   • enoxaparin  40 mg Subcutaneous Daily Michael Kimball MD     • insulin lispro  1-5 Units Subcutaneous HS [facility], DO     • insulin lispro  1-6 Units Subcutaneous TID With [address], DO     • melatonin  9 mg Oral HS Michael Kimball MD     • metoprolol succinate  100 mg Oral Daily [facility], DO     • metroNIDAZOLE  500 mg Intravenous [facility],  mg (10/10/23 1234)   • QUEtiapine  25 mg Oral HS Michael Kimball MD     • trimethobenzamide  200 mg Intramuscular Q6H PRN Michael Kimball MD     • vancomycin  125 mg Oral Q6H Torie Serrato MD          Today, Patient Was Seen By: [facility], DO    **Please Note: This note may have been constructed using a voice recognition system. **

## 2023-10-10 NOTE — PLAN OF CARE
Problem: Potential for Falls  Goal: Patient will remain free of falls  Description: INTERVENTIONS:  - Educate patient/family on patient safety including physical limitations  - Instruct patient to call for assistance with activity   - Consult OT/PT to assist with strengthening/mobility   - Keep Call bell within reach  - Keep bed low and locked with side rails adjusted as appropriate  - Keep care items and personal belongings within reach  - Initiate and maintain comfort rounds  - Make Fall Risk Sign visible to staff  - Offer Toileting every 2 Hours, in advance of need  - Initiate/Maintain Bed/ Chair alarm  - Obtain necessary fall risk management equipment: Frequent checks/ reorientation  - Apply yellow socks and bracelet for high fall risk patients  - Consider moving patient to room near nurses station  Outcome: Progressing     Problem: Prexisting or High Potential for Compromised Skin Integrity  Goal: Skin integrity is maintained or improved  Description: INTERVENTIONS:  - Identify patients at risk for skin breakdown  - Assess and monitor skin integrity  - Assess and monitor nutrition and hydration status  - Monitor labs   - Assess for incontinence   - Turn and reposition patient  - Assist with mobility/ambulation  - Relieve pressure over bony prominences  - Avoid friction and shearing  - Provide appropriate hygiene as needed including keeping skin clean and dry  - Evaluate need for skin moisturizer/barrier cream  - Collaborate with interdisciplinary team   - Patient/family teaching  - Consider wound care consult   Outcome: Progressing     Problem: MOBILITY - ADULT  Goal: Maintain or return to baseline ADL function  Description: INTERVENTIONS:  -  Assess patient's ability to carry out ADLs; assess patient's baseline for ADL function and identify physical deficits which impact ability to perform ADLs (bathing, care of mouth/teeth, toileting, grooming, dressing, etc.)  - Assess/evaluate cause of self-care deficits - Assess range of motion  - Assess patient's mobility; develop plan if impaired  - Assess patient's need for assistive devices and provide as appropriate  - Encourage maximum independence but intervene and supervise when necessary  - Involve family in performance of ADLs  - Assess for home care needs following discharge   - Consider OT consult to assist with ADL evaluation and planning for discharge  - Provide patient education as appropriate  Outcome: Progressing  Goal: Maintains/Returns to pre admission functional level  Description: INTERVENTIONS:  - Perform BMAT or MOVE assessment daily.   - Set and communicate daily mobility goal to care team and patient/family/caregiver. - Collaborate with rehabilitation services on mobility goals if consulted  - Perform Range of Motion 4 times a day. - Reposition patient every 2 hours.   - Dangle patient 3 times a day  - Stand patient 3 times a day  - Ambulate patient 3 times a day  - Out of bed to chair 3 times a day   - Out of bed for meals 3 times a day  - Out of bed for toileting  - Record patient progress and toleration of activity level   Outcome: Progressing     Problem: PAIN - ADULT  Goal: Verbalizes/displays adequate comfort level or baseline comfort level  Description: Interventions:  - Encourage patient to monitor pain and request assistance  - Assess pain using appropriate pain scale  - Administer analgesics based on type and severity of pain and evaluate response  - Implement non-pharmacological measures as appropriate and evaluate response  - Consider cultural and social influences on pain and pain management  - Notify physician/advanced practitioner if interventions unsuccessful or patient reports new pain  Outcome: Progressing     Problem: INFECTION - ADULT  Goal: Absence or prevention of progression during hospitalization  Description: INTERVENTIONS:  - Assess and monitor for signs and symptoms of infection  - Monitor lab/diagnostic results  - Monitor all insertion sites, i.e. indwelling lines, tubes, and drains  - Monitor endotracheal if appropriate and nasal secretions for changes in amount and color  - [place] appropriate cooling/warming therapies per order  - Administer medications as ordered  - Instruct and encourage patient and family to use good hand hygiene technique  - Identify and instruct in appropriate isolation precautions for identified infection/condition  Outcome: Progressing     Problem: DISCHARGE PLANNING  Goal: Discharge to home or other facility with appropriate resources  Description: INTERVENTIONS:  - Identify barriers to discharge w/patient and caregiver  - Arrange for needed discharge resources and transportation as appropriate  - Identify discharge learning needs (meds, wound care, etc.)  - Arrange for interpretive services to assist at discharge as needed  - Refer to Case Management Department for coordinating discharge planning if the patient needs post-hospital services based on physician/advanced practitioner order or complex needs related to functional status, cognitive ability, or social support system  Outcome: Progressing     Problem: Knowledge Deficit  Goal: Patient/family/caregiver demonstrates understanding of disease process, treatment plan, medications, and discharge instructions  Description: Complete learning assessment and assess knowledge base. Interventions:  - Provide teaching at level of understanding  - Provide teaching via preferred learning methods  Outcome: Progressing     Problem: Nutrition/Hydration-ADULT  Goal: Nutrient/Hydration intake appropriate for improving, restoring or maintaining nutritional needs  Description: Monitor and assess patient's nutrition/hydration status for malnutrition. Collaborate with interdisciplinary team and initiate plan and interventions as ordered. Monitor patient's weight and dietary intake as ordered or per policy. Utilize nutrition screening tool and intervene as necessary. Determine patient's food preferences and provide high-protein, high-caloric foods as appropriate.      INTERVENTIONS:  - Monitor oral intake, urinary output, labs, and treatment plans  - Assess nutrition and hydration status and recommend course of action  - Evaluate amount of meals eaten  - Assist patient with eating if necessary   - Allow adequate time for meals  - Recommend/ encourage appropriate diets, oral nutritional supplements, and vitamin/mineral supplements  - Order, calculate, and assess calorie counts as needed  - Recommend, monitor, and adjust tube feedings and TPN/PPN based on assessed needs  - Assess need for intravenous fluids  - Provide specific nutrition/hydration education as appropriate  - Include patient/family/caregiver in decisions related to nutrition  Outcome: Progressing

## 2023-10-10 NOTE — ASSESSMENT & PLAN NOTE
Lab Results   Component Value Date    HGBA1C 6.6 (H) 10/05/2023       Recent Labs     10/09/23  1058 10/09/23  1343 10/09/23  2037 10/10/23  0807   POCGLU 145* 146* 102 97       Blood Sugar Average: Last 72 hrs:  (P) 113.0625     Not on anti-diabetic medications, per EMR    Plan  - Fingerstick QID w/ meals and at bedtime  - SSI

## 2023-10-10 NOTE — ASSESSMENT & PLAN NOTE
Pt p/w abd pain, emesis, decreased PO intake. Pt is poor historian due to dementia. 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 Moderate obstruction of the colon proximal to the lesion. Colorectal consulted from ED and discussed w family at bedside. Per surgery note, family does not desire aggressive treatment such as surgery or chemotherapy, but would like to know extent of disease and have palliative care on board. 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 - Surgery not recommending any surgical interventions at this time. -GI not recommending colonoscopy in hospital due to C. difficile infection. We will follow-up with patient in 4 weeks to assess need for colonoscopy.   - Consult to palliative care for recommendations on possible comfort care  - Obtaining CT chest to assess pulmonary nodule seen on CT A/P

## 2023-10-10 NOTE — ASSESSMENT & PLAN NOTE
Patient has been having uncontrollable diarrhea since admission. Patient was recently treated with Augmentin a week ago. - C Diff +  - ongoing loose watery stools 1x this morning     Plan:  Continuing IV and PO Antibiotics  Vancomycin 125 mg q6 day #7/10 (switched to solution form of Vancomycin on 10/07 due to patient having difficulty swallowing pills  Patient seen by infectious disease. They recommended discontinuation of cefepime Flagyl. They are also recommending 10 days of vancomycin 125 mg q6h oral for 10 days following the discontinuation of systemic antibiotics. Currently day #1/10 since discontinuation of systemic Abx. Speech consulted recommending Clear liquid diet, Thin liquids; advance to include regular solids as per GI  Patient medically stable for discharge to rehab facility. Awaiting placement.

## 2023-10-11 ENCOUNTER — TELEPHONE (OUTPATIENT)
Age: 88
End: 2023-10-11

## 2023-10-11 LAB
ANION GAP SERPL CALCULATED.3IONS-SCNC: 7 MMOL/L
BACTERIA BLD CULT: NORMAL
BACTERIA BLD CULT: NORMAL
BUN SERPL-MCNC: 7 MG/DL (ref 5–25)
CALCIUM SERPL-MCNC: 7.8 MG/DL (ref 8.4–10.2)
CHLORIDE SERPL-SCNC: 105 MMOL/L (ref 96–108)
CO2 SERPL-SCNC: 23 MMOL/L (ref 21–32)
CREAT SERPL-MCNC: 0.52 MG/DL (ref 0.6–1.3)
ERYTHROCYTE [DISTWIDTH] IN BLOOD BY AUTOMATED COUNT: 14.7 % (ref 11.6–15.1)
GFR SERPL CREATININE-BSD FRML MDRD: 84 ML/MIN/1.73SQ M
GLUCOSE SERPL-MCNC: 100 MG/DL (ref 65–140)
GLUCOSE SERPL-MCNC: 104 MG/DL (ref 65–140)
GLUCOSE SERPL-MCNC: 120 MG/DL (ref 65–140)
GLUCOSE SERPL-MCNC: 152 MG/DL (ref 65–140)
GLUCOSE SERPL-MCNC: 199 MG/DL (ref 65–140)
HCT VFR BLD AUTO: 38.1 % (ref 34.8–46.1)
HGB BLD-MCNC: 12.4 G/DL (ref 11.5–15.4)
MCH RBC QN AUTO: 28.7 PG (ref 26.8–34.3)
MCHC RBC AUTO-ENTMCNC: 32.5 G/DL (ref 31.4–37.4)
MCV RBC AUTO: 88 FL (ref 82–98)
PLATELET # BLD AUTO: 308 THOUSANDS/UL (ref 149–390)
PMV BLD AUTO: 9.2 FL (ref 8.9–12.7)
POTASSIUM SERPL-SCNC: 3.3 MMOL/L (ref 3.5–5.3)
RBC # BLD AUTO: 4.32 MILLION/UL (ref 3.81–5.12)
SODIUM SERPL-SCNC: 135 MMOL/L (ref 135–147)
WBC # BLD AUTO: 14.41 THOUSAND/UL (ref 4.31–10.16)

## 2023-10-11 PROCEDURE — 82948 REAGENT STRIP/BLOOD GLUCOSE: CPT

## 2023-10-11 PROCEDURE — 97110 THERAPEUTIC EXERCISES: CPT

## 2023-10-11 PROCEDURE — 97116 GAIT TRAINING THERAPY: CPT

## 2023-10-11 PROCEDURE — 99232 SBSQ HOSP IP/OBS MODERATE 35: CPT | Performed by: INTERNAL MEDICINE

## 2023-10-11 PROCEDURE — 80048 BASIC METABOLIC PNL TOTAL CA: CPT

## 2023-10-11 PROCEDURE — 85027 COMPLETE CBC AUTOMATED: CPT

## 2023-10-11 PROCEDURE — 97530 THERAPEUTIC ACTIVITIES: CPT

## 2023-10-11 RX ORDER — POTASSIUM CHLORIDE 20MEQ/15ML
40 LIQUID (ML) ORAL ONCE
Status: COMPLETED | OUTPATIENT
Start: 2023-10-11 | End: 2023-10-11

## 2023-10-11 RX ADMIN — QUETIAPINE FUMARATE 25 MG: 25 TABLET ORAL at 21:38

## 2023-10-11 RX ADMIN — Medication 125 MG: at 23:31

## 2023-10-11 RX ADMIN — POTASSIUM CHLORIDE 40 MEQ: 1.5 SOLUTION ORAL at 08:56

## 2023-10-11 RX ADMIN — Medication 125 MG: at 12:31

## 2023-10-11 RX ADMIN — METOPROLOL SUCCINATE 100 MG: 100 TABLET, EXTENDED RELEASE ORAL at 08:56

## 2023-10-11 RX ADMIN — ENOXAPARIN SODIUM 40 MG: 40 INJECTION SUBCUTANEOUS at 08:55

## 2023-10-11 RX ADMIN — INSULIN LISPRO 1 UNITS: 100 INJECTION, SOLUTION INTRAVENOUS; SUBCUTANEOUS at 21:58

## 2023-10-11 RX ADMIN — Medication 125 MG: at 05:55

## 2023-10-11 RX ADMIN — Medication 9 MG: at 21:38

## 2023-10-11 RX ADMIN — AMLODIPINE BESYLATE 5 MG: 5 TABLET ORAL at 08:56

## 2023-10-11 RX ADMIN — Medication 125 MG: at 18:03

## 2023-10-11 RX ADMIN — ATORVASTATIN CALCIUM 10 MG: 10 TABLET, FILM COATED ORAL at 08:55

## 2023-10-11 NOTE — ASSESSMENT & PLAN NOTE
Pt p/w abd pain, emesis, decreased PO intake. Pt is poor historian due to dementia. 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 Moderate obstruction of the colon proximal to the lesion. Colorectal consulted from ED and discussed w family at bedside. Per surgery note, family does not desire aggressive treatment such as surgery or chemotherapy, but would like to know extent of disease and have palliative care on board. 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 - Surgery not recommending any surgical interventions at this time. -GI not recommending colonoscopy in hospital due to C. difficile infection. We will follow-up with patient in 4 weeks to assess need for colonoscopy. - Consult to palliative care for recommendations on possible comfort care  - CT chest demonstrated multiple intermediate pulmonary nodules.

## 2023-10-11 NOTE — PLAN OF CARE
Problem: Potential for Falls  Goal: Patient will remain free of falls  Description: INTERVENTIONS:  - Educate patient/family on patient safety including physical limitations  - Instruct patient to call for assistance with activity   - Consult OT/PT to assist with strengthening/mobility   - Keep Call bell within reach  - Keep bed low and locked with side rails adjusted as appropriate  - Keep care items and personal belongings within reach  - Initiate and maintain comfort rounds  - Make Fall Risk Sign visible to staff  - Offer Toileting every 2 Hours, in advance of need  - Initiate/Maintain Bed/ Chair alarm  - Obtain necessary fall risk management equipment: Frequent checks/ reorientation  - Apply yellow socks and bracelet for high fall risk patients  - Consider moving patient to room near nurses station  Outcome: Progressing     Problem: Prexisting or High Potential for Compromised Skin Integrity  Goal: Skin integrity is maintained or improved  Description: INTERVENTIONS:  - Identify patients at risk for skin breakdown  - Assess and monitor skin integrity  - Assess and monitor nutrition and hydration status  - Monitor labs   - Assess for incontinence   - Turn and reposition patient  - Assist with mobility/ambulation  - Relieve pressure over bony prominences  - Avoid friction and shearing  - Provide appropriate hygiene as needed including keeping skin clean and dry  - Evaluate need for skin moisturizer/barrier cream  - Collaborate with interdisciplinary team   - Patient/family teaching  - Consider wound care consult   Outcome: Progressing     Problem: MOBILITY - ADULT  Goal: Maintain or return to baseline ADL function  Description: INTERVENTIONS:  -  Assess patient's ability to carry out ADLs; assess patient's baseline for ADL function and identify physical deficits which impact ability to perform ADLs (bathing, care of mouth/teeth, toileting, grooming, dressing, etc.)  - Assess/evaluate cause of self-care deficits - Assess range of motion  - Assess patient's mobility; develop plan if impaired  - Assess patient's need for assistive devices and provide as appropriate  - Encourage maximum independence but intervene and supervise when necessary  - Involve family in performance of ADLs  - Assess for home care needs following discharge   - Consider OT consult to assist with ADL evaluation and planning for discharge  - Provide patient education as appropriate  Outcome: Progressing  Goal: Maintains/Returns to pre admission functional level  Description: INTERVENTIONS:  - Perform BMAT or MOVE assessment daily.   - Set and communicate daily mobility goal to care team and patient/family/caregiver. - Collaborate with rehabilitation services on mobility goals if consulted  - Perform Range of Motion 4 times a day. - Reposition patient every 2 hours.   - Dangle patient 3 times a day  - Stand patient 3 times a day  - Ambulate patient 3 times a day  - Out of bed to chair 3 times a day   - Out of bed for meals 3 times a day  - Out of bed for toileting  - Record patient progress and toleration of activity level   Outcome: Progressing     Problem: PAIN - ADULT  Goal: Verbalizes/displays adequate comfort level or baseline comfort level  Description: Interventions:  - Encourage patient to monitor pain and request assistance  - Assess pain using appropriate pain scale  - Administer analgesics based on type and severity of pain and evaluate response  - Implement non-pharmacological measures as appropriate and evaluate response  - Consider cultural and social influences on pain and pain management  - Notify physician/advanced practitioner if interventions unsuccessful or patient reports new pain  Outcome: Progressing     Problem: INFECTION - ADULT  Goal: Absence or prevention of progression during hospitalization  Description: INTERVENTIONS:  - Assess and monitor for signs and symptoms of infection  - Monitor lab/diagnostic results  - Monitor all insertion sites, i.e. indwelling lines, tubes, and drains  - Monitor endotracheal if appropriate and nasal secretions for changes in amount and color  - [place] appropriate cooling/warming therapies per order  - Administer medications as ordered  - Instruct and encourage patient and family to use good hand hygiene technique  - Identify and instruct in appropriate isolation precautions for identified infection/condition  Outcome: Progressing     Problem: DISCHARGE PLANNING  Goal: Discharge to home or other facility with appropriate resources  Description: INTERVENTIONS:  - Identify barriers to discharge w/patient and caregiver  - Arrange for needed discharge resources and transportation as appropriate  - Identify discharge learning needs (meds, wound care, etc.)  - Arrange for interpretive services to assist at discharge as needed  - Refer to Case Management Department for coordinating discharge planning if the patient needs post-hospital services based on physician/advanced practitioner order or complex needs related to functional status, cognitive ability, or social support system  Outcome: Progressing     Problem: Knowledge Deficit  Goal: Patient/family/caregiver demonstrates understanding of disease process, treatment plan, medications, and discharge instructions  Description: Complete learning assessment and assess knowledge base. Interventions:  - Provide teaching at level of understanding  - Provide teaching via preferred learning methods  Outcome: Progressing     Problem: Nutrition/Hydration-ADULT  Goal: Nutrient/Hydration intake appropriate for improving, restoring or maintaining nutritional needs  Description: Monitor and assess patient's nutrition/hydration status for malnutrition. Collaborate with interdisciplinary team and initiate plan and interventions as ordered. Monitor patient's weight and dietary intake as ordered or per policy. Utilize nutrition screening tool and intervene as necessary. Determine patient's food preferences and provide high-protein, high-caloric foods as appropriate.      INTERVENTIONS:  - Monitor oral intake, urinary output, labs, and treatment plans  - Assess nutrition and hydration status and recommend course of action  - Evaluate amount of meals eaten  - Assist patient with eating if necessary   - Allow adequate time for meals  - Recommend/ encourage appropriate diets, oral nutritional supplements, and vitamin/mineral supplements  - Order, calculate, and assess calorie counts as needed  - Recommend, monitor, and adjust tube feedings and TPN/PPN based on assessed needs  - Assess need for intravenous fluids  - Provide specific nutrition/hydration education as appropriate  - Include patient/family/caregiver in decisions related to nutrition  Outcome: Progressing

## 2023-10-11 NOTE — PHYSICAL THERAPY NOTE
PHYSICAL THERAPY NOTE          Patient Name: Diana Hampton  UOKSR'N Date: 10/11/2023           10/11/23 1431   PT Last Visit   PT Visit Date 10/11/23   Note Type   Note Type Treatment   Pain Assessment   Pain Assessment Tool 0-10   Pain Score No Pain   Pain Location/Orientation Location: Abdomen   Patient's Stated Pain Goal No pain   Hospital Pain Intervention(s) Repositioned; Emotional support;Elevated; Rest   Multiple Pain Sites No   Restrictions/Precautions   Other Precautions Contact/isolation;Cognitive; Chair Alarm; Bed Alarm; Fall Risk   General   Chart Reviewed Yes   Response to Previous Treatment Patient with no complaints from previous session. Family/Caregiver Present No   Cognition   Overall Cognitive Status Impaired   Arousal/Participation Alert; Responsive; Cooperative   Attention Attends with cues to redirect   Orientation Level Oriented to person;Oriented to place;Oriented to situation   Memory Decreased recall of recent events   Following Commands Follows one step commands with increased time or repetition   Comments pt was pleasant, cooperative throughout tx session   Subjective   Subjective pt was agreeable to participate in PT intervention   Bed Mobility   Rolling R 3  Moderate assistance   Additional items Assist x 1;HOB elevated; Bedrails;Verbal cues; Increased time required;LE management; Other  (trunk management)   Rolling L 3  Moderate assistance   Additional items Assist x 1;HOB elevated; Bedrails; Increased time required;Verbal cues;LE management; Other  (trunk management)   Supine to Sit 3  Moderate assistance   Additional items Assist x 1; Increased time required;LE management; Other   Additional items Assist x 1   Additional Comments pt able to sit EOB w/o LOB   Transfers   Sit to Stand 3  Moderate assistance   Additional items Assist x 1; Armrests; Increased time required;Verbal cues   Stand to Sit 3  Moderate assistance   Additional items Assist x 1; Armrests; Increased time required;Verbal cues   Stand pivot 3  Moderate assistance   Additional items Assist x 1; Armrests; Increased time required;Verbal cues   Toilet transfer Unable to assess   Additional Comments pt completed multiple functional transfers to and from RW with mod Ax1 and VC's for hand placement while ascending to RW and descending back into recliner   Ambulation/Elevation   Gait pattern Improper Weight shift;Decreased foot clearance;Narrow ZAFAR; Short stride; Excessively slow; Foward flexed;Decreased hip extension   Gait Assistance 4  Minimal assist   Additional items Assist x 1;Verbal cues   Assistive Device Rolling walker   Distance 30'x2 RW   Ambulation/Elevation Additional Comments on Initial ambulation of 30'x1pt wilth LOB that required mod Ax1 to correct. All other ambulation required min Ax1 for safety, balance and RW management   Balance   Static Sitting Fair +   Dynamic Sitting Fair +   Static Standing Fair   Dynamic Standing Poor   Ambulatory Poor +  (w/ RW)   Endurance Deficit   Endurance Deficit Yes   Endurance Deficit Description limited activity tolerance, ambulation distance   Activity Tolerance   Activity Tolerance Patient limited by fatigue   Nurse Made Aware Spoke to RN   Exercises   Quad Sets Supine;10 reps;AROM; Bilateral   Heelslides Supine;10 reps;AROM; Bilateral   Hip Abduction Sitting;20 reps;AROM; Bilateral   Hip Adduction Sitting;20 reps;AROM; Bilateral  (pillow squeezes)   Knee AROM Long Arc Quad Sitting;15 reps;AROM; Bilateral  (VC's to slow down and focus on targeted muscle groups)   Ankle Pumps Supine;20 reps;AROM; Bilateral   Assessment   Prognosis Fair   Problem List Decreased strength;Decreased endurance; Impaired balance;Decreased mobility; Decreased cognition; Impaired judgement;Decreased safety awareness; Impaired hearing;Obesity; Decreased skin integrity   Assessment pt began tx session lying supine in the bed and was agreeable to participate in PT intervention. pt progressinmg with skilled PT intervention as pt was able to roll and reposition from L to R and complete a supine<>sit EOB transfer with mod Ax1, LE and trunk management. pt was able to sit EoB w/o LOB while performimg TE activities in order to increase static/dynamic sitting balance. pt continues to remain consistant with requiring mod ax1 for all functional transfers to and from RW with VC's for hand placement while ascending to RW and descending back to seated in recliner. pt continues to be limited with activity tolerance and ambulation distance but was able to increase ambulation distance compared to previous tx session. pt ambulated 30'x2 RW. On initial ambulation pt had 1 LOB that required mod Ax1 to correct. All other ambulation pt required min Ax1 for safety and balance. pt would benefit from continued skilled PT intervention in order to address deficits listed above. Continue to recommend post acute rehab services at the time of D/C in order to maximize pt functional independence and safety with all OOB mobility. Goals   Patient Goals none stated this tx session   STG Expiration Date 10/17/23   PT Treatment Day 2   Plan   Treatment/Interventions Functional transfer training;LE strengthening/ROM; Therapeutic exercise; Endurance training;Cognitive reorientation;Patient/family training;Equipment eval/education; Bed mobility;Gait training;Spoke to nursing   Progress Slow progress, decreased activity tolerance   PT Frequency 3-5x/wk   Discharge Recommendation   PT Discharge Recommendation Post acute rehabilitation services   AM-PAC Basic Mobility Inpatient   Turning in Flat Bed Without Bedrails 2   Lying on Back to Sitting on Edge of Flat Bed Without Bedrails 2   Moving Bed to Chair 3   Standing Up From Chair Using Arms 2   Walk in Room 3   Climb 3-5 Stairs With Railing 1   Basic Mobility Inpatient Raw Score 13   Basic Mobility Standardized Score 33.99   Highest Level Of Mobility   JH-HLM Goal 4: Move to chair/commode   JH-HLM Achieved 7: Walk 25 feet or more   Education   Education Provided Mobility training;Assistive device; Other  (bed mobility, functional transfers)   Patient Reinforcement needed   End of Consult   Patient Position at End of Consult Bedside chair;Bed/Chair alarm activated; All needs within reach   The patient's AM-PAC Basic Mobility Inpatient Short Form Raw Score is 13. A Raw score of less than or equal to 16 suggests the patient may benefit from discharge to post-acute rehabilitation services. Please also refer to the recommendation of the Physical Therapist for safe discharge planning.       Gaye Mendieta

## 2023-10-11 NOTE — PROGRESS NOTES
[address] [facility]  Progress Note  Name: Leonardo Chamorro  MRN: [id number]  Unit/Bed#: W -01 I Date of Admission: 10/5/2023   Date of Service: 10/11/2023 I Hospital Day: 5    Assessment/Plan   C. difficile diarrhea  Assessment & Plan  Patient has been having uncontrollable diarrhea since admission. Patient was recently treated with Augmentin a week ago. - C Diff +  - ongoing loose watery stools 1x this morning     Plan:  Continuing IV and PO Antibiotics  Vancomycin 125 mg q6 day #7/10 (switched to solution form of Vancomycin on 10/07 due to patient having difficulty swallowing pills  Patient seen by infectious disease. They recommended discontinuation of cefepime Flagyl. They are also recommending 10 days of vancomycin 125 mg q6h oral for 10 days following the discontinuation of systemic antibiotics. Currently day #1/10 since discontinuation of systemic Abx. Speech consulted recommending Clear liquid diet, Thin liquids; advance to include regular solids as per GI  Patient medically stable for discharge to rehab facility. Awaiting placement. * Colorectal cancer suspected  Assessment & Plan  Pt p/w abd pain, emesis, decreased PO intake. Pt is poor historian due to dementia. 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 Moderate obstruction of the colon proximal to the lesion. Colorectal consulted from ED and discussed w family at bedside. Per surgery note, family does not desire aggressive treatment such as surgery or chemotherapy, but would like to know extent of disease and have palliative care on board. 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   - Surgery not recommending any surgical interventions at this time. -GI not recommending colonoscopy in hospital due to C. difficile infection. We will follow-up with patient in 4 weeks to assess need for colonoscopy. - Consult to palliative care for recommendations on possible comfort care  - CT chest demonstrated multiple intermediate pulmonary nodules. SIRS (systemic inflammatory response syndrome) (HCC)  Assessment & Plan  Meets SIRS criteria on admission for leukocytosis of 46 thousand and tachycardia ( at presentation, now 85)    Afebrile. BPs mildly to moderately hypertensive (131-143)/(60-67). Pt appears to have just completed 1-week course Augmentin 9/27-10/4/23, per chart review    Differential includes inflammatory 2/2 malignancy vs infectious (possible intra-abdominal abscess seen on CT vs gut translocation 2/2 moderate colonic obstruction). BCx x2 collected in ED, pt received cefepime 1000 mg, metronidazole 500 mg, vancomycin 1500 mg  - Bcx negative at 48 hrs     Plan:  Monitor off additional abx at this time  Monitor fever curve and BP  Trend WBCs  F/u BCx  If pt develops fever or hypotension, should consider consulting interventional radiology for abscess drainage. Lung nodule seen on imaging study  Assessment & Plan  CT A/P w con shows two pulmonary nodules within the left lower lobe of the lung, measuring 3 mm and 4 mm, respectively. Metastatic disease not excluded. Short-term follow-up recommended.     Type 2 diabetes mellitus without complication, without long-term current use of insulin [facility])  Assessment & Plan  Lab Results   Component Value Date    HGBA1C 6.6 (H) 10/05/2023       Recent Labs     10/10/23  1616 10/10/23  2126 10/11/23  0746 10/11/23  1119   POCGLU 117 144* 104 120         Blood Sugar Average: Last 72 hrs:  (P) 116.75     Not on anti-diabetic medications, per EMR    Plan  - Fingerstick QID w/ meals and at bedtime  - SSI       Thickened endometrium  Assessment & Plan  CT A/P w con shows thickened endometrium measuring approximately 14 mm thick. This is abnormally prominent for a postmenopausal patient. Nonemergent pelvic ultrasound recommended for further evaluation. Benign essential hypertension  Assessment & Plan  Unable to review medications with patient (dementia) or family (unable to reach). Per chart, pt home medications include amlodipine and metoprolol. Unclear indication for metoprolol. SIRS as noted above, unclear whether due to infectious or inflammatory etiology, monitoring closely for development of hypotension. Plan  - Continue amlodipine with hold parameters for SBP<130  - Home metoprolol continued  - Review home medications with family when able to contact               VTE Pharmacologic Prophylaxis: VTE Score: 11 High Risk (Score >/= 5) - Pharmacological DVT Prophylaxis Ordered: enoxaparin (Lovenox). Sequential Compression Devices Ordered. Patient Centered Rounds: I performed bedside rounds with nursing staff today. Discussions with Specialists or Other Care Team Provider: GI, ID, Colorectal surgery    Education and Discussions with Family / Patient: Updated  (daughter) via phone. Current Length of Stay: 5 day(s)  Current Patient Status: Inpatient   Discharge Plan: Anticipate discharge in 24-48 hrs to rehab facility. Code Status: Level 3 - DNAR and DNI    Subjective:   Patient today is stating she is feeling well. She stated she only had 1 bowel movement yesterday. She is not endorsing any abdominal pain, nausea, or vomiting. She is endorsing pain anywhere else. Objective:     Vitals:   Temp (24hrs), Av.4 °F (36.3 °C), Min:97.4 °F (36.3 °C), Max:97.5 °F (36.4 °C)    Temp:  [97.4 °F (36.3 °C)-97.5 °F (36.4 °C)] 97.4 °F (36.3 °C)  HR:  [83-86] 84  Resp:  [16-18] 16  BP: (143-144)/(82-84) 143/82  SpO2:  [95 %-96 %] 96 %  Body mass index is 33.78 kg/m².      Input and Output Summary (last 24 hours):   No intake or output data in the 24 hours ending 10/11/23 1356    Physical Exam:   Physical Exam  Cardiovascular:      Rate and Rhythm: Normal rate and regular rhythm. Heart sounds: Normal heart sounds. Pulmonary:      Effort: Pulmonary effort is normal.      Breath sounds: Normal breath sounds. Abdominal:      General: Bowel sounds are normal. There is no distension. Palpations: Abdomen is soft. Tenderness: There is no abdominal tenderness. Musculoskeletal:      Right lower leg: No edema. Left lower leg: No edema. Additional Data:     Labs:  Results from last 7 days   Lab Units 10/11/23  0505 10/10/23  0628   WBC Thousand/uL 14.41* 14.51*   HEMOGLOBIN g/dL 12.4 12.4   HEMATOCRIT % 38.1 38.2   PLATELETS Thousands/uL 308 348   BANDS PCT %  --  8   LYMPHO PCT %  --  20   MONO PCT %  --  9   EOS PCT %  --  1     Results from last 7 days   Lab Units 10/11/23  0505 10/07/23  0617 10/06/23  0519   SODIUM mmol/L 135   < > 135   POTASSIUM mmol/L 3.3*   < > 3.2*   CHLORIDE mmol/L 105   < > 101   CO2 mmol/L 23   < > 20*   BUN mg/dL 7   < > 21   CREATININE mg/dL 0.52*   < > 0.75   ANION GAP mmol/L 7   < > 14   CALCIUM mg/dL 7.8*   < > 8.2*   ALBUMIN g/dL  --   --  3.2*   TOTAL BILIRUBIN mg/dL  --   --  1.01*   ALK PHOS U/L  --   --  82   ALT U/L  --   --  9   AST U/L  --   --  16   GLUCOSE RANDOM mg/dL 100   < > 161*    < > = values in this interval not displayed.      Results from last 7 days   Lab Units 10/05/23  2225   INR  1.31*     Results from last 7 days   Lab Units 10/11/23  1119 10/11/23  0746 10/10/23  2126 10/10/23  1616 10/10/23  1205 10/10/23  0807 10/09/23  2037 10/09/23  1343 10/09/23  1058 10/09/23  0756 10/09/23  0558 10/09/23  0003   POC GLUCOSE mg/dl 120 104 144* 117 135 97 102 146* 145* 105 93 103     Results from last 7 days   Lab Units 10/05/23  2033   HEMOGLOBIN A1C % 6.6*     Results from last 7 days   Lab Units 10/07/23  0617 10/06/23  0519 10/05/23  2225   LACTIC ACID mmol/L  -- --  1.6   PROCALCITONIN ng/ml 1.19* 0.66* 0.36*       Lines/Drains:  Invasive Devices       Peripheral Intravenous Line  Duration             Peripheral IV 10/11/23 Right Antecubital <1 day                          Imaging: Reviewed radiology reports from this admission including: chest CT scan    Recent Cultures (last 7 days):   Results from last 7 days   Lab Units 10/06/23  0742 10/05/23  2239 10/05/23  2225   BLOOD CULTURE   --  No Growth After 5 Days. No Growth After 5 Days. C DIFF TOXIN B BY PCR  Positive*  --   --        Last 24 Hours Medication List:   Current Facility-Administered Medications   Medication Dose Route Frequency Provider Last Rate    acetaminophen  650 mg Oral Q6H PRN Patricio Bird MD      amLODIPine  5 mg Oral Daily Patricio Bird MD      atorvastatin  10 mg Oral Daily Patricio Bird MD      enoxaparin  40 mg Subcutaneous Daily Patricio Bird MD      insulin lispro  1-5 Units Subcutaneous HS [facility], DO      insulin lispro  1-6 Units Subcutaneous TID With [address], DO      melatonin  9 mg Oral HS Patricio Bird MD      metoprolol succinate  100 mg Oral Daily [facility], DO      QUEtiapine  25 mg Oral HS Patricio Bird MD      trimethobenzamide  200 mg Intramuscular Q6H PRN Patricio Bird MD      vancomycin  125 mg Oral Q6H Aneta Daniels MD          Today, Patient Was Seen By: [facility], DO    **Please Note: This note may have been constructed using a voice recognition system. **

## 2023-10-11 NOTE — ASSESSMENT & PLAN NOTE
Lab Results   Component Value Date    HGBA1C 6.6 (H) 10/05/2023       Recent Labs     10/10/23  1616 10/10/23  2126 10/11/23  0746 10/11/23  1119   POCGLU 117 144* 104 120         Blood Sugar Average: Last 72 hrs:  (P) 116.75     Not on anti-diabetic medications, per EMR    Plan  - Fingerstick QID w/ meals and at bedtime  - SSI

## 2023-10-11 NOTE — TELEPHONE ENCOUNTER
Spoke with patient's daughter, Jayy Ngo ([phone]) to advise FMLA application is complete. Daughter request document be emailed to her at [email or web address] and FAX to [place] at [phone].

## 2023-10-11 NOTE — CASE MANAGEMENT
Case Management Progress Note    Patient name Germaine Pickering  Location W /W -01 MRN [id number]  : 1934 Date 10/11/2023       LOS (days): 5  Geometric Mean LOS (GMLOS) (days): 3.80  Days to GMLOS:-1.9        OBJECTIVE:        Current admission status: Inpatient  [facility] Pharmacy:   [id number], [address] [place] [address] [address] [facility] [address]  Phone: [phone] Fax: [phone]    Primary Care Provider: RUBY Cochran    Primary Insurance: [facility] REP  Secondary Insurance:     PROGRESS NOTE:  CM spoke with patients daughter re: dcp. Daughter stating family is still undecided on home w/ HHC vs STR, however daughter is agreeable to blanket referrals being placed. Daughter requesting a list of potential facilities are e-mailed to her and she states family will review and discuss tonight.  E-mail sent, CM to follow up in AM.

## 2023-10-11 NOTE — PLAN OF CARE
Problem: PHYSICAL THERAPY ADULT  Goal: Performs mobility at highest level of function for planned discharge setting. See evaluation for individualized goals. Description:                                                                  See flowsheet documentation for full assessment, interventions and recommendations. Outcome: Progressing  Note: Prognosis: Fair  Problem List: Decreased strength, Decreased endurance, Impaired balance, Decreased mobility, Decreased cognition, Impaired judgement, Decreased safety awareness, Impaired hearing, Obesity, Decreased skin integrity  Assessment: pt began tx session lying supine in the bed and was agreeable to participate in PT intervention. pt progressinmg with skilled PT intervention as pt was able to roll and reposition from L to R and complete a supine<>sit EOB transfer with mod Ax1, LE and trunk management. pt was able to sit EoB w/o LOB while performimg TE activities in order to increase static/dynamic sitting balance. pt continues to remain consistant with requiring mod ax1 for all functional transfers to and from RW with VC's for hand placement while ascending to RW and descending back to seated in recliner. pt continues to be limited with activity tolerance and ambulation distance but was able to increase ambulation distance compared to previous tx session. pt ambulated 30'x2 RW. On initial ambulation pt had 1 LOB that required mod Ax1 to correct. All other ambulation pt required min Ax1 for safety and balance. pt would benefit from continued skilled PT intervention in order to address deficits listed above. Continue to recommend post acute rehab services at the time of D/C in order to maximize pt functional independence and safety with all OOB mobility. Barriers to Discharge:  (unknown social history at this time)     PT Discharge Recommendation: Post acute rehabilitation services    See flowsheet documentation for full assessment.         Problem: PHYSICAL THERAPY ADULT  Goal: Performs mobility at highest level of function for planned discharge setting. See evaluation for individualized goals. Description:                                                                  See flowsheet documentation for full assessment, interventions and recommendations. Outcome: Progressing  Note: Prognosis: Fair  Problem List: Decreased strength, Decreased endurance, Impaired balance, Decreased mobility, Decreased cognition, Impaired judgement, Decreased safety awareness, Impaired hearing, Obesity, Decreased skin integrity  Assessment: pt began tx session lying supine in the bed and was agreeable to participate in PT intervention. pt progressinmg with skilled PT intervention as pt was able to roll and reposition from L to R and complete a supine<>sit EOB transfer with mod Ax1, LE and trunk management. pt was able to sit EoB w/o LOB while performimg TE activities in order to increase static/dynamic sitting balance. pt continues to remain consistant with requiring mod ax1 for all functional transfers to and from RW with VC's for hand placement while ascending to RW and descending back to seated in recliner. pt continues to be limited with activity tolerance and ambulation distance but was able to increase ambulation distance compared to previous tx session. pt ambulated 30'x2 RW. On initial ambulation pt had 1 LOB that required mod Ax1 to correct. All other ambulation pt required min Ax1 for safety and balance. pt would benefit from continued skilled PT intervention in order to address deficits listed above. Continue to recommend post acute rehab services at the time of D/C in order to maximize pt functional independence and safety with all OOB mobility. Barriers to Discharge:  (unknown social history at this time)     PT Discharge Recommendation: Post acute rehabilitation services    See flowsheet documentation for full assessment.       Latanya Pavon  The patient's AM-PAC Basic Mobility Inpatient Short Form Raw Score is 13. A Raw score of less than or equal to 16 suggests the patient may benefit from discharge to post-acute rehabilitation services. Please also refer to the recommendation of the Physical Therapist for safe discharge planning.     Bertha Rooney

## 2023-10-11 NOTE — PROGRESS NOTES
Progress Note - Infectious Disease   Ellen Pink 80 y.o. female MRN: [id number]  Unit/Bed#: W -01 Encounter: [id number]      Impression/Plan:  1. SIRS vs sepsis. E/b leukocytosis to 46k on admission and tachycardia. Otherwise afebrile and HDS. Initial CT A/P with concern for neoplasm with possible lung mets and ?possible intra-abdominal abscess formation. Repeat CT A/P without evidence of abscess though continues to demonstrate fluid-filled fistulous tracts extending from transverse colon to cecum and loop of distal ileum. Presentation favored 2/2 to acute C.diff infection. Patient also just completed a 7d course of Augmentin which is known to be a risk factor for C diff. WBC has down-trended after initiation of PO vancomycin. Consider potential GI neoplasm also contributing to clinical presentation. Blood cultures resulted NG. Has been treated with cefepime and metronidazole for 6 days and has remained afebrile and HDS. Given no further definitive evidence of intra-abdominal abscess on repeat imaging, favor stopping IV antibiotics and monitoring off. This will also help limit her exposure to more antibiotics I/s/o acute C.diff infection. Patient has remained afebrile and HDS off systemic antibiotics. WBC downtrending. Continue to monitor off IV antibiotics  Continue PO vancomycin for C diff as below  Monitor temperature  Monitor vitals  CBCD with AM labs     2. C.diff. Patient presented with uncontrollable diarrhea and WBC to 46k. Found to be C diff toxin and PCR positive. Consider 2/2 to Augmentin course patient just finished. Leukocytosis likely elevated I/s/o C diff, now down-trending. Reports improvement in diarrhea after starting PO vancomycin. Continue PO vancomycin 125mg q6h x 10d from discontinuation of systemic antibiotics (through 10/20)  Serial abdominal exams to assess for any clinical worsening or progression to fulminant C diff. Low concern at this time.    Avoid excessive antibiotic use if able     3. Concern for colorectal cancer. CT A/P on admission demonstrated 7cm "apple core" lesion of the transverse colon c/f neoplasm. Additional findings c/f local tumor spread and possible fistula formation with potential early abscess formation. Also pulmonary nodule which could be representative of mets. Repeat CT A/P on 10/9 showed ongoing possible mass in transverse colon with fistula formation and sinus tracts. No clear evidence of intra-abdominal abscess. Colorectal surgery not recommending any emergent surgical interventions. GI considering outpatient colonoscopy given C diff infection. Goals of care discussions with family ongoing. Management per primary team  Continue to observe off further IV antibiotics   Appreciate GI and colorectal surgery evaluations  Follow-up goals of care discussions     4. T2DM. Most recent A1C 6.6. Appears to be relatively well controlled. Currently on Insulin. Management per primary team    Above plan was discussed in detail with primary team.     Antibiotics:  PO vancomycin 6    Antibiotics 7    Subjective:  Patient pleasantly confused as per baseline. States she does not remember how she got here. Does not endorse having any pain and states that she feels fine. No fever, chills, sweats, shakes; no nausea, vomiting, abdominal pain, or dysuria; no cough, shortness of breath, or chest pain. Does not recall having significant diarrhea this AM.     Objective:  Vitals:  Temp:  [97.4 °F (36.3 °C)-97.5 °F (36.4 °C)] 97.4 °F (36.3 °C)  HR:  [83-86] 84  Resp:  [16-18] 16  BP: (143-144)/(82-84) 143/82  SpO2:  [95 %-96 %] 96 %  Temp (24hrs), Av.4 °F (36.3 °C), Min:97.4 °F (36.3 °C), Max:97.5 °F (36.4 °C)  Current: Temperature: (!) 97.4 °F (36.3 °C)    Physical Exam:   General Appearance:  Pleasantly confused, though remains interactive, alert, and nontoxic appearing. No acute distress. Throat: Oropharynx moist without lesions.     Lungs:   Clear to auscultation bilaterally; no wheezes, rhonchi or rales; respirations unlabored. Heart:  RRR; no murmur, rub or gallop. Abdomen:   Soft, non-tender, non-distended, positive bowel sounds. Extremities: No clubbing or cyanosis, no edema. Skin: No new rashes, lesions, or draining wounds noted on exposed skin. Labs, Imaging, & Other studies:   All pertinent labs and imaging studies were personally reviewed  Results from last 7 days   Lab Units 10/11/23  0505 10/10/23  0628 10/09/23  0446   WBC Thousand/uL 14.41* 14.51* 16.30*   HEMOGLOBIN g/dL 12.4 12.4 11.4*   PLATELETS Thousands/uL 308 348 318     Results from last 7 days   Lab Units 10/11/23  0505 10/07/23  0617 10/06/23  0519 10/05/23  1928   POTASSIUM mmol/L 3.3*   < > 3.2* 3.8   CHLORIDE mmol/L 105   < > 101 96   CO2 mmol/L 23   < > 20* 19*   BUN mg/dL 7   < > 21 16   CREATININE mg/dL 0.52*   < > 0.75 0.94   EGFR ml/min/1.73sq m 84   < > 70 53   CALCIUM mg/dL 7.8*   < > 8.2* 9.0   AST U/L  --   --  16 22   ALT U/L  --   --  9 11   ALK PHOS U/L  --   --  82 91    < > = values in this interval not displayed. Results from last 7 days   Lab Units 10/06/23  0742 10/05/23  2239 10/05/23  2225   BLOOD CULTURE   --  No Growth After 5 Days. No Growth After 5 Days.    C DIFF TOXIN B BY PCR  Positive*  --   --      Results from last 7 days   Lab Units 10/07/23  0617 10/06/23  0519 10/05/23  2225   PROCALCITONIN ng/ml 1.19* 0.66* 0.36*

## 2023-10-12 LAB
ANION GAP SERPL CALCULATED.3IONS-SCNC: 7 MMOL/L
BUN SERPL-MCNC: 7 MG/DL (ref 5–25)
CALCIUM SERPL-MCNC: 8.1 MG/DL (ref 8.4–10.2)
CHLORIDE SERPL-SCNC: 106 MMOL/L (ref 96–108)
CO2 SERPL-SCNC: 24 MMOL/L (ref 21–32)
CREAT SERPL-MCNC: 0.54 MG/DL (ref 0.6–1.3)
ERYTHROCYTE [DISTWIDTH] IN BLOOD BY AUTOMATED COUNT: 14.8 % (ref 11.6–15.1)
GFR SERPL CREATININE-BSD FRML MDRD: 83 ML/MIN/1.73SQ M
GLUCOSE SERPL-MCNC: 108 MG/DL (ref 65–140)
GLUCOSE SERPL-MCNC: 116 MG/DL (ref 65–140)
GLUCOSE SERPL-MCNC: 122 MG/DL (ref 65–140)
GLUCOSE SERPL-MCNC: 133 MG/DL (ref 65–140)
GLUCOSE SERPL-MCNC: 133 MG/DL (ref 65–140)
HCT VFR BLD AUTO: 36.7 % (ref 34.8–46.1)
HGB BLD-MCNC: 11.9 G/DL (ref 11.5–15.4)
MCH RBC QN AUTO: 28.8 PG (ref 26.8–34.3)
MCHC RBC AUTO-ENTMCNC: 32.4 G/DL (ref 31.4–37.4)
MCV RBC AUTO: 89 FL (ref 82–98)
PLATELET # BLD AUTO: 287 THOUSANDS/UL (ref 149–390)
PMV BLD AUTO: 9.4 FL (ref 8.9–12.7)
POTASSIUM SERPL-SCNC: 3.4 MMOL/L (ref 3.5–5.3)
RBC # BLD AUTO: 4.13 MILLION/UL (ref 3.81–5.12)
SODIUM SERPL-SCNC: 137 MMOL/L (ref 135–147)
WBC # BLD AUTO: 14.28 THOUSAND/UL (ref 4.31–10.16)

## 2023-10-12 PROCEDURE — 97116 GAIT TRAINING THERAPY: CPT

## 2023-10-12 PROCEDURE — 85027 COMPLETE CBC AUTOMATED: CPT

## 2023-10-12 PROCEDURE — 97535 SELF CARE MNGMENT TRAINING: CPT

## 2023-10-12 PROCEDURE — 99232 SBSQ HOSP IP/OBS MODERATE 35: CPT | Performed by: INTERNAL MEDICINE

## 2023-10-12 PROCEDURE — 80048 BASIC METABOLIC PNL TOTAL CA: CPT

## 2023-10-12 PROCEDURE — 99233 SBSQ HOSP IP/OBS HIGH 50: CPT | Performed by: NURSE PRACTITIONER

## 2023-10-12 PROCEDURE — 97110 THERAPEUTIC EXERCISES: CPT

## 2023-10-12 PROCEDURE — 82948 REAGENT STRIP/BLOOD GLUCOSE: CPT

## 2023-10-12 RX ORDER — POTASSIUM CHLORIDE 20MEQ/15ML
40 LIQUID (ML) ORAL ONCE
Status: COMPLETED | OUTPATIENT
Start: 2023-10-12 | End: 2023-10-12

## 2023-10-12 RX ADMIN — Medication 9 MG: at 22:30

## 2023-10-12 RX ADMIN — POTASSIUM CHLORIDE 40 MEQ: 1.5 SOLUTION ORAL at 09:51

## 2023-10-12 RX ADMIN — ENOXAPARIN SODIUM 40 MG: 40 INJECTION SUBCUTANEOUS at 09:51

## 2023-10-12 RX ADMIN — ATORVASTATIN CALCIUM 10 MG: 10 TABLET, FILM COATED ORAL at 09:52

## 2023-10-12 RX ADMIN — Medication 125 MG: at 12:33

## 2023-10-12 RX ADMIN — AMLODIPINE BESYLATE 5 MG: 5 TABLET ORAL at 09:52

## 2023-10-12 RX ADMIN — METOPROLOL SUCCINATE 100 MG: 100 TABLET, EXTENDED RELEASE ORAL at 09:52

## 2023-10-12 RX ADMIN — Medication 125 MG: at 05:05

## 2023-10-12 RX ADMIN — Medication 125 MG: at 18:23

## 2023-10-12 RX ADMIN — Medication 125 MG: at 23:36

## 2023-10-12 RX ADMIN — QUETIAPINE FUMARATE 25 MG: 25 TABLET ORAL at 22:30

## 2023-10-12 NOTE — CASE MANAGEMENT
Case Management Progress Note    Patient name Valentín Gilliam W /W -01 MRN [id number]  : 1934 Date 10/12/2023       LOS (days): 6  Geometric Mean LOS (GMLOS) (days): 3.80  Days to GMLOS:-2.6        OBJECTIVE:        Current admission status: Inpatient  [facility]erred Pharmacy:   [id number], [address] [place] [address] Road  Phone: [phone] Fax: [phone]    Primary Care Provider: RUBY Granados    Primary Insurance: [facility] REP  Secondary Insurance:     PROGRESS NOTE:    Weekly Care Management Length of Stay Review     Current LOS: 6 Days    Most Recent Labs:     Lab Results   Component Value Date/Time    WBC 14.28 (H) 10/12/2023 05:04 AM    HGB 11.9 10/12/2023 05:04 AM    HCT 36.7 10/12/2023 05:04 AM     10/12/2023 05:04 AM    BANDSPCT 8 10/10/2023 06:28 AM    SODIUM 137 10/12/2023 05:04 AM    K 3.4 (L) 10/12/2023 05:04 AM     10/12/2023 05:04 AM    CO2 24 10/12/2023 05:04 AM    BUN 7 10/12/2023 05:04 AM    CREATININE 0.54 (L) 10/12/2023 05:04 AM    GLUC 116 10/12/2023 05:04 AM       Most Recent Vitals:   Vitals:    10/12/23 0808   BP: 147/74   Pulse:    Resp: 18   Temp: 97.9 °F (36.6 °C)   SpO2:         Identified Barriers to Discharge/Discharge Goals/Care Management Interventions:   Abdominal mass, c-diff, GOC did occur, but family wants to pursue care. Rehab placement and auth pending.      Intended Discharge Disposition: rehab       Expected Discharge Date: anticipated in next 48hrs

## 2023-10-12 NOTE — PLAN OF CARE
Problem: OCCUPATIONAL THERAPY ADULT  Goal: Performs self-care activities at highest level of function for planned discharge setting. See evaluation for individualized goals. Description: Treatment Interventions: ADL retraining, Functional transfer training, Endurance training, Cognitive reorientation, Patient/family training, Equipment evaluation/education, Compensatory technique education, Continued evaluation, Activityengagement, Energy conservation          See flowsheet documentation for full assessment, interventions and recommendations. Outcome: Progressing  Note: Limitation: Decreased ADL status, Decreased Safe judgement during ADL, Decreased endurance, Decreased fine motor control, Decreased self-care trans, Decreased cognition  Prognosis: Fair  Assessment: Pt agreeable to OT treatment session, upon arrival patient was found supine in bed. Patient participated in skilled OT interventions to address ADL tasks, functional transfers, and overall activity tolerance and participation. In comparison to previous session, patient with improvements in functional activity tolerance and initiation for ADL tasks. Pt improving to S for bed mobility and able to manage household distance to bathroom for toileting/grooming task. Pt demonstrated improved standing tolerance however continues to be limited by weakness and deconditioning. Pt continues to be confused and require VC for sequencing and problem solving self-care. Patient to benefit from continued IPOT treatment to address deficits as defined above and maximize level of functional independence with ADLs and functional mobility.      OT Discharge Recommendation: Post acute rehabilitation services     Gio Alvares, OTR/L  [place] License [id number]  [address] [place] [address] [id number]

## 2023-10-12 NOTE — PLAN OF CARE
Problem: PHYSICAL THERAPY ADULT  Goal: Performs mobility at highest level of function for planned discharge setting. See evaluation for individualized goals. Description: Treatment/Interventions: Functional transfer training, LE strengthening/ROM, Therapeutic exercise, Endurance training, Patient/family training, Bed mobility, Equipment eval/education, Gait training, Spoke to nursing          See flowsheet documentation for full assessment, interventions and recommendations. Outcome: Not Progressing  Note: Prognosis: Fair  Problem List: Decreased strength, Decreased endurance, Impaired balance, Decreased mobility, Decreased cognition, Impaired judgement, Decreased safety awareness, Impaired hearing, Obesity, Decreased skin integrity  Assessment: pt began tx session seated OOB in the recliner and was agreeable to participate in PT intervention. PT intervention to focus on transfer training, increasing activity tolerance/endurance, posture/balance w/ gait. pt progressed with skilled PT intervention as pt was able to complete functional transfers to and from rW w/ a decrease in level of assistance required min Ax1 and VC's for hand placement while ascending to RW and descending back into recliner. pt limited in todays tx session with activity tolerance, functional mobility and ambulation distnace due to fatigue and just completing tx session with OT Rupa. pt ambulated 30'x1 RW with min ax1 for safety and balance bit required a therapeutic seated rest break post ambulation due to fatigue. pt was able to participate in TE activities while in recliner in order to strengthen LE in effort of reducing risk for falls and injuries. Post tx pt in recliner with call bell and all pt needs met. Continue to recommend post acute rehab services at the tiem of D/C in order to maximize pt functional independence and safety and all OOB mobility.  Post tx pt in reclciner with call bell, chair alarm activated and all pt needs met  Barriers to Discharge:  (unknown social history at this time)     PT Discharge Recommendation: Post acute rehabilitation services    See flowsheet documentation for full assessment. Problem: PHYSICAL THERAPY ADULT  Goal: Performs mobility at highest level of function for planned discharge setting. See evaluation for individualized goals. Description: Treatment/Interventions: Functional transfer training, LE strengthening/ROM, Therapeutic exercise, Endurance training, Patient/family training, Bed mobility, Equipment eval/education, Gait training, Spoke to nursing          See flowsheet documentation for full assessment, interventions and recommendations. Outcome: Not Progressing  Note: Prognosis: Fair  Problem List: Decreased strength, Decreased endurance, Impaired balance, Decreased mobility, Decreased cognition, Impaired judgement, Decreased safety awareness, Impaired hearing, Obesity, Decreased skin integrity  Assessment: pt began tx session seated OOB in the recliner and was agreeable to participate in PT intervention. PT intervention to focus on transfer training, increasing activity tolerance/endurance, posture/balance w/ gait. pt progressed with skilled PT intervention as pt was able to complete functional transfers to and from rW w/ a decrease in level of assistance required min Ax1 and VC's for hand placement while ascending to RW and descending back into recliner. pt limited in todays tx session with activity tolerance, functional mobility and ambulation distnace due to fatigue and just completing tx session with OT Rupa. pt ambulated 30'x1 RW with min ax1 for safety and balance bit required a therapeutic seated rest break post ambulation due to fatigue. pt was able to participate in TE activities while in recliner in order to strengthen LE in effort of reducing risk for falls and injuries. Post tx pt in recliner with call bell and all pt needs met.  Continue to recommend post acute rehab services at the [facility] of D/C in order to maximize pt functional independence and safety and all OOB mobility. Post tx pt in reclciner with call bell, chair alarm activated and all pt needs met  Barriers to Discharge:  (unknown social history at this time)     PT Discharge Recommendation: Post acute rehabilitation services    See flowsheet documentation for full assessment.       Candido Barthel

## 2023-10-12 NOTE — ASSESSMENT & PLAN NOTE
CT A/P w con shows thickened endometrium measuring approximately 14 mm thick. This is abnormally prominent for a postmenopausal patient. Nonemergent pelvic ultrasound recommended for further evaluation. patient slipped on the stairs. landed on his but. denies head trauma/loc. is c/o right hand pain

## 2023-10-12 NOTE — PROGRESS NOTES
Progress Note - Palliative & Supportive Care  Heena Leon  80 y.o.  female  W /W -01   MRN: [id number]  Encounter: [id number]     Assessment  Dementia  Suspected colorectal cancer  Lung nodule  Thickened endometrium  SIRS  C-diff  Pill dysphagia  Goals of care counseling     Plan:  Symptom management  Patient comfortable with no complaints. No palliative symptom management today. Will continue to monitor patient and treat accordingly. No scripts needed from [facility] prior to d/c.    2.   Goals:  Level 3 code status  Disease focused care DNR/DNI limits in place. Concerns introduced today include:  STR versus hospice  Outpt [facility] follow up  Will continue discussions regarding [address] [facility] as patient's clinical presentation evolves. Encouraged follow up with Palliative Medicine on an outpatient basis after discharge for continued symptom management. Family will continue to think about necessity of outpt follow up. Family has our contact information and will call when ready. 3.  Social support provided for mariah Sandoval with her decision for rehab  Feels re admission will be quick "then what"  Discussed future options for hospice  Alyx Bee is taking intermittent FMLA  Supportive listening provided  Normalized experience of patient/family  Provided anxiety containment  Provided anticipatory guidance  Encouraged self care  Discussed spiritual needs    4. Follow up  Palliative Care will continue to follow and goals of care discussions will be ongoing. Please reach out via [employer] if questions or concerns arise. 5. Care Coordination  Reviewed case with [facility] Sandra FIELDS    24 Hour History  Chart reviewed before visit. Patient is presently confused with no complaints  Says diarrhea is improving  No N/V  Is eating small amounts at each meal    Spoke to mariah Bee.   She reports being worried about her decision to have her mother go to rehab but feels it is what she needed to do at this time.  We discussed the fact that she can consider hospice at any time in the future. It is likely that readmission will happen rather quickly but if by some chance patient is discharged home from rehab Samantha Mackenzie is aware of the fact that Layne Yanes can follow palliative medicine on an outpatient basis for ongoing symptom management. Samantha Mackenzie has our contact information and will reach out with questions concerns or to schedule an appointment. Review of Systems   All other systems reviewed and are negative.       Medications    Current Facility-Administered Medications:     acetaminophen (TYLENOL) tablet 650 mg, 650 mg, Oral, Q6H PRN, Jorge Travis MD    amLODIPine (NORVASC) tablet 5 mg, 5 mg, Oral, Daily, Jorge Travis MD, 5 mg at 10/12/23 3605    atorvastatin (LIPITOR) tablet 10 mg, 10 mg, Oral, Daily, Jorge Travis MD, 10 mg at 10/12/23 0952    enoxaparin (LOVENOX) subcutaneous injection 40 mg, 40 mg, Subcutaneous, Daily, Jorge Travis MD, 40 mg at 10/12/23 0951    insulin lispro (HumaLOG) 100 units/mL subcutaneous injection 1-5 Units, 1-5 Units, Subcutaneous, HS, Koko Whitman DO, 1 Units at 10/11/23 2158    insulin lispro (HumaLOG) 100 units/mL subcutaneous injection 1-6 Units, 1-6 Units, Subcutaneous, TID With Meals **AND** [CANCELED] Fingerstick Glucose (POCT), , , Q6H, Koko Whitman DO    melatonin tablet 9 mg, 9 mg, Oral, HS, Jorge Travis MD, 9 mg at 10/11/23 2138    metoprolol succinate (TOPROL-XL) 24 hr tablet 100 mg, 100 mg, Oral, Daily, Koko Whitman DO, 100 mg at 10/12/23 1540    QUEtiapine (SEROquel) tablet 25 mg, 25 mg, Oral, HS, Jorge Travis MD, 25 mg at 10/11/23 2138    trimethobenzamide (TIGAN) IM injection 200 mg, 200 mg, Intramuscular, Q6H PRN, Jorge Travis MD    vancomycin (VANCOCIN) oral solution 125 mg, 125 mg, Oral, Q6H [facility], Carolyn Armenta MD, 125 mg at 10/12/23 0505    Objective  /74   Pulse 80   Temp 97.9 °F (36.6 °C)   Resp 18   Ht 5' 1" (1.549 m)   Wt 81.1 kg (178 lb 12.7 oz)   SpO2 95%   BMI 33.78 kg/m²   Physical Exam  Vitals and nursing note reviewed. Constitutional:       General: She is awake. She is not in acute distress. Appearance: She is overweight. She is not toxic-appearing. HENT:      Head: Normocephalic and atraumatic. Right Ear: External ear normal.      Left Ear: External ear normal.   Eyes:      General: No scleral icterus. Right eye: No discharge. Left eye: No discharge. Extraocular Movements: Extraocular movements intact. Conjunctiva/sclera: Conjunctivae normal.      Pupils: Pupils are equal, round, and reactive to light. Cardiovascular:      Rate and Rhythm: Normal rate. Pulmonary:      Effort: Pulmonary effort is normal. No tachypnea, bradypnea, accessory muscle usage or respiratory distress. Abdominal:      General: There is no distension. Musculoskeletal:      Cervical back: Normal range of motion. Normal range of motion. Right lower leg: No edema. Left lower leg: No edema. Skin:     General: Skin is dry. Coloration: Skin is not pale. Neurological:      Mental Status: She is alert. Cranial Nerves: No dysarthria or facial asymmetry. Comments: Pleasantly confused at baseline. Oriented to person and place  Follow commands     Psychiatric:         Attention and Perception: Attention normal.         Mood and Affect: Mood and affect normal.         Speech: Speech normal.         Behavior: Behavior normal. Behavior is cooperative. Thought Content: Thought content normal.         Cognition and Memory: Memory is impaired. Judgment: Judgment normal.           Lab Results: I have personally reviewed pertinent labs. , CBC:   Lab Results   Component Value Date    WBC 14.28 (H) 10/12/2023    HGB 11.9 10/12/2023    HCT 36.7 10/12/2023    MCV 89 10/12/2023     10/12/2023    RBC 4.13 10/12/2023    MCH 28.8 10/12/2023    MCHC 32.4 10/12/2023    RDW 14.8 10/12/2023 MPV 9.4 10/12/2023   , CMP:   Lab Results   Component Value Date    SODIUM 137 10/12/2023    K 3.4 (L) 10/12/2023     10/12/2023    CO2 24 10/12/2023    BUN 7 10/12/2023    CREATININE 0.54 (L) 10/12/2023    CALCIUM 8.1 (L) 10/12/2023    EGFR 83 10/12/2023     Imaging Studies: I have personally reviewed pertinent reports. CT chest w contrast  Result Date: 10/10/2023  Impression: Multiple indeterminate pulmonary nodules. The largest one is solid and measures 4 mm. For multiple nodules <6 mm in a patient of unknown risk, with the largest one being solid, for a low-risk patient, recommend no follow-up. For a high-risk patient, CT at 12 months is optional with stronger consideration if there is suspicious nodule morphology and/or upper lobe location. No mediastinal or hilar lymphadenopathy. Subpleural bibasal consolidations, appearance favors subsegmental atelectasis or scarring but correlate to exclude symptoms of pneumonia. Moderate cardiomegaly. Age-indeterminate, possibly chronic mild compression deformity of T3. Chronic compression fractures of T12 and L1.      EKG, Pathology, and Other Studies: I have personally reviewed pertinent reports. Counseling / Coordination of Care  Total floor / unit time spent today 40 minutes. Greater than 50% of total time was spent with the patient and / or family counseling and / or coordinating of care. A description of the counseling / coordination of care: Chart reviewed, provided medical updates, determined goals of care, discussed palliative care and symptom management, discussed comfort care and hospice, provided anticipatory guidance, determined competency and POA/HCA, determined social/family support, provided psychosocial support. Jay Lagunas, MSN, CRNP, [facility]  Palliative & Supportive Care    Portions of this document may have been created using dictation software and as such some "sound alike" terms may have been generated by the system.  Do not hesitate to contact me with any questions or clarifications.

## 2023-10-12 NOTE — PHYSICAL THERAPY NOTE
PHYSICAL THERAPY NOTE          Patient Name: Cesar Russ  NHQXT'N Date: 10/12/2023           10/12/23 1200   PT Last Visit   PT Visit Date 10/12/23   Note Type   Note Type Treatment   Pain Assessment   Pain Assessment Tool 0-10   Pain Score No Pain   Pain Location/Orientation Location: Abdomen   Patient's Stated Pain Goal No pain   Hospital Pain Intervention(s) Repositioned; Ambulation/increased activity; Emotional support;Elevated; Rest   Multiple Pain Sites No   Restrictions/Precautions   Weight Bearing Precautions Per Order No   Other Precautions Cognitive; Chair Alarm; Bed Alarm;Contact/isolation  (strick contact and hand hygiene)   General   Chart Reviewed Yes   Response to Previous Treatment Patient with no complaints from previous session. Family/Caregiver Present No   Cognition   Overall Cognitive Status Impaired   Arousal/Participation Alert; Responsive; Cooperative   Attention Attends with cues to redirect   Orientation Level Oriented to person;Oriented to place;Oriented to situation   Memory Decreased recall of recent events   Following Commands Follows one step commands with increased time or repetition   Comments pt was pleasant and cooperative throughout tx session   Subjective   Subjective pt was agreeable to participate in PT intervention   Bed Mobility   Rolling R Unable to assess   Rolling L Unable to assess   Supine to Sit Unable to assess   Sit to Supine Unable to assess   Additional Comments pt seated OOB in lamont recliner pre/post tx session   Transfers   Sit to Stand 4  Minimal assistance   Additional items Assist x 1; Armrests; Increased time required;Verbal cues   Stand to Sit 4  Minimal assistance   Additional items Assist x 1; Armrests; Increased time required;Verbal cues   Stand pivot 4  Minimal assistance   Additional items Assist x 1; Increased time required;Verbal cues   Additional Comments all functional transfers completed with min ax1 and RW   Ambulation/Elevation   Gait pattern Improper Weight shift;Decreased foot clearance; Foward flexed; Short stride; Excessively slow   Gait Assistance 4  Minimal assist   Additional items Assist x 1;Verbal cues   Assistive Device Rolling walker   Distance 30'x1 RW   Ambulation/Elevation Additional Comments pt limited with activity toleranc, funcitonal mobility and ambulation distance due to fatigue and OT tx session prior to PT intervention   Balance   Static Sitting Fair +   Dynamic Sitting Fair +   Static Standing Fair   Dynamic Standing Poor   Ambulatory Poor +  (w/ RW)   Endurance Deficit   Endurance Deficit Yes   Endurance Deficit Description limited activity tolerance, ambulation distance   Activity Tolerance   Activity Tolerance Patient limited by fatigue   Nurse Made Aware Spoke to RN   Exercises   Quad Sets Sitting;15 reps;AROM; Bilateral   Hip Abduction Sitting;20 reps;AROM; Bilateral   Hip Adduction Sitting;20 reps;AROM; Bilateral  (pillow squeezes)   Knee AROM Long Arc Quad Sitting;15 reps;AROM; Bilateral   Ankle Pumps Sitting;20 reps;AROM; Bilateral   Marching Sitting;10 reps;AROM; Bilateral   Assessment   Prognosis Fair   Problem List Decreased strength;Decreased endurance; Impaired balance;Decreased mobility; Decreased cognition; Impaired judgement;Decreased safety awareness; Impaired hearing;Obesity; Decreased skin integrity   Assessment pt began tx session seated OOB in the recliner and was agreeable to participate in PT intervention. PT intervention to focus on transfer training, increasing activity tolerance/endurance, posture/balance w/ gait. pt progressed with skilled PT intervention as pt was able to complete functional transfers to and from rW w/ a decrease in level of assistance required min Ax1 and VC's for hand placement while ascending to RW and descending back into recliner.  pt limited in todays tx session with activity tolerance, functional mobility and ambulation distnace due to fatigue and just completing tx session with ABEBE Goode. pt ambulated 30'x1 RW with min ax1 for safety and balance bit required a therapeutic seated rest break post ambulation due to fatigue. pt was able to participate in TE activities while in recliner in order to strengthen LE in effort of reducing risk for falls and injuries. Post tx pt in recliner with call bell and all pt needs met. Continue to recommend post acute rehab services at the [facility] in order to maximize pt functional independence and safety and all OOB mobility. Post tx pt in reclciner with call bell, chair alarm activated and all pt needs met   Goals   Patient Goals none stated   STG Expiration Date 10/17/23   PT Treatment Day 3   Plan   Treatment/Interventions Functional transfer training;LE strengthening/ROM; Therapeutic exercise; Endurance training;Patient/family training;Bed mobility; Equipment eval/education;Gait training;Spoke to nursing   Progress Slow progress, decreased activity tolerance   PT Frequency 3-5x/wk   Discharge Recommendation   PT Discharge Recommendation Post acute rehabilitation services   AM-PAC Basic Mobility Inpatient   Turning in Flat Bed Without Bedrails 2   Lying on Back to Sitting on Edge of Flat Bed Without Bedrails 2   Moving Bed to Chair 3   Standing Up From Chair Using Arms 3   Walk in Room 3   Climb 3-5 Stairs With Railing 1   Basic Mobility Inpatient Raw Score 14   Basic Mobility Standardized Score 35.55   Highest Level Of Mobility   JH-HLM Goal 4: Move to chair/commode   JH-HLM Achieved 7: Walk 25 feet or more   Education   Education Provided Mobility training;Assistive device   Patient Reinforcement needed   End of Consult   Patient Position at End of Consult Bedside chair;Bed/Chair alarm activated; All needs within reach      10/12/23 1200   PT Last Visit   PT Visit Date 10/12/23   Note Type   Note Type Treatment   Pain Assessment   Pain Assessment Tool 0-10   Pain Score No Pain   Pain Location/Orientation Location: Abdomen   Patient's Stated Pain Goal No pain   Hospital Pain Intervention(s) Repositioned; Ambulation/increased activity; Emotional support;Elevated; Rest   Multiple Pain Sites No   Restrictions/Precautions   Weight Bearing Precautions Per Order No   Other Precautions Cognitive; Chair Alarm; Bed Alarm;Contact/isolation  (strick contact and hand hygiene)   General   Chart Reviewed Yes   Response to Previous Treatment Patient with no complaints from previous session. Family/Caregiver Present No   Cognition   Overall Cognitive Status Impaired   Arousal/Participation Alert; Responsive; Cooperative   Attention Attends with cues to redirect   Orientation Level Oriented to person;Oriented to place;Oriented to situation   Memory Decreased recall of recent events   Following Commands Follows one step commands with increased time or repetition   Comments pt was pleasant and cooperative throughout tx session   Subjective   Subjective pt was agreeable to participate in PT intervention   Bed Mobility   Rolling R Unable to assess   Rolling L Unable to assess   Supine to Sit Unable to assess   Sit to Supine Unable to assess   Additional Comments pt seated OOB in lamont recliner pre/post tx session   Transfers   Sit to Stand 4  Minimal assistance   Additional items Assist x 1; Armrests; Increased time required;Verbal cues   Stand to Sit 4  Minimal assistance   Additional items Assist x 1; Armrests; Increased time required;Verbal cues   Stand pivot 4  Minimal assistance   Additional items Assist x 1; Increased time required;Verbal cues   Additional Comments all functional transfers completed with min ax1 and RW   Ambulation/Elevation   Gait pattern Improper Weight shift;Decreased foot clearance; Foward flexed; Short stride; Excessively slow   Gait Assistance 4  Minimal assist   Additional items Assist x 1;Verbal cues   Assistive Device Rolling walker   Distance 30'x1 RW   Ambulation/Elevation Additional Comments pt limited with activity toleranc, funcitonal mobility and ambulation distance due to fatigue and OT tx session prior to PT intervention   Balance   Static Sitting Fair +   Dynamic Sitting Fair +   Static Standing Fair   Dynamic Standing Poor   Ambulatory Poor +  (w/ RW)   Endurance Deficit   Endurance Deficit Yes   Endurance Deficit Description limited activity tolerance, ambulation distance   Activity Tolerance   Activity Tolerance Patient limited by fatigue   Nurse Made Aware Spoke to RN   Exercises   Quad Sets Sitting;15 reps;AROM; Bilateral   Hip Abduction Sitting;20 reps;AROM; Bilateral   Hip Adduction Sitting;20 reps;AROM; Bilateral  (pillow squeezes)   Knee AROM Long Arc Quad Sitting;15 reps;AROM; Bilateral   Ankle Pumps Sitting;20 reps;AROM; Bilateral   Marching Sitting;10 reps;AROM; Bilateral   Assessment   Prognosis Fair   Problem List Decreased strength;Decreased endurance; Impaired balance;Decreased mobility; Decreased cognition; Impaired judgement;Decreased safety awareness; Impaired hearing;Obesity; Decreased skin integrity   Assessment pt began tx session seated OOB in the recliner and was agreeable to participate in PT intervention. PT intervention to focus on transfer training, increasing activity tolerance/endurance, posture/balance w/ gait. pt progressed with skilled PT intervention as pt was able to complete functional transfers to and from rW w/ a decrease in level of assistance required min Ax1 and VC's for hand placement while ascending to RW and descending back into recliner. pt limited in todays tx session with activity tolerance, functional mobility and ambulation distnace due to fatigue and just completing tx session with OT Rupa. pt ambulated 30'x1 RW with min ax1 for safety and balance bit required a therapeutic seated rest break post ambulation due to fatigue. pt was able to participate in TE activities while in recliner in order to strengthen LE in effort of reducing risk for falls and injuries. Post tx pt in recliner with call bell and all pt needs met. Continue to recommend post acute rehab services at the [facility] in order to maximize pt functional independence and safety and all OOB mobility. Post tx pt in reclciner with call bell, chair alarm activated and all pt needs met   Goals   Patient Goals none stated   STG Expiration Date 10/17/23   PT Treatment Day 3   Plan   Treatment/Interventions Functional transfer training;LE strengthening/ROM; Therapeutic exercise; Endurance training;Patient/family training;Bed mobility; Equipment eval/education;Gait training;Spoke to nursing   Progress Slow progress, decreased activity tolerance   PT Frequency 3-5x/wk   Discharge Recommendation   PT Discharge Recommendation Post acute rehabilitation services   AM-PAC Basic Mobility Inpatient   Turning in Flat Bed Without Bedrails 2   Lying on Back to Sitting on Edge of Flat Bed Without Bedrails 2   Moving Bed to Chair 3   Standing Up From Chair Using Arms 3   Walk in Room 3   Climb 3-5 Stairs With Railing 1   Basic Mobility Inpatient Raw Score 14   Basic Mobility Standardized Score 35.55   Highest Level Of Mobility   -HLM Goal 4: Move to chair/commode   JH-HLM Achieved 7: Walk 25 feet or more   Education   Education Provided Mobility training;Assistive device   Patient Reinforcement needed   End of Consult   Patient Position at End of Consult Bedside chair;Bed/Chair alarm activated; All needs within reach   The patient's AM-PAC Basic Mobility Inpatient Short Form Raw Score is 14. A Raw score of less than or equal to 16 suggests the patient may benefit from discharge to post-acute rehabilitation services. Please also refer to the recommendation of the Physical Therapist for safe discharge planning.       Glenys Lindsay

## 2023-10-12 NOTE — PROGRESS NOTES
[address] [facility]  Progress Note  Name: Zbigniew Taylor  MRN: [id number]  Unit/Bed#: W -01 I Date of Admission: 10/5/2023   Date of Service: 10/12/2023 I Hospital Day: 6    Assessment/Plan   C. difficile diarrhea  Assessment & Plan  Patient has been having uncontrollable diarrhea since admission. Patient was recently treated with Augmentin a week ago. - C Diff +  - ongoing loose watery stools 1x this morning     Plan:  Continuing IV and PO Antibiotics  Vancomycin 125 mg q6 day #7/10 (switched to solution form of Vancomycin on 10/07 due to patient having difficulty swallowing pills  Patient seen by infectious disease. They recommended discontinuation of cefepime Flagyl. They are also recommending 10 days of vancomycin 125 mg q6h oral for 10 days following the discontinuation of systemic antibiotics. Currently day #2/10 since discontinuation of systemic Abx. Speech consulted recommending Clear liquid diet, Thin liquids; advance to include regular solids as per GI  Patient medically stable for discharge to rehab facility. Awaiting placement. * Colorectal cancer suspected  Assessment & Plan  Pt p/w abd pain, emesis, decreased PO intake. Pt is poor historian due to dementia. 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 Moderate obstruction of the colon proximal to the lesion. Colorectal consulted from ED and discussed w family at bedside. Per surgery note, family does not desire aggressive treatment such as surgery or chemotherapy, but would like to know extent of disease and have palliative care on board. 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   - Surgery not recommending any surgical interventions at this time. -GI not recommending colonoscopy in hospital due to C. difficile infection. We will follow-up with patient in 4 weeks to assess need for colonoscopy. - Consult to palliative care for recommendations on possible comfort care  - CT chest demonstrated multiple intermediate pulmonary nodules. SIRS (systemic inflammatory response syndrome) (HCC)  Assessment & Plan  Meets SIRS criteria on admission for leukocytosis of 46 thousand and tachycardia ( at presentation, now 85)    Afebrile. BPs mildly to moderately hypertensive (131-143)/(60-67). Pt appears to have just completed 1-week course Augmentin 9/27-10/4/23, per chart review    Differential includes inflammatory 2/2 malignancy vs infectious (possible intra-abdominal abscess seen on CT vs gut translocation 2/2 moderate colonic obstruction). BCx x2 collected in ED, pt received cefepime 1000 mg, metronidazole 500 mg, vancomycin 1500 mg  - Bcx negative at 48 hrs     Plan:  Monitor off additional abx at this time  Monitor fever curve and BP  Trend WBCs  F/u BCx  If pt develops fever or hypotension, should consider consulting interventional radiology for abscess drainage. Lung nodule seen on imaging study  Assessment & Plan  CT A/P w con shows two pulmonary nodules within the left lower lobe of the lung, measuring 3 mm and 4 mm, respectively. Metastatic disease not excluded. Short-term follow-up recommended.     Type 2 diabetes mellitus without complication, without long-term current use of insulin [facility])  Assessment & Plan  Lab Results   Component Value Date    HGBA1C 6.6 (H) 10/05/2023       Recent Labs     10/11/23  1528 10/11/23  2104 10/12/23  0806 10/12/23  1151   POCGLU 199* 152* 108 133         Blood Sugar Average: Last 72 hrs:  (P) 125.1875     Not on anti-diabetic medications, per EMR    Plan  - Fingerstick QID w/ meals and at bedtime  - SSI       Thickened endometrium  Assessment & Plan  CT A/P w con shows thickened endometrium measuring approximately 14 mm thick. This is abnormally prominent for a postmenopausal patient. Nonemergent pelvic ultrasound recommended for further evaluation. Benign essential hypertension  Assessment & Plan  Unable to review medications with patient (dementia) or family (unable to reach). Per chart, pt home medications include amlodipine and metoprolol. Unclear indication for metoprolol. SIRS as noted above, unclear whether due to infectious or inflammatory etiology, monitoring closely for development of hypotension. Plan  - Continue amlodipine with hold parameters for SBP<130  - Home metoprolol continued  - Review home medications with family when able to contact               VTE Pharmacologic Prophylaxis: VTE Score: 11 High Risk (Score >/= 5) - Pharmacological DVT Prophylaxis Ordered: enoxaparin (Lovenox). Sequential Compression Devices Ordered. Patient Centered Rounds: I performed bedside rounds with nursing staff today. Discussions with Specialists or Other Care Team Provider: GI, ID, Colorectal surgery    Education and Discussions with Family / Patient: Updated  (daughter) via phone. Current Length of Stay: 6 day(s)  Current Patient Status: Inpatient   Discharge Plan: Anticipate discharge in 24-48 hrs to rehab facility. Code Status: Level 3 - DNAR and DNI    Subjective:   Patient is reporting that she is feeling well and she is currently not experiencing abdominal pain, shortness of breath, or increased frequency of bowel movements. She stated she feels better than prior to admission. Objective:     Vitals:   Temp (24hrs), Av.7 °F (36.5 °C), Min:97.5 °F (36.4 °C), Max:97.9 °F (36.6 °C)    Temp:  [97.5 °F (36.4 °C)-97.9 °F (36.6 °C)] 97.9 °F (36.6 °C)  HR:  [80] 80  Resp:  [17-18] 18  BP: (130-147)/(74-83) 147/74  SpO2:  [95 %] 95 %  Body mass index is 33.78 kg/m². Input and Output Summary (last 24 hours):    No intake or output data in the 24 hours ending 10/12/23 1528      Physical Exam:   Physical Exam  Cardiovascular:      Rate and Rhythm: Normal rate and regular rhythm. Heart sounds: Normal heart sounds. Pulmonary:      Effort: Pulmonary effort is normal.      Breath sounds: Normal breath sounds. Neurological:      Mental Status: She is alert. She is disoriented. Additional Data:     Labs:  Results from last 7 days   Lab Units 10/12/23  0504 10/11/23  0505 10/10/23  0628   WBC Thousand/uL 14.28*   < > 14.51*   HEMOGLOBIN g/dL 11.9   < > 12.4   HEMATOCRIT % 36.7   < > 38.2   PLATELETS Thousands/uL 287   < > 348   BANDS PCT %  --   --  8   LYMPHO PCT %  --   --  20   MONO PCT %  --   --  9   EOS PCT %  --   --  1    < > = values in this interval not displayed. Results from last 7 days   Lab Units 10/12/23  0504 10/07/23  0617 10/06/23  0519   SODIUM mmol/L 137   < > 135   POTASSIUM mmol/L 3.4*   < > 3.2*   CHLORIDE mmol/L 106   < > 101   CO2 mmol/L 24   < > 20*   BUN mg/dL 7   < > 21   CREATININE mg/dL 0.54*   < > 0.75   ANION GAP mmol/L 7   < > 14   CALCIUM mg/dL 8.1*   < > 8.2*   ALBUMIN g/dL  --   --  3.2*   TOTAL BILIRUBIN mg/dL  --   --  1.01*   ALK PHOS U/L  --   --  82   ALT U/L  --   --  9   AST U/L  --   --  16   GLUCOSE RANDOM mg/dL 116   < > 161*    < > = values in this interval not displayed.      Results from last 7 days   Lab Units 10/05/23  2225   INR  1.31*     Results from last 7 days   Lab Units 10/12/23  1151 10/12/23  0806 10/11/23  2104 10/11/23  1528 10/11/23  1119 10/11/23  0746 10/10/23  2126 10/10/23  1616 10/10/23  1205 10/10/23  0807 10/09/23  2037 10/09/23  1343   POC GLUCOSE mg/dl 133 108 152* 199* 120 104 144* 117 135 97 102 146*     Results from last 7 days   Lab Units 10/05/23  2033   HEMOGLOBIN A1C % 6.6*     Results from last 7 days   Lab Units 10/07/23  0617 10/06/23  0519 10/05/23  2225   LACTIC ACID mmol/L  --   --  1.6   PROCALCITONIN ng/ml 1.19* 0.66* 0.36* Lines/Drains:  Invasive Devices       Peripheral Intravenous Line  Duration             Peripheral IV 10/11/23 Right Antecubital 1 day                          Imaging: No pertinent imaging reviewed. Recent Cultures (last 7 days):   Results from last 7 days   Lab Units 10/06/23  0742 10/05/23  2239 10/05/23  2225   BLOOD CULTURE   --  No Growth After 5 Days. No Growth After 5 Days. C DIFF TOXIN B BY PCR  Positive*  --   --        Last 24 Hours Medication List:   Current Facility-Administered Medications   Medication Dose Route Frequency Provider Last Rate    acetaminophen  650 mg Oral Q6H PRN Roger Christian MD      amLODIPine  5 mg Oral Daily Roger Christian MD      atorvastatin  10 mg Oral Daily Roger Christian MD      enoxaparin  40 mg Subcutaneous Daily Roger Christian MD      insulin lispro  1-5 Units Subcutaneous HS [facility], DO      insulin lispro  1-6 Units Subcutaneous TID With [address], DO      melatonin  9 mg Oral HS Roger Christian MD      metoprolol succinate  100 mg Oral Daily [facility], DO      QUEtiapine  25 mg Oral HS Roger Christian MD      trimethobenzamide  200 mg Intramuscular Q6H PRN Roger Christian MD      vancomycin  125 mg Oral Q6H Keyona Alvarado MD          Today, Patient Was Seen By: [facility], DO    **Please Note: This note may have been constructed using a voice recognition system. **

## 2023-10-12 NOTE — ASSESSMENT & PLAN NOTE
Lab Results   Component Value Date    HGBA1C 6.6 (H) 10/05/2023       Recent Labs     10/11/23  1528 10/11/23  2104 10/12/23  0806 10/12/23  1151   POCGLU 199* 152* 108 133         Blood Sugar Average: Last 72 hrs:  (P) 125.1875     Not on anti-diabetic medications, per EMR    Plan  - Fingerstick QID w/ meals and at bedtime  - SSI

## 2023-10-12 NOTE — ASSESSMENT & PLAN NOTE
Patient was admitted with tachycardia and elevated WBC and therefore meeting sepsis criteria. CT showed possible abscess in the abdomen. Patient also tested positive for C. difficile. Was treated with empiric antibiotics for 5 days. We will continue vancomycin for 10 days after stop of empiric antibiotics. No

## 2023-10-12 NOTE — CASE MANAGEMENT
Case Management Discharge Planning Note    Patient name Dionne Mata  Location W /W -00 MRN [id number]  : 1934 Date 10/12/2023       Current Admission Date: 10/5/2023  Current Admission Diagnosis:Colorectal cancer suspected   Patient Active Problem List    Diagnosis Date Noted    SIRS (systemic inflammatory response syndrome) ([address]) 10/06/2023    Colorectal cancer suspected 10/06/2023    Lung nodule seen on imaging study 10/06/2023    C. difficile diarrhea 10/06/2023    Bilateral hearing loss 2023    Type 2 diabetes mellitus without complication, without long-term current use of insulin ([address])     Metabolic encephalopathy     Major depressive disorder with single episode, in full remission ([address]) 10/19/2020    Constipation, slow transit 2019    Closed fracture of right hip with routine healing 2019    Thickened endometrium 2017    Pancreatic cyst 2017    Enteritis 2017    Age related osteoporosis 2016    Resting tremor 05/10/2016    Vitamin D deficiency 2014    Right bundle-branch block 10/29/2013    Leiomyoma of uterus 2013    Benign essential hypertension 2012    Mixed hyperlipidemia 2012    Gastroesophageal reflux disease without esophagitis 2012      LOS (days): 6  Geometric Mean LOS (GMLOS) (days): 3.80  Days to GMLOS:-2.9     OBJECTIVE:  Risk of Unplanned Readmission Score: 17.65         Current admission status: Inpatient   Preferred Pharmacy:   [facility]/pharmacy #[id number]  Phone: [phone] Fax: [phone]    Primary Care Provider: RUBY Bryan    Primary Insurance: [employer] [facility]  Secondary Insurance:     DISCHARGE DETAILS:    Discharge planning discussed with[de-identified] daughter Karissa Renner via e-mail  Freedom of Choice: Yes     CM contacted family/caregiver?: Yes  Were Treatment Team discharge recommendations reviewed with patient/caregiver?: Yes  Did patient/caregiver verbalize understanding of patient care needs?: N/A- going to facility  Were patient/caregiver advised of the risks associated with not following Treatment Team discharge recommendations?: Yes    Contacts  Patient Contacts: Da Theodore  Relationship to Patient[de-identified] Family  Contact Method: Other (Comment) (e-mail)  Reason/Outcome: Continuity of Care, Emergency Contact, Discharge Planning, Referral    Requested [address] [facility]         Is the patient interested in [facility] at discharge?: No    DME Referral Provided  Referral made for DME?: No    Other Referral/Resources/Interventions Provided:  Interventions: SNF, Short Term Rehab  Referral Comments: CM discussed placement with patients daughter, Da Theodore via e-mail. Daughter stating she would like to stay within [address] [place] [address] if able, neither  SNF or [facility] able to accept. CM reviewed other accepting facilities with daughter, daughter would like to accept bed at [facility] in [place], d/c support attached- auth initiated. CM to follow up in AM to continue with dcp.     Would you like to participate in our [facility] service program?  : No - Declined    Treatment Team Recommendation: SNF, Short Term Rehab  Discharge Destination Plan[de-identified] SNF, Short Term Rehab  Transport at Discharge : Wheelchair Ria

## 2023-10-12 NOTE — OCCUPATIONAL THERAPY NOTE
Occupational Therapy Progress Note     Patient Name: Dionne Mata  XPHAV'B Date: 10/12/2023  Problem List  Principal Problem:    Colorectal cancer suspected  Active Problems:    Benign essential hypertension    Thickened endometrium    Type 2 diabetes mellitus without complication, without long-term current use of insulin (HCC)    SIRS (systemic inflammatory response syndrome) (HCC)    Lung nodule seen on imaging study    C. difficile diarrhea        10/12/23 1143   OT Last Visit   OT Visit Date 10/12/23   Note Type   Note Type Treatment for insurance authorization   Pain Assessment   Pain Assessment Tool 0-10   Pain Score No Pain  (however pt intermittently saying "ouch" throughout session)   Restrictions/Precautions   Weight Bearing Precautions Per Order No   Other Precautions Contact/isolation;Cognitive; Chair Alarm; Bed Alarm; Fall Risk  (strict contact and hand hygiene)   Lifestyle   Autonomy Pt lives w/ daughter in a 2 level house and requires A for ADLs PTA, RW, (-)    Reciprocal Relationships Supportive daughters   Service to Others Retired   Intrinsic Gratification Pt enjoys reading   ADL   Where Assessed Sitting at sink   Eating Assistance 5  Supervision/Setup   Eating Deficit Setup; Beverage management   Eating Comments A for setup, encouragement to eat, able to use utensils appropriately   Grooming Assistance 4  Minimal Assistance   Grooming Deficit Setup;Steadying;Verbal cueing;Supervision/safety; Increased time to complete; Teeth care;Wash/dry hands; Wash/dry face   Grooming Comments Able to wash hands in stance at sink w/ VC to operate soap. Attempted to perform teeth care in stance at sink however limited by fatigue and required completion of task in sitting. VC for sequencing of task   UB Dressing Assistance 4  Minimal Assistance   UB Dressing Deficit Setup;Steadying;Verbal cueing;Supervision/safety; Increased time to complete; Thread RUE; Thread LUE;Pull around back   UB Dressing Comments sitting, VC for orientation of gown, attempting to thread UEs through incorrect holes   LB Dressing Comments pt declined participation in LB dressing   [address]  3  Moderate Assistance   Toileting Deficit Setup;Steadying;Verbal cueing;Supervison/safety; Increased time to complete;Grab bar use;Clothing management up;Perineal hygiene   Toileting Comments pt attemped get care sitting on toilet however poor thoroughness and requried additional assist in stance, incontinent of BM at sink and reqired max A for hygiene second attempt   Functional Standing Tolerance   Time 2 minutes   Activity grooming at sink   Comments limited by fatigue, trunk support on sinktop   Bed Mobility   Supine to Sit 5  Supervision   Additional items Assist x 1;HOB elevated; Bedrails; Increased time required;Verbal cues   Additional Comments OOB to recliner at end of session   Transfers   Sit to Stand 4  Minimal assistance   Additional items Assist x 1; Increased time required;Verbal cues   Stand to Sit 4  Minimal assistance   Additional items Assist x 1; Increased time required;Verbal cues   Toilet transfer 4  Minimal assistance   Additional items Assist x 1; Increased time required;Verbal cues;Standard toilet  (use of grab bar R)   Additional Comments consistet VC for safe hand placement   Functional Mobility   Functional Mobility 4  Minimal assistance   Additional Comments Short household distance to bathroom and back w/ RW and min A x 1, limited by fatigue   Additional items Rolling walker   Toilet Transfers   Toilet Transfer From [employer] Transfer Type To and from   Toilet Transfer to Standard toilet   Toilet Transfer Technique Ambulating   Toilet Transfers Minimal assistance; Not tested   Toilet Transfers Comments VC for use of grab bar R   Subjective   Subjective (S)  "Is there someone I can ask if I graduated highschool?" "I think I live with my grandfather, but he might be gone now, I'm not sure"   Cognition   Overall Cognitive Status Impaired   Arousal/Participation Alert; Cooperative   Attention Attends with cues to redirect   Orientation Level Oriented to person;Oriented to place; Disoriented to time;Disoriented to situation  (pt asked many times throughout session "why am I here?")   Memory Decreased recall of recent events;Decreased recall of precautions;Decreased short term memory;Decreased recall of biographical information;Decreased long term memory   Following Commands Follows one step commands with increased time or repetition   Comments Pt remains confused, repetative w/ poor short term memory to reorientation. Believes she is still living with her grandfather, asked if she had children. Overall pleasant and cooperative. Requires frequent redirection and VC to initiate and sequence basic tasks. Activity Tolerance   Activity Tolerance Patient limited by fatigue  (cognition, deconditioning)   Medical Staff Made Aware Spoke to PT KYLEIGH Centeno   Assessment   Assessment Pt agreeable to OT treatment session, upon arrival patient was found supine in bed. Patient participated in skilled OT interventions to address ADL tasks, functional transfers, and overall activity tolerance and participation. In comparison to previous session, patient with improvements in functional activity tolerance and initiation for ADL tasks. Pt improving to S for bed mobility and able to manage household distance to bathroom for toileting/grooming task. Pt demonstrated improved standing tolerance however continues to be limited by weakness and deconditioning. Pt continues to be confused and require VC for sequencing and problem solving self-care. Patient to benefit from continued IPOT treatment to address deficits as defined above and maximize level of functional independence with ADLs and functional mobility. Plan   Treatment Interventions ADL retraining;Functional transfer training; Endurance training;Cognitive reorientation;Patient/family training;Equipment evaluation/education; Compensatory technique education;Continued evaluation; Activityengagement; Energy conservation   Goal Expiration Date 10/21/23   OT Treatment Day 1   OT Frequency 3-5x/wk   Discharge Recommendation   OT Discharge Recommendation Post acute rehabilitation services   Additional Comments  The patient's raw score on the AM-PAC Daily Activity inpatient short form is 16, standardized score is 35.96, less than 39.4. From an OT standpoint, patients at this level may benefit from d/c to Post acute rehabilitation services. AM-PAC Daily Activity Inpatient   Lower Body Dressing 2   Bathing 2   Toileting 2   Upper Body Dressing 3   Grooming 3   Eating 4   Daily Activity Raw Score 16   Daily Activity Standardized Score (Calc for Raw Score >=11) 35.96   AM-PAC Applied Cognition Inpatient   Following a Speech/Presentation 2   Understanding Ordinary Conversation 4   Taking Medications 1   Remembering Where Things Are Placed or Put Away 2   Remembering List of 4-5 Errands 1   Taking Care of Complicated Tasks 1   Applied Cognition Raw Score 11   Applied Cognition Standardized Score 27.03   End of Consult   Patient Position at End of Consult Bedside chair;Bed/Chair alarm activated; All needs within reach   Nurse Communication Nurse aware of consult     IQRA Rod/L  [place] License [id number]  [address] [place] [address] [id number]

## 2023-10-12 NOTE — CASE MANAGEMENT
[facility] has received approved authorization from insurance:   CBC  Called in by Wanda Washington P#  [phone]  Authorization received for: SNF  Facility: [address] [place] [address] #: [id number]    Start of Care: 10/12  Next Review Date: 10/19  Continued Stay Care Coordinator:  none given P#: [phone]   Submit next review to: [address] [place] [address],Third Floor notified: Aurea Pitts

## 2023-10-12 NOTE — ASSESSMENT & PLAN NOTE
Patient has been having uncontrollable diarrhea since admission. Patient was recently treated with Augmentin a week ago. - C Diff +  - ongoing loose watery stools 1x this morning     Plan:  Continuing IV and PO Antibiotics  Vancomycin 125 mg q6 day #7/10 (switched to solution form of Vancomycin on 10/07 due to patient having difficulty swallowing pills  Patient seen by infectious disease. They recommended discontinuation of cefepime Flagyl. They are also recommending 10 days of vancomycin 125 mg q6h oral for 10 days following the discontinuation of systemic antibiotics. Currently day #2/10 since discontinuation of systemic Abx. Speech consulted recommending Clear liquid diet, Thin liquids; advance to include regular solids as per GI  Patient medically stable for discharge to rehab facility. Awaiting placement.

## 2023-10-12 NOTE — CASE MANAGEMENT
Case Management Discharge Planning Note    Patient name Salomón Medina  Location W /W -03 MRN [id number]  : 1934 Date 10/12/2023       Current Admission Date: 10/5/2023  Current Admission Diagnosis:Colorectal cancer suspected   Patient Active Problem List    Diagnosis Date Noted    SIRS (systemic inflammatory response syndrome) ([address]) 10/06/2023    Colorectal cancer suspected 10/06/2023    Lung nodule seen on imaging study 10/06/2023    C. difficile diarrhea 10/06/2023    Bilateral hearing loss 2023    Type 2 diabetes mellitus without complication, without long-term current use of insulin ([address])     Metabolic encephalopathy     Major depressive disorder with single episode, in full remission ([address]) 10/19/2020    Constipation, slow transit 2019    Closed fracture of right hip with routine healing 2019    Thickened endometrium 2017    Pancreatic cyst 2017    Enteritis 2017    Age related osteoporosis 2016    Resting tremor 05/10/2016    Vitamin D deficiency 2014    Right bundle-branch block 10/29/2013    Leiomyoma of uterus 2013    Benign essential hypertension 2012    Mixed hyperlipidemia 2012    Gastroesophageal reflux disease without esophagitis 2012      LOS (days): 6  Geometric Mean LOS (GMLOS) (days): 3.80  Days to GMLOS:-2.8     OBJECTIVE:  Risk of Unplanned Readmission Score: 17.65         Current admission status: Inpatient   Preferred Pharmacy:   [facility]/pharmacy #[id number] Flor, [address] [place] [address]  [address]  Phone: [phone] Fax: [phone]    Primary Care Provider: RUBY Vargas    [facility] [facility]:     [address] [facility] Number: [id number]

## 2023-10-12 NOTE — CASE MANAGEMENT
[address] received request for authorization from Care Manager.   Authorization request for: SNF  Facility Name:  [facility] NPI: [id number]  Facility MD:   Dr Venus Lucero  NPI: [id number]  Authorization initiated by contacting insurance:  [facility] Via: Availity  Pending Reference #: [id number]   Clinicals submitted via:  Lesly Menezes

## 2023-10-13 VITALS
HEIGHT: 61 IN | BODY MASS INDEX: 35.34 KG/M2 | OXYGEN SATURATION: 96 % | DIASTOLIC BLOOD PRESSURE: 70 MMHG | SYSTOLIC BLOOD PRESSURE: 134 MMHG | TEMPERATURE: 96.6 F | WEIGHT: 187.17 LBS | HEART RATE: 80 BPM | RESPIRATION RATE: 18 BRPM

## 2023-10-13 LAB
ANION GAP SERPL CALCULATED.3IONS-SCNC: 6 MMOL/L
BASOPHILS # BLD MANUAL: 0.13 THOUSAND/UL (ref 0–0.1)
BASOPHILS NFR MAR MANUAL: 1 % (ref 0–1)
BUN SERPL-MCNC: 7 MG/DL (ref 5–25)
CALCIUM SERPL-MCNC: 8.2 MG/DL (ref 8.4–10.2)
CHLORIDE SERPL-SCNC: 105 MMOL/L (ref 96–108)
CO2 SERPL-SCNC: 26 MMOL/L (ref 21–32)
CREAT SERPL-MCNC: 0.53 MG/DL (ref 0.6–1.3)
EOSINOPHIL # BLD MANUAL: 0.13 THOUSAND/UL (ref 0–0.4)
EOSINOPHIL NFR BLD MANUAL: 1 % (ref 0–6)
ERYTHROCYTE [DISTWIDTH] IN BLOOD BY AUTOMATED COUNT: 15.1 % (ref 11.6–15.1)
GFR SERPL CREATININE-BSD FRML MDRD: 84 ML/MIN/1.73SQ M
GLUCOSE SERPL-MCNC: 163 MG/DL (ref 65–140)
GLUCOSE SERPL-MCNC: 87 MG/DL (ref 65–140)
GLUCOSE SERPL-MCNC: 95 MG/DL (ref 65–140)
HCT VFR BLD AUTO: 34.2 % (ref 34.8–46.1)
HGB BLD-MCNC: 11.1 G/DL (ref 11.5–15.4)
LYMPHOCYTES # BLD AUTO: 19 % (ref 14–44)
LYMPHOCYTES # BLD AUTO: 2.38 THOUSAND/UL (ref 0.6–4.47)
MCH RBC QN AUTO: 28.6 PG (ref 26.8–34.3)
MCHC RBC AUTO-ENTMCNC: 32.5 G/DL (ref 31.4–37.4)
MCV RBC AUTO: 88 FL (ref 82–98)
METAMYELOCYTES NFR BLD MANUAL: 12 % (ref 0–1)
MONOCYTES # BLD AUTO: 1 THOUSAND/UL (ref 0–1.22)
MONOCYTES NFR BLD: 8 % (ref 4–12)
MYELOCYTES NFR BLD MANUAL: 4 % (ref 0–1)
NEUTROPHILS # BLD MANUAL: 6.9 THOUSAND/UL (ref 1.85–7.62)
NEUTS BAND NFR BLD MANUAL: 11 % (ref 0–8)
NEUTS SEG NFR BLD AUTO: 44 % (ref 43–75)
PLATELET # BLD AUTO: 265 THOUSANDS/UL (ref 149–390)
PLATELET BLD QL SMEAR: ADEQUATE
PMV BLD AUTO: 9.4 FL (ref 8.9–12.7)
POTASSIUM SERPL-SCNC: 3.5 MMOL/L (ref 3.5–5.3)
RBC # BLD AUTO: 3.88 MILLION/UL (ref 3.81–5.12)
RBC MORPH BLD: NORMAL
SODIUM SERPL-SCNC: 137 MMOL/L (ref 135–147)
WBC # BLD AUTO: 12.54 THOUSAND/UL (ref 4.31–10.16)

## 2023-10-13 PROCEDURE — 80048 BASIC METABOLIC PNL TOTAL CA: CPT

## 2023-10-13 PROCEDURE — 85007 BL SMEAR W/DIFF WBC COUNT: CPT

## 2023-10-13 PROCEDURE — 82948 REAGENT STRIP/BLOOD GLUCOSE: CPT

## 2023-10-13 PROCEDURE — 85027 COMPLETE CBC AUTOMATED: CPT

## 2023-10-13 PROCEDURE — 97110 THERAPEUTIC EXERCISES: CPT

## 2023-10-13 PROCEDURE — 99239 HOSP IP/OBS DSCHRG MGMT >30: CPT | Performed by: HOSPITALIST

## 2023-10-13 PROCEDURE — 99232 SBSQ HOSP IP/OBS MODERATE 35: CPT | Performed by: PHYSICIAN ASSISTANT

## 2023-10-13 PROCEDURE — 97116 GAIT TRAINING THERAPY: CPT

## 2023-10-13 RX ORDER — POTASSIUM CHLORIDE 20MEQ/15ML
40 LIQUID (ML) ORAL ONCE
Status: COMPLETED | OUTPATIENT
Start: 2023-10-13 | End: 2023-10-13

## 2023-10-13 RX ADMIN — METOPROLOL SUCCINATE 100 MG: 100 TABLET, EXTENDED RELEASE ORAL at 08:46

## 2023-10-13 RX ADMIN — Medication 125 MG: at 11:25

## 2023-10-13 RX ADMIN — AMLODIPINE BESYLATE 5 MG: 5 TABLET ORAL at 08:46

## 2023-10-13 RX ADMIN — Medication 125 MG: at 05:13

## 2023-10-13 RX ADMIN — POTASSIUM CHLORIDE 40 MEQ: 1.5 SOLUTION ORAL at 08:46

## 2023-10-13 RX ADMIN — INSULIN LISPRO 1 UNITS: 100 INJECTION, SOLUTION INTRAVENOUS; SUBCUTANEOUS at 11:52

## 2023-10-13 RX ADMIN — ENOXAPARIN SODIUM 40 MG: 40 INJECTION SUBCUTANEOUS at 08:46

## 2023-10-13 RX ADMIN — ATORVASTATIN CALCIUM 10 MG: 10 TABLET, FILM COATED ORAL at 08:46

## 2023-10-13 NOTE — PLAN OF CARE
Problem: Potential for Falls  Goal: Patient will remain free of falls  Description: INTERVENTIONS:  - Educate patient/family on patient safety including physical limitations  - Instruct patient to call for assistance with activity   - Consult OT/PT to assist with strengthening/mobility   - Keep Call bell within reach  - Keep bed low and locked with side rails adjusted as appropriate  - Keep care items and personal belongings within reach  - Initiate and maintain comfort rounds  - Make Fall Risk Sign visible to staff  - Offer Toileting every 2 Hours, in advance of need  - Initiate/Maintain Bed/ Chair alarm  - Obtain necessary fall risk management equipment: Frequent checks/ reorientation  - Apply yellow socks and bracelet for high fall risk patients  - Consider moving patient to room near nurses station  Outcome: Adequate for Discharge     Problem: Prexisting or High Potential for Compromised Skin Integrity  Goal: Skin integrity is maintained or improved  Description: INTERVENTIONS:  - Identify patients at risk for skin breakdown  - Assess and monitor skin integrity  - Assess and monitor nutrition and hydration status  - Monitor labs   - Assess for incontinence   - Turn and reposition patient  - Assist with mobility/ambulation  - Relieve pressure over bony prominences  - Avoid friction and shearing  - Provide appropriate hygiene as needed including keeping skin clean and dry  - Evaluate need for skin moisturizer/barrier cream  - Collaborate with interdisciplinary team   - Patient/family teaching  - Consider wound care consult   Outcome: Adequate for Discharge     Problem: MOBILITY - ADULT  Goal: Maintain or return to baseline ADL function  Description: INTERVENTIONS:  -  Assess patient's ability to carry out ADLs; assess patient's baseline for ADL function and identify physical deficits which impact ability to perform ADLs (bathing, care of mouth/teeth, toileting, grooming, dressing, etc.)  - Assess/evaluate cause of self-care deficits   - Assess range of motion  - Assess patient's mobility; develop plan if impaired  - Assess patient's need for assistive devices and provide as appropriate  - Encourage maximum independence but intervene and supervise when necessary  - Involve family in performance of ADLs  - Assess for home care needs following discharge   - Consider OT consult to assist with ADL evaluation and planning for discharge  - Provide patient education as appropriate  Outcome: Adequate for Discharge  Goal: Maintains/Returns to pre admission functional level  Description: INTERVENTIONS:  - Perform BMAT or MOVE assessment daily.   - Set and communicate daily mobility goal to care team and patient/family/caregiver. - Collaborate with rehabilitation services on mobility goals if consulted  - Perform Range of Motion 4 times a day. - Reposition patient every 2 hours.   - Dangle patient 3 times a day  - Stand patient 3 times a day  - Ambulate patient 3 times a day  - Out of bed to chair 3 times a day   - Out of bed for meals 3 times a day  - Out of bed for toileting  - Record patient progress and toleration of activity level   Outcome: Adequate for Discharge     Problem: PAIN - ADULT  Goal: Verbalizes/displays adequate comfort level or baseline comfort level  Description: Interventions:  - Encourage patient to monitor pain and request assistance  - Assess pain using appropriate pain scale  - Administer analgesics based on type and severity of pain and evaluate response  - Implement non-pharmacological measures as appropriate and evaluate response  - Consider cultural and social influences on pain and pain management  - Notify physician/advanced practitioner if interventions unsuccessful or patient reports new pain  Outcome: Adequate for Discharge     Problem: INFECTION - ADULT  Goal: Absence or prevention of progression during hospitalization  Description: INTERVENTIONS:  - Assess and monitor for signs and symptoms of infection  - Monitor lab/diagnostic results  - Monitor all insertion sites, i.e. indwelling lines, tubes, and drains  - Monitor endotracheal if appropriate and nasal secretions for changes in amount and color  - [place] appropriate cooling/warming therapies per order  - Administer medications as ordered  - Instruct and encourage patient and family to use good hand hygiene technique  - Identify and instruct in appropriate isolation precautions for identified infection/condition  Outcome: Adequate for Discharge     Problem: DISCHARGE PLANNING  Goal: Discharge to home or other facility with appropriate resources  Description: INTERVENTIONS:  - Identify barriers to discharge w/patient and caregiver  - Arrange for needed discharge resources and transportation as appropriate  - Identify discharge learning needs (meds, wound care, etc.)  - Arrange for interpretive services to assist at discharge as needed  - Refer to Case Management Department for coordinating discharge planning if the patient needs post-hospital services based on physician/advanced practitioner order or complex needs related to functional status, cognitive ability, or social support system  Outcome: Adequate for Discharge     Problem: Knowledge Deficit  Goal: Patient/family/caregiver demonstrates understanding of disease process, treatment plan, medications, and discharge instructions  Description: Complete learning assessment and assess knowledge base. Interventions:  - Provide teaching at level of understanding  - Provide teaching via preferred learning methods  Outcome: Adequate for Discharge     Problem: Nutrition/Hydration-ADULT  Goal: Nutrient/Hydration intake appropriate for improving, restoring or maintaining nutritional needs  Description: Monitor and assess patient's nutrition/hydration status for malnutrition. Collaborate with interdisciplinary team and initiate plan and interventions as ordered.   Monitor patient's weight and dietary intake as ordered or per policy. Utilize nutrition screening tool and intervene as necessary. Determine patient's food preferences and provide high-protein, high-caloric foods as appropriate.      INTERVENTIONS:  - Monitor oral intake, urinary output, labs, and treatment plans  - Assess nutrition and hydration status and recommend course of action  - Evaluate amount of meals eaten  - Assist patient with eating if necessary   - Allow adequate time for meals  - Recommend/ encourage appropriate diets, oral nutritional supplements, and vitamin/mineral supplements  - Order, calculate, and assess calorie counts as needed  - Recommend, monitor, and adjust tube feedings and TPN/PPN based on assessed needs  - Assess need for intravenous fluids  - Provide specific nutrition/hydration education as appropriate  - Include patient/family/caregiver in decisions related to nutrition  Outcome: Adequate for Discharge

## 2023-10-13 NOTE — PLAN OF CARE
Problem: PHYSICAL THERAPY ADULT  Goal: Performs mobility at highest level of function for planned discharge setting. See evaluation for individualized goals. Description: Treatment/Interventions: Functional transfer training, LE strengthening/ROM, Therapeutic exercise, Endurance training, Cognitive reorientation, Patient/family training, Equipment eval/education, Bed mobility, Gait training, Spoke to nursing          See flowsheet documentation for full assessment, interventions and recommendations. Outcome: Progressing  Note: Prognosis: Fair  Problem List: Decreased strength, Decreased endurance, Impaired balance, Decreased mobility, Decreased cognition, Impaired judgement, Decreased safety awareness, Impaired hearing, Decreased skin integrity, Obesity  Assessment: pt began tx session lying supine in the bed and was agreeable to participate in PT intervention. pt continues to remain consistant with min Ax1 for all functional transfers to and from RW. pt continues to require VC's for hand placement while ascending to RW and descending back to recliner. pt was limited with activity tolerance and ambulation distance as pt ambulated 30'x2 RW with min Ax1 for safety and balance. pt required a 5 minute therapeutic seated rest break in between ambulation trials due to fatigue. pt was able to participate in TE activities to strengthen LE's in order to reduce risk for falls and injuries. Post tx session pt continues to demonstrate the following deficits limited functional transfers, activity tolerance, ambulation distance and would benefit from cotninued PT intervention to address these deficits in next few PT interventions. Continue to recommend post acute rehab services at the time of D/C in order to maximize pt functional independence and safety with all OOB mobility.  Post tx pt in recliner with call bell, chair alarm activated and all pt needs met  Barriers to Discharge:  (unknown social history at this time) PT Discharge Recommendation: Post acute rehabilitation services    See flowsheet documentation for full assessment.

## 2023-10-13 NOTE — PHYSICAL THERAPY NOTE
PHYSICAL THERAPY NOTE          Patient Name: Cintia Jerry  YCFMS'V Date: 10/13/2023           10/13/23 0920   PT Last Visit   PT Visit Date 10/13/23   Note Type   Note Type Treatment   Pain Assessment   Pain Assessment Tool 0-10   Pain Score No Pain   Pain Location/Orientation Location: Abdomen   Patient's Stated Pain Goal No pain   Hospital Pain Intervention(s) Repositioned; Ambulation/increased activity; Rest   Multiple Pain Sites No   Restrictions/Precautions   Weight Bearing Precautions Per Order No   Other Precautions Cognitive; Chair Alarm; Bed Alarm;Contact/isolation  (strick contact and hand hygiene)   General   Chart Reviewed Yes   Response to Previous Treatment Patient with no complaints from previous session. Family/Caregiver Present No   Cognition   Overall Cognitive Status Impaired   Arousal/Participation Alert; Responsive; Cooperative   Attention Attends with cues to redirect   Orientation Level Oriented to person;Oriented to place;Oriented to situation   Memory Decreased recall of recent events   Following Commands Follows one step commands with increased time or repetition   Comments pt continues to be pleasantly confused throughout tx session adn required VC's to redirect   Subjective   Subjective pt was agreeable to participate in PT intervention. pt stated 0/10 pain pre/post tx session   Bed Mobility   Supine to Sit 4  Minimal assistance   Additional items Assist x 1;HOB elevated; Bedrails; Increased time required;Verbal cues;LE management   Sit to Supine Unable to assess   Additional Comments pt seated OOB in the recliner post tx session with call bell, legs elevated and all pt needs met, PCA present   Transfers   Sit to Stand 4  Minimal assistance   Additional items Assist x 1; Armrests; Increased time required;Verbal cues   Stand to Sit 4  Minimal assistance   Additional items Assist x 1; Armrests; Increased time required;Verbal cues   Stand pivot 4  Minimal assistance   Additional items Assist x 1; Armrests; Increased time required;Verbal cues   Additional Comments all functional transfers completed with min Ax1 and RW for increased safety and balance. pt continues to require VC's for all functional transfers   Ambulation/Elevation   Gait pattern Decreased foot clearance; Improper Weight shift; Foward flexed; Short stride; Excessively slow   Gait Assistance 4  Minimal assist   Additional items Assist x 1;Verbal cues   Assistive Device Rolling walker   Distance 30'x2 RW   Stair Management Assistance Not tested   Ambulation/Elevation Additional Comments pt with continued limited activity tolerance as pt required a 5 minute therapeutic seated rest break in between ambulation trials due to fatigue   Balance   Static Sitting Fair +   Dynamic Sitting Fair +   Static Standing Fair   Dynamic Standing Poor   Ambulatory Poor +  (w/ RW)   Endurance Deficit   Endurance Deficit Yes   Endurance Deficit Description limited ambulation distance, activity tolerance and functional mobility   Activity Tolerance   Activity Tolerance Patient limited by fatigue   Nurse Made Aware Spoke to RN   Exercises   Hip Abduction Sitting;20 reps;AROM; Bilateral   Hip Adduction Sitting;20 reps;AROM; Bilateral  (pillow squeezes)   Knee AROM Long Arc Quad Sitting;15 reps;AROM; Bilateral   Ankle Pumps Sitting;20 reps;AROM; Bilateral   Marching Sitting;10 reps;AROM; Bilateral   Assessment   Prognosis Fair   Problem List Decreased strength;Decreased endurance; Impaired balance;Decreased mobility; Decreased cognition; Impaired judgement;Decreased safety awareness; Impaired hearing;Decreased skin integrity;Obesity   Assessment pt began tx session lying supine in the bed and was agreeable to participate in PT intervention.  pt continues to remain consistant with min Ax1 for all functional transfers to and from RW. pt continues to require VC's for hand placement while ascending to RW and descending back to recliner. pt was limited with activity tolerance and ambulation distance as pt ambulated 30'x2 RW with min Ax1 for safety and balance. pt required a 5 minute therapeutic seated rest break in between ambulation trials due to fatigue. pt was able to participate in TE activities to strengthen LE's in order to reduce risk for falls and injuries. Post tx session pt continues to demonstrate the following deficits limited functional transfers, activity tolerance, ambulation distance and would benefit from cotninued PT intervention to address these deficits in next few PT interventions. Continue to recommend post acute rehab services at the time of D/C in order to maximize pt functional independence and safety with all OOB mobility. Post tx pt in recliner with call bell, chair alarm activated and all pt needs met   Goals   Patient Goals none stated   STG Expiration Date 10/17/23   PT Treatment Day 4   Plan   Treatment/Interventions Functional transfer training;LE strengthening/ROM; Therapeutic exercise; Endurance training;Cognitive reorientation;Patient/family training;Equipment eval/education; Bed mobility;Gait training;Spoke to nursing   Progress Slow progress, decreased activity tolerance   PT Frequency 3-5x/wk   Discharge Recommendation   PT Discharge Recommendation Post acute rehabilitation services   AM-PAC Basic Mobility Inpatient   Turning in Flat Bed Without Bedrails 3   Lying on Back to Sitting on Edge of Flat Bed Without Bedrails 3   Moving Bed to Chair 3   Standing Up From Chair Using Arms 3   Walk in Room 3   Climb 3-5 Stairs With Railing 1   Basic Mobility Inpatient Raw Score 16   Basic Mobility Standardized Score 38.32   Highest Level Of Mobility   JH-HLM Goal 5: Stand one or more mins   JH-HLM Achieved 7: Walk 25 feet or more   Education   Education Provided Mobility training;Assistive device   Patient Reinforcement needed  (pt continues to require VC's for hand placement while ascendign to RW and descending back into recliner)   End of Consult   Patient Position at End of Consult Bedside chair;Bed/Chair alarm activated; All needs within reach   The patient's AM-PAC Basic Mobility Inpatient Short Form Raw Score is 16. A Raw score of less than or equal to 16 suggests the patient may benefit from discharge to post-acute rehabilitation services. Please also refer to the recommendation of the Physical Therapist for safe discharge planning.       Kelechi Schooling

## 2023-10-13 NOTE — ASSESSMENT & PLAN NOTE
Patient has been having uncontrollable diarrhea since admission. Patient was recently treated with Augmentin a week ago. - C Diff +  - ongoing loose watery stools 1x this morning     Plan:  Continuing IV and PO Antibiotics  Vancomycin 125 mg q6 day #7/10 (switched to solution form of Vancomycin on 10/07 due to patient having difficulty swallowing pills  Patient seen by infectious disease. They recommended discontinuation of cefepime Flagyl. They are also recommending 10 days of vancomycin 125 mg q6h oral for 10 days following the discontinuation of systemic antibiotics. Currently day #3/10 since discontinuation of systemic Abx. Speech consulted recommending Clear liquid diet, Thin liquids; advance to include regular solids as per GI  Patient medically stable for discharge to rehab facility. Awaiting placement.

## 2023-10-13 NOTE — PLAN OF CARE
Problem: Potential for Falls  Goal: Patient will remain free of falls  Description: INTERVENTIONS:  - Educate patient/family on patient safety including physical limitations  - Instruct patient to call for assistance with activity   - Consult OT/PT to assist with strengthening/mobility   - Keep Call bell within reach  - Keep bed low and locked with side rails adjusted as appropriate  - Keep care items and personal belongings within reach  - Initiate and maintain comfort rounds  - Make Fall Risk Sign visible to staff  - Offer Toileting every 2 Hours, in advance of need  - Initiate/Maintain Bed/ Chair alarm  - Obtain necessary fall risk management equipment: Frequent checks/ reorientation  - Apply yellow socks and bracelet for high fall risk patients  - Consider moving patient to room near nurses station  10/13/2023 1333 by Luisa Quintero RN  Outcome: Completed  10/13/2023 1005 by Luisa Quintero RN  Outcome: Adequate for Discharge     Problem: Prexisting or High Potential for Compromised Skin Integrity  Goal: Skin integrity is maintained or improved  Description: INTERVENTIONS:  - Identify patients at risk for skin breakdown  - Assess and monitor skin integrity  - Assess and monitor nutrition and hydration status  - Monitor labs   - Assess for incontinence   - Turn and reposition patient  - Assist with mobility/ambulation  - Relieve pressure over bony prominences  - Avoid friction and shearing  - Provide appropriate hygiene as needed including keeping skin clean and dry  - Evaluate need for skin moisturizer/barrier cream  - Collaborate with interdisciplinary team   - Patient/family teaching  - Consider wound care consult   10/13/2023 1333 by Luisa Quintero RN  Outcome: Completed  10/13/2023 1005 by Luisa Quintero RN  Outcome: Adequate for Discharge     Problem: MOBILITY - ADULT  Goal: Maintain or return to baseline ADL function  Description: INTERVENTIONS:  -  Assess patient's ability to carry out ADLs; assess patient's baseline for ADL function and identify physical deficits which impact ability to perform ADLs (bathing, care of mouth/teeth, toileting, grooming, dressing, etc.)  - Assess/evaluate cause of self-care deficits   - Assess range of motion  - Assess patient's mobility; develop plan if impaired  - Assess patient's need for assistive devices and provide as appropriate  - Encourage maximum independence but intervene and supervise when necessary  - Involve family in performance of ADLs  - Assess for home care needs following discharge   - Consider OT consult to assist with ADL evaluation and planning for discharge  - Provide patient education as appropriate  10/13/2023 1333 by Jorge Sears RN  Outcome: Completed  10/13/2023 1005 by Jorge Sears RN  Outcome: Adequate for Discharge  Goal: Maintains/Returns to pre admission functional level  Description: INTERVENTIONS:  - Perform BMAT or MOVE assessment daily.   - Set and communicate daily mobility goal to care team and patient/family/caregiver. - Collaborate with rehabilitation services on mobility goals if consulted  - Perform Range of Motion 4 times a day. - Reposition patient every 2 hours.   - Dangle patient 3 times a day  - Stand patient 3 times a day  - Ambulate patient 3 times a day  - Out of bed to chair 3 times a day   - Out of bed for meals 3 times a day  - Out of bed for toileting  - Record patient progress and toleration of activity level   10/13/2023 1333 by Jorge Sears RN  Outcome: Completed  10/13/2023 1005 by Jorge Sears RN  Outcome: Adequate for Discharge     Problem: PAIN - ADULT  Goal: Verbalizes/displays adequate comfort level or baseline comfort level  Description: Interventions:  - Encourage patient to monitor pain and request assistance  - Assess pain using appropriate pain scale  - Administer analgesics based on type and severity of pain and evaluate response  - Implement non-pharmacological measures as appropriate and evaluate response  - Consider cultural and social influences on pain and pain management  - Notify physician/advanced practitioner if interventions unsuccessful or patient reports new pain  10/13/2023 1333 by Enrique Closs, RN  Outcome: Completed  10/13/2023 1005 by Enrique Closs, RN  Outcome: Adequate for Discharge     Problem: INFECTION - ADULT  Goal: Absence or prevention of progression during hospitalization  Description: INTERVENTIONS:  - Assess and monitor for signs and symptoms of infection  - Monitor lab/diagnostic results  - Monitor all insertion sites, i.e. indwelling lines, tubes, and drains  - Monitor endotracheal if appropriate and nasal secretions for changes in amount and color  - [place] appropriate cooling/warming therapies per order  - Administer medications as ordered  - Instruct and encourage patient and family to use good hand hygiene technique  - Identify and instruct in appropriate isolation precautions for identified infection/condition  10/13/2023 1333 by Enrique Closs, RN  Outcome: Completed  10/13/2023 1005 by Enrique Closs, RN  Outcome: Adequate for Discharge     Problem: DISCHARGE PLANNING  Goal: Discharge to home or other facility with appropriate resources  Description: INTERVENTIONS:  - Identify barriers to discharge w/patient and caregiver  - Arrange for needed discharge resources and transportation as appropriate  - Identify discharge learning needs (meds, wound care, etc.)  - Arrange for interpretive services to assist at discharge as needed  - Refer to Case Management Department for coordinating discharge planning if the patient needs post-hospital services based on physician/advanced practitioner order or complex needs related to functional status, cognitive ability, or social support system  10/13/2023 1333 by Enrique Closs, RN  Outcome: Completed  10/13/2023 1005 by Enrique Closs, RN  Outcome: Adequate for Discharge     Problem: Knowledge Deficit  Goal: Patient/family/caregiver demonstrates understanding of disease process, treatment plan, medications, and discharge instructions  Description: Complete learning assessment and assess knowledge base. Interventions:  - Provide teaching at level of understanding  - Provide teaching via preferred learning methods  10/13/2023 1333 by Maryan Crews RN  Outcome: Completed  10/13/2023 1005 by Maryan Crews RN  Outcome: Adequate for Discharge     Problem: Nutrition/Hydration-ADULT  Goal: Nutrient/Hydration intake appropriate for improving, restoring or maintaining nutritional needs  Description: Monitor and assess patient's nutrition/hydration status for malnutrition. Collaborate with interdisciplinary team and initiate plan and interventions as ordered. Monitor patient's weight and dietary intake as ordered or per policy. Utilize nutrition screening tool and intervene as necessary. Determine patient's food preferences and provide high-protein, high-caloric foods as appropriate.      INTERVENTIONS:  - Monitor oral intake, urinary output, labs, and treatment plans  - Assess nutrition and hydration status and recommend course of action  - Evaluate amount of meals eaten  - Assist patient with eating if necessary   - Allow adequate time for meal  - Recommend/ encourage appropriate diets, oral nutritional supplements, and vitamin/mineral supplements  - Order, calculate, and assess calorie counts as needed  - Recommend, monitor, and adjust tube feedings and TPN/PPN based on assessed needs  - Assess need for intravenous fluids  - Provide specific nutrition/hydration education as appropriate  - Include patient/family/caregiver in decisions related to nutrition  10/13/2023 1333 by Maryan Crews RN  Outcome: Completed  10/13/2023 1005 by Maryan Crews RN  Outcome: Adequate for Discharge

## 2023-10-13 NOTE — DISCHARGE INSTR - AVS FIRST PAGE
Dear Darrell Cowart,     It was our pleasure to care for you here at [facility]. It is our hope that we were always able to exceed the expected standards for your care during your stay. You were hospitalized due to a C. difficile infection. You were cared for on the [address] [facility] fourth floor by Edwin Colvin DO under the service of Jenny Chapa DO with the [facility] who covers for your primary care physician (PCP), RUBY Kelsey, while you were hospitalized. If you have any questions or concerns related to this hospitalization, you may contact us at [phone]. For follow up as well as any medication refills, we recommend that you follow up with your primary care physician. A registered nurse will reach out to you by phone within a few days after your discharge to answer any additional questions that you may have after going home. However, at this time we provide for you here, the most important instructions / recommendations at discharge:     Notable Medication Adjustments -   You were given antibiotics in the hospital and are continuing them when you leave. You are taking vancomycin 125 mg 4 times a day for another 7 days after your discharge. Testing Required after Discharge -   None  ** Please contact your PCP to request testing orders for any of the testing recommended here **  Important follow up information -   Follow-up with your PCP after discharge from rehab  Follow-up with gastroenterology as recommended by them in 4 weeks  Follow-up with palliative care as recommended by them. Other Instructions -   None  Please review this entire after visit summary as additional general instructions including medication list, appointments, activity, diet, any pertinent wound care, and other additional recommendations from your care team that may be provided for you.       Sincerely,     Edwin Colvin DO

## 2023-10-13 NOTE — ASSESSMENT & PLAN NOTE
Lab Results   Component Value Date    HGBA1C 6.6 (H) 10/05/2023       Recent Labs     10/12/23  1151 10/12/23  1621 10/12/23  2044 10/13/23  0727   POCGLU 133 133 122 87         Blood Sugar Average: Last 72 hrs:  (P) 127     Not on anti-diabetic medications, per EMR    Plan  - Fingerstick QID w/ meals and at bedtime  - SSI

## 2023-10-13 NOTE — PROGRESS NOTES
Progress Note - Infectious Disease   Aly Gone 80 y.o. female MRN: [id number]  Unit/Bed#: W -01 Encounter: [id number]      Impression/Plan:  1. SIRS vs sepsis. E/b leukocytosis to 46k on admission and tachycardia. Otherwise afebrile and HDS. Initial CT A/P with concern for neoplasm with possible lung mets and ?possible intra-abdominal abscess formation. Repeat CT A/P without evidence of abscess though continues to demonstrate fluid-filled fistulous tracts extending from transverse colon to cecum and loop of distal ileum. Presentation favored 2/2 to acute C.diff infection. Patient also just completed a 7d course of Augmentin which is known to be a risk factor for C diff. WBC has down-trended after initiation of PO vancomycin. Consider potential GI neoplasm also contributing to clinical presentation. Blood cultures resulted NG. Has been treated with cefepime and metronidazole for 6 days and has remained afebrile and HDS. Given no further definitive evidence of intra-abdominal abscess on repeat imaging, favor stopping IV antibiotics and monitoring off. This will also help limit her exposure to more antibiotics I/s/o acute C.diff infection. Patient has remained afebrile and HDS off systemic antibiotics. WBC downtrending. Continue to monitor off IV antibiotics  Continue PO vancomycin for C diff as below  Monitor temperature  Monitor vitals  CBCD with AM labs     2. C.diff. Patient presented with uncontrollable diarrhea and WBC to 46k. Found to be C diff toxin and PCR positive. Consider 2/2 to Augmentin course patient just finished. Leukocytosis likely elevated I/s/o C diff, now down-trending. Reports improvement in diarrhea after starting PO vancomycin. Continue PO vancomycin 125mg q6h x 10d from discontinuation of systemic antibiotics (through 10/20)  Serial abdominal exams   Avoid systemic antibiotic use if able     3. Concern for colorectal cancer.  CT A/P on admission demonstrated 7cm "apple core" lesion of the transverse colon c/f neoplasm. Additional findings c/f local tumor spread and possible fistula formation with potential early abscess formation. Also pulmonary nodule which could be representative of mets. Repeat CT A/P on 10/9 showed ongoing possible mass in transverse colon with fistula formation and sinus tracts. No clear evidence of intra-abdominal abscess. Colorectal surgery not recommending any emergent surgical interventions. GI considering outpatient colonoscopy given C diff infection. Goals of care discussions with family ongoing. Management per primary team  Continue to observe off further IV antibiotics   Appreciate GI and colorectal surgery evaluations  Follow-up goals of care discussions     4. T2DM. Most recent A1C 6.6. Appears to be relatively well controlled. Currently on Insulin. Management per primary team    Above plan was discussed in detail with primary team.     ID consult service will sign off at this time. Please call with any additional questions or concerns. Antibiotics:  PO vancomycin from discontinuation of systemic antibiotics D3    Antibiotics 9    Subjective:  Patient pleasantly confused as per baseline. Does not endorse having any new complaints. Per nursing, she ate her whole breakfast and has not had any bowel movements overnight or today so far. Does not endorse having any pain and states that she feels fine. No fever, chills, sweats, shakes; no nausea, vomiting, abdominal pain, or dysuria; no cough, shortness of breath, or chest pain. Objective:  Vitals:  Temp:  [97.1 °F (36.2 °C)-98.6 °F (37 °C)] 97.1 °F (36.2 °C)  HR:  [80-82] 80  Resp:  [18] 18  BP: (135-138)/(64-72) 136/71  SpO2:  [96 %] 96 %  Temp (24hrs), Av.1 °F (36.7 °C), Min:97.1 °F (36.2 °C), Max:98.6 °F (37 °C)  Current: Temperature: (!) 97.1 °F (36.2 °C)    Physical Exam:   General Appearance:  Pleasantly confused, though remains interactive, alert, and nontoxic appearing. No acute distress. Sitting upright in chair next to bed, eating lunch   Throat: Oropharynx moist without lesions. Lungs:   Clear to auscultation bilaterally; no wheezes, rhonchi or rales; respirations unlabored. Heart:  RRR; no murmur, rub or gallop. Abdomen:   Soft, non-tender, non-distended, positive bowel sounds. Extremities: No clubbing or cyanosis, no edema. Skin: No new rashes, lesions, or draining wounds noted on exposed skin.      Labs, Imaging, & Other studies:   All pertinent labs and imaging studies were personally reviewed  Results from last 7 days   Lab Units 10/13/23  0449 10/12/23  0504 10/11/23  0505   WBC Thousand/uL 12.54* 14.28* 14.41*   HEMOGLOBIN g/dL 11.1* 11.9 12.4   PLATELETS Thousands/uL 265 287 308       Results from last 7 days   Lab Units 10/13/23  0449   POTASSIUM mmol/L 3.5   CHLORIDE mmol/L 105   CO2 mmol/L 26   BUN mg/dL 7   CREATININE mg/dL 0.53*   EGFR ml/min/1.73sq m 84   CALCIUM mg/dL 8.2*             Results from last 7 days   Lab Units 10/07/23  0617   PROCALCITONIN ng/ml 1.19*

## 2023-10-13 NOTE — CASE MANAGEMENT
Case Management Discharge Planning Note    Patient name Dionne Mata  Location W /W -05 MRN [id number]  : 1934 Date 10/13/2023       Current Admission Date: 10/5/2023  Current Admission Diagnosis:Colorectal cancer suspected   Patient Active Problem List    Diagnosis Date Noted    SIRS (systemic inflammatory response syndrome) ([address]) 10/06/2023    Colorectal cancer suspected 10/06/2023    Lung nodule seen on imaging study 10/06/2023    C. difficile diarrhea 10/06/2023    Bilateral hearing loss 2023    Type 2 diabetes mellitus without complication, without long-term current use of insulin ([address])     Metabolic encephalopathy     Major depressive disorder with single episode, in full remission ([address]) 10/19/2020    Constipation, slow transit 2019    Closed fracture of right hip with routine healing 2019    Thickened endometrium 2017    Pancreatic cyst 2017    Enteritis 2017    Age related osteoporosis 2016    Resting tremor 05/10/2016    Vitamin D deficiency 2014    Right bundle-branch block 10/29/2013    Leiomyoma of uterus 2013    Benign essential hypertension 2012    Mixed hyperlipidemia 2012    Gastroesophageal reflux disease without esophagitis 2012      LOS (days): 7  Geometric Mean LOS (GMLOS) (days): 3.60  Days to GMLOS:-3.8     OBJECTIVE:  Risk of Unplanned Readmission Score: 19.67         Current admission status: Inpatient   Preferred Pharmacy:   [facility]/pharmacy #[id number] [address]  Phone: [phone] Fax: [phone]    Primary Care Provider: RUBY Bryan    Primary Insurance: [facility] REP  Secondary Insurance:     DISCHARGE DETAILS:    Discharge planning discussed with[de-identified] daughterKarissa over phone  Freedom of Choice: Yes  Comments - Freedom of Choice: [facility] CM contacted family/caregiver?: Yes  Were Treatment Team discharge recommendations reviewed with patient/caregiver?: Yes  Did patient/caregiver verbalize understanding of patient care needs?: N/A- going to facility  Were patient/caregiver advised of the risks associated with not following Treatment Team discharge recommendations?: Yes    Contacts  Patient Contacts: Mag Guzman  Relationship to Patient[de-identified] Family  Contact Method: Phone  Phone Number: [phone]  Reason/Outcome: Continuity of Care, Emergency Contact, Discharge Planning, Referral    Requested [address]         Is the patient interested in [address] [facility] at discharge?: No    DME Referral Provided  Referral made for DME?: No    Other Referral/Resources/Interventions Provided:  Interventions: SNF, Short Term Rehab  Referral Comments: Auth received for patient to go to [facility] forwarded to facility via [address] Patient to d/c to facility today. Call to daughter aMg Guzman to discuss, daughter aware and agreeable to dcp to facility today. 2:00PM WCV confirmed- all appropriate parties aware. No further CM needs anticipated at this time. Would you like to participate in our [facility] service program?  : No - Declined    Treatment Team Recommendation: SNF, Short Term Rehab  Discharge Destination Plan[de-identified] SNF, Short Term Rehab  Transport at Discharge : Wheelchair Janine Leather by Rylan and Unit #): Vijay Leandro      IMM Given (Date):: 10/13/23  IMM Given to[de-identified] Family (copy emailed to daughter at [email or web address])    Accepting Facility Name, [address] [place] [address] : Adriana Paiz  Receiving Facility/Agency Phone Number: [phone]  Facility/Agency Fax Number: [phone]

## 2023-10-15 ENCOUNTER — DOCUMENTATION (OUTPATIENT)
Dept: GASTROENTEROLOGY | Facility: AMBULARY SURGERY CENTER | Age: 88
End: 2023-10-15

## 2023-10-16 NOTE — UTILIZATION REVIEW
NOTIFICATION OF ADMISSION DISCHARGE   This is a Notification of Discharge from [address] [facility]. Please be advised that this patient has been discharge from our facility. Below you will find the admission and discharge date and time including the patient’s disposition. UTILIZATION REVIEW CONTACT:  Josh Alcantara  Utilization   Network Utilization Review Department  Phone: [phone] x carefully listen to the prompts. All voicemails are confidential.  Email: [email or web address]     ADMISSION INFORMATION  PRESENTATION DATE: 10/5/2023  7:11 PM  OBERVATION ADMISSION DATE:   INPATIENT ADMISSION DATE: 10/6/23  1:05 AM   DISCHARGE DATE: 10/13/2023  2:05 PM   DISPOSITION:[facility] SNF/TCU/SNU    Network Utilization Review Department  ATTENTION: Please call with any questions or concerns to [phone] and carefully listen to the prompts so that you are directed to the right person. All voicemails are confidential.   For Discharge needs, contact Care Management DC Support Team at [phone] opt. 2  Send all requests for admission clinical reviews, approved or denied determinations and any other requests to dedicated fax number below belonging to the campus where the patient is receiving treatment.  List of dedicated fax numbers for the Facilities:  [place] DENIALS (Administrative/Medical Necessity) [phone]   DISCHARGE SUPPORT TEAM (Network) [phone] [facility] (Maternity/NICU/Pediatrics) [phone]   [address] [facility] [address] [place] [address] [address] [phone]

## 2023-10-23 ENCOUNTER — HOME HEALTH ADMISSION (OUTPATIENT)
Dept: HOME HEALTH SERVICES | Facility: HOME HEALTHCARE | Age: 88
End: 2023-10-23
Payer: COMMERCIAL

## 2023-10-27 ENCOUNTER — HOME CARE VISIT (OUTPATIENT)
Dept: HOME HEALTH SERVICES | Facility: HOME HEALTHCARE | Age: 88
End: 2023-10-27

## 2023-10-28 ENCOUNTER — HOME CARE VISIT (OUTPATIENT)
Dept: HOME HEALTH SERVICES | Facility: HOME HEALTHCARE | Age: 88
End: 2023-10-28

## 2023-10-28 ENCOUNTER — TELEPHONE (OUTPATIENT)
Dept: HOME HEALTH SERVICES | Facility: HOME HEALTHCARE | Age: 88
End: 2023-10-28

## 2023-10-30 ENCOUNTER — TELEPHONE (OUTPATIENT)
Dept: HOME HEALTH SERVICES | Facility: HOME HEALTHCARE | Age: 88
End: 2023-10-30

## 2023-10-30 ENCOUNTER — HOME CARE VISIT (OUTPATIENT)
Dept: HOME HEALTH SERVICES | Facility: HOME HEALTHCARE | Age: 88
End: 2023-10-30

## 2023-10-31 ENCOUNTER — HOME CARE VISIT (OUTPATIENT)
Dept: HOME HEALTH SERVICES | Facility: HOME HEALTHCARE | Age: 88
End: 2023-10-31
Payer: COMMERCIAL

## 2023-10-31 VITALS
OXYGEN SATURATION: 97 % | HEART RATE: 68 BPM | RESPIRATION RATE: 16 BRPM | DIASTOLIC BLOOD PRESSURE: 70 MMHG | SYSTOLIC BLOOD PRESSURE: 122 MMHG

## 2023-10-31 PROCEDURE — 400013 VN SOC

## 2023-10-31 PROCEDURE — G0299 HHS/HOSPICE OF RN EA 15 MIN: HCPCS

## 2023-11-01 ENCOUNTER — HOME CARE VISIT (OUTPATIENT)
Dept: HOME HEALTH SERVICES | Facility: HOME HEALTHCARE | Age: 88
End: 2023-11-01
Payer: COMMERCIAL

## 2023-11-01 ENCOUNTER — OFFICE VISIT (OUTPATIENT)
Dept: FAMILY MEDICINE CLINIC | Facility: CLINIC | Age: 88
End: 2023-11-01
Payer: COMMERCIAL

## 2023-11-01 VITALS — DIASTOLIC BLOOD PRESSURE: 78 MMHG | HEART RATE: 75 BPM | SYSTOLIC BLOOD PRESSURE: 124 MMHG | OXYGEN SATURATION: 94 %

## 2023-11-01 DIAGNOSIS — E78.2 MIXED HYPERLIPIDEMIA: ICD-10-CM

## 2023-11-01 DIAGNOSIS — E66.01 OBESITY, MORBID (HCC): ICD-10-CM

## 2023-11-01 DIAGNOSIS — F32.5 MAJOR DEPRESSIVE DISORDER WITH SINGLE EPISODE, IN FULL REMISSION (HCC): ICD-10-CM

## 2023-11-01 DIAGNOSIS — E11.9 TYPE 2 DIABETES MELLITUS WITHOUT COMPLICATION, WITHOUT LONG-TERM CURRENT USE OF INSULIN (HCC): Primary | ICD-10-CM

## 2023-11-01 DIAGNOSIS — I10 BENIGN ESSENTIAL HYPERTENSION: ICD-10-CM

## 2023-11-01 DIAGNOSIS — C19 COLORECTAL CANCER (HCC): ICD-10-CM

## 2023-11-01 DIAGNOSIS — B37.89 CANDIDA RASH OF GROIN: ICD-10-CM

## 2023-11-01 DIAGNOSIS — F01.50 MIXED DEMENTIA (HCC): ICD-10-CM

## 2023-11-01 DIAGNOSIS — F02.80 MIXED DEMENTIA (HCC): ICD-10-CM

## 2023-11-01 DIAGNOSIS — G30.9 MIXED DEMENTIA (HCC): ICD-10-CM

## 2023-11-01 DIAGNOSIS — M81.0 AGE-RELATED OSTEOPOROSIS WITHOUT CURRENT PATHOLOGICAL FRACTURE: ICD-10-CM

## 2023-11-01 PROCEDURE — G0152 HHCP-SERV OF OT,EA 15 MIN: HCPCS

## 2023-11-01 PROCEDURE — 99214 OFFICE O/P EST MOD 30 MIN: CPT | Performed by: NURSE PRACTITIONER

## 2023-11-01 RX ORDER — NYSTATIN 100000 [USP'U]/G
POWDER TOPICAL 2 TIMES DAILY
Qty: 30 G | Refills: 0 | Status: SHIPPED | OUTPATIENT
Start: 2023-11-01 | End: 2023-11-08

## 2023-11-01 NOTE — CASE COMMUNICATION
For informational purposes only. No response required. OT evaluation completed on 11/1/23. Pt demonstrates impaired cognition, decreased activity tolerance, fair safety awareness, impaired balance, fatigue and increased risk of falls resulting in impaired ADLs. Pt would benefit from skilled OT services to address the above deficits. Recommended and anticipated OT visit pattern is 1 week 1. 2 week 1.

## 2023-11-01 NOTE — PROGRESS NOTES
[facility]    ASSESSMENT AND PLAN     1. Type 2 diabetes mellitus without complication, without long-term current use of insulin ([facility])  Assessment & Plan:    Lab Results   Component Value Date    HGBA1C 6.6 (H) 10/05/2023   Diet controlled      2. Candida rash of groin  -     nystatin (MYCOSTATIN) powder; Apply topically 2 (two) times a day for 7 days    3. Obesity, morbid ([address])    4. Benign essential hypertension  Assessment & Plan:  Fairly well controlled at this time  130/82 today  Denies symptoms  Fall risk-avoid hypotension  Continue Toprol-XL and amlodipine at this time  Visiting nursing in the home-monitor BPs      5. Mixed hyperlipidemia  Assessment & Plan:  No recent lipid panel  Orders have been in the system-but not completed  Last lipid panel 2021  Reports she has been consistent with atorvastatin daily  Family discussion of necessary versus nonnecessary meds at this time. Given current medical conditions, we will discontinue atorvastatin today  - shared decision making with family      6. Age-related osteoporosis without current pathological fracture  Assessment & Plan:  DEXA order has been in the system  Has not been completed  Per daughter-will likely not complete at this time      7. Colorectal cancer suspected    8. Major depressive disorder with single episode, in full remission [facility])  Assessment & Plan:  Daughter reports fairly well managed  Continue management per geriatric medicine   Sertraline; Seroquel      9. Mixed dementia [facility])  Assessment & Plan:  Reports at baseline  Continue management per geriatric medicine   Sertraline; Seroquel         Continue follow-up with geriatric medicine; GI    SUBJECTIVE       Patient ID: Lysle Kawasaki is a 80 y.o. female. Chief Complaint   Patient presents with    Follow-up       HISTORY OF PRESENT ILLNESS    Patient presents today for routine follow up. She has had 2 recent hospitalizations: 9/25-27; 10/5-13.   She was discharged from the hospital to an inpatient rehab facility after the second hospitalization on the 13th. Reports she did well at the IRF and has recently returned home (10/26) with her daughter. She is set up with visiting nurses and occupational therapy to come her home. Pt reports she feels relatively well today with no new concerns. Denies any abdominal pain. No changes in stool patterns. Of note: She was recently diagnosed with suspected colon CA. And recent C. difficile infection. Family present today-looking for quality at this point. She does have follow-up scheduled with GI. Further treatment courses to be determined    TCM Call     Date and time call was made  9/27/2023  5:47 PM    Hospital care reviewed  Records reviewed    Patient was hospitialized at  [facility] - CLOSED    Date of Admission  09/25/23    Date of discharge  09/27/23    Diagnosis  UTI    Disposition  Home    Were the patients medications reviewed and updated  No    Current Symptoms  None      TCM Call     Post hospital issues  Reduced activity    Scheduled for follow up? Yes    Did you obtain your prescribed medications  Yes    Do you need help managing your prescriptions or medications  No    Is transportation to your appointment needed  Yes    Specify why  Daughter will drive    I have advised the patient to call PCP with any new or worsening symptoms    Reji Dorman MA    Living Arrangements  Family members    Are you recieving any outpatient services  No    Are you recieving home care services  No    Are you using any community resources  No    Current waiver services  No                   The following portions of the patient's history were reviewed and updated as appropriate: allergies, current medications, past family history, past medical history, past social history, past surgical history, and problem list.    REVIEW OF SYSTEMS  Review of Systems   Constitutional:  Positive for fatigue (chronic).  Negative for activity change, appetite change and fever. Respiratory: Negative. Cardiovascular: Negative. Negative for chest pain and palpitations. Gastrointestinal:  Negative for abdominal distention, abdominal pain, blood in stool, constipation, diarrhea and nausea. Denies any recent changes   Genitourinary: Negative. Neurological:  Negative for dizziness and headaches. Psychiatric/Behavioral:  Positive for sleep disturbance. OBJECTIVE      VITAL SIGNS  /82 (BP Location: Left arm, Patient Position: Sitting, Cuff Size: Standard)   Pulse 88   Temp 98.4 °F (36.9 °C) (Temporal)   Resp 15   Wt 76.7 kg (169 lb)   SpO2 96%   BMI 31.93 kg/m²     CURRENT MEDICATIONS    Current Outpatient Medications:     Acetaminophen (Tylenol) 325 MG CAPS, Take by mouth as needed, Disp: , Rfl:     amLODIPine (NORVASC) 5 mg tablet, TAKE 1 TABLET BY MOUTH EVERY DAY, Disp: 90 tablet, Rfl: 1    Melatonin 10 MG TABS, Take 10 mg by mouth daily at bedtime, Disp: , Rfl:     metoprolol succinate (TOPROL-XL) 100 mg 24 hr tablet, TAKE 1 TABLET BY MOUTH EVERY DAY IN THE EVENING, Disp: 90 tablet, Rfl: 1    nystatin (MYCOSTATIN) powder, Apply topically 2 (two) times a day for 7 days, Disp: 30 g, Rfl: 0    sertraline (ZOLOFT) 50 mg tablet, Take 75 mg by mouth daily 1 and half tablet by mouth daily, Disp: , Rfl:     QUEtiapine (SEROquel) 25 mg tablet, Take 2 tablets (50 mg total) by mouth daily at bedtime, Disp: 60 tablet, Rfl: 0    vancomycin (VANCOCIN) 125 MG capsule, Take 1 capsule (125 mg total) by mouth 4 (four) times a day for 7 days, THEN 1 capsule (125 mg total) 2 (two) times a day for 7 days, THEN 1 capsule (125 mg total) in the morning for 7 days, THEN 1 capsule (125 mg total) every other day for 10 days. , Disp: 54 capsule, Rfl: 0    Vancomycin HCl 25 MG/ML SOLR, Take 125 mg by mouth 4 (four) times a day for 7 days, THEN 125 mg 2 (two) times a day for 7 days, THEN 125 mg in the morning for 7 days, THEN 125 mg every other day for 10 days. , Disp: 300 mL, Rfl: 0      PHYSICAL EXAMINATION   Physical Exam  Vitals and nursing note reviewed. Chaperone present: Daughter is present in the visit today; granddaughter joined us via phone. She is a physician assistant. Constitutional:       General: She is not in acute distress. Appearance: Normal appearance. She is not ill-appearing. Cardiovascular:      Rate and Rhythm: Normal rate and regular rhythm. Pulses: no weak pulses          Dorsalis pedis pulses are 2+ on the right side and 2+ on the left side. Posterior tibial pulses are 2+ on the right side and 2+ on the left side. Pulmonary:      Effort: Pulmonary effort is normal. No respiratory distress. Breath sounds: Normal breath sounds and air entry. Abdominal:      General: Bowel sounds are normal.      Tenderness: There is no abdominal tenderness. There is no guarding or rebound. Feet:      Right foot:      Skin integrity: Callus and dry skin present. Left foot:      Skin integrity: Callus and dry skin present. Neurological:      Mental Status: She is alert. Mental status is at baseline. Comments: Daughter reports at baseline today    Patient pleasant and cooperative during exam     Psychiatric:         Attention and Perception: Attention normal.         Mood and Affect: Mood normal.         Behavior: Behavior normal.         Patient's shoes and socks removed. Right Foot/Ankle   Right Foot Inspection  Skin Exam: skin intact, dry skin, callus and callus. Sensory   Monofilament testing: intact    Vascular  The right DP pulse is 2+. The right PT pulse is 2+. Left Foot/Ankle  Left Foot Inspection  Skin Exam: skin intact, dry skin and callus. Sensory   Monofilament testing: intact    Vascular  The left DP pulse is 2+. The left PT pulse is 2+.      Assign Risk Category  No deformity present  No loss of protective sensation  No weak pulses  Risk: 0

## 2023-11-02 ENCOUNTER — HOME CARE VISIT (OUTPATIENT)
Dept: HOME HEALTH SERVICES | Facility: HOME HEALTHCARE | Age: 88
End: 2023-11-02
Payer: COMMERCIAL

## 2023-11-02 VITALS
SYSTOLIC BLOOD PRESSURE: 128 MMHG | OXYGEN SATURATION: 96 % | DIASTOLIC BLOOD PRESSURE: 62 MMHG | HEART RATE: 72 BPM | RESPIRATION RATE: 18 BRPM

## 2023-11-02 PROCEDURE — G0299 HHS/HOSPICE OF RN EA 15 MIN: HCPCS

## 2023-11-03 ENCOUNTER — TELEPHONE (OUTPATIENT)
Age: 88
End: 2023-11-03

## 2023-11-03 ENCOUNTER — HOME CARE VISIT (OUTPATIENT)
Dept: HOME HEALTH SERVICES | Facility: HOME HEALTHCARE | Age: 88
End: 2023-11-03
Payer: COMMERCIAL

## 2023-11-03 PROCEDURE — G0151 HHCP-SERV OF PT,EA 15 MIN: HCPCS

## 2023-11-03 NOTE — TELEPHONE ENCOUNTER
Patients GI provider:  Dr. Marlene Dillard    Reason for call: Pt's daughter Melba Mayes requesting an appt for Layne Yanes to be seen at [facility] on Thursday.     Scheduled procedure/appointment date if applicable: Apt/procedure 11/09/2023

## 2023-11-05 ENCOUNTER — APPOINTMENT (EMERGENCY)
Dept: RADIOLOGY | Facility: HOSPITAL | Age: 88
DRG: 372 | End: 2023-11-05
Payer: COMMERCIAL

## 2023-11-05 ENCOUNTER — HOSPITAL ENCOUNTER (INPATIENT)
Facility: HOSPITAL | Age: 88
LOS: 4 days | Discharge: HOME HEALTH | DRG: 372 | End: 2023-11-09
Attending: EMERGENCY MEDICINE | Admitting: INTERNAL MEDICINE
Payer: COMMERCIAL

## 2023-11-05 VITALS — OXYGEN SATURATION: 97 % | HEART RATE: 92 BPM

## 2023-11-05 DIAGNOSIS — E87.6 HYPOKALEMIA: ICD-10-CM

## 2023-11-05 DIAGNOSIS — F03.90 DEMENTIA (HCC): ICD-10-CM

## 2023-11-05 DIAGNOSIS — K51.00 PANCOLITIS (HCC): ICD-10-CM

## 2023-11-05 DIAGNOSIS — D72.829 LEUKOCYTOSIS: ICD-10-CM

## 2023-11-05 DIAGNOSIS — Z86.19 HISTORY OF CLOSTRIDIOIDES DIFFICILE INFECTION: ICD-10-CM

## 2023-11-05 DIAGNOSIS — R10.9 ABDOMINAL PAIN: Primary | ICD-10-CM

## 2023-11-05 DIAGNOSIS — K80.20 CHOLELITHIASES: ICD-10-CM

## 2023-11-05 DIAGNOSIS — K86.1 CHRONIC PANCREATITIS (HCC): ICD-10-CM

## 2023-11-05 DIAGNOSIS — G30.9 MIXED DEMENTIA (HCC): ICD-10-CM

## 2023-11-05 DIAGNOSIS — C19 COLORECTAL CANCER (HCC): ICD-10-CM

## 2023-11-05 DIAGNOSIS — F01.50 MIXED DEMENTIA (HCC): ICD-10-CM

## 2023-11-05 DIAGNOSIS — F02.80 MIXED DEMENTIA (HCC): ICD-10-CM

## 2023-11-05 DIAGNOSIS — I10 HTN (HYPERTENSION): ICD-10-CM

## 2023-11-05 DIAGNOSIS — A04.72 C. DIFFICILE DIARRHEA: ICD-10-CM

## 2023-11-05 DIAGNOSIS — K57.90 DIVERTICULOSIS: ICD-10-CM

## 2023-11-05 LAB
2HR DELTA HS TROPONIN: 0 NG/L
ALBUMIN SERPL BCP-MCNC: 3.5 G/DL (ref 3.5–5)
ALP SERPL-CCNC: 69 U/L (ref 34–104)
ALT SERPL W P-5'-P-CCNC: 6 U/L (ref 7–52)
ANION GAP SERPL CALCULATED.3IONS-SCNC: 10 MMOL/L
APTT PPP: 30 SECONDS (ref 23–37)
AST SERPL W P-5'-P-CCNC: 18 U/L (ref 13–39)
BACTERIA UR QL AUTO: ABNORMAL /HPF
BASOPHILS # BLD MANUAL: 0 THOUSAND/UL (ref 0–0.1)
BASOPHILS NFR MAR MANUAL: 0 % (ref 0–1)
BILIRUB SERPL-MCNC: 1.41 MG/DL (ref 0.2–1)
BILIRUB UR QL STRIP: NEGATIVE
BUN SERPL-MCNC: 5 MG/DL (ref 5–25)
CALCIUM SERPL-MCNC: 8.3 MG/DL (ref 8.4–10.2)
CARDIAC TROPONIN I PNL SERPL HS: 12 NG/L
CARDIAC TROPONIN I PNL SERPL HS: 12 NG/L
CHLORIDE SERPL-SCNC: 97 MMOL/L (ref 96–108)
CLARITY UR: CLEAR
CO2 SERPL-SCNC: 27 MMOL/L (ref 21–32)
COLOR UR: ABNORMAL
CREAT SERPL-MCNC: 0.56 MG/DL (ref 0.6–1.3)
EOSINOPHIL # BLD MANUAL: 0 THOUSAND/UL (ref 0–0.4)
EOSINOPHIL NFR BLD MANUAL: 0 % (ref 0–6)
ERYTHROCYTE [DISTWIDTH] IN BLOOD BY AUTOMATED COUNT: 15.1 % (ref 11.6–15.1)
GFR SERPL CREATININE-BSD FRML MDRD: 82 ML/MIN/1.73SQ M
GIANT PLATELETS BLD QL SMEAR: PRESENT
GLUCOSE SERPL-MCNC: 104 MG/DL (ref 65–140)
GLUCOSE UR STRIP-MCNC: NEGATIVE MG/DL
HCT VFR BLD AUTO: 34.3 % (ref 34.8–46.1)
HGB BLD-MCNC: 11.6 G/DL (ref 11.5–15.4)
HGB UR QL STRIP.AUTO: NEGATIVE
HYALINE CASTS #/AREA URNS LPF: ABNORMAL /LPF
INR PPP: 1.21 (ref 0.84–1.19)
KETONES UR STRIP-MCNC: NEGATIVE MG/DL
LACTATE SERPL-SCNC: 0.9 MMOL/L (ref 0.5–2)
LEUKOCYTE ESTERASE UR QL STRIP: ABNORMAL
LIPASE SERPL-CCNC: 21 U/L (ref 11–82)
LYMPHOCYTES # BLD AUTO: 1.83 THOUSAND/UL (ref 0.6–4.47)
LYMPHOCYTES # BLD AUTO: 6 % (ref 14–44)
MCH RBC QN AUTO: 29.4 PG (ref 26.8–34.3)
MCHC RBC AUTO-ENTMCNC: 33.8 G/DL (ref 31.4–37.4)
MCV RBC AUTO: 87 FL (ref 82–98)
MONOCYTES # BLD AUTO: 0.76 THOUSAND/UL (ref 0–1.22)
MONOCYTES NFR BLD: 5 % (ref 4–12)
NEUTROPHILS # BLD MANUAL: 12.69 THOUSAND/UL (ref 1.85–7.62)
NEUTS SEG NFR BLD AUTO: 83 % (ref 43–75)
NITRITE UR QL STRIP: NEGATIVE
NON-SQ EPI CELLS URNS QL MICRO: ABNORMAL /HPF
PH UR STRIP.AUTO: 6 [PH]
PLATELET # BLD AUTO: 272 THOUSANDS/UL (ref 149–390)
PLATELET BLD QL SMEAR: ADEQUATE
PMV BLD AUTO: 9.7 FL (ref 8.9–12.7)
POIKILOCYTOSIS BLD QL SMEAR: PRESENT
POLYCHROMASIA BLD QL SMEAR: PRESENT
POTASSIUM SERPL-SCNC: 2.6 MMOL/L (ref 3.5–5.3)
PROCALCITONIN SERPL-MCNC: 0.07 NG/ML
PROT SERPL-MCNC: 6.7 G/DL (ref 6.4–8.4)
PROT UR STRIP-MCNC: NEGATIVE MG/DL
PROTHROMBIN TIME: 15.2 SECONDS (ref 11.6–14.5)
RBC # BLD AUTO: 3.95 MILLION/UL (ref 3.81–5.12)
RBC #/AREA URNS AUTO: ABNORMAL /HPF
RBC MORPH BLD: PRESENT
SODIUM SERPL-SCNC: 134 MMOL/L (ref 135–147)
SP GR UR STRIP.AUTO: 1.01 (ref 1–1.03)
UROBILINOGEN UR STRIP-ACNC: <2 MG/DL
VARIANT LYMPHS # BLD AUTO: 6 %
WBC # BLD AUTO: 15.29 THOUSAND/UL (ref 4.31–10.16)
WBC #/AREA URNS AUTO: ABNORMAL /HPF

## 2023-11-05 PROCEDURE — 71045 X-RAY EXAM CHEST 1 VIEW: CPT

## 2023-11-05 PROCEDURE — 85027 COMPLETE CBC AUTOMATED: CPT

## 2023-11-05 PROCEDURE — 85730 THROMBOPLASTIN TIME PARTIAL: CPT

## 2023-11-05 PROCEDURE — 99285 EMERGENCY DEPT VISIT HI MDM: CPT

## 2023-11-05 PROCEDURE — 81001 URINALYSIS AUTO W/SCOPE: CPT

## 2023-11-05 PROCEDURE — 84484 ASSAY OF TROPONIN QUANT: CPT

## 2023-11-05 PROCEDURE — 83690 ASSAY OF LIPASE: CPT

## 2023-11-05 PROCEDURE — 84145 PROCALCITONIN (PCT): CPT

## 2023-11-05 PROCEDURE — 85610 PROTHROMBIN TIME: CPT

## 2023-11-05 PROCEDURE — 80053 COMPREHEN METABOLIC PANEL: CPT

## 2023-11-05 PROCEDURE — 83605 ASSAY OF LACTIC ACID: CPT

## 2023-11-05 PROCEDURE — 74177 CT ABD & PELVIS W/CONTRAST: CPT

## 2023-11-05 PROCEDURE — 93005 ELECTROCARDIOGRAM TRACING: CPT

## 2023-11-05 PROCEDURE — G1004 CDSM NDSC: HCPCS

## 2023-11-05 PROCEDURE — 99285 EMERGENCY DEPT VISIT HI MDM: CPT | Performed by: EMERGENCY MEDICINE

## 2023-11-05 PROCEDURE — 99223 1ST HOSP IP/OBS HIGH 75: CPT | Performed by: INTERNAL MEDICINE

## 2023-11-05 PROCEDURE — 96365 THER/PROPH/DIAG IV INF INIT: CPT

## 2023-11-05 PROCEDURE — 36415 COLL VENOUS BLD VENIPUNCTURE: CPT

## 2023-11-05 PROCEDURE — 87040 BLOOD CULTURE FOR BACTERIA: CPT

## 2023-11-05 PROCEDURE — 85007 BL SMEAR W/DIFF WBC COUNT: CPT

## 2023-11-05 RX ORDER — QUETIAPINE FUMARATE 25 MG/1
25 TABLET, FILM COATED ORAL
Status: DISCONTINUED | OUTPATIENT
Start: 2023-11-05 | End: 2023-11-08

## 2023-11-05 RX ORDER — DOCUSATE SODIUM 100 MG/1
100 CAPSULE, LIQUID FILLED ORAL 2 TIMES DAILY PRN
Status: DISCONTINUED | OUTPATIENT
Start: 2023-11-05 | End: 2023-11-09 | Stop reason: HOSPADM

## 2023-11-05 RX ORDER — VANCOMYCIN HYDROCHLORIDE 125 MG/1
125 CAPSULE ORAL EVERY 6 HOURS SCHEDULED
Status: DISCONTINUED | OUTPATIENT
Start: 2023-11-05 | End: 2023-11-09 | Stop reason: HOSPADM

## 2023-11-05 RX ORDER — ACETAMINOPHEN 325 MG/1
650 TABLET ORAL EVERY 6 HOURS PRN
Status: DISCONTINUED | OUTPATIENT
Start: 2023-11-05 | End: 2023-11-09 | Stop reason: HOSPADM

## 2023-11-05 RX ORDER — ONDANSETRON 2 MG/ML
4 INJECTION INTRAMUSCULAR; INTRAVENOUS EVERY 6 HOURS PRN
Status: DISCONTINUED | OUTPATIENT
Start: 2023-11-05 | End: 2023-11-09 | Stop reason: HOSPADM

## 2023-11-05 RX ORDER — LANOLIN ALCOHOL/MO/W.PET/CERES
9 CREAM (GRAM) TOPICAL
Status: DISCONTINUED | OUTPATIENT
Start: 2023-11-05 | End: 2023-11-09 | Stop reason: HOSPADM

## 2023-11-05 RX ORDER — METOPROLOL SUCCINATE 100 MG/1
100 TABLET, EXTENDED RELEASE ORAL EVERY EVENING
Status: DISCONTINUED | OUTPATIENT
Start: 2023-11-05 | End: 2023-11-09 | Stop reason: HOSPADM

## 2023-11-05 RX ORDER — AMLODIPINE BESYLATE 5 MG/1
5 TABLET ORAL DAILY
Status: DISCONTINUED | OUTPATIENT
Start: 2023-11-06 | End: 2023-11-09 | Stop reason: HOSPADM

## 2023-11-05 RX ORDER — POTASSIUM CHLORIDE 20 MEQ/1
40 TABLET, EXTENDED RELEASE ORAL ONCE
Status: COMPLETED | OUTPATIENT
Start: 2023-11-05 | End: 2023-11-05

## 2023-11-05 RX ORDER — MAGNESIUM HYDROXIDE/ALUMINUM HYDROXICE/SIMETHICONE 120; 1200; 1200 MG/30ML; MG/30ML; MG/30ML
30 SUSPENSION ORAL EVERY 6 HOURS PRN
Status: DISCONTINUED | OUTPATIENT
Start: 2023-11-05 | End: 2023-11-09 | Stop reason: HOSPADM

## 2023-11-05 RX ORDER — ENOXAPARIN SODIUM 100 MG/ML
40 INJECTION SUBCUTANEOUS DAILY
Status: DISCONTINUED | OUTPATIENT
Start: 2023-11-06 | End: 2023-11-09 | Stop reason: HOSPADM

## 2023-11-05 RX ORDER — SODIUM CHLORIDE, SODIUM GLUCONATE, SODIUM ACETATE, POTASSIUM CHLORIDE, MAGNESIUM CHLORIDE, SODIUM PHOSPHATE, DIBASIC, AND POTASSIUM PHOSPHATE .53; .5; .37; .037; .03; .012; .00082 G/100ML; G/100ML; G/100ML; G/100ML; G/100ML; G/100ML; G/100ML
75 INJECTION, SOLUTION INTRAVENOUS CONTINUOUS
Status: DISCONTINUED | OUTPATIENT
Start: 2023-11-05 | End: 2023-11-08

## 2023-11-05 RX ORDER — ACETAMINOPHEN 325 MG/1
650 TABLET ORAL ONCE
Status: COMPLETED | OUTPATIENT
Start: 2023-11-05 | End: 2023-11-05

## 2023-11-05 RX ADMIN — ACETAMINOPHEN 650 MG: 325 TABLET, FILM COATED ORAL at 21:18

## 2023-11-05 RX ADMIN — IOHEXOL 100 ML: 350 INJECTION, SOLUTION INTRAVENOUS at 21:37

## 2023-11-05 RX ADMIN — PIPERACILLIN SODIUM AND TAZOBACTAM SODIUM 3.38 G: 36; 4.5 INJECTION, POWDER, LYOPHILIZED, FOR SOLUTION INTRAVENOUS at 22:09

## 2023-11-05 RX ADMIN — POTASSIUM CHLORIDE 40 MEQ: 1500 TABLET, EXTENDED RELEASE ORAL at 21:18
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
## 2023-11-06 ENCOUNTER — HOME CARE VISIT (OUTPATIENT)
Dept: HOME HEALTH SERVICES | Facility: HOME HEALTHCARE | Age: 88
End: 2023-11-06
Payer: COMMERCIAL

## 2023-11-06 LAB
ALBUMIN SERPL BCP-MCNC: 2.9 G/DL (ref 3.5–5)
ALP SERPL-CCNC: 54 U/L (ref 34–104)
ALT SERPL W P-5'-P-CCNC: <3 U/L (ref 7–52)
ANION GAP SERPL CALCULATED.3IONS-SCNC: 13 MMOL/L
AST SERPL W P-5'-P-CCNC: 13 U/L (ref 13–39)
BILIRUB SERPL-MCNC: 1.23 MG/DL (ref 0.2–1)
BUN SERPL-MCNC: 4 MG/DL (ref 5–25)
CALCIUM ALBUM COR SERPL-MCNC: 8 MG/DL (ref 8.3–10.1)
CALCIUM SERPL-MCNC: 7.1 MG/DL (ref 8.4–10.2)
CHLORIDE SERPL-SCNC: 101 MMOL/L (ref 96–108)
CO2 SERPL-SCNC: 24 MMOL/L (ref 21–32)
CREAT SERPL-MCNC: 0.48 MG/DL (ref 0.6–1.3)
ERYTHROCYTE [DISTWIDTH] IN BLOOD BY AUTOMATED COUNT: 15.4 % (ref 11.6–15.1)
GFR SERPL CREATININE-BSD FRML MDRD: 87 ML/MIN/1.73SQ M
GLUCOSE SERPL-MCNC: 94 MG/DL (ref 65–140)
HCT VFR BLD AUTO: 31.4 % (ref 34.8–46.1)
HGB BLD-MCNC: 10 G/DL (ref 11.5–15.4)
MAGNESIUM SERPL-MCNC: 1.9 MG/DL (ref 1.9–2.7)
MCH RBC QN AUTO: 29 PG (ref 26.8–34.3)
MCHC RBC AUTO-ENTMCNC: 31.8 G/DL (ref 31.4–37.4)
MCV RBC AUTO: 91 FL (ref 82–98)
NRBC BLD AUTO-RTO: 0 /100 WBCS
PHOSPHATE SERPL-MCNC: 3 MG/DL (ref 2.3–4.1)
PLATELET # BLD AUTO: 240 THOUSANDS/UL (ref 149–390)
PMV BLD AUTO: 10.4 FL (ref 8.9–12.7)
POTASSIUM SERPL-SCNC: 3.1 MMOL/L (ref 3.5–5.3)
PROT SERPL-MCNC: 5.3 G/DL (ref 6.4–8.4)
RBC # BLD AUTO: 3.45 MILLION/UL (ref 3.81–5.12)
SODIUM SERPL-SCNC: 138 MMOL/L (ref 135–147)
VIT B12 SERPL-MCNC: 420 PG/ML (ref 180–914)
WBC # BLD AUTO: 13.04 THOUSAND/UL (ref 4.31–10.16)
WBC STL QL MICRO: NORMAL

## 2023-11-06 PROCEDURE — 97167 OT EVAL HIGH COMPLEX 60 MIN: CPT

## 2023-11-06 PROCEDURE — 87205 SMEAR GRAM STAIN: CPT | Performed by: INTERNAL MEDICINE

## 2023-11-06 PROCEDURE — 80053 COMPREHEN METABOLIC PANEL: CPT | Performed by: INTERNAL MEDICINE

## 2023-11-06 PROCEDURE — 83735 ASSAY OF MAGNESIUM: CPT | Performed by: INTERNAL MEDICINE

## 2023-11-06 PROCEDURE — 82607 VITAMIN B-12: CPT | Performed by: INTERNAL MEDICINE

## 2023-11-06 PROCEDURE — 99222 1ST HOSP IP/OBS MODERATE 55: CPT | Performed by: INTERNAL MEDICINE

## 2023-11-06 PROCEDURE — 97163 PT EVAL HIGH COMPLEX 45 MIN: CPT

## 2023-11-06 PROCEDURE — 99232 SBSQ HOSP IP/OBS MODERATE 35: CPT | Performed by: INTERNAL MEDICINE

## 2023-11-06 PROCEDURE — 87493 C DIFF AMPLIFIED PROBE: CPT | Performed by: INTERNAL MEDICINE

## 2023-11-06 PROCEDURE — 85027 COMPLETE CBC AUTOMATED: CPT | Performed by: INTERNAL MEDICINE

## 2023-11-06 PROCEDURE — 84100 ASSAY OF PHOSPHORUS: CPT | Performed by: INTERNAL MEDICINE

## 2023-11-06 PROCEDURE — 87505 NFCT AGENT DETECTION GI: CPT | Performed by: INTERNAL MEDICINE

## 2023-11-06 RX ORDER — POTASSIUM CHLORIDE 20 MEQ/1
40 TABLET, EXTENDED RELEASE ORAL 2 TIMES DAILY
Status: COMPLETED | OUTPATIENT
Start: 2023-11-06 | End: 2023-11-06

## 2023-11-06 RX ADMIN — QUETIAPINE 25 MG: 25 TABLET ORAL at 00:27

## 2023-11-06 RX ADMIN — VANCOMYCIN HYDROCHLORIDE 125 MG: 125 CAPSULE ORAL at 01:27

## 2023-11-06 RX ADMIN — VANCOMYCIN HYDROCHLORIDE 125 MG: 125 CAPSULE ORAL at 14:31

## 2023-11-06 RX ADMIN — METOPROLOL SUCCINATE 100 MG: 100 TABLET, EXTENDED RELEASE ORAL at 00:27

## 2023-11-06 RX ADMIN — VANCOMYCIN HYDROCHLORIDE 125 MG: 125 CAPSULE ORAL at 18:33

## 2023-11-06 RX ADMIN — ENOXAPARIN SODIUM 40 MG: 40 INJECTION SUBCUTANEOUS at 10:22

## 2023-11-06 RX ADMIN — METOPROLOL SUCCINATE 100 MG: 100 TABLET, EXTENDED RELEASE ORAL at 16:51

## 2023-11-06 RX ADMIN — VANCOMYCIN HYDROCHLORIDE 125 MG: 125 CAPSULE ORAL at 10:22

## 2023-11-06 RX ADMIN — SODIUM CHLORIDE, SODIUM GLUCONATE, SODIUM ACETATE, POTASSIUM CHLORIDE, MAGNESIUM CHLORIDE, SODIUM PHOSPHATE, DIBASIC, AND POTASSIUM PHOSPHATE 75 ML/HR: .53; .5; .37; .037; .03; .012; .00082 INJECTION, SOLUTION INTRAVENOUS at 13:23

## 2023-11-06 RX ADMIN — SODIUM CHLORIDE, SODIUM GLUCONATE, SODIUM ACETATE, POTASSIUM CHLORIDE, MAGNESIUM CHLORIDE, SODIUM PHOSPHATE, DIBASIC, AND POTASSIUM PHOSPHATE 75 ML/HR: .53; .5; .37; .037; .03; .012; .00082 INJECTION, SOLUTION INTRAVENOUS at 00:27

## 2023-11-06 RX ADMIN — MELATONIN 9 MG: at 21:57

## 2023-11-06 RX ADMIN — QUETIAPINE 25 MG: 25 TABLET ORAL at 21:57

## 2023-11-06 RX ADMIN — MELATONIN 9 MG: at 00:27

## 2023-11-06 RX ADMIN — POTASSIUM CHLORIDE 40 MEQ: 1500 TABLET, EXTENDED RELEASE ORAL at 16:51

## 2023-11-06 RX ADMIN — AMLODIPINE BESYLATE 5 MG: 5 TABLET ORAL at 10:22

## 2023-11-06 RX ADMIN — POTASSIUM CHLORIDE 40 MEQ: 1500 TABLET, EXTENDED RELEASE ORAL at 10:22

## 2023-11-06 RX ADMIN — SERTRALINE HYDROCHLORIDE 75 MG: 50 TABLET ORAL at 10:22

## 2023-11-06 NOTE — ASSESSMENT & PLAN NOTE
Patient recently admitted in [address] [place] [address] due to diarrhea and was diagnosed to have C. difficile colitis. At that time, the patient was placed on cefepime with Flagyl and was discontinued in favor of vancomycin p.o. for 10 days. Patient does not give a very good history although she claims that her diarrhea was much better and had completely resolved. Patient is complaining of abdominal pain generalized for 1 day with note of recurrence of diarrhea although she could not say for sure just how much and the consistency (poor historian). As patient has signs of acute colitis per CT (read by V rad) reported as follows: "There is circumferential wall thickening of the entire large bowel, consistent  with acute colitis (as can be seen with C. diff.). Diverticulosis without diverticulitis."  The patient was initially placed on Zosyn x1 by ER; this has been discontinued after the first dose, I changed it to vancomycin 125 mg every 6.  C. difficile, stool enteric pathogens and WBCs. Continie vancomycin for c diff with outpatient followup for possible colonoscopy pending family discussion.

## 2023-11-06 NOTE — ASSESSMENT & PLAN NOTE
Patient recently admitted in [facility] due to diarrhea and was diagnosed to have C. difficile colitis. At that time, the patient was placed on cefepime with Flagyl and was discontinued in favor of vancomycin p.o. for 10 days. Patient does not give a very good history although she claims that her diarrhea was much better and had completely resolved. Patient is complaining of abdominal pain generalized for 1 day with note of recurrence of diarrhea although she could not say for sure just how much and the consistency (poor historian). As patient has signs of acute colitis per CT (read by V rad) reported as follows: "There is circumferential wall thickening of the entire large bowel, consistent  with acute colitis (as can be seen with C. diff.). Diverticulosis without diverticulitis."  The patient was initially placed on Zosyn x1 by ER; this has been discontinued after the first dose, I changed it to vancomycin 125 mg every 6.  C. difficile, stool enteric pathogens and WBCs. Patient was being managed by gastroenterology at the time although she was seen by infectious disease x1 in that previous admission. For now we will place patient back with gastroenterology consult. Repeat CBC in the morning with metabolic profile. We will resume diet although low fat and low milk.

## 2023-11-06 NOTE — ASSESSMENT & PLAN NOTE
In previous admission, the patient had suspicion of colorectal cancer due to CT findings as noted below and plans are as outlined:  CT A/P w contrast showing 7 cm "apple core" lesion of the transverse colon concerning for neoplasm. 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 Moderate obstruction of the colon proximal to the lesion. Colorectal consulted from ED and discussed w family at bedside. Per surgery note, family does not desire aggressive treatment such as surgery or chemotherapy, but would like to know extent of disease and have palliative care on board. 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 - Surgery not recommending any surgical interventions at this time. -GI not recommending colonoscopy in hospital due to C. difficile infection. We will follow-up with patient in 4 weeks to assess need for colonoscopy. - Consult to palliative care for recommendations on possible comfort care  - CT chest demonstrated multiple intermediate pulmonary nodules. GI has been consulted primarily for the recurrence of diarrhea and possible C. difficile infection recurrence.
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
## 2023-11-06 NOTE — CASE MANAGEMENT
Case Management Discharge Planning Note    Patient name Barbara Good  Location [facility] 918/[facility] 033-50 MRN [id number]  : 1934 Date 2023       Current Admission Date: 2023  Current Admission Diagnosis:C. difficile diarrhea   Patient Active Problem List    Diagnosis Date Noted    Obesity, morbid ([address]) 2023    SIRS (systemic inflammatory response syndrome) ([address]) 10/06/2023    Colorectal cancer suspected 10/06/2023    Lung nodule seen on imaging study 10/06/2023    C. difficile diarrhea 10/06/2023    Bilateral hearing loss 2023    Type 2 diabetes mellitus without complication, without long-term current use of insulin ([address])     Metabolic encephalopathy     Major depressive disorder with single episode, in full remission ([address]) 10/19/2020    Constipation, slow transit 2019    Closed fracture of right hip with routine healing 2019    Thickened endometrium 2017    Pancreatic cyst 2017    Enteritis 2017    Age related osteoporosis 2016    Resting tremor 05/10/2016    Vitamin D deficiency 2014    Right bundle-branch block 10/29/2013    Leiomyoma of uterus 2013    Benign essential hypertension 2012    Mixed hyperlipidemia 2012    Gastroesophageal reflux disease without esophagitis 2012      LOS (days): 1  Geometric Mean LOS (GMLOS) (days):   Days to GMLOS:     OBJECTIVE:  Risk of Unplanned Readmission Score: 32.25         Current admission status: Inpatient   Preferred Pharmacy:   [facility]/pharmacy #[id number] [place] [address] [address] [address]  Phone: [phone] Fax: [phone]    Primary Care Provider: RUBY Gallardo    Primary Insurance: [facility]  Secondary Insurance:     DISCHARGE DETAILS:    Discharge planning discussed with[de-identified] Patient's daughterAime Pick of Choice: Yes  Comments - Freedom of Choice: CM spoke with pt's daughter, Madi Martin, via phone to discuss PT rec for ST. Jarad Scott reported that she would not want a referral sent back to [facility] and would like a referral sent to a [facility]. CM to send referral and to send [address] [place] [address] VNA as Jarad Scott reported she will be speaking with family to decide what would be best for the pt in terms of STR vs. home. CM contacted family/caregiver?: Yes  Were Treatment Team discharge recommendations reviewed with patient/caregiver?: Yes  Did patient/caregiver verbalize understanding of patient care needs?: Yes       Contacts  Patient Contacts: Antonino Lynch  Relationship to Patient[de-identified] Family  Contact Method: Phone  Phone Number: [phone]  Reason/Outcome: Continuity of Care, Emergency Contact, Discharge Planning, Referral    Requested [facility]         Is the patient interested in [address] at discharge?: Yes  [facility] requested[de-identified] Occupational Therapy, Physical [address] [facility] Name[de-identified] Rachel Provider[de-identified] PCP  Home Health Services Needed[de-identified] Evaluate Functional Status and Safety, Gait/ADL Training, Strengthening/Theraputic Exercises to Improve Function  Homebound Criteria Met[de-identified] Requires the Assistance of Another Person for Safe Ambulation or to Leave the Home, Uses an Assist Device (i.e. cane, walker, etc)  Supporting Clincal Findings[de-identified] Limited Endurance, Fatigues Easliy in [employer]         Other Referral/Resources/Interventions Provided:  Referral Comments: [facility], referral sent out to [facility] facility for STR pending family's decision on pt's discharge plan. Additional Comments: Per pt's daughter's request, CM reached to provider to see if GI is seeing pt and can speak with pt's family. CM to continue to follow for discharge planning. UPDATE 1419:     VNA reserved in [place].

## 2023-11-06 NOTE — UTILIZATION REVIEW
NOTIFICATION OF INPATIENT ADMISSION   AUTHORIZATION REQUEST   SERVICING FACILITY:   [address] [place] [address]  Address: [address] [place] [address] [address] [facility] [address]  Tax ID: [id number]  NPI: [id number] ATTENDING PROVIDER:  Attending Name and NPI#: Danna Altman [[id number]]  Address: [address] [place] [address] [address] [facility] [address]  Phone: [phone]   ADMISSION INFORMATION:  Place of Service: Inpatient [address] [facility]  Place of Service Code: 21  Inpatient Admission Date/Time: 11/5/23 11:04 PM  Discharge Date/Time: No discharge date for patient encounter. Admitting Diagnosis Code/Description:  Chronic pancreatitis ([facility]) [K86.1]  Hypokalemia [E87.6]  Cholelithiases [K80.20]  Diverticulosis [K57.90]  Leukocytosis [D72.829]  Vomiting [R11.10]  Pancolitis (HCC) [K51.00]  HTN (hypertension) [I10]  Abdominal pain [R10.9]  Dementia ([address]) [F03.90]  History of Clostridioides difficile infection [Z86.19]     UTILIZATION REVIEW CONTACT:  Shelby Tompkins, Utilization   Network Utilization Review Department  Phone: [phone]  Fax: [phone]  Email: [email or web address]  Contact for approvals/pending authorizations, clinical reviews, and discharge. PHYSICIAN ADVISORY SERVICES:  Medical Necessity Denial & Pgbj-fo-Jgaj Review  Phone: [phone]  Fax: [phone]  Email: [email or web address]     DISCHARGE SUPPORT TEAM:  For Patients Discharge Needs & Updates  Phone: [phone] opt. 2 Fax: [phone]  Email: [email or web address]

## 2023-11-06 NOTE — ED NOTES
Patient transported to [facility], [facility]  11/05/23 7343
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
## 2023-11-06 NOTE — PROGRESS NOTES
Patient had episode of incontinence in which liquid urine/stool was soaked up by pad and/or found on the floor. Stool samples were unable to be obtained.

## 2023-11-06 NOTE — PROGRESS NOTES
Patient:    MRN:  [id number]    Alexander Request ID:  [id number]    Level of care reserved:  [address]    Partner Reserved:  [facility] [place],  [facility] [phone]    Clinical needs requested:  occupational therapy, physical therapy    Geography searched:  [phone]    Start of Service:    Request sent:  12:01pm EST on 11/6/2023 by Bernardino Noble    Partner reserved:  2:18pm EST on 11/6/2023 by Bernardino Noble    Choice list shared:  2:18pm EST on 11/6/2023 by Bernardino Noble

## 2023-11-06 NOTE — ASSESSMENT & PLAN NOTE
In previous admission, the patient had suspicion of colorectal cancer due to CT findings as noted below and plans are as outlined:  CT A/P w contrast showing 7 cm "apple core" lesion of the transverse colon concerning for neoplasm. 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 Moderate obstruction of the colon proximal to the lesion. Colorectal consulted from ED and discussed w family at bedside. Per surgery note, family does not desire aggressive treatment such as surgery or chemotherapy, but would like to know extent of disease and have palliative care on board. 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 - Surgery not recommending any surgical interventions at this time. -GI not recommending colonoscopy in hospital due to C. difficile infection. We will follow-up with patient in 4 weeks to assess need for colonoscopy. - Consult to palliative care for recommendations on possible comfort care/hospice  - CT chest demonstrated multiple intermediate pulmonary nodules. GI has been consulted primarily for the recurrence of diarrhea and possible C. difficile infection recurrence.
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
## 2023-11-06 NOTE — ASSESSMENT & PLAN NOTE
Lab Results   Component Value Date    HGBA1C 6.6 (H) 10/05/2023       Continue carbohydrate controlled diet. Blood Sugar Average: Last 72 hrs:  ?
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
## 2023-11-06 NOTE — PLAN OF CARE
Problem: Potential for Falls  Goal: Patient will remain free of falls  Description: INTERVENTIONS:  - Educate patient/family on patient safety including physical limitations  - Instruct patient to call for assistance with activity   - Consult OT/PT to assist with strengthening/mobility   - Keep Call bell within reach  - Keep bed low and locked with side rails adjusted as appropriate  - Keep care items and personal belongings within reach  - Initiate and maintain comfort rounds  - Make Fall Risk Sign visible to staff  - Offer Toileting every  Hours, in advance of need  - Initiate/Maintain alarm  - Obtain necessary fall risk management equipment:     Problem: INFECTION - ADULT  Goal: Absence or prevention of progression during hospitalization  Description: INTERVENTIONS:  - Assess and monitor for signs and symptoms of infection  - Monitor lab/diagnostic results  - Monitor all insertion sites, i.e. indwelling lines, tubes, and drains  - Monitor endotracheal if appropriate and nasal secretions for changes in amount and color  - [place] appropriate cooling/warming therapies per order  - Administer medications as ordered  - Instruct and encourage patient and family to use good hand hygiene technique  - Identify and instruct in appropriate isolation precautions for identified infection/condition  Outcome: Progressing  Goal: Absence of fever/infection during neutropenic period  Description: INTERVENTIONS:  - Monitor WBC    Outcome: Progressing     - Apply yellow socks and bracelet for high fall risk patients  - Consider moving patient to room near nurses station  Outcome: Progressing
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
## 2023-11-06 NOTE — ASSESSMENT & PLAN NOTE
Lab Results   Component Value Date    HGBA1C 6.6 (H) 10/05/2023       Continue carbohydrate controlled diet.     Blood Sugar Average: Last 72 hrs:

## 2023-11-06 NOTE — PROGRESS NOTES
[facility]  Progress Note  Name: Elisa Brandt  MRN: [id number]  Unit/Bed#: [facility] 374-74 I Date of Admission: 11/5/2023   Date of Service: 11/6/2023 I Hospital Day: 1    Assessment/Plan   Colorectal cancer suspected  Assessment & Plan  In previous admission, the patient had suspicion of colorectal cancer due to CT findings as noted below and plans are as outlined:  CT A/P w contrast showing 7 cm "apple core" lesion of the transverse colon concerning for neoplasm. 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 Moderate obstruction of the colon proximal to the lesion. Colorectal consulted from ED and discussed w family at bedside. Per surgery note, family does not desire aggressive treatment such as surgery or chemotherapy, but would like to know extent of disease and have palliative care on board. 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 - Surgery not recommending any surgical interventions at this time. -GI not recommending colonoscopy in hospital due to C. difficile infection. We will follow-up with patient in 4 weeks to assess need for colonoscopy. - Consult to palliative care for recommendations on possible comfort care/hospice  - CT chest demonstrated multiple intermediate pulmonary nodules. GI has been consulted primarily for the recurrence of diarrhea and possible C. difficile infection recurrence. Type 2 diabetes mellitus without complication, without long-term current use of insulin [facility])  Assessment & Plan  Lab Results   Component Value Date    HGBA1C 6.6 (H) 10/05/2023       Continue carbohydrate controlled diet. Blood Sugar Average: Last 72 hrs:         Major depressive disorder with single episode, in full remission [facility])  Assessment & Plan  Currently on Seroquel 25 mg at bedtime. Zoloft 75 mg daily. Gastroesophageal reflux disease without esophagitis  Assessment & Plan  Maalox as needed. Benign essential hypertension  Assessment & Plan  Continue Norvasc 5 mg daily. Metoprolol succinate 100 mg daily with hold parameters. * C. difficile diarrhea  Assessment & Plan  Patient recently admitted in [facility] due to diarrhea and was diagnosed to have C. difficile colitis. At that time, the patient was placed on cefepime with Flagyl and was discontinued in favor of vancomycin p.o. for 10 days. Patient does not give a very good history although she claims that her diarrhea was much better and had completely resolved. Patient is complaining of abdominal pain generalized for 1 day with note of recurrence of diarrhea although she could not say for sure just how much and the consistency (poor historian). As patient has signs of acute colitis per CT (read by V rad) reported as follows: "There is circumferential wall thickening of the entire large bowel, consistent  with acute colitis (as can be seen with C. diff.). Diverticulosis without diverticulitis."  The patient was initially placed on Zosyn x1 by ER; this has been discontinued after the first dose, I changed it to vancomycin 125 mg every 6.  C. difficile, stool enteric pathogens and WBCs. Continie vancomycin for c diff with outpatient followup for possible colonoscopy pending family discussion. VTE Pharmacologic Prophylaxis:   High Risk (Score >/= 5) - Pharmacological DVT Prophylaxis Ordered: enoxaparin (Lovenox). Sequential Compression Devices Ordered. Patient Centered Rounds: I performed bedside rounds with nursing staff today. Discussions with Specialists or Other Care Team Provider: gi    Education and Discussions with Family / Patient: Updated  (daughter) via phone.     Total Time Spent on Date of Encounter in care of patient: 35 mins. This time was spent on one or more of the following: performing physical exam; counseling and coordination of care; obtaining or reviewing history; documenting in the medical record; reviewing/ordering tests, medications or procedures; communicating with other healthcare professionals and discussing with patient's family/caregivers. Current Length of Stay: 1 day(s)  Current Patient Status: Inpatient   Certification Statement: The patient will continue to require additional inpatient hospital stay due to pending poalliative consult  Discharge Plan: Anticipate discharge in 48 hrs to discharge location to be determined pending rehab evaluations. Code Status: Level 1 - Full Code    Subjective:   Patient alert to self and hospital but not nto year or general situation. She denies any abdominal discomfort or any other complaints    Objective:     Vitals:   Temp (24hrs), Av.8 °F (37.1 °C), Min:97.9 °F (36.6 °C), Max:100.7 °F (38.2 °C)    Temp:  [97.9 °F (36.6 °C)-100.7 °F (38.2 °C)] 98.4 °F (36.9 °C)  HR:  [79-98] 79  Resp:  [17-19] 18  BP: (130-150)/(67-89) 130/74  SpO2:  [92 %-95 %] 92 %  Body mass index is 30.93 kg/m². Input and Output Summary (last 24 hours): Intake/Output Summary (Last 24 hours) at 2023 1314  Last data filed at 2023 0301  Gross per 24 hour   Intake --   Output 100 ml   Net -100 ml       Physical Exam:   Physical Exam  Vitals and nursing note reviewed. Constitutional:       General: She is not in acute distress. Appearance: She is well-developed. She is not toxic-appearing or diaphoretic. HENT:      Head: Normocephalic and atraumatic. Eyes:      General: No scleral icterus. Conjunctiva/sclera: Conjunctivae normal.   Cardiovascular:      Rate and Rhythm: Normal rate and regular rhythm. Heart sounds: No murmur heard. No friction rub. No gallop. Pulmonary:      Effort: Pulmonary effort is normal. No respiratory distress.       Breath sounds: Normal breath sounds. No stridor. No wheezing, rhonchi or rales. Chest:      Chest wall: No tenderness. Abdominal:      General: There is no distension. Palpations: Abdomen is soft. There is no mass. Tenderness: There is abdominal tenderness. There is no guarding or rebound. Hernia: No hernia is present. Musculoskeletal:         General: No swelling or tenderness. Cervical back: Neck supple. Skin:     General: Skin is warm and dry. Capillary Refill: Capillary refill takes less than 2 seconds. Neurological:      Mental Status: She is alert. She is disoriented.    Psychiatric:         Mood and Affect: Mood normal.          Additional Data:     Labs:  Results from last 7 days   Lab Units 11/06/23  0426 11/05/23  1850   WBC Thousand/uL 13.04* 15.29*   HEMOGLOBIN g/dL 10.0* 11.6   HEMATOCRIT % 31.4* 34.3*   PLATELETS Thousands/uL 240 272   LYMPHO PCT %  --  6*   MONO PCT %  --  5   EOS PCT %  --  0     Results from last 7 days   Lab Units 11/06/23  0426   SODIUM mmol/L 138   POTASSIUM mmol/L 3.1*   CHLORIDE mmol/L 101   CO2 mmol/L 24   BUN mg/dL 4*   CREATININE mg/dL 0.48*   ANION GAP mmol/L 13   CALCIUM mg/dL 7.1*   ALBUMIN g/dL 2.9*   TOTAL BILIRUBIN mg/dL 1.23*   ALK PHOS U/L 54   ALT U/L <3*   AST U/L 13   GLUCOSE RANDOM mg/dL 94     Results from last 7 days   Lab Units 11/05/23  2111   INR  1.21*             Results from last 7 days   Lab Units 11/05/23  2111   LACTIC ACID mmol/L 0.9   PROCALCITONIN ng/ml 0.07       Lines/Drains:  Invasive Devices       Peripheral Intravenous Line  Duration             Peripheral IV 11/05/23 Left Antecubital <1 day    Peripheral IV 11/05/23 Right Forearm <1 day              Drain  Duration             External Urinary Catheter <1 day                          Imaging: Reviewed radiology reports from this admission including: abdominal/pelvic CT    Recent Cultures (last 7 days):   Results from last 7 days   Lab Units 11/05/23  2111   BLOOD CULTURE  Received in Microbiology Lab. Culture in Progress. Received in Microbiology Lab. Culture in Progress. Last 24 Hours Medication List:   Current Facility-Administered Medications   Medication Dose Route Frequency Provider Last Rate    acetaminophen  650 mg Oral Q6H PRN Delanna Hodgkins, MD      aluminum-magnesium hydroxide-simethicone  30 mL Oral Q6H PRN Delanna Hodgkins, MD      amLODIPine  5 mg Oral Daily Delanna Hodgkins, MD      docusate sodium  100 mg Oral BID PRN Delanna Hodgkins, MD      enoxaparin  40 mg Subcutaneous Daily Delanna Hodgkins, MD      melatonin  9 mg Oral HS Delanna Hodgkins, MD      metoprolol succinate  100 mg Oral QPM Delanna Hodgkins, MD      multi-electrolyte  75 mL/hr Intravenous Continuous Delanna Hodgkins, MD 75 mL/hr (11/06/23 0027)    ondansetron  4 mg Intravenous Q6H PRN Delanna Hodgkins, MD      potassium chloride  40 mEq Oral BID Prasanna Gaspar DO      QUEtiapine  25 mg Oral HS Delanna Hodgkins, MD      sertraline  75 mg Oral Daily Delanna Hodgkins, MD      vancomycin oral (capsules or solution)  125 mg Oral Q6H Noelle Hammond MD          Today, Patient Was Seen By: Josy Mcclendon DO    **Please Note: This note may have been constructed using a voice recognition system. **

## 2023-11-06 NOTE — CASE MANAGEMENT
Case Management Assessment & Discharge Planning Note    Patient name Kevan Peace  Location [facility]16-29 MRN [id number]  : 1934 Date 2023       Current Admission Date: 2023  Current Admission Diagnosis:C. difficile diarrhea   Patient Active Problem List    Diagnosis Date Noted    Obesity, morbid ([address]) 2023    SIRS (systemic inflammatory response syndrome) ([address]) 10/06/2023    Colorectal cancer suspected 10/06/2023    Lung nodule seen on imaging study 10/06/2023    C. difficile diarrhea 10/06/2023    Bilateral hearing loss 2023    Type 2 diabetes mellitus without complication, without long-term current use of insulin ([address]) 6219    Metabolic encephalopathy     Major depressive disorder with single episode, in full remission ([address]) 10/19/2020    Constipation, slow transit 2019    Closed fracture of right hip with routine healing 2019    Thickened endometrium 2017    Pancreatic cyst 2017    Enteritis 2017    Age related osteoporosis 2016    Resting tremor 05/10/2016    Vitamin D deficiency 2014    Right bundle-branch block 10/29/2013    Leiomyoma of uterus 2013    Benign essential hypertension 2012    Mixed hyperlipidemia 2012    Gastroesophageal reflux disease without esophagitis 2012      LOS (days): 1  Geometric Mean LOS (GMLOS) (days):   Days to GMLOS:     OBJECTIVE:  PATIENT READMITTED TO HOSPITAL  Risk of Unplanned Readmission Score: 32.25         Current admission status: Inpatient       Preferred Pharmacy:   [facility]/pharmacy #[id number] Mariel, [address] [place] [address] [id number] [address]  Phone: [phone] Fax: [phone]    Primary Care Provider: RUBY Hayes    Primary Insurance: [facility]  Secondary Insurance:     ASSESSMENT:  Antione Sims, [address] [employer] Representative - Daughter   Primary Phone: [phone] (Mobile)  Home Phone: [phone]                   Readmission Root Cause  30 Day Readmission: Yes  Who directed you to return to the hospital?: Other (comment) (Per H&P, pt was previously in rehab facility)  During previous admission, was a post-acute recommendation made?: Yes  What post-acute resources were offered?: STR (Pt previously discharged to [facility] on 10/13)  Patient was readmitted due to: C. difficile diarrhea  Action Plan: Pt started on vancomycin PO    Patient Information  Admitted from[de-identified] Facility (Per H&P)  Mental Status: Alert  During Assessment patient was accompanied by: Other-Comment (Pt is a 30-day readmit)  Assessment information provided by[de-identified] Other - please comment (Pt is a 30-day readmit)    DISCHARGE DETAILS:          Additional Comments: Pt is a 30-day readmission. Please see open assessment completed on 10/8/2023. Pt recently discharged to [facility] for [place]. CM to follow for discharge planning needs.

## 2023-11-07 ENCOUNTER — HOME CARE VISIT (OUTPATIENT)
Dept: HOME HEALTH SERVICES | Facility: HOME HEALTHCARE | Age: 88
End: 2023-11-07
Payer: COMMERCIAL

## 2023-11-07 LAB
ALBUMIN SERPL BCP-MCNC: 2.9 G/DL (ref 3.5–5)
ALP SERPL-CCNC: 55 U/L (ref 34–104)
ALT SERPL W P-5'-P-CCNC: 5 U/L (ref 7–52)
ANION GAP SERPL CALCULATED.3IONS-SCNC: 8 MMOL/L
AST SERPL W P-5'-P-CCNC: 16 U/L (ref 13–39)
BASOPHILS # BLD AUTO: 0.02 THOUSANDS/ÂΜL (ref 0–0.1)
BASOPHILS NFR BLD AUTO: 0 % (ref 0–1)
BILIRUB SERPL-MCNC: 1.18 MG/DL (ref 0.2–1)
BUN SERPL-MCNC: 5 MG/DL (ref 5–25)
C DIFF TOX A+B STL QL IA: POSITIVE
C DIFF TOX GENS STL QL NAA+PROBE: POSITIVE
CALCIUM ALBUM COR SERPL-MCNC: 8.5 MG/DL (ref 8.3–10.1)
CALCIUM SERPL-MCNC: 7.6 MG/DL (ref 8.4–10.2)
CAMPYLOBACTER DNA SPEC NAA+PROBE: NORMAL
CHLORIDE SERPL-SCNC: 100 MMOL/L (ref 96–108)
CO2 SERPL-SCNC: 26 MMOL/L (ref 21–32)
CREAT SERPL-MCNC: 0.51 MG/DL (ref 0.6–1.3)
EOSINOPHIL # BLD AUTO: 0.07 THOUSAND/ÂΜL (ref 0–0.61)
EOSINOPHIL NFR BLD AUTO: 1 % (ref 0–6)
ERYTHROCYTE [DISTWIDTH] IN BLOOD BY AUTOMATED COUNT: 15.6 % (ref 11.6–15.1)
GFR SERPL CREATININE-BSD FRML MDRD: 85 ML/MIN/1.73SQ M
GLUCOSE SERPL-MCNC: 92 MG/DL (ref 65–140)
HCT VFR BLD AUTO: 31.2 % (ref 34.8–46.1)
HGB BLD-MCNC: 10 G/DL (ref 11.5–15.4)
IMM GRANULOCYTES # BLD AUTO: 0.1 THOUSAND/UL (ref 0–0.2)
IMM GRANULOCYTES NFR BLD AUTO: 1 % (ref 0–2)
LYMPHOCYTES # BLD AUTO: 1.38 THOUSANDS/ÂΜL (ref 0.6–4.47)
LYMPHOCYTES NFR BLD AUTO: 10 % (ref 14–44)
MCH RBC QN AUTO: 28.5 PG (ref 26.8–34.3)
MCHC RBC AUTO-ENTMCNC: 32.1 G/DL (ref 31.4–37.4)
MCV RBC AUTO: 89 FL (ref 82–98)
MONOCYTES # BLD AUTO: 1.34 THOUSAND/ÂΜL (ref 0.17–1.22)
MONOCYTES NFR BLD AUTO: 10 % (ref 4–12)
NEUTROPHILS # BLD AUTO: 10.49 THOUSANDS/ÂΜL (ref 1.85–7.62)
NEUTS SEG NFR BLD AUTO: 78 % (ref 43–75)
NRBC BLD AUTO-RTO: 0 /100 WBCS
PLATELET # BLD AUTO: 231 THOUSANDS/UL (ref 149–390)
PMV BLD AUTO: 10.6 FL (ref 8.9–12.7)
POTASSIUM SERPL-SCNC: 3.3 MMOL/L (ref 3.5–5.3)
PROT SERPL-MCNC: 5.7 G/DL (ref 6.4–8.4)
RBC # BLD AUTO: 3.51 MILLION/UL (ref 3.81–5.12)
SALMONELLA DNA SPEC QL NAA+PROBE: NORMAL
SHIGA TOXIN STX GENE SPEC NAA+PROBE: NORMAL
SHIGELLA DNA SPEC QL NAA+PROBE: NORMAL
SODIUM SERPL-SCNC: 134 MMOL/L (ref 135–147)
WBC # BLD AUTO: 13.4 THOUSAND/UL (ref 4.31–10.16)

## 2023-11-07 PROCEDURE — 99232 SBSQ HOSP IP/OBS MODERATE 35: CPT | Performed by: HOSPITALIST

## 2023-11-07 PROCEDURE — 99232 SBSQ HOSP IP/OBS MODERATE 35: CPT | Performed by: INTERNAL MEDICINE

## 2023-11-07 PROCEDURE — 80053 COMPREHEN METABOLIC PANEL: CPT | Performed by: INTERNAL MEDICINE

## 2023-11-07 PROCEDURE — 85025 COMPLETE CBC W/AUTO DIFF WBC: CPT | Performed by: INTERNAL MEDICINE

## 2023-11-07 RX ORDER — POTASSIUM CHLORIDE 20 MEQ/1
40 TABLET, EXTENDED RELEASE ORAL 2 TIMES DAILY
Status: COMPLETED | OUTPATIENT
Start: 2023-11-07 | End: 2023-11-07

## 2023-11-07 RX ADMIN — MELATONIN 9 MG: at 22:54

## 2023-11-07 RX ADMIN — POTASSIUM CHLORIDE 40 MEQ: 1500 TABLET, EXTENDED RELEASE ORAL at 15:09

## 2023-11-07 RX ADMIN — VANCOMYCIN HYDROCHLORIDE 125 MG: 125 CAPSULE ORAL at 15:10

## 2023-11-07 RX ADMIN — ENOXAPARIN SODIUM 40 MG: 40 INJECTION SUBCUTANEOUS at 09:56

## 2023-11-07 RX ADMIN — VANCOMYCIN HYDROCHLORIDE 125 MG: 125 CAPSULE ORAL at 19:45

## 2023-11-07 RX ADMIN — POTASSIUM CHLORIDE 40 MEQ: 1500 TABLET, EXTENDED RELEASE ORAL at 19:45

## 2023-11-07 RX ADMIN — VANCOMYCIN HYDROCHLORIDE 125 MG: 125 CAPSULE ORAL at 01:14

## 2023-11-07 RX ADMIN — VANCOMYCIN HYDROCHLORIDE 125 MG: 125 CAPSULE ORAL at 09:57

## 2023-11-07 RX ADMIN — SODIUM CHLORIDE, SODIUM GLUCONATE, SODIUM ACETATE, POTASSIUM CHLORIDE, MAGNESIUM CHLORIDE, SODIUM PHOSPHATE, DIBASIC, AND POTASSIUM PHOSPHATE 75 ML/HR: .53; .5; .37; .037; .03; .012; .00082 INJECTION, SOLUTION INTRAVENOUS at 03:02

## 2023-11-07 RX ADMIN — METOPROLOL SUCCINATE 100 MG: 100 TABLET, EXTENDED RELEASE ORAL at 17:48

## 2023-11-07 RX ADMIN — QUETIAPINE 25 MG: 25 TABLET ORAL at 22:54

## 2023-11-07 RX ADMIN — SERTRALINE HYDROCHLORIDE 75 MG: 50 TABLET ORAL at 09:56

## 2023-11-07 RX ADMIN — SODIUM CHLORIDE, SODIUM GLUCONATE, SODIUM ACETATE, POTASSIUM CHLORIDE, MAGNESIUM CHLORIDE, SODIUM PHOSPHATE, DIBASIC, AND POTASSIUM PHOSPHATE 75 ML/HR: .53; .5; .37; .037; .03; .012; .00082 INJECTION, SOLUTION INTRAVENOUS at 16:59

## 2023-11-07 RX ADMIN — AMLODIPINE BESYLATE 5 MG: 5 TABLET ORAL at 09:56

## 2023-11-07 NOTE — ASSESSMENT & PLAN NOTE
Patient recently admitted in [facility] due to diarrhea and was diagnosed to have C. difficile colitis. At that time, the patient was placed on cefepime with Flagyl and was discontinued in favor of vancomycin p.o. for 10 days. Patient does not give a very good history although she claims that her diarrhea was much better and had completely resolved. Patient is complaining of abdominal pain generalized for 1 day with note of recurrence of diarrhea although she could not say for sure just how much and the consistency (poor historian). As patient has signs of acute colitis per CT (read by V rad) reported as follows: "There is circumferential wall thickening of the entire large bowel, consistent  with acute colitis (as can be seen with C. diff.). Diverticulosis without diverticulitis."  The patient was initially placed on Zosyn x1 by ER; this has been discontinued after the first dose, I changed it to vancomycin 125 mg every 6.  C. difficile, stool enteric pathogens and WBCs. Continie vancomycin for c diff with outpatient followup for possible colonoscopy pending family discussion.

## 2023-11-07 NOTE — PLAN OF CARE
Problem: Potential for Falls  Goal: Patient will remain free of falls  Description: INTERVENTIONS:  - Educate patient/family on patient safety including physical limitations  - Instruct patient to call for assistance with activity   - Consult OT/PT to assist with strengthening/mobility   - Keep Call bell within reach  - Keep bed low and locked with side rails adjusted as appropriate  - Keep care items and personal belongings within reach  - Initiate and maintain comfort rounds  - Make Fall Risk Sign visible to staff  - Offer Toileting every 2 Hours, in advance of need  - Initiate/Maintain alarm  - Obtain necessary fall risk management equipment:   - Apply yellow socks and bracelet for high fall risk patients  - Consider moving patient to room near nurses station  Outcome: Progressing     Problem: INFECTION - ADULT  Goal: Absence or prevention of progression during hospitalization  Description: INTERVENTIONS:  - Assess and monitor for signs and symptoms of infection  - Monitor lab/diagnostic results  - Monitor all insertion sites, i.e. indwelling lines, tubes, and drains  - Monitor endotracheal if appropriate and nasal secretions for changes in amount and color  - [place] appropriate cooling/warming therapies per order  - Administer medications as ordered  - Instruct and encourage patient and family to use good hand hygiene technique  - Identify and instruct in appropriate isolation precautions for identified infection/condition  Outcome: Progressing  Goal: Absence of fever/infection during neutropenic period  Description: INTERVENTIONS:  - Monitor WBC    Outcome: Progressing

## 2023-11-07 NOTE — PROGRESS NOTES
[facility]  Progress Note  Name: Gonsalo Zavala  MRN: [id number]  [facility] Date of Admission: 11/5/2023   Date of Service: 11/7/2023 I Hospital Day: 2    Assessment/Plan   Colorectal cancer suspected  Assessment & Plan  In previous admission, the patient had suspicion of colorectal cancer due to CT findings as noted below and plans are as outlined:  CT A/P w contrast showing 7 cm "apple core" lesion of the transverse colon concerning for neoplasm. 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 Moderate obstruction of the colon proximal to the lesion. Colorectal consulted from ED and discussed w family at bedside. Per surgery note, family does not desire aggressive treatment such as surgery or chemotherapy, but would like to know extent of disease and have palliative care on board. 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 - Surgery not recommending any surgical interventions at this time. -GI not recommending colonoscopy in hospital due to C. difficile infection. We will follow-up with patient in 4 weeks to assess need for colonoscopy. - Consult to palliative care for recommendations on possible comfort care/hospice  - CT chest demonstrated multiple intermediate pulmonary nodules. GI has been consulted primarily for the recurrence of diarrhea and possible C. difficile infection recurrence  Continue Rx with PO Vanco, f/u stool studies    Type 2 diabetes mellitus without complication, without long-term current use of insulin [facility])  Assessment & Plan  Lab Results   Component Value Date    HGBA1C 6.6 (H) 10/05/2023       Continue carbohydrate controlled diet. Blood Sugar Average: Last 72 hrs:         Major depressive disorder with single episode, in full remission [facility])  Assessment & Plan  Currently on Seroquel 25 mg at bedtime. Zoloft 75 mg daily. Gastroesophageal reflux disease without esophagitis  Assessment & Plan  Maalox as needed. Benign essential hypertension  Assessment & Plan  Continue Norvasc 5 mg daily. Metoprolol succinate 100 mg daily with hold parameters. * C. difficile diarrhea  Assessment & Plan  Patient recently admitted in [address] [place] [address] due to diarrhea and was diagnosed to have C. difficile colitis. At that time, the patient was placed on cefepime with Flagyl and was discontinued in favor of vancomycin p.o. for 10 days. Patient does not give a very good history although she claims that her diarrhea was much better and had completely resolved. Patient is complaining of abdominal pain generalized for 1 day with note of recurrence of diarrhea although she could not say for sure just how much and the consistency (poor historian). As patient has signs of acute colitis per CT (read by V rad) reported as follows: "There is circumferential wall thickening of the entire large bowel, consistent  with acute colitis (as can be seen with C. diff.). Diverticulosis without diverticulitis."  The patient was initially placed on Zosyn x1 by ER; this has been discontinued after the first dose, I changed it to vancomycin 125 mg every 6.  C. difficile, stool enteric pathogens and WBCs. Continie vancomycin for c diff with outpatient followup for possible colonoscopy pending family discussion. VTE Pharmacologic Prophylaxis:   Moderate Risk (Score 3-4) - Pharmacological DVT Prophylaxis Ordered: enoxaparin (Lovenox). Patient Centered Rounds: I performed bedside rounds with nursing staff today. Discussions with Specialists or Other Care Team Provider:     Education and Discussions with Family / Patient:  patient. Total Time Spent on Date of Encounter in care of patient: 35 mins.  This time was spent on one or more of the following: performing physical exam; counseling and coordination of care; obtaining or reviewing history; documenting in the medical record; reviewing/ordering tests, medications or procedures; communicating with other healthcare professionals and discussing with patient's family/caregivers. Current Length of Stay: 2 day(s)  Current Patient Status: Inpatient   Certification Statement: The patient will continue to require additional inpatient hospital stay due to Rx Cdiff, discuss defitnitive plans  Discharge Plan: Anticipate discharge in 24-48 hrs to home with home services. Code Status: Level 1 - Full Code    Subjective:   Denies any pain or nausea    Objective:     Vitals:   Temp (24hrs), Av.7 °F (37.1 °C), Min:97.9 °F (36.6 °C), Max:99.3 °F (37.4 °C)    Temp:  [97.9 °F (36.6 °C)-99.3 °F (37.4 °C)] 98.9 °F (37.2 °C)  HR:  [78-87] 78  Resp:  [16] 16  BP: (125-127)/(70-72) 126/70  SpO2:  [93 %-96 %] 93 %  Body mass index is 30.93 kg/m². Input and Output Summary (last 24 hours):   No intake or output data in the 24 hours ending 23 1659    Physical Exam:   Physical Exam  Vitals reviewed. HENT:      Head: Normocephalic and atraumatic. Cardiovascular:      Rate and Rhythm: Normal rate and regular rhythm. Pulmonary:      Effort: Pulmonary effort is normal. No respiratory distress. Breath sounds: Normal breath sounds. Abdominal:      General: There is no distension. Palpations: Abdomen is soft. Tenderness: There is no abdominal tenderness. Musculoskeletal:      Right lower leg: No edema. Left lower leg: No edema. Neurological:      Mental Status: She is alert. She is disoriented.           Additional Data:     Labs:  Results from last 7 days   Lab Units 23  0543   WBC Thousand/uL 13.40*   HEMOGLOBIN g/dL 10.0*   HEMATOCRIT % 31.2*   PLATELETS Thousands/uL 231   NEUTROS PCT % 78*   LYMPHS PCT % 10*   MONOS PCT % 10   EOS PCT % 1     Results from last 7 days   Lab Units 11/07/23  0543   SODIUM mmol/L 134*   POTASSIUM mmol/L 3.3*   CHLORIDE mmol/L 100   CO2 mmol/L 26   BUN mg/dL 5   CREATININE mg/dL 0.51*   ANION GAP mmol/L 8   CALCIUM mg/dL 7.6*   ALBUMIN g/dL 2.9*   TOTAL BILIRUBIN mg/dL 1.18*   ALK PHOS U/L 55   ALT U/L 5*   AST U/L 16   GLUCOSE RANDOM mg/dL 92     Results from last 7 days   Lab Units 11/05/23 2111   INR  1.21*             Results from last 7 days   Lab Units 11/05/23 2111   LACTIC ACID mmol/L 0.9   PROCALCITONIN ng/ml 0.07       Lines/Drains:  Invasive Devices       Peripheral Intravenous Line  Duration             Peripheral IV 11/05/23 Right Forearm 1 day                          Imaging: No pertinent imaging reviewed. Recent Cultures (last 7 days):   Results from last 7 days   Lab Units 11/06/23  1743 11/05/23 2111   BLOOD CULTURE   --  No Growth at 24 hrs. No Growth at 24 hrs.    C DIFF TOXIN B BY PCR  Positive*  --        Last 24 Hours Medication List:   Current Facility-Administered Medications   Medication Dose Route Frequency Provider Last Rate    acetaminophen  650 mg Oral Q6H PRN Noe Melendrez MD      aluminum-magnesium hydroxide-simethicone  30 mL Oral Q6H PRN Noe Melendrez MD      amLODIPine  5 mg Oral Daily Noe Melendrez MD      docusate sodium  100 mg Oral BID PRN Noe Melendrez MD      enoxaparin  40 mg Subcutaneous Daily Noe Melendrez MD      melatonin  9 mg Oral HS Noe Melendrez MD      metoprolol succinate  100 mg Oral QPM Noe Melendrez MD      multi-electrolyte  75 mL/hr Intravenous Continuous Noe Melendrez MD 75 mL/hr (11/07/23 0302)    ondansetron  4 mg Intravenous Q6H PRN Noe Melendrez MD      potassium chloride  40 mEq Oral BID Puneet Jones MD      QUEtiapine  25 mg Oral HS Noe Melendrez MD      sertraline  75 mg Oral Daily Noe Melendrez MD      vancomycin oral (capsules or solution)  125 mg Oral Q6H [address] Noe Melendrez MD Today, Patient Was Seen By: Brenda Mullen MD    **Please Note: This note may have been constructed using a voice recognition system. **

## 2023-11-07 NOTE — ASSESSMENT & PLAN NOTE
In previous admission, the patient had suspicion of colorectal cancer due to CT findings as noted below and plans are as outlined:  CT A/P w contrast showing 7 cm "apple core" lesion of the transverse colon concerning for neoplasm. 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 Moderate obstruction of the colon proximal to the lesion. Colorectal consulted from ED and discussed w family at bedside. Per surgery note, family does not desire aggressive treatment such as surgery or chemotherapy, but would like to know extent of disease and have palliative care on board. 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 - Surgery not recommending any surgical interventions at this time. -GI not recommending colonoscopy in hospital due to C. difficile infection. We will follow-up with patient in 4 weeks to assess need for colonoscopy. - Consult to palliative care for recommendations on possible comfort care/hospice  - CT chest demonstrated multiple intermediate pulmonary nodules.   GI has been consulted primarily for the recurrence of diarrhea and possible C. difficile infection recurrence  Continue Rx with PO Vanco, f/u stool studies

## 2023-11-07 NOTE — PROGRESS NOTES
Progress note - Palliative and Supportive Care   Trula Frames 80 y.o. female [id number]    Patient Active Problem List   Diagnosis    Enteritis    Benign essential hypertension    Mixed hyperlipidemia    Gastroesophageal reflux disease without esophagitis    Leiomyoma of uterus    Pancreatic cyst    Right bundle-branch block    Thickened endometrium    Resting tremor    Closed fracture of right hip with routine healing    Vitamin D deficiency    Age related osteoporosis    Constipation, slow transit    Major depressive disorder with single episode, in full remission ([address])    Metabolic encephalopathy    Type 2 diabetes mellitus without complication, without long-term current use of insulin (HCC)    Bilateral hearing loss    SIRS (systemic inflammatory response syndrome) ([address])    Colorectal cancer suspected    Lung nodule seen on imaging study    C. difficile diarrhea    Obesity, morbid ([address])     Active issues specifically addressed today include:   Recurrent C. difficile  Concern for mass in transverse colon  Cognitive impairment  Sundowning  Palliative care patient  Goals of care    Plan:  1. Symptom management -    - Patient at risk for delirium given age and co-morbidities, recommend global delirium precautions as follows:  Establishment of day/night cycle via lights during the day and blinds open. Please limit interruptions at night as medically appropriate. Patient should be out of bed during the day as tolerated or medically indicated. Provide glasses/hearing aids as appropriate. Minimize deliriogenic meds as able. Provide reorientation including date on board and visible clock. Avoid restraints as able, frequent verbal reorientations or patient care sitter as appropriate. Consider use of melatonin qHS for circadian rhythm maintenance. 2. Goals - evolving. Family wants her to return home, no SNF. Call placed to daughter, no answer.   -    3. Psychosocial support- NA   -    4.  PSC follow-up- will follow       Code Status: Full - Level 1   Decisional apparatus:  Patient is not competent on my exam today. If competence is lost, patient's substitute decision maker would default to Carmen Nurse by [place] Act 169. Advance Directive / Living Will / POLST:  yes    Interval history:       Patient pleasant. In good spirits. Offers no complaints. MEDICATIONS / ALLERGIES:     all current active meds have been reviewed and current meds:   Current Facility-Administered Medications   Medication Dose Route Frequency    acetaminophen (TYLENOL) tablet 650 mg  650 mg Oral Q6H PRN    aluminum-magnesium hydroxide-simethicone (MAALOX) oral suspension 30 mL  30 mL Oral Q6H PRN    amLODIPine (NORVASC) tablet 5 mg  5 mg Oral Daily    docusate sodium (COLACE) capsule 100 mg  100 mg Oral BID PRN    enoxaparin (LOVENOX) subcutaneous injection 40 mg  40 mg Subcutaneous Daily    melatonin tablet 9 mg  9 mg Oral HS    metoprolol succinate (TOPROL-XL) 24 hr tablet 100 mg  100 mg Oral QPM    multi-electrolyte (PLASMALYTE-A/ISOLYTE-S PH 7.4) IV solution  75 mL/hr Intravenous Continuous    ondansetron (ZOFRAN) injection 4 mg  4 mg Intravenous Q6H PRN    potassium chloride (K-DUR,KLOR-CON) CR tablet 40 mEq  40 mEq Oral BID    QUEtiapine (SEROquel) tablet 25 mg  25 mg Oral HS    sertraline (ZOLOFT) tablet 75 mg  75 mg Oral Daily    vancomycin (VANCOCIN) capsule 125 mg  125 mg Oral Q6H [facility]       No Known Allergies    OBJECTIVE:    Physical Exam  Physical Exam  Vitals and nursing note reviewed. Constitutional:       General: She is not in acute distress. Appearance: She is obese. HENT:      Head: Normocephalic and atraumatic. Right Ear: External ear normal.      Left Ear: External ear normal.   Eyes:      General:         Right eye: No discharge. Left eye: No discharge. Cardiovascular:      Rate and Rhythm: Normal rate. Abdominal:      General: There is no distension. Palpations: Abdomen is soft.    Skin:     Coloration: Skin is pale. Neurological:      Comments: Alert to self   Psychiatric:         Mood and Affect: Mood normal.           Lab Results: I have personally reviewed pertinent labs. , CBC:   Lab Results   Component Value Date    WBC 13.40 (H) 11/07/2023    HGB 10.0 (L) 11/07/2023    HCT 31.2 (L) 11/07/2023    MCV 89 11/07/2023     11/07/2023    RBC 3.51 (L) 11/07/2023    MCH 28.5 11/07/2023    MCHC 32.1 11/07/2023    RDW 15.6 (H) 11/07/2023    MPV 10.6 11/07/2023    NRBC 0 11/07/2023   , CMP:   Lab Results   Component Value Date    SODIUM 134 (L) 11/07/2023    K 3.3 (L) 11/07/2023     11/07/2023    CO2 26 11/07/2023    BUN 5 11/07/2023    CREATININE 0.51 (L) 11/07/2023    CALCIUM 7.6 (L) 11/07/2023    AST 16 11/07/2023    ALT 5 (L) 11/07/2023    ALKPHOS 55 11/07/2023    EGFR 85 11/07/2023   , PT/PTT:No results found for: "PT", "PTT"  Imaging Studies: reviewed  EKG, Pathology, and Other Studies: reviewed    Counseling / Coordination of Care    Total floor / unit time spent today 15+ minutes. Greater than 50% of total time was spent with the patient and / or family counseling and / or coordination of care. A description of the counseling / coordination of care: chart review, medication review, supportive listening, goals of care. Portions of this document may have been created using dictation software and as such some "sound alike" terms may have been generated by the system. Do not hesitate to contact me with any questions or clarifications.

## 2023-11-07 NOTE — CASE MANAGEMENT
Case Management Discharge Planning Note    Patient name Heena Leon  Location [facility] 918/[facility] 852-53 MRN [id number]  : 1934 Date 2023       Current Admission Date: 2023  Current Admission Diagnosis:C. difficile diarrhea   Patient Active Problem List    Diagnosis Date Noted    Obesity, morbid ([address]) 2023    SIRS (systemic inflammatory response syndrome) ([address]) 10/06/2023    Colorectal cancer suspected 10/06/2023    Lung nodule seen on imaging study 10/06/2023    C. difficile diarrhea 10/06/2023    Bilateral hearing loss 2023    Type 2 diabetes mellitus without complication, without long-term current use of insulin ([address])     Metabolic encephalopathy     Major depressive disorder with single episode, in full remission ([address]) 10/19/2020    Constipation, slow transit 2019    Closed fracture of right hip with routine healing 2019    Thickened endometrium 2017    Pancreatic cyst 2017    Enteritis 2017    Age related osteoporosis 2016    Resting tremor 05/10/2016    Vitamin D deficiency 2014    Right bundle-branch block 10/29/2013    Leiomyoma of uterus 2013    Benign essential hypertension 2012    Mixed hyperlipidemia 2012    Gastroesophageal reflux disease without esophagitis 2012      LOS (days): 2  Geometric Mean LOS (GMLOS) (days): 3.80  Days to GMLOS:2.2     OBJECTIVE:  Risk of Unplanned Readmission Score: 31.88         Current admission status: Inpatient   Preferred Pharmacy:   [facility]/pharmacy #[id number] Cruz, [address] [place] [address] [phone] [address]  Phone: [phone] Fax: [phone]    Primary Care Provider: RUBY Barber    Primary Insurance: [facility] REP  Secondary Insurance:     DISCHARGE DETAILS:    Discharge planning discussed with[de-identified] Patient's daughter, Patricia Arnel of Choice: Yes  Comments - Freedom of Choice: CM f/u with pt's daughter to discuss discharge plan. Pt's daughter reported that the family has decided to take the pt home with [facility]. CM cancelled referral to JACE JEAN, who was already aware of this. Pt is reserved with [facility]. CM contacted family/caregiver?: Yes  Were Treatment Team discharge recommendations reviewed with patient/caregiver?: Yes  Did patient/caregiver verbalize understanding of patient care needs?: Yes  Were patient/caregiver advised of the risks associated with not following Treatment Team discharge recommendations?: Yes    Contacts  Patient Contacts: Siri Cardoso  Relationship to Patient[de-identified] Family  Contact Method: Phone  Phone Number: [phone]  Reason/Outcome: Continuity of Care, Emergency Contact, Discharge Planning, Referral    Requested [facility] requested[de-identified] Nursing         Other Referral/Resources/Interventions Provided:  Interventions: [facility]  Referral Comments: [facility] reserved in [facility]         Treatment Team Recommendation: Short Term Rehab  Discharge Destination Plan[de-identified] Home with [address] [place] at Discharge : Family              Additional Comments: Pt's daughter reported that her  would be transporting pt home at discharge. Pt's daughter also requested that her daughter, Corinne Nelson, be contacted for medical updates and if it is for discharge, that she Porfirio Priest) be contacted.

## 2023-11-08 LAB
ALBUMIN SERPL BCP-MCNC: 2.8 G/DL (ref 3.5–5)
ALP SERPL-CCNC: 64 U/L (ref 34–104)
ALT SERPL W P-5'-P-CCNC: 5 U/L (ref 7–52)
ANION GAP SERPL CALCULATED.3IONS-SCNC: 6 MMOL/L
AST SERPL W P-5'-P-CCNC: 14 U/L (ref 13–39)
BASOPHILS # BLD AUTO: 0.04 THOUSANDS/ÂΜL (ref 0–0.1)
BASOPHILS NFR BLD AUTO: 1 % (ref 0–1)
BILIRUB SERPL-MCNC: 0.75 MG/DL (ref 0.2–1)
BUN SERPL-MCNC: 5 MG/DL (ref 5–25)
CALCIUM ALBUM COR SERPL-MCNC: 9 MG/DL (ref 8.3–10.1)
CALCIUM SERPL-MCNC: 8 MG/DL (ref 8.4–10.2)
CHLORIDE SERPL-SCNC: 105 MMOL/L (ref 96–108)
CO2 SERPL-SCNC: 28 MMOL/L (ref 21–32)
CREAT SERPL-MCNC: 0.53 MG/DL (ref 0.6–1.3)
EOSINOPHIL # BLD AUTO: 0.14 THOUSAND/ÂΜL (ref 0–0.61)
EOSINOPHIL NFR BLD AUTO: 2 % (ref 0–6)
ERYTHROCYTE [DISTWIDTH] IN BLOOD BY AUTOMATED COUNT: 15.3 % (ref 11.6–15.1)
GFR SERPL CREATININE-BSD FRML MDRD: 84 ML/MIN/1.73SQ M
GLUCOSE SERPL-MCNC: 80 MG/DL (ref 65–140)
HCT VFR BLD AUTO: 33.1 % (ref 34.8–46.1)
HGB BLD-MCNC: 10.6 G/DL (ref 11.5–15.4)
IMM GRANULOCYTES # BLD AUTO: 0.13 THOUSAND/UL (ref 0–0.2)
IMM GRANULOCYTES NFR BLD AUTO: 2 % (ref 0–2)
LYMPHOCYTES # BLD AUTO: 1.34 THOUSANDS/ÂΜL (ref 0.6–4.47)
LYMPHOCYTES NFR BLD AUTO: 17 % (ref 14–44)
MCH RBC QN AUTO: 29.4 PG (ref 26.8–34.3)
MCHC RBC AUTO-ENTMCNC: 32 G/DL (ref 31.4–37.4)
MCV RBC AUTO: 92 FL (ref 82–98)
MONOCYTES # BLD AUTO: 0.95 THOUSAND/ÂΜL (ref 0.17–1.22)
MONOCYTES NFR BLD AUTO: 12 % (ref 4–12)
NEUTROPHILS # BLD AUTO: 5.41 THOUSANDS/ÂΜL (ref 1.85–7.62)
NEUTS SEG NFR BLD AUTO: 66 % (ref 43–75)
NRBC BLD AUTO-RTO: 0 /100 WBCS
PLATELET # BLD AUTO: 236 THOUSANDS/UL (ref 149–390)
PMV BLD AUTO: 10.5 FL (ref 8.9–12.7)
POTASSIUM SERPL-SCNC: 3.9 MMOL/L (ref 3.5–5.3)
PROT SERPL-MCNC: 5.6 G/DL (ref 6.4–8.4)
RBC # BLD AUTO: 3.61 MILLION/UL (ref 3.81–5.12)
SODIUM SERPL-SCNC: 139 MMOL/L (ref 135–147)
WBC # BLD AUTO: 8.01 THOUSAND/UL (ref 4.31–10.16)

## 2023-11-08 PROCEDURE — 99232 SBSQ HOSP IP/OBS MODERATE 35: CPT | Performed by: INTERNAL MEDICINE

## 2023-11-08 PROCEDURE — 85025 COMPLETE CBC W/AUTO DIFF WBC: CPT | Performed by: HOSPITALIST

## 2023-11-08 PROCEDURE — 99232 SBSQ HOSP IP/OBS MODERATE 35: CPT | Performed by: HOSPITALIST

## 2023-11-08 PROCEDURE — 80053 COMPREHEN METABOLIC PANEL: CPT | Performed by: HOSPITALIST

## 2023-11-08 PROCEDURE — 97116 GAIT TRAINING THERAPY: CPT

## 2023-11-08 PROCEDURE — 97530 THERAPEUTIC ACTIVITIES: CPT

## 2023-11-08 RX ORDER — QUETIAPINE FUMARATE 25 MG/1
50 TABLET, FILM COATED ORAL
Status: DISCONTINUED | OUTPATIENT
Start: 2023-11-08 | End: 2023-11-09 | Stop reason: HOSPADM

## 2023-11-08 RX ADMIN — SODIUM CHLORIDE, SODIUM GLUCONATE, SODIUM ACETATE, POTASSIUM CHLORIDE, MAGNESIUM CHLORIDE, SODIUM PHOSPHATE, DIBASIC, AND POTASSIUM PHOSPHATE 75 ML/HR: .53; .5; .37; .037; .03; .012; .00082 INJECTION, SOLUTION INTRAVENOUS at 05:59

## 2023-11-08 RX ADMIN — SERTRALINE HYDROCHLORIDE 75 MG: 50 TABLET ORAL at 08:42

## 2023-11-08 RX ADMIN — QUETIAPINE 50 MG: 25 TABLET ORAL at 22:20

## 2023-11-08 RX ADMIN — MELATONIN 9 MG: at 22:20

## 2023-11-08 RX ADMIN — VANCOMYCIN HYDROCHLORIDE 125 MG: 125 CAPSULE ORAL at 13:54

## 2023-11-08 RX ADMIN — METOPROLOL SUCCINATE 100 MG: 100 TABLET, EXTENDED RELEASE ORAL at 17:34

## 2023-11-08 RX ADMIN — VANCOMYCIN HYDROCHLORIDE 125 MG: 125 CAPSULE ORAL at 01:53

## 2023-11-08 RX ADMIN — VANCOMYCIN HYDROCHLORIDE 125 MG: 125 CAPSULE ORAL at 08:43

## 2023-11-08 RX ADMIN — ENOXAPARIN SODIUM 40 MG: 40 INJECTION SUBCUTANEOUS at 08:42

## 2023-11-08 RX ADMIN — VANCOMYCIN HYDROCHLORIDE 125 MG: 125 CAPSULE ORAL at 20:07

## 2023-11-08 NOTE — PROGRESS NOTES
Progress note - Palliative and Supportive Care   Susannah Higgins 80 y.o. female [id number]    Patient Active Problem List   Diagnosis    Enteritis    Benign essential hypertension    Mixed hyperlipidemia    Gastroesophageal reflux disease without esophagitis    Leiomyoma of uterus    Pancreatic cyst    Right bundle-branch block    Thickened endometrium    Resting tremor    Closed fracture of right hip with routine healing    Vitamin D deficiency    Age related osteoporosis    Constipation, slow transit    Major depressive disorder with single episode, in full remission ([address])    Metabolic encephalopathy    Type 2 diabetes mellitus without complication, without long-term current use of insulin (HCC)    Bilateral hearing loss    SIRS (systemic inflammatory response syndrome) ([address])    Colorectal cancer suspected    Lung nodule seen on imaging study    C. difficile diarrhea    Obesity, morbid ([address])     Active issues specifically addressed today include:   Recurrent C. difficile  Concern for mass in transverse colon  Cognitive impairment  Sundowning  Palliative care patient  Goals of care    Plan:  1. Symptom management -    - Patient at risk for delirium given age and co-morbidities, recommend global delirium precautions as follows:  Establishment of day/night cycle via lights during the day and blinds open. Please limit interruptions at night as medically appropriate. Patient should be out of bed during the day as tolerated or medically indicated. Provide glasses/hearing aids as appropriate. Minimize deliriogenic meds as able. Provide reorientation including date on board and visible clock. Avoid restraints as able, frequent verbal reorientations or patient care sitter as appropriate. Consider use of melatonin qHS for circadian rhythm maintenance. 2. Goals - evolving. SLIM talked to family and changed to Level 3 code status. Will help facilitate POLST completion.    -    3. Psychosocial support- NA   -    4.  [facility] follow-up- will follow       Code Status: DNR/DNI - Level 3   Decisional apparatus:  Patient is not competent on my exam today. If competence is lost, patient's substitute decision maker would default to [place] Act 169. Advance Directive / Living Will / POLST:  yes    Interval history:       Patient pleasant. In good spirits. Offers no complaints. MEDICATIONS / ALLERGIES:     all current active meds have been reviewed and current meds:   Current Facility-Administered Medications   Medication Dose Route Frequency    acetaminophen (TYLENOL) tablet 650 mg  650 mg Oral Q6H PRN    aluminum-magnesium hydroxide-simethicone (MAALOX) oral suspension 30 mL  30 mL Oral Q6H PRN    amLODIPine (NORVASC) tablet 5 mg  5 mg Oral Daily    docusate sodium (COLACE) capsule 100 mg  100 mg Oral BID PRN    enoxaparin (LOVENOX) subcutaneous injection 40 mg  40 mg Subcutaneous Daily    melatonin tablet 9 mg  9 mg Oral HS    metoprolol succinate (TOPROL-XL) 24 hr tablet 100 mg  100 mg Oral QPM    multi-electrolyte (PLASMALYTE-A/ISOLYTE-S PH 7.4) IV solution  75 mL/hr Intravenous Continuous    ondansetron (ZOFRAN) injection 4 mg  4 mg Intravenous Q6H PRN    QUEtiapine (SEROquel) tablet 25 mg  25 mg Oral HS    sertraline (ZOLOFT) tablet 75 mg  75 mg Oral Daily    vancomycin (VANCOCIN) capsule 125 mg  125 mg Oral Q6H [address]       No Known Allergies    OBJECTIVE:    Physical Exam  Physical Exam  Vitals and nursing note reviewed. Constitutional:       General: She is not in acute distress. HENT:      Head: Normocephalic and atraumatic. Right Ear: External ear normal.      Left Ear: External ear normal.   Eyes:      General:         Right eye: No discharge. Left eye: No discharge. Cardiovascular:      Rate and Rhythm: Normal rate. Abdominal:      General: There is no distension. Palpations: Abdomen is soft. Skin:     Coloration: Skin is pale. Neurological:      Mental Status: Mental status is at baseline. Psychiatric:         Mood and Affect: Mood normal.         Behavior: Behavior normal.           Lab Results: I have personally reviewed pertinent labs. , CBC:   Lab Results   Component Value Date    WBC 8.01 11/08/2023    HGB 10.6 (L) 11/08/2023    HCT 33.1 (L) 11/08/2023    MCV 92 11/08/2023     11/08/2023    RBC 3.61 (L) 11/08/2023    MCH 29.4 11/08/2023    MCHC 32.0 11/08/2023    RDW 15.3 (H) 11/08/2023    MPV 10.5 11/08/2023    NRBC 0 11/08/2023   , CMP:   No results found for: "SODIUM", "K", "CL", "CO2", "ANIONGAP", "BUN", "CREATININE", "GLUCOSE", "CALCIUM", "AST", "ALT", "ALKPHOS", "PROT", "BILITOT", "EGFR"  , PT/PTT:No results found for: "PT", "PTT"  Imaging Studies: reviewed  EKG, Pathology, and Other Studies: reviewed    Counseling / Coordination of Care    Total floor / unit time spent today 15+ minutes. Greater than 50% of total time was spent with the patient and / or family counseling and / or coordination of care. A description of the counseling / coordination of care: chart review, medication review, supportive listening, goals of care. Portions of this document may have been created using dictation software and as such some "sound alike" terms may have been generated by the system. Do not hesitate to contact me with any questions or clarifications.

## 2023-11-08 NOTE — PLAN OF CARE
Problem: Potential for Falls  Goal: Patient will remain free of falls  Description: INTERVENTIONS:  - Educate patient/family on patient safety including physical limitations  - Instruct patient to call for assistance with activity   - Consult OT/PT to assist with strengthening/mobility   - Keep Call bell within reach  - Keep bed low and locked with side rails adjusted as appropriate  - Keep care items and personal belongings within reach  - Initiate and maintain comfort rounds  - Make Fall Risk Sign visible to staff  - Offer Toileting every  Hours, in advance of need  - Initiate/Maintain alarm  - Obtain necessary fall risk management equipment:   - Apply yellow socks and bracelet for high fall risk patients  - Consider moving patient to room near nurses station  Outcome: Progressing     Problem: MOBILITY - ADULT  Goal: Maintain or return to baseline ADL function  Description: INTERVENTIONS:  -  Assess patient's ability to carry out ADLs; assess patient's baseline for ADL function and identify physical deficits which impact ability to perform ADLs (bathing, care of mouth/teeth, toileting, grooming, dressing, etc.)  - Assess/evaluate cause of self-care deficits   - Assess range of motion  - Assess patient's mobility; develop plan if impaired  - Assess patient's need for assistive devices and provide as appropriate  - Encourage maximum independence but intervene and supervise when necessary  - Involve family in performance of ADLs  - Assess for home care needs following discharge   - Consider OT consult to assist with ADL evaluation and planning for discharge  - Provide patient education as appropriate  Outcome: Progressing  Goal: Maintains/Returns to pre admission functional level  Description: INTERVENTIONS:  - Perform BMAT or MOVE assessment daily.   - Set and communicate daily mobility goal to care team and patient/family/caregiver.    - Collaborate with rehabilitation services on mobility goals if consulted  - Perform Range of Motion  times a day. - Reposition patient every  hours.   - Dangle patient  times a day  - Stand patient  times a day  - Ambulate patient  times a day  - Out of bed to chair  times a day   - Out of bed for meals  times a day  - Out of bed for toileting  - Record patient progress and toleration of activity level   Outcome: Progressing     Problem: PAIN - ADULT  Goal: Verbalizes/displays adequate comfort level or baseline comfort level  Description: Interventions:  - Encourage patient to monitor pain and request assistance  - Assess pain using appropriate pain scale  - Administer analgesics based on type and severity of pain and evaluate response  - Implement non-pharmacological measures as appropriate and evaluate response  - Consider cultural and social influences on pain and pain management  - Notify physician/advanced practitioner if interventions unsuccessful or patient reports new pain  Outcome: Progressing     Problem: INFECTION - ADULT  Goal: Absence or prevention of progression during hospitalization  Description: INTERVENTIONS:  - Assess and monitor for signs and symptoms of infection  - Monitor lab/diagnostic results  - Monitor all insertion sites, i.e. indwelling lines, tubes, and drains  - Monitor endotracheal if appropriate and nasal secretions for changes in amount and color  - [place] appropriate cooling/warming therapies per order  - Administer medications as ordered  - Instruct and encourage patient and family to use good hand hygiene technique  - Identify and instruct in appropriate isolation precautions for identified infection/condition  Outcome: Progressing  Goal: Absence of fever/infection during neutropenic period  Description: INTERVENTIONS:  - Monitor WBC    Outcome: Progressing     Problem: SAFETY ADULT  Goal: Patient will remain free of falls  Description: INTERVENTIONS:  - Educate patient/family on patient safety including physical limitations  - Instruct patient to call for assistance with activity   - Consult OT/PT to assist with strengthening/mobility   - Keep Call bell within reach  - Keep bed low and locked with side rails adjusted as appropriate  - Keep care items and personal belongings within reach  - Initiate and maintain comfort rounds  - Make Fall Risk Sign visible to staff  - Offer Toileting every  Hours, in advance of need  - Initiate/Maintain alarm  - Obtain necessary fall risk management equipment:   - Apply yellow socks and bracelet for high fall risk patients  - Consider moving patient to room near nurses station  Outcome: Progressing  Goal: Maintain or return to baseline ADL function  Description: INTERVENTIONS:  -  Assess patient's ability to carry out ADLs; assess patient's baseline for ADL function and identify physical deficits which impact ability to perform ADLs (bathing, care of mouth/teeth, toileting, grooming, dressing, etc.)  - Assess/evaluate cause of self-care deficits   - Assess range of motion  - Assess patient's mobility; develop plan if impaired  - Assess patient's need for assistive devices and provide as appropriate  - Encourage maximum independence but intervene and supervise when necessary  - Involve family in performance of ADLs  - Assess for home care needs following discharge   - Consider OT consult to assist with ADL evaluation and planning for discharge  - Provide patient education as appropriate  Outcome: Progressing  Goal: Maintains/Returns to pre admission functional level  Description: INTERVENTIONS:  - Perform BMAT or MOVE assessment daily.   - Set and communicate daily mobility goal to care team and patient/family/caregiver. - Collaborate with rehabilitation services on mobility goals if consulted  - Perform Range of Motion  times a day. - Reposition patient every  hours.   - Dangle patient  times a day  - Stand patient  times a day  - Ambulate patient  times a day  - Out of bed to chair  times a day   - Out of bed for meals times a day  - Out of bed for toileting  - Record patient progress and toleration of activity level   Outcome: Progressing     Problem: DISCHARGE PLANNING  Goal: Discharge to home or other facility with appropriate resources  Description: INTERVENTIONS:  - Identify barriers to discharge w/patient and caregiver  - Arrange for needed discharge resources and transportation as appropriate  - Identify discharge learning needs (meds, wound care, etc.)  - Arrange for interpretive services to assist at discharge as needed  - Refer to Case Management Department for coordinating discharge planning if the patient needs post-hospital services based on physician/advanced practitioner order or complex needs related to functional status, cognitive ability, or social support system  Outcome: Progressing     Problem: Knowledge Deficit  Goal: Patient/family/caregiver demonstrates understanding of disease process, treatment plan, medications, and discharge instructions  Description: Complete learning assessment and assess knowledge base.   Interventions:  - Provide teaching at level of understanding  - Provide teaching via preferred learning methods  Outcome: Progressing
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
## 2023-11-08 NOTE — ASSESSMENT & PLAN NOTE
Patient recently admitted in [address] [place] [address] due to diarrhea and was diagnosed to have C. difficile colitis. At that time, the patient was placed on cefepime with Flagyl and was discontinued in favor of vancomycin p.o. for 10 days. Patient does not give a very good history although she claims that her diarrhea was much better and had completely resolved. Patient is complaining of abdominal pain generalized for 1 day with note of recurrence of diarrhea although she could not say for sure just how much and the consistency (poor historian). As patient has signs of acute colitis per CT (read by V rad) reported as follows: "There is circumferential wall thickening of the entire large bowel, consistent  with acute colitis (as can be seen with C. diff.). Diverticulosis without diverticulitis."  The patient was initially placed on Zosyn x1 by ER; this has been discontinued after the first dose, changed it to vancomycin 125 mg every 6.  C.  Difficile - positive, stool enteric pathogens and WBCs -negative  Rx as 1 st Cdiff recurrence - will plan for tapering PO Vanco once clinically improving, temp & WBC better, monitor BMs

## 2023-11-08 NOTE — PLAN OF CARE
Problem: PHYSICAL THERAPY ADULT  Goal: Performs mobility at highest level of function for planned discharge setting. See evaluation for individualized goals. Description: Treatment/Interventions: Functional transfer training, LE strengthening/ROM, Elevations, Therapeutic exercise, Endurance training, Cognitive reorientation, Patient/family training, Equipment eval/education, Bed mobility, Gait training, Spoke to nursing, Spoke to case management, OT          See flowsheet documentation for full assessment, interventions and recommendations. Note: Prognosis: Fair  Problem List: Decreased strength, Decreased endurance, Impaired balance, Decreased mobility, Decreased coordination, Decreased cognition, Impaired judgement, Decreased safety awareness  Assessment: Pt seen this date for PT intervention. Therapy session focused on bed mobility, functional transfers, and ambulation. Functional gains in this session include decreased support required for bed mobility, transfers, and ambulation, suggesting improvements in LE strength and endurance. Ambulation component of treatment limited due to pt bowel movement, during which pt demonstrated incontinence . Patient required increased assistance during toilet transfers due to decreased height of toilet relative to chair and fatigue. Increased time standing with rolling walker required for toileting hygiene (with assistance). Pt lack of safety awareness suggests the need for close supervision/minAx1 throughout functional transfers and mobility. Patient continues to present below baseline, and would benefit from further skilled PT interventions while in hospital to address remaining limitations. The patient's AM-PAC Basic Mobility Inpatient Short Form Raw Score is 17. A Raw score of greater than or equal to 16 suggests the patient may benefit from discharge to home. Please also refer to the recommendation of the Physical Therapist for safe discharge planning.   Barriers to Discharge: Inaccessible home environment, Decreased caregiver support     Rehab Resource Intensity Level, PT: II (Moderate Resource Intensity)    See flowsheet documentation for full assessment.

## 2023-11-08 NOTE — ASSESSMENT & PLAN NOTE
In previous admission, the patient had suspicion of colorectal cancer due to CT findings as noted below and plans are as outlined:  CT A/P w contrast showing 7 cm "apple core" lesion of the transverse colon concerning for neoplasm. 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 Moderate obstruction of the colon proximal to the lesion. Colorectal consulted from ED and discussed w family at bedside. Per surgery note, family does not desire aggressive treatment such as surgery or chemotherapy, but would like to know extent of disease and have palliative care on board. 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 - Surgery not recommending any surgical interventions at this time. - per GI patient can follow-up in 4 weeks to assess need for colonoscopy - per daughter pt has been reluctant for bowel prep in past too, so they dont want her to get it  - Consult to palliative care for recommendations on possible comfort care/hospice  - d/w daughter Viktoria Romyw - conclusion is that surger wont be a good idea, they dont want pt to go thru colonoscopy. So they will take her home with plans for rehospitalization only for limited interventions like IVF or IV Abx, level 3 DNR  - CT chest demonstrated multiple intermediate pulmonary nodules.   GI has been consulted primarily for the recurrence of diarrhea and possible C. difficile infection recurrence  Continue Rx with PO Vanco, f/u stool studies -> positive fo Cdiff again  Also, daughter wants patient to get better sleep at night & avoid wandering when goes home , d/w palliative -> will attempt increasing dose of seroquel

## 2023-11-08 NOTE — PLAN OF CARE
Problem: Potential for Falls  Goal: Patient will remain free of falls  Description: INTERVENTIONS:  - Educate patient/family on patient safety including physical limitations  - Instruct patient to call for assistance with activity   - Consult OT/PT to assist with strengthening/mobility   - Keep Call bell within reach  - Keep bed low and locked with side rails adjusted as appropriate  - Keep care items and personal belongings within reach  - Initiate and maintain comfort rounds  - Make Fall Risk Sign visible to staff  - Offer Toileting every  Hours, in advance of need  - Initiate/Maintain alarm  - Obtain necessary fall risk management equipment:   - Apply yellow socks and bracelet for high fall risk patients  - Consider moving patient to room near nurses station  Outcome: Progressing     Problem: INFECTION - ADULT  Goal: Absence or prevention of progression during hospitalization  Description: INTERVENTIONS:  - Assess and monitor for signs and symptoms of infection  - Monitor lab/diagnostic results  - Monitor all insertion sites, i.e. indwelling lines, tubes, and drains  - Monitor endotracheal if appropriate and nasal secretions for changes in amount and color  - Raymond appropriate cooling/warming therapies per order  - Administer medications as ordered  - Instruct and encourage patient and family to use good hand hygiene technique  - Identify and instruct in appropriate isolation precautions for identified infection/condition  Outcome: Progressing  Goal: Absence of fever/infection during neutropenic period  Description: INTERVENTIONS:  - Monitor WBC    Outcome: Progressing

## 2023-11-08 NOTE — PROGRESS NOTES
[facility] ([place])  Progress Note  Name: Yfn Gonzalez  MRN: [id number]  [facility] Date of Admission: 11/5/2023   Date of Service: 11/8/2023 I Hospital Day: 3    Assessment/Plan   Colorectal cancer suspected  Assessment & Plan  In previous admission, the patient had suspicion of colorectal cancer due to CT findings as noted below and plans are as outlined:  CT A/P w contrast showing 7 cm "apple core" lesion of the transverse colon concerning for neoplasm. 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 Moderate obstruction of the colon proximal to the lesion. Colorectal consulted from ED and discussed w family at bedside. Per surgery note, family does not desire aggressive treatment such as surgery or chemotherapy, but would like to know extent of disease and have palliative care on board. 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 - Surgery not recommending any surgical interventions at this time. - per GI patient can follow-up in 4 weeks to assess need for colonoscopy - per daughter pt has been reluctant for bowel prep in past too, so they dont want her to get it  - Consult to palliative care for recommendations on possible comfort care/hospice  - d/w daughter Wilhemena Kayser - conclusion is that surger wont be a good idea, they dont want pt to go thru colonoscopy. So they will take her home with plans for rehospitalization only for limited interventions like IVF or IV Abx, level 3 DNR  - CT chest demonstrated multiple intermediate pulmonary nodules.   GI has been consulted primarily for the recurrence of diarrhea and possible C. difficile infection recurrence  Continue Rx with PO Vanco, f/u stool studies -> positive fo Cdiff again    Type 2 diabetes mellitus without complication, without long-term current use of insulin [facility])  Assessment & Plan  Lab Results   Component Value Date    HGBA1C 6.6 (H) 10/05/2023       Continue carbohydrate controlled diet. Blood Sugar Average: Last 72 hrs: Major depressive disorder with single episode, in full remission [facility])  Assessment & Plan  Currently on Seroquel 25 mg at bedtime. Zoloft 75 mg daily. Gastroesophageal reflux disease without esophagitis  Assessment & Plan  Maalox as needed. Benign essential hypertension  Assessment & Plan  Continue Norvasc 5 mg daily. Metoprolol succinate 100 mg daily with hold parameters. * C. difficile diarrhea  Assessment & Plan  Patient recently admitted in [facility] due to diarrhea and was diagnosed to have C. difficile colitis. At that time, the patient was placed on cefepime with Flagyl and was discontinued in favor of vancomycin p.o. for 10 days. Patient does not give a very good history although she claims that her diarrhea was much better and had completely resolved. Patient is complaining of abdominal pain generalized for 1 day with note of recurrence of diarrhea although she could not say for sure just how much and the consistency (poor historian). As patient has signs of acute colitis per CT (read by V rad) reported as follows: "There is circumferential wall thickening of the entire large bowel, consistent  with acute colitis (as can be seen with C. diff.). Diverticulosis without diverticulitis."  The patient was initially placed on Zosyn x1 by ER; this has been discontinued after the first dose, changed it to vancomycin 125 mg every 6.  C.  Difficile - positive, stool enteric pathogens and WBCs -negative  Rx as 1 st Cdiff recurrence - will plan for tapering PO Vanco once clinically improving, temp & WBC better, monitor BMs                 VTE Pharmacologic Prophylaxis:   Moderate Risk (Score 3-4) - Pharmacological DVT Prophylaxis Ordered: enoxaparin (Lovenox). Patient Centered Rounds: I performed bedside rounds with nursing staff today. Discussions with Specialists or Other Care Team Provider: d/w palliative    Education and Discussions with Family / Patient: Updated  (daughter) via phone. Total Time Spent on Date of Encounter in care of patient: 35 mins. This time was spent on one or more of the following: performing physical exam; counseling and coordination of care; obtaining or reviewing history; documenting in the medical record; reviewing/ordering tests, medications or procedures; communicating with other healthcare professionals and discussing with patient's family/caregivers. Current Length of Stay: 3 day(s)  Current Patient Status: Inpatient   Certification Statement: The patient will continue to require additional inpatient hospital stay due to monitor BMs  Discharge Plan: Anticipate discharge in 24-48 hrs to home with home services. Code Status: Level 3 - DNAR and DNI    Subjective:   Patient denies any complains    Objective:     Vitals:   Temp (24hrs), Av.8 °F (36.6 °C), Min:97.3 °F (36.3 °C), Max:98.2 °F (36.8 °C)    Temp:  [97.3 °F (36.3 °C)-98.2 °F (36.8 °C)] 98 °F (36.7 °C)  HR:  [75-95] 95  Resp:  [15-18] 15  BP: ()/(60-75) 120/67  SpO2:  [93 %-97 %] 97 %  Body mass index is 30.93 kg/m². Input and Output Summary (last 24 hours): Intake/Output Summary (Last 24 hours) at 2023 1749  Last data filed at 2023 1300  Gross per 24 hour   Intake 1361.25 ml   Output 950 ml   Net 411.25 ml       Physical Exam:   Physical Exam  Vitals reviewed. HENT:      Head: Normocephalic and atraumatic. Cardiovascular:      Rate and Rhythm: Normal rate and regular rhythm. Pulmonary:      Effort: Pulmonary effort is normal.      Breath sounds: Normal breath sounds. Abdominal:      General: There is no distension. Palpations: Abdomen is soft. Musculoskeletal:      Right lower leg: No edema. Left lower leg: No edema. Neurological:      Mental Status: She is alert. She is disoriented. Additional Data:     Labs:  Results from last 7 days   Lab Units 11/08/23  0444   WBC Thousand/uL 8.01   HEMOGLOBIN g/dL 10.6*   HEMATOCRIT % 33.1*   PLATELETS Thousands/uL 236   NEUTROS PCT % 66   LYMPHS PCT % 17   MONOS PCT % 12   EOS PCT % 2     Results from last 7 days   Lab Units 11/08/23  0444   SODIUM mmol/L 139   POTASSIUM mmol/L 3.9   CHLORIDE mmol/L 105   CO2 mmol/L 28   BUN mg/dL 5   CREATININE mg/dL 0.53*   ANION GAP mmol/L 6   CALCIUM mg/dL 8.0*   ALBUMIN g/dL 2.8*   TOTAL BILIRUBIN mg/dL 0.75   ALK PHOS U/L 64   ALT U/L 5*   AST U/L 14   GLUCOSE RANDOM mg/dL 80     Results from last 7 days   Lab Units 11/05/23  2111   INR  1.21*             Results from last 7 days   Lab Units 11/05/23 2111   LACTIC ACID mmol/L 0.9   PROCALCITONIN ng/ml 0.07       Lines/Drains:  Invasive Devices       Peripheral Intravenous Line  Duration             Peripheral IV 11/05/23 Right Forearm 2 days                          Imaging: No pertinent imaging reviewed. Recent Cultures (last 7 days):   Results from last 7 days   Lab Units 11/06/23  1743 11/05/23 2111   BLOOD CULTURE   --  No Growth at 48 hrs. No Growth at 48 hrs.    C DIFF TOXIN B BY PCR  Positive*  --        Last 24 Hours Medication List:   Current Facility-Administered Medications   Medication Dose Route Frequency Provider Last Rate    acetaminophen  650 mg Oral Q6H PRN Bev Cook MD      aluminum-magnesium hydroxide-simethicone  30 mL Oral Q6H PRN Bev Cook MD      amLODIPine  5 mg Oral Daily Bev Cook MD      docusate sodium  100 mg Oral BID PRN Bev Cook MD      enoxaparin  40 mg Subcutaneous Daily Bev Cook MD      melatonin  9 mg Oral HS Bev Cook MD      metoprolol succinate  100 mg Oral QPM Bev Cook MD      ondansetron  4 mg Intravenous Q6H PRN Joaquin Velazquez MD      QUEtiapine  50 mg Oral HS Radha Morales DO      sertraline  75 mg Oral Daily Denisse Spencer MD      vancomycin oral (capsules or solution)  125 mg Oral Q6H Shakir Turner MD          Today, Patient Was Seen By: Arvil Gowers, MD    **Please Note: This note may have been constructed using a voice recognition system. **

## 2023-11-09 VITALS
RESPIRATION RATE: 16 BRPM | DIASTOLIC BLOOD PRESSURE: 70 MMHG | HEIGHT: 62 IN | TEMPERATURE: 97.4 F | WEIGHT: 169.09 LBS | HEART RATE: 90 BPM | OXYGEN SATURATION: 95 % | BODY MASS INDEX: 31.12 KG/M2 | SYSTOLIC BLOOD PRESSURE: 134 MMHG

## 2023-11-09 LAB
ANION GAP SERPL CALCULATED.3IONS-SCNC: 11 MMOL/L
ATRIAL RATE: 87 BPM
BASOPHILS # BLD MANUAL: 0 THOUSAND/UL (ref 0–0.1)
BASOPHILS NFR MAR MANUAL: 0 % (ref 0–1)
BUN SERPL-MCNC: 4 MG/DL (ref 5–25)
CALCIUM SERPL-MCNC: 8.2 MG/DL (ref 8.4–10.2)
CHLORIDE SERPL-SCNC: 104 MMOL/L (ref 96–108)
CO2 SERPL-SCNC: 27 MMOL/L (ref 21–32)
CREAT SERPL-MCNC: 0.51 MG/DL (ref 0.6–1.3)
EOSINOPHIL # BLD MANUAL: 0.13 THOUSAND/UL (ref 0–0.4)
EOSINOPHIL NFR BLD MANUAL: 2 % (ref 0–6)
ERYTHROCYTE [DISTWIDTH] IN BLOOD BY AUTOMATED COUNT: 15.3 % (ref 11.6–15.1)
GFR SERPL CREATININE-BSD FRML MDRD: 85 ML/MIN/1.73SQ M
GLUCOSE SERPL-MCNC: 89 MG/DL (ref 65–140)
HCT VFR BLD AUTO: 33.5 % (ref 34.8–46.1)
HGB BLD-MCNC: 10.6 G/DL (ref 11.5–15.4)
LYMPHOCYTES # BLD AUTO: 1.45 THOUSAND/UL (ref 0.6–4.47)
LYMPHOCYTES # BLD AUTO: 15 % (ref 14–44)
MCH RBC QN AUTO: 28.7 PG (ref 26.8–34.3)
MCHC RBC AUTO-ENTMCNC: 31.6 G/DL (ref 31.4–37.4)
MCV RBC AUTO: 91 FL (ref 82–98)
MONOCYTES # BLD AUTO: 0.57 THOUSAND/UL (ref 0–1.22)
MONOCYTES NFR BLD: 9 % (ref 4–12)
MYELOCYTES NFR BLD MANUAL: 2 % (ref 0–1)
NEUTROPHILS # BLD MANUAL: 4.03 THOUSAND/UL (ref 1.85–7.62)
NEUTS SEG NFR BLD AUTO: 64 % (ref 43–75)
P AXIS: 54 DEGREES
PLATELET # BLD AUTO: 245 THOUSANDS/UL (ref 149–390)
PLATELET BLD QL SMEAR: ADEQUATE
PMV BLD AUTO: 10.4 FL (ref 8.9–12.7)
POIKILOCYTOSIS BLD QL SMEAR: PRESENT
POLYCHROMASIA BLD QL SMEAR: PRESENT
POTASSIUM SERPL-SCNC: 3.8 MMOL/L (ref 3.5–5.3)
QRS AXIS: 111 DEGREES
QRSD INTERVAL: 130 MS
QT INTERVAL: 330 MS
QTC INTERVAL: 419 MS
RBC # BLD AUTO: 3.69 MILLION/UL (ref 3.81–5.12)
RBC MORPH BLD: PRESENT
SODIUM SERPL-SCNC: 142 MMOL/L (ref 135–147)
T WAVE AXIS: -48 DEGREES
VARIANT LYMPHS # BLD AUTO: 8 %
VENTRICULAR RATE: 97 BPM
WBC # BLD AUTO: 6.29 THOUSAND/UL (ref 4.31–10.16)
WBC TOXIC VACUOLES BLD QL SMEAR: PRESENT

## 2023-11-09 PROCEDURE — 80048 BASIC METABOLIC PNL TOTAL CA: CPT | Performed by: HOSPITALIST

## 2023-11-09 PROCEDURE — 93010 ELECTROCARDIOGRAM REPORT: CPT | Performed by: INTERNAL MEDICINE

## 2023-11-09 PROCEDURE — 85007 BL SMEAR W/DIFF WBC COUNT: CPT | Performed by: HOSPITALIST

## 2023-11-09 PROCEDURE — 99239 HOSP IP/OBS DSCHRG MGMT >30: CPT | Performed by: HOSPITALIST

## 2023-11-09 PROCEDURE — 85027 COMPLETE CBC AUTOMATED: CPT | Performed by: HOSPITALIST

## 2023-11-09 RX ORDER — QUETIAPINE FUMARATE 25 MG/1
50 TABLET, FILM COATED ORAL
Qty: 60 TABLET | Refills: 0 | Status: SHIPPED | OUTPATIENT
Start: 2023-11-09 | End: 2023-12-09

## 2023-11-09 RX ORDER — VANCOMYCIN HYDROCHLORIDE 125 MG/1
125 CAPSULE ORAL 4 TIMES DAILY
Qty: 28 CAPSULE | Refills: 0 | Status: SHIPPED | OUTPATIENT
Start: 2023-11-09 | End: 2023-11-09

## 2023-11-09 RX ORDER — VANCOMYCIN HYDROCHLORIDE 25 MG/ML
KIT ORAL
Qty: 300 ML | Refills: 0 | Status: SHIPPED | OUTPATIENT
Start: 2023-11-09 | End: 2023-12-10

## 2023-11-09 RX ORDER — VANCOMYCIN HYDROCHLORIDE 125 MG/1
CAPSULE ORAL
Qty: 54 CAPSULE | Refills: 0 | Status: SHIPPED | OUTPATIENT
Start: 2023-11-09 | End: 2023-12-10

## 2023-11-09 RX ADMIN — VANCOMYCIN HYDROCHLORIDE 125 MG: 125 CAPSULE ORAL at 01:08

## 2023-11-09 RX ADMIN — SERTRALINE HYDROCHLORIDE 75 MG: 50 TABLET ORAL at 09:48

## 2023-11-09 RX ADMIN — ENOXAPARIN SODIUM 40 MG: 40 INJECTION SUBCUTANEOUS at 09:48

## 2023-11-09 RX ADMIN — VANCOMYCIN HYDROCHLORIDE 125 MG: 125 CAPSULE ORAL at 08:03

## 2023-11-09 RX ADMIN — AMLODIPINE BESYLATE 5 MG: 5 TABLET ORAL at 09:48

## 2023-11-09 NOTE — ASSESSMENT & PLAN NOTE
Currently on Seroquel 25 mg at bedtime -> increased to 50 mg HS for behaviors at home  Zoloft 75 mg daily.

## 2023-11-09 NOTE — QUICK NOTE
Progress Note - Palliative & Supportive Care     Patient for d/c today. Will arrange outpatient follow-up for ongoing palliative care support.     Bailey Burns,   Palliative and Supportive Care  [phone]

## 2023-11-09 NOTE — DISCHARGE SUMMARY
[address] [facility]  Discharge- Claire Pock 1934, 80 y.o. female MRN: [id number]  Unit/Bed#: [facility] 918-01 Encounter: [id number]  Primary Care Provider: RUBY Polo   Date and time admitted to hospital: 11/5/2023  6:24 PM    * C. difficile diarrhea  Assessment & Plan  Patient recently admitted in [address] [place] [address] due to diarrhea and was diagnosed to have C. difficile colitis. At that time, the patient was placed on cefepime with Flagyl and was discontinued in favor of vancomycin p.o. for 10 days. Patient does not give a very good history although she claims that her diarrhea was much better and had completely resolved. Patient is complaining of abdominal pain generalized for 1 day with note of recurrence of diarrhea although she could not say for sure just how much and the consistency (poor historian). As patient has signs of acute colitis per CT (read by V rad) reported as follows: "There is circumferential wall thickening of the entire large bowel, consistent  with acute colitis (as can be seen with C. diff.). Diverticulosis without diverticulitis."  The patient was initially placed on Zosyn x1 by ER; this has been discontinued after the first dose, changed it to vancomycin 125 mg every 6.  C. Difficile - positive, stool enteric pathogens and WBCs -negative  Rx as 1 st Cdiff recurrence - temp & WBC improved, so did BMs , hence being discharged home on tapering dose of PO vancomycin q 7 days        Colorectal cancer suspected  Assessment & Plan  In previous admission, the patient had suspicion of colorectal cancer due to CT findings as noted below and plans are as outlined:  CT A/P w contrast showing 7 cm "apple core" lesion of the transverse colon concerning for neoplasm. 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 Moderate obstruction of the colon proximal to the lesion. Colorectal consulted from ED and discussed w family at bedside. Per surgery note, family does not desire aggressive treatment such as surgery or chemotherapy, but would like to know extent of disease and have palliative care on board. 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 - Surgery not recommending any surgical interventions at this time. - per GI patient can follow-up in 4 weeks to assess need for colonoscopy - per daughter pt has been reluctant for bowel prep in past too, so they dont want her to get it  - Consult to palliative care for recommendations on possible comfort care/hospice  - d/w daughter Honorio Dupree - conclusion is that surger wont be a good idea, they dont want pt to go thru colonoscopy. So they will take her home with plans for rehospitalization only for limited interventions like IVF or IV Abx, level 3 DNR  - CT chest demonstrated multiple intermediate pulmonary nodules. GI has been consulted primarily for the recurrence of diarrhea and possible C. difficile infection recurrence  Continue Rx with PO Vanco, f/u stool studies -> positive fo Cdiff again  Also, daughter wants patient to get better sleep at night & avoid wandering when goes home , d/w palliative -> will attempt increasing dose of seroquel to 50 mg - tolerated it, so will continue on discharge    Type 2 diabetes mellitus without complication, without long-term current use of insulin [facility])  Assessment & Plan  Lab Results   Component Value Date    HGBA1C 6.6 (H) 10/05/2023       Continue carbohydrate controlled diet. Blood Sugar Average: Last 72 hrs: Major depressive disorder with single episode, in full remission [facility])  Assessment & Plan  Currently on Seroquel 25 mg at bedtime -> increased to 50 mg HS for behaviors at home  Zoloft 75 mg daily.     Gastroesophageal reflux disease without esophagitis  Assessment & Plan  Maalox as needed. Benign essential hypertension  Assessment & Plan  Continue Norvasc 5 mg daily. Metoprolol succinate 100 mg daily with hold parameters. Medical Problems       Resolved Problems  Date Reviewed: 11/9/2023   None       Discharging Physician / Practitioner: Rose Izquierdo MD  PCP: Tabitha Roach, [facility]  Admission Date:   Admission Orders (From admission, onward)       Ordered        11/05/23 2304  Inpatient Admission  Once                          Discharge Date: 11/09/23    Consultations During Hospital Stay:  Palliative care  GI    Procedures Performed:   none    Significant Findings / Test Results:   RADIOLOGY RESULTS   Final Result by  (11/09 1458)      CT abdomen pelvis with contrast   Final Result by Chandana Quinteros MD (11/06 0818)      1. Improving right-sided colitis with resolution of the previously seen pericolonic abscess/fistula between the possible mass at the proximal transverse colon and the cecum with only a small amount of residual phlegmonous change. 2. Mild rectosigmoid proctocolitis. 3. Stable changes of chronic pancreatitis with multiple small pancreatic cysts. Findings are consistent with the preliminary report from [facility] which was provided shortly after completion of the exam. The additional finding of improving changes with resolution of previously seen fistula   will now be communicated with    patient's clinical team by our radiology liaison. The study was marked in [facility] for immediate notification. Workstation performed: [id number]         XR chest portable   ED Interpretation by Aimee Meyers DO (11/05 2121)   Wet read - normal cxr      Final Result by Maria E Sams MD (11/06 1248)      No acute cardiopulmonary disease.                   Workstation performed: [id number]                   Reason for Admission: Generalized abdominal pain with recurrence of diarrhea; findings of pancolitis     Hospital Course:   Mya Peres is a 80 y.o. female patient who originally presented to the hospital on 11/5/2023 with past medical history significant for recent admission in [address] [place] [address] early October and was diagnosed to have C. difficile colitis as well a possibility of colorectal cancer. Patient also has a past medical history of hypertension, bilateral hearing loss gastroesophageal reflux major depression and type 2 diabetes currently diet controlled. The patient has been complaining of generalized abdominal discomfort for the past day with note of recurrence of said loose stools. She was also complaining of generalized abdominal discomfort earlier. in the emergency room, she was found to have a white count of 15.29 when it was 12.54 in October 13 when she had C. difficile colitis. Also she had a fever of 100.7 . A CT of the abdomen showed pancolitis also with diverticulosis but no diverticuliti       Please see above list of diagnoses and related plan for additional information. Condition at Discharge: stable    Discharge Day Visit / Exam:   Subjective:  pleasant, has no complains    Vitals: Blood Pressure: 134/70 (11/09/23 0748)  Pulse: 90 (11/09/23 0748)  Temperature: (!) 97.4 °F (36.3 °C) (11/09/23 0748)  Temp Source: Oral (11/08/23 1524)  Respirations: 16 (11/09/23 0748)  Height: 5' 2" (157.5 cm) (11/06/23 0016)  Weight - Scale: 76.7 kg (169 lb 1.5 oz) (11/06/23 0552)  SpO2: 95 % (11/09/23 0806)  Exam:   Physical Exam  Vitals reviewed. HENT:      Head: Normocephalic and atraumatic. Cardiovascular:      Rate and Rhythm: Normal rate and regular rhythm. Pulmonary:      Effort: Pulmonary effort is normal.      Breath sounds: Normal breath sounds. Abdominal:      General: There is no distension. Palpations: Abdomen is soft. Tenderness: There is no abdominal tenderness. Musculoskeletal:      Right lower leg: No edema.       Left lower leg: No edema. Neurological:      Mental Status: She is alert. She is disoriented. Discussion with Family: Updated  (daughter) via phone. Vega Santizo    Discharge instructions/Information to patient and family:   See after visit summary for information provided to patient and family. Provisions for Follow-Up Care:  See after visit summary for information related to follow-up care and any pertinent home health orders. Disposition:   Home with VNA Services (Reminder: Complete face to face encounter)    Planned Readmission: no     Discharge Statement:  I spent 45 minutes discharging the patient. This time was spent on the day of discharge. I had direct contact with the patient on the day of discharge. Greater than 50% of the total time was spent examining patient, answering all patient questions, arranging and discussing plan of care with patient as well as directly providing post-discharge instructions. Additional time then spent on discharge activities. Discharge Medications:  See after visit summary for reconciled discharge medications provided to patient and/or family.       **Please Note: This note may have been constructed using a voice recognition system**

## 2023-11-09 NOTE — ASSESSMENT & PLAN NOTE
Patient recently admitted in [address] [place] [address] due to diarrhea and was diagnosed to have C. difficile colitis. At that time, the patient was placed on cefepime with Flagyl and was discontinued in favor of vancomycin p.o. for 10 days. Patient does not give a very good history although she claims that her diarrhea was much better and had completely resolved. Patient is complaining of abdominal pain generalized for 1 day with note of recurrence of diarrhea although she could not say for sure just how much and the consistency (poor historian). As patient has signs of acute colitis per CT (read by V rad) reported as follows: "There is circumferential wall thickening of the entire large bowel, consistent  with acute colitis (as can be seen with C. diff.). Diverticulosis without diverticulitis."  The patient was initially placed on Zosyn x1 by ER; this has been discontinued after the first dose, changed it to vancomycin 125 mg every 6.  C.  Difficile - positive, stool enteric pathogens and WBCs -negative  Rx as 1 st Cdiff recurrence - temp & WBC improved, so did BMs , hence being discharged home on tapering dose of PO vancomycin q 7 days

## 2023-11-09 NOTE — ED ATTENDING ATTESTATION
11/5/2023  ITej MD, saw and evaluated the patient. I have discussed the patient with the resident/non-physician practitioner and agree with the resident's/non-physician practitioner's findings, Plan of Care, and MDM as documented in the resident's/non-physician practitioner's note, except where noted. All available labs and Radiology studies were reviewed. I was present for key portions of any procedure(s) performed by the resident/non-physician practitioner and I was immediately available to provide assistance. At this point I agree with the current assessment done in the Emergency Department. I have conducted an independent evaluation of this patient a history and physical is as follows:   The patient presents with complaints of probable abdominal pain she has significant dementia   Apparently the patient had an episode of vomiting he denies abdominal pain at the present time however her history is unreliable  We will obtain a CAT scan of the abdomen pelvis and lab work-up and urine the patient would likely require admission to the hospital  Reviewed old records patient had a recent bout of C. difficile  ED Course         Critical Care Time  Procedures

## 2023-11-09 NOTE — CASE MANAGEMENT
Case Management Discharge Planning Note    Patient name Francisca Durant  Location [facility] 918/[facility] 118-56 MRN [id number]  : 1934 Date 2023       Current Admission Date: 2023  Current Admission Diagnosis:C. difficile diarrhea   Patient Active Problem List    Diagnosis Date Noted    Obesity, morbid ([address]) 2023    SIRS (systemic inflammatory response syndrome) ([address]) 10/06/2023    Colorectal cancer suspected 10/06/2023    Lung nodule seen on imaging study 10/06/2023    C. difficile diarrhea 10/06/2023    Bilateral hearing loss 2023    Type 2 diabetes mellitus without complication, without long-term current use of insulin ([address])     Metabolic encephalopathy     Major depressive disorder with single episode, in full remission ([address]) 10/19/2020    Constipation, slow transit 2019    Closed fracture of right hip with routine healing 2019    Thickened endometrium 2017    Pancreatic cyst 2017    Enteritis 2017    Age related osteoporosis 2016    Resting tremor 05/10/2016    Vitamin D deficiency 2014    Right bundle-branch block 10/29/2013    Leiomyoma of uterus 2013    Benign essential hypertension 2012    Mixed hyperlipidemia 2012    Gastroesophageal reflux disease without esophagitis 2012      LOS (days): 4  Geometric Mean LOS (GMLOS) (days): 3.80  Days to GMLOS:0.2     OBJECTIVE:  Risk of Unplanned Readmission Score: 27.1         Current admission status: Inpatient   Preferred Pharmacy:   [facility]/pharmacy #[id number] Jensen[address] [facility] 121  Phone: [phone] Fax: [address] [place] [address] 1 [facility] [address] [place] [address]  Phone: [phone] Fax: [phone]    Primary Care Provider: RUBY Abebe    Primary Insurance: [employer] CROSS [address] [facility] [facility]:     DISCHARGE DETAILS:    PT for DC today.   IMM reviewed    TCT ledy Renner, will add HHA and MSW to [facility]. request.  Emailed her non skill home care list                                                                                        IMM Given (Date):: 11/09/23  IMM Given to[de-identified] Patient

## 2023-11-09 NOTE — PLAN OF CARE
Problem: Potential for Falls  Goal: Patient will remain free of falls  Description: INTERVENTIONS:  - Educate patient/family on patient safety including physical limitations  - Instruct patient to call for assistance with activity   - Consult OT/PT to assist with strengthening/mobility   - Keep Call bell within reach  - Keep bed low and locked with side rails adjusted as appropriate  - Keep care items and personal belongings within reach  - Initiate and maintain comfort rounds  - Make Fall Risk Sign visible to staff  - Apply yellow socks and bracelet for high fall risk patients  - Consider moving patient to room near nurses station  11/9/2023 0136 by Jozef Navas RN  Outcome: Progressing  11/9/2023 0136 by Jozef Navas RN  Outcome: Progressing     Problem: MOBILITY - ADULT  Goal: Maintain or return to baseline ADL function  Description: INTERVENTIONS:  -  Assess patient's ability to carry out ADLs; assess patient's baseline for ADL function and identify physical deficits which impact ability to perform ADLs (bathing, care of mouth/teeth, toileting, grooming, dressing, etc.)  - Assess/evaluate cause of self-care deficits   - Assess range of motion  - Assess patient's mobility; develop plan if impaired  - Assess patient's need for assistive devices and provide as appropriate  - Encourage maximum independence but intervene and supervise when necessary  - Involve family in performance of ADLs  - Assess for home care needs following discharge   - Consider OT consult to assist with ADL evaluation and planning for discharge  - Provide patient education as appropriate  11/9/2023 0136 by Jozef Navas RN  Outcome: Progressing  11/9/2023 0136 by Jozef Navas RN  Outcome: Progressing  Goal: Maintains/Returns to pre admission functional level  Description: INTERVENTIONS:  - Perform BMAT or MOVE assessment daily.   - Set and communicate daily mobility goal to care team and patient/family/caregiver.    - Collaborate with rehabilitation services on mobility goals if consulted  - Out of bed for toileting  - Record patient progress and toleration of activity level   11/9/2023 0136 by Sharlene Hager RN  Outcome: Progressing  11/9/2023 0136 by Sharlene Hager RN  Outcome: Progressing     Problem: PAIN - ADULT  Goal: Verbalizes/displays adequate comfort level or baseline comfort level  Description: Interventions:  - Encourage patient to monitor pain and request assistance  - Assess pain using appropriate pain scale  - Administer analgesics based on type and severity of pain and evaluate response  - Implement non-pharmacological measures as appropriate and evaluate response  - Consider cultural and social influences on pain and pain management  - Notify physician/advanced practitioner if interventions unsuccessful or patient reports new pain  11/9/2023 0136 by Sharlene Hager RN  Outcome: Progressing  11/9/2023 0136 by Sharlene Hager RN  Outcome: Progressing     Problem: INFECTION - ADULT  Goal: Absence or prevention of progression during hospitalization  Description: INTERVENTIONS:  - Assess and monitor for signs and symptoms of infection  - Monitor lab/diagnostic results  - Monitor all insertion sites, i.e. indwelling lines, tubes, and drains  - Monitor endotracheal if appropriate and nasal secretions for changes in amount and color  - [place] appropriate cooling/warming therapies per order  - Administer medications as ordered  - Instruct and encourage patient and family to use good hand hygiene technique  - Identify and instruct in appropriate isolation precautions for identified infection/condition  11/9/2023 0136 by Sharlene Hager RN  Outcome: Progressing  11/9/2023 0136 by Sharlene Hager RN  Outcome: Progressing  Goal: Absence of fever/infection during neutropenic period  Description: INTERVENTIONS:  - Monitor WBC    11/9/2023 0136 by Sharlene Hager RN  Outcome: Progressing  11/9/2023 0136 by Carmelita JOSELUIS Lowe  Outcome: Progressing     Problem: SAFETY ADULT  Goal: Patient will remain free of falls  Description: INTERVENTIONS:  - Educate patient/family on patient safety including physical limitations  - Instruct patient to call for assistance with activity   - Consult OT/PT to assist with strengthening/mobility   - Keep Call bell within reach  - Keep bed low and locked with side rails adjusted as appropriate  - Keep care items and personal belongings within reach  - Initiate and maintain comfort rounds  - Make Fall Risk Sign visible to staff  - Apply yellow socks and bracelet for high fall risk patients  - Consider moving patient to room near nurses station  11/9/2023 0136 by Melly Shafer RN  Outcome: Progressing  11/9/2023 0136 by Melly Shafer RN  Outcome: Progressing  Goal: Maintain or return to baseline ADL function  Description: INTERVENTIONS:  -  Assess patient's ability to carry out ADLs; assess patient's baseline for ADL function and identify physical deficits which impact ability to perform ADLs (bathing, care of mouth/teeth, toileting, grooming, dressing, etc.)  - Assess/evaluate cause of self-care deficits   - Assess range of motion  - Assess patient's mobility; develop plan if impaired  - Assess patient's need for assistive devices and provide as appropriate  - Encourage maximum independence but intervene and supervise when necessary  - Involve family in performance of ADLs  - Assess for home care needs following discharge   - Consider OT consult to assist with ADL evaluation and planning for discharge  - Provide patient education as appropriate  11/9/2023 0136 by Melly Shafer RN  Outcome: Progressing  11/9/2023 0136 by Melly Shafer RN  Outcome: Progressing  Goal: Maintains/Returns to pre admission functional level  Description: INTERVENTIONS:  - Perform BMAT or MOVE assessment daily.   - Set and communicate daily mobility goal to care team and patient/family/caregiver.    - Collaborate with rehabilitation services on mobility goals if consulted  - Out of bed for toileting  - Record patient progress and toleration of activity level   11/9/2023 0136 by Cynthia Nash RN  Outcome: Progressing  11/9/2023 0136 by Cynthia Nash RN  Outcome: Progressing     Problem: DISCHARGE PLANNING  Goal: Discharge to home or other facility with appropriate resources  Description: INTERVENTIONS:  - Identify barriers to discharge w/patient and caregiver  - Arrange for needed discharge resources and transportation as appropriate  - Identify discharge learning needs (meds, wound care, etc.)  - Arrange for interpretive services to assist at discharge as needed  - Refer to Case Management Department for coordinating discharge planning if the patient needs post-hospital services based on physician/advanced practitioner order or complex needs related to functional status, cognitive ability, or social support system  Outcome: Progressing     Problem: Knowledge Deficit  Goal: Patient/family/caregiver demonstrates understanding of disease process, treatment plan, medications, and discharge instructions  Description: Complete learning assessment and assess knowledge base.   Interventions:  - Provide teaching at level of understanding  - Provide teaching via preferred learning methods  Outcome: Progressing

## 2023-11-09 NOTE — ASSESSMENT & PLAN NOTE
In previous admission, the patient had suspicion of colorectal cancer due to CT findings as noted below and plans are as outlined:  CT A/P w contrast showing 7 cm "apple core" lesion of the transverse colon concerning for neoplasm. 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 Moderate obstruction of the colon proximal to the lesion. Colorectal consulted from ED and discussed w family at bedside. Per surgery note, family does not desire aggressive treatment such as surgery or chemotherapy, but would like to know extent of disease and have palliative care on board. 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 - Surgery not recommending any surgical interventions at this time. - per GI patient can follow-up in 4 weeks to assess need for colonoscopy - per daughter pt has been reluctant for bowel prep in past too, so they dont want her to get it  - Consult to palliative care for recommendations on possible comfort care/hospice  - d/w daughter Marylou Juan - conclusion is that surger wont be a good idea, they dont want pt to go thru colonoscopy. So they will take her home with plans for rehospitalization only for limited interventions like IVF or IV Abx, level 3 DNR  - CT chest demonstrated multiple intermediate pulmonary nodules.   GI has been consulted primarily for the recurrence of diarrhea and possible C. difficile infection recurrence  Continue Rx with PO Vanco, f/u stool studies -> positive fo Cdiff again  Also, daughter wants patient to get better sleep at night & avoid wandering when goes home , d/w palliative -> will attempt increasing dose of seroquel to 50 mg - tolerated it, so will continue on discharge

## 2023-11-10 ENCOUNTER — HOME CARE VISIT (OUTPATIENT)
Dept: HOME HEALTH SERVICES | Facility: HOME HEALTHCARE | Age: 88
End: 2023-11-10
Payer: COMMERCIAL

## 2023-11-10 ENCOUNTER — TELEPHONE (OUTPATIENT)
Dept: PALLIATIVE MEDICINE | Facility: CLINIC | Age: 88
End: 2023-11-10

## 2023-11-10 VITALS
HEIGHT: 62 IN | HEART RATE: 78 BPM | DIASTOLIC BLOOD PRESSURE: 62 MMHG | TEMPERATURE: 97.9 F | RESPIRATION RATE: 20 BRPM | WEIGHT: 169 LBS | SYSTOLIC BLOOD PRESSURE: 130 MMHG | OXYGEN SATURATION: 95 % | BODY MASS INDEX: 31.1 KG/M2

## 2023-11-10 PROCEDURE — G0299 HHS/HOSPICE OF RN EA 15 MIN: HCPCS

## 2023-11-10 NOTE — TELEPHONE ENCOUNTER
Called patient to make a hospital follow up and spoke to daughter, she requests a call back from Dr. Rose aPppas. She said she sent a email to Dr. Rose Pappas who saw the patient in the hospital. Her daughter Ravin Patricia wants to speak to  Before making a appt. Because she wants to know what gameplan will be.

## 2023-11-10 NOTE — UTILIZATION REVIEW
NOTIFICATION OF ADMISSION DISCHARGE   This is a Notification of Discharge from [address] Please be advised that this patient has been discharge from our facility. Below you will find the admission and discharge date and time including the patient’s disposition. UTILIZATION REVIEW CONTACT:  Shelby Tompkins  Utilization   Network Utilization Review Department  Phone: [phone] x carefully listen to the prompts. All voicemails are confidential.  Email: [email or web address]     ADMISSION INFORMATION  PRESENTATION DATE: 11/5/2023  6:24 PM  OBERVATION ADMISSION DATE:   INPATIENT ADMISSION DATE: 11/5/23 11:04 PM   DISCHARGE DATE: 11/9/2023  3:00 PM   DISPOSITION:Home with Alex Fonsec[facility] Utilization Review Department  ATTENTION: Please call with any questions or concerns to [phone] and carefully listen to the prompts so that you are directed to the right person. All voicemails are confidential.   For Discharge needs, contact Care Management DC Support Team at [phone] opt. 2  Send all requests for admission clinical reviews, approved or denied determinations and any other requests to dedicated fax number below belonging to the campus where the patient is receiving treatment.  List of dedicated fax numbers for the Facilities:  [place] DENIALS (Administrative/Medical Necessity) [phone]   DISCHARGE SUPPORT TEAM (Network) [phone] [facility] [phone] [address] [facility] [address] [place] [address] [address]  [place] [phone]

## 2023-11-11 VITALS
OXYGEN SATURATION: 96 % | TEMPERATURE: 98.4 F | HEART RATE: 88 BPM | WEIGHT: 169 LBS | RESPIRATION RATE: 15 BRPM | DIASTOLIC BLOOD PRESSURE: 82 MMHG | BODY MASS INDEX: 31.93 KG/M2 | SYSTOLIC BLOOD PRESSURE: 130 MMHG

## 2023-11-11 PROBLEM — F01.50 MIXED DEMENTIA (HCC): Status: ACTIVE | Noted: 2023-11-11

## 2023-11-11 PROBLEM — F02.80 MIXED DEMENTIA (HCC): Status: ACTIVE | Noted: 2023-11-11

## 2023-11-11 PROBLEM — G30.9 MIXED DEMENTIA (HCC): Status: ACTIVE | Noted: 2023-11-11

## 2023-11-11 LAB
BACTERIA BLD CULT: NORMAL
BACTERIA BLD CULT: NORMAL

## 2023-11-11 NOTE — ASSESSMENT & PLAN NOTE
Fairly well controlled at this time  130/82 today  Denies symptoms  Fall risk-avoid hypotension  Continue Toprol-XL and amlodipine at this time  Visiting nursing in the home-monitor BPs

## 2023-11-11 NOTE — ASSESSMENT & PLAN NOTE
DEXA order has been in the system  Has not been completed  Per daughter-will likely not complete at this time

## 2023-11-11 NOTE — ASSESSMENT & PLAN NOTE
Daughter reports fairly well managed  Continue management per geriatric medicine   Sertraline; Seroquel

## 2023-11-11 NOTE — ASSESSMENT & PLAN NOTE
No recent lipid panel  Orders have been in the system-but not completed  Last lipid panel 2021  Reports she has been consistent with atorvastatin daily  Family discussion of necessary versus nonnecessary meds at this time.   Given current medical conditions, we will discontinue atorvastatin today  - shared decision making with family

## 2023-11-13 ENCOUNTER — HOME CARE VISIT (OUTPATIENT)
Dept: HOME HEALTH SERVICES | Facility: HOME HEALTHCARE | Age: 88
End: 2023-11-13
Payer: COMMERCIAL

## 2023-11-13 ENCOUNTER — TRANSITIONAL CARE MANAGEMENT (OUTPATIENT)
Dept: FAMILY MEDICINE CLINIC | Facility: CLINIC | Age: 88
End: 2023-11-13

## 2023-11-13 VITALS — HEART RATE: 86 BPM | OXYGEN SATURATION: 97 %

## 2023-11-13 PROCEDURE — G0151 HHCP-SERV OF PT,EA 15 MIN: HCPCS

## 2023-11-13 NOTE — TELEPHONE ENCOUNTER
Spoke with patient's daughter and reviewed her questions regarding differences between palliative and hospice care. Discussed when hospice care would be appropriate for patient. Patient already enrolled with [facility] and they explained that this can aid in a seamless transition for when she continues to decline. Their goal is to optimize quality of life and they do not want her to be rehospitalized. All questions and concerns were addressed to her satisfaction.

## 2023-11-14 ENCOUNTER — HOME CARE VISIT (OUTPATIENT)
Dept: HOME HEALTH SERVICES | Facility: HOME HEALTHCARE | Age: 88
End: 2023-11-14
Payer: COMMERCIAL

## 2023-11-14 VITALS
DIASTOLIC BLOOD PRESSURE: 70 MMHG | HEART RATE: 83 BPM | OXYGEN SATURATION: 97 % | SYSTOLIC BLOOD PRESSURE: 122 MMHG | RESPIRATION RATE: 16 BRPM

## 2023-11-14 PROCEDURE — G0299 HHS/HOSPICE OF RN EA 15 MIN: HCPCS

## 2023-11-16 ENCOUNTER — HOME CARE VISIT (OUTPATIENT)
Dept: HOME HEALTH SERVICES | Facility: HOME HEALTHCARE | Age: 88
End: 2023-11-16
Payer: COMMERCIAL

## 2023-11-16 VITALS
DIASTOLIC BLOOD PRESSURE: 70 MMHG | HEART RATE: 77 BPM | OXYGEN SATURATION: 97 % | RESPIRATION RATE: 16 BRPM | SYSTOLIC BLOOD PRESSURE: 138 MMHG

## 2023-11-16 PROCEDURE — G0299 HHS/HOSPICE OF RN EA 15 MIN: HCPCS

## 2023-11-17 ENCOUNTER — HOME CARE VISIT (OUTPATIENT)
Dept: HOME HEALTH SERVICES | Facility: HOME HEALTHCARE | Age: 88
End: 2023-11-17
Payer: COMMERCIAL

## 2023-11-17 VITALS — OXYGEN SATURATION: 96 % | HEART RATE: 80 BPM

## 2023-11-17 PROCEDURE — G0151 HHCP-SERV OF PT,EA 15 MIN: HCPCS

## 2023-11-20 ENCOUNTER — HOME CARE VISIT (OUTPATIENT)
Dept: HOME HEALTH SERVICES | Facility: HOME HEALTHCARE | Age: 88
End: 2023-11-20
Payer: COMMERCIAL

## 2023-11-20 VITALS
OXYGEN SATURATION: 94 % | SYSTOLIC BLOOD PRESSURE: 138 MMHG | HEART RATE: 76 BPM | DIASTOLIC BLOOD PRESSURE: 80 MMHG | RESPIRATION RATE: 16 BRPM

## 2023-11-20 VITALS — OXYGEN SATURATION: 96 % | HEART RATE: 80 BPM

## 2023-11-20 PROCEDURE — G0299 HHS/HOSPICE OF RN EA 15 MIN: HCPCS

## 2023-11-20 PROCEDURE — G0151 HHCP-SERV OF PT,EA 15 MIN: HCPCS

## 2023-11-21 ENCOUNTER — OFFICE VISIT (OUTPATIENT)
Dept: FAMILY MEDICINE CLINIC | Facility: CLINIC | Age: 88
End: 2023-11-21
Payer: COMMERCIAL

## 2023-11-21 ENCOUNTER — APPOINTMENT (OUTPATIENT)
Dept: LAB | Facility: CLINIC | Age: 88
End: 2023-11-21
Payer: COMMERCIAL

## 2023-11-21 VITALS
TEMPERATURE: 98.2 F | DIASTOLIC BLOOD PRESSURE: 82 MMHG | WEIGHT: 168 LBS | SYSTOLIC BLOOD PRESSURE: 126 MMHG | RESPIRATION RATE: 20 BRPM | OXYGEN SATURATION: 96 % | BODY MASS INDEX: 30.73 KG/M2 | HEART RATE: 92 BPM

## 2023-11-21 DIAGNOSIS — C19 COLORECTAL CANCER (HCC): ICD-10-CM

## 2023-11-21 DIAGNOSIS — A04.72 C. DIFFICILE COLITIS: ICD-10-CM

## 2023-11-21 DIAGNOSIS — A04.72 C. DIFFICILE COLITIS: Primary | ICD-10-CM

## 2023-11-21 LAB
ALBUMIN SERPL BCP-MCNC: 3.8 G/DL (ref 3.5–5)
ALP SERPL-CCNC: 58 U/L (ref 34–104)
ALT SERPL W P-5'-P-CCNC: 9 U/L (ref 7–52)
ANION GAP SERPL CALCULATED.3IONS-SCNC: 7 MMOL/L
AST SERPL W P-5'-P-CCNC: 24 U/L (ref 13–39)
BASOPHILS # BLD AUTO: 0.02 THOUSANDS/ÂΜL (ref 0–0.1)
BASOPHILS NFR BLD AUTO: 0 % (ref 0–1)
BILIRUB SERPL-MCNC: 0.89 MG/DL (ref 0.2–1)
BUN SERPL-MCNC: 8 MG/DL (ref 5–25)
CALCIUM SERPL-MCNC: 9.4 MG/DL (ref 8.4–10.2)
CHLORIDE SERPL-SCNC: 102 MMOL/L (ref 96–108)
CO2 SERPL-SCNC: 31 MMOL/L (ref 21–32)
CREAT SERPL-MCNC: 0.59 MG/DL (ref 0.6–1.3)
EOSINOPHIL # BLD AUTO: 0.06 THOUSAND/ÂΜL (ref 0–0.61)
EOSINOPHIL NFR BLD AUTO: 1 % (ref 0–6)
ERYTHROCYTE [DISTWIDTH] IN BLOOD BY AUTOMATED COUNT: 15.7 % (ref 11.6–15.1)
GFR SERPL CREATININE-BSD FRML MDRD: 81 ML/MIN/1.73SQ M
GLUCOSE P FAST SERPL-MCNC: 110 MG/DL (ref 65–99)
HCT VFR BLD AUTO: 37.8 % (ref 34.8–46.1)
HGB BLD-MCNC: 11.9 G/DL (ref 11.5–15.4)
IMM GRANULOCYTES # BLD AUTO: 0.04 THOUSAND/UL (ref 0–0.2)
IMM GRANULOCYTES NFR BLD AUTO: 0 % (ref 0–2)
LYMPHOCYTES # BLD AUTO: 1.63 THOUSANDS/ÂΜL (ref 0.6–4.47)
LYMPHOCYTES NFR BLD AUTO: 18 % (ref 14–44)
MCH RBC QN AUTO: 28.3 PG (ref 26.8–34.3)
MCHC RBC AUTO-ENTMCNC: 31.5 G/DL (ref 31.4–37.4)
MCV RBC AUTO: 90 FL (ref 82–98)
MONOCYTES # BLD AUTO: 0.6 THOUSAND/ÂΜL (ref 0.17–1.22)
MONOCYTES NFR BLD AUTO: 7 % (ref 4–12)
NEUTROPHILS # BLD AUTO: 6.66 THOUSANDS/ÂΜL (ref 1.85–7.62)
NEUTS SEG NFR BLD AUTO: 74 % (ref 43–75)
NRBC BLD AUTO-RTO: 0 /100 WBCS
PLATELET # BLD AUTO: 270 THOUSANDS/UL (ref 149–390)
PMV BLD AUTO: 10.5 FL (ref 8.9–12.7)
POTASSIUM SERPL-SCNC: 3.6 MMOL/L (ref 3.5–5.3)
PROT SERPL-MCNC: 7.2 G/DL (ref 6.4–8.4)
RBC # BLD AUTO: 4.21 MILLION/UL (ref 3.81–5.12)
SODIUM SERPL-SCNC: 140 MMOL/L (ref 135–147)
WBC # BLD AUTO: 9.01 THOUSAND/UL (ref 4.31–10.16)

## 2023-11-21 PROCEDURE — 85025 COMPLETE CBC W/AUTO DIFF WBC: CPT

## 2023-11-21 PROCEDURE — 80053 COMPREHEN METABOLIC PANEL: CPT

## 2023-11-21 PROCEDURE — 36415 COLL VENOUS BLD VENIPUNCTURE: CPT

## 2023-11-21 PROCEDURE — 99495 TRANSJ CARE MGMT MOD F2F 14D: CPT | Performed by: FAMILY MEDICINE

## 2023-11-22 ENCOUNTER — HOME CARE VISIT (OUTPATIENT)
Dept: HOME HEALTH SERVICES | Facility: HOME HEALTHCARE | Age: 88
End: 2023-11-22
Payer: COMMERCIAL

## 2023-11-22 NOTE — PROGRESS NOTES
Assessment/Plan:   1. C. difficile colitis  Patient appears clinically stable today. At this time, she states that her bowel movements have been returning back to normal.  Continue with her current treatment of vancomycin. Continues with her regular follow-up with gastroenterology. - CBC and differential; Future  - Comprehensive metabolic panel; Future    2. Colorectal cancer suspected  Reviewed patient's previous evaluation. At this time, there did appear to be abnormalities on her CT imaging. This was reviewed with patient as well as her daughter today. She will be following up with gastroenterology to further assess if a colonoscopy would be warranted. Diagnoses and all orders for this visit:    C. difficile colitis  -     CBC and differential; Future  -     Comprehensive metabolic panel; Future          Subjective:       Chief Complaint   Patient presents with    Transition of Care Management      Patient ID: Misty Hutchison is a 80 y.o. female presents today for a follow-up from her recent hospitalization. She was admitted on November 5 and subsequent discharge on November 9. She was admitted secondary to the development of C. difficile colitis. She was previously admitted in early October secondary to an acute mental status change. She was found to have a apple core lesion in her colon as well as a C. difficile infection at that time as well. She was evaluated by colorectal surgery as well as gastroenterology. She was treated for her C. difficile infection. She was subsequently discharged. She started to develop these persisting symptoms. She was admitted again and found to have C. difficile. She was started on her antiviral treatment of vancomycin. Since her discharge, she states that she has been doing relatively well. She denies any diarrhea today. She is scheduled to see gastroenterology next week.     [facility] Call     Date and time call was made  11/13/2023  2:52 PM    Hospital care reviewed  Records reviewed    Patient was hospitialized at  [facility]    Date of Admission  11/05/23    Date of discharge  11/09/23    Diagnosis  c diff, diarrhea    Disposition  Home    Were the patients medications reviewed and updated  No    Current Symptoms  None      TCM Call     Post hospital issues  Reduced activity    Should patient be enrolled in anticoag monitoring? No    Scheduled for follow up? Yes    Did you obtain your prescribed medications  Yes    Do you need help managing your prescriptions or medications  No    Is transportation to your appointment needed  Yes    Specify why  Daughter will drive    I have advised the patient to call PCP with any new or worsening symptoms    Rolando Green MA    Living Arrangements  Family members    Are you recieving any outpatient services  No    Are you recieving home care services  Yes    Are you using any community resources  No    Current waiver services  No    Interperter language line needed  No              Review of Systems   Constitutional:  Negative for activity change, chills, fatigue and fever. HENT:  Negative for congestion, ear pain, sinus pressure and sore throat. Eyes:  Negative for redness, itching and visual disturbance. Respiratory:  Negative for cough and shortness of breath. Cardiovascular:  Negative for chest pain and palpitations. Gastrointestinal:  Negative for abdominal pain, diarrhea and nausea. Endocrine: Negative for cold intolerance and heat intolerance. Genitourinary:  Negative for dysuria, flank pain and frequency. Musculoskeletal:  Negative for arthralgias, back pain, gait problem and myalgias. Skin:  Negative for color change. Allergic/Immunologic: Negative for environmental allergies. Neurological:  Negative for dizziness, numbness and headaches. Psychiatric/Behavioral:  Negative for behavioral problems and sleep disturbance.         The following portions of the patient's history were reviewed and updated as appropriate : past family history, past medical history, past social history and past surgical history. Current Outpatient Medications:     Acetaminophen (Tylenol) 325 MG CAPS, Take by mouth as needed, Disp: , Rfl:     amLODIPine (NORVASC) 5 mg tablet, TAKE 1 TABLET BY MOUTH EVERY DAY, Disp: 90 tablet, Rfl: 1    Melatonin 10 MG TABS, Take 10 mg by mouth daily at bedtime, Disp: , Rfl:     metoprolol succinate (TOPROL-XL) 100 mg 24 hr tablet, TAKE 1 TABLET BY MOUTH EVERY DAY IN THE EVENING, Disp: 90 tablet, Rfl: 1    QUEtiapine (SEROquel) 25 mg tablet, Take 2 tablets (50 mg total) by mouth daily at bedtime, Disp: 60 tablet, Rfl: 0    sertraline (ZOLOFT) 50 mg tablet, Take 75 mg by mouth daily 1 and half tablet by mouth daily, Disp: , Rfl:     vancomycin (VANCOCIN) 125 MG capsule, Take 1 capsule (125 mg total) by mouth 4 (four) times a day for 7 days, THEN 1 capsule (125 mg total) 2 (two) times a day for 7 days, THEN 1 capsule (125 mg total) in the morning for 7 days, THEN 1 capsule (125 mg total) every other day for 10 days. , Disp: 54 capsule, Rfl: 0    Vancomycin HCl 25 MG/ML SOLR, Take 125 mg by mouth 4 (four) times a day for 7 days, THEN 125 mg 2 (two) times a day for 7 days, THEN 125 mg in the morning for 7 days, THEN 125 mg every other day for 10 days. , Disp: 300 mL, Rfl: 0    nystatin (MYCOSTATIN) powder, Apply topically 2 (two) times a day for 7 days, Disp: 30 g, Rfl: 0         Objective:         Vitals:    11/21/23 1355   BP: 126/82   BP Location: Right arm   Patient Position: Sitting   Cuff Size: Standard   Pulse: 92   Resp: 20   Temp: 98.2 °F (36.8 °C)   TempSrc: Temporal   SpO2: 96%   Weight: 76.2 kg (168 lb)     Physical Exam  Vitals reviewed. Constitutional:       Appearance: She is well-developed. HENT:      Head: Normocephalic and atraumatic. Nose: Nose normal.      Mouth/Throat:      Pharynx: No oropharyngeal exudate. Eyes:      General: No scleral icterus.         Right eye: No discharge. Left eye: No discharge. Pupils: Pupils are equal, round, and reactive to light. Neck:      Trachea: No tracheal deviation. Cardiovascular:      Rate and Rhythm: Normal rate and regular rhythm. Pulses:           Dorsalis pedis pulses are 2+ on the right side and 2+ on the left side. Posterior tibial pulses are 2+ on the right side and 2+ on the left side. Heart sounds: Normal heart sounds. No murmur heard. No friction rub. No gallop. Pulmonary:      Effort: Pulmonary effort is normal. No respiratory distress. Breath sounds: Normal breath sounds. No wheezing or rales. Abdominal:      General: Bowel sounds are normal. There is no distension. Palpations: Abdomen is soft. Tenderness: There is no abdominal tenderness. There is no guarding or rebound. Musculoskeletal:         General: Normal range of motion. Cervical back: Normal range of motion and neck supple. Lymphadenopathy:      Head:      Right side of head: No submental or submandibular adenopathy. Left side of head: No submental or submandibular adenopathy. Cervical: No cervical adenopathy. Right cervical: No superficial, deep or posterior cervical adenopathy. Left cervical: No superficial, deep or posterior cervical adenopathy. Skin:     General: Skin is warm and dry. Findings: No erythema. Neurological:      Mental Status: She is alert and oriented to person, place, and time. Cranial Nerves: No cranial nerve deficit. Sensory: No sensory deficit. Psychiatric:         Mood and Affect: Mood is not anxious or depressed. Speech: Speech normal.         Behavior: Behavior normal.         Thought Content:  Thought content normal.         Judgment: Judgment normal.

## 2023-11-24 ENCOUNTER — HOME CARE VISIT (OUTPATIENT)
Dept: HOME HEALTH SERVICES | Facility: HOME HEALTHCARE | Age: 88
End: 2023-11-24
Payer: COMMERCIAL

## 2023-11-24 PROCEDURE — G0155 HHCP-SVS OF CSW,EA 15 MIN: HCPCS

## 2023-11-27 ENCOUNTER — OFFICE VISIT (OUTPATIENT)
Dept: GASTROENTEROLOGY | Facility: CLINIC | Age: 88
End: 2023-11-27
Payer: COMMERCIAL

## 2023-11-27 ENCOUNTER — HOME CARE VISIT (OUTPATIENT)
Dept: HOME HEALTH SERVICES | Facility: HOME HEALTHCARE | Age: 88
End: 2023-11-27
Payer: COMMERCIAL

## 2023-11-27 VITALS
BODY MASS INDEX: 31.1 KG/M2 | HEART RATE: 99 BPM | SYSTOLIC BLOOD PRESSURE: 133 MMHG | DIASTOLIC BLOOD PRESSURE: 84 MMHG | OXYGEN SATURATION: 97 % | WEIGHT: 169 LBS | TEMPERATURE: 97.5 F | HEIGHT: 62 IN

## 2023-11-27 DIAGNOSIS — A04.72 C. DIFFICILE DIARRHEA: ICD-10-CM

## 2023-11-27 DIAGNOSIS — G30.9 MIXED DEMENTIA (HCC): ICD-10-CM

## 2023-11-27 DIAGNOSIS — F02.80 MIXED DEMENTIA (HCC): ICD-10-CM

## 2023-11-27 DIAGNOSIS — F01.50 MIXED DEMENTIA (HCC): ICD-10-CM

## 2023-11-27 DIAGNOSIS — E43 UNSPECIFIED SEVERE PROTEIN-CALORIE MALNUTRITION (HCC): ICD-10-CM

## 2023-11-27 DIAGNOSIS — R63.4 WEIGHT LOSS: ICD-10-CM

## 2023-11-27 DIAGNOSIS — C19 COLORECTAL CANCER (HCC): Primary | ICD-10-CM

## 2023-11-27 DIAGNOSIS — R93.5 ABNORMAL CT OF THE ABDOMEN: ICD-10-CM

## 2023-11-27 PROCEDURE — 99204 OFFICE O/P NEW MOD 45 MIN: CPT | Performed by: INTERNAL MEDICINE
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
## 2023-11-28 ENCOUNTER — HOME CARE VISIT (OUTPATIENT)
Dept: HOME HEALTH SERVICES | Facility: HOME HEALTHCARE | Age: 88
End: 2023-11-28
Payer: COMMERCIAL

## 2023-11-28 VITALS
RESPIRATION RATE: 16 BRPM | OXYGEN SATURATION: 93 % | DIASTOLIC BLOOD PRESSURE: 70 MMHG | SYSTOLIC BLOOD PRESSURE: 130 MMHG | HEART RATE: 80 BPM

## 2023-11-28 PROCEDURE — G0299 HHS/HOSPICE OF RN EA 15 MIN: HCPCS

## 2023-11-29 ENCOUNTER — HOME CARE VISIT (OUTPATIENT)
Dept: HOME HEALTH SERVICES | Facility: HOME HEALTHCARE | Age: 88
End: 2023-11-29
Payer: COMMERCIAL

## 2023-11-29 ENCOUNTER — TELEPHONE (OUTPATIENT)
Dept: FAMILY MEDICINE CLINIC | Facility: CLINIC | Age: 88
End: 2023-11-29

## 2023-11-29 VITALS — HEART RATE: 82 BPM | OXYGEN SATURATION: 96 %

## 2023-11-29 DIAGNOSIS — Z71.89 GOALS OF CARE, COUNSELING/DISCUSSION: ICD-10-CM

## 2023-11-29 DIAGNOSIS — C19 COLORECTAL CANCER (HCC): Primary | ICD-10-CM

## 2023-11-29 PROCEDURE — G0151 HHCP-SERV OF PT,EA 15 MIN: HCPCS

## 2023-11-30 ENCOUNTER — HOME CARE VISIT (OUTPATIENT)
Dept: HOME HEALTH SERVICES | Facility: HOME HEALTHCARE | Age: 88
End: 2023-11-30
Payer: COMMERCIAL

## 2023-11-30 VITALS
SYSTOLIC BLOOD PRESSURE: 138 MMHG | OXYGEN SATURATION: 94 % | DIASTOLIC BLOOD PRESSURE: 80 MMHG | HEART RATE: 78 BPM | RESPIRATION RATE: 16 BRPM

## 2023-11-30 PROCEDURE — G0299 HHS/HOSPICE OF RN EA 15 MIN: HCPCS

## 2023-12-28 DIAGNOSIS — F02.80 MIXED DEMENTIA (HCC): ICD-10-CM

## 2023-12-28 DIAGNOSIS — G30.9 MIXED DEMENTIA (HCC): ICD-10-CM

## 2023-12-28 DIAGNOSIS — F01.50 MIXED DEMENTIA (HCC): ICD-10-CM

## 2023-12-28 NOTE — TELEPHONE ENCOUNTER
Voicemail from daughter Bianca requesting refill of Seroquel.  Will pend and forward to ordering provider.

## 2024-01-01 ENCOUNTER — HOME CARE VISIT (OUTPATIENT)
Dept: HOME HEALTH SERVICES | Facility: HOME HEALTHCARE | Age: 89
End: 2024-01-01
Payer: MEDICARE

## 2024-01-01 ENCOUNTER — HOME CARE VISIT (OUTPATIENT)
Dept: HOME HOSPICE | Facility: HOSPICE | Age: 89
End: 2024-01-01
Payer: MEDICARE

## 2024-01-01 ENCOUNTER — TELEPHONE (OUTPATIENT)
Age: 89
End: 2024-01-01

## 2024-01-01 VITALS — SYSTOLIC BLOOD PRESSURE: 160 MMHG | RESPIRATION RATE: 14 BRPM | HEART RATE: 100 BPM | DIASTOLIC BLOOD PRESSURE: 100 MMHG

## 2024-01-01 VITALS — RESPIRATION RATE: 13 BRPM | HEART RATE: 104 BPM

## 2024-01-01 DIAGNOSIS — Z51.5 HOSPICE CARE: Primary | ICD-10-CM

## 2024-01-01 PROCEDURE — G0299 HHS/HOSPICE OF RN EA 15 MIN: HCPCS

## 2024-01-01 PROCEDURE — G0156 HHCP-SVS OF AIDE,EA 15 MIN: HCPCS

## 2024-01-01 PROCEDURE — G0155 HHCP-SVS OF CSW,EA 15 MIN: HCPCS

## 2024-01-02 ENCOUNTER — APPOINTMENT (OUTPATIENT)
Dept: RADIOLOGY | Facility: HOSPITAL | Age: 89
DRG: 563 | End: 2024-01-02
Payer: COMMERCIAL

## 2024-01-02 ENCOUNTER — APPOINTMENT (EMERGENCY)
Dept: RADIOLOGY | Facility: HOSPITAL | Age: 89
DRG: 563 | End: 2024-01-02
Payer: COMMERCIAL

## 2024-01-02 ENCOUNTER — HOSPITAL ENCOUNTER (INPATIENT)
Facility: HOSPITAL | Age: 89
LOS: 5 days | Discharge: NURSING, REHAB OR LONG-TERM CARE | DRG: 563 | End: 2024-01-08
Attending: EMERGENCY MEDICINE | Admitting: INTERNAL MEDICINE
Payer: COMMERCIAL

## 2024-01-02 DIAGNOSIS — S52.123A: ICD-10-CM
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
## 2024-01-02 DIAGNOSIS — C19 COLORECTAL CANCER (HCC): ICD-10-CM

## 2024-01-02 DIAGNOSIS — S52.502D CLOSED FRACTURE OF DISTAL END OF LEFT RADIUS WITH ROUTINE HEALING, UNSPECIFIED FRACTURE MORPHOLOGY, SUBSEQUENT ENCOUNTER: ICD-10-CM

## 2024-01-02 PROBLEM — K92.1 BLOOD IN STOOL: Status: ACTIVE | Noted: 2024-01-02

## 2024-01-02 PROBLEM — R55 SYNCOPE: Status: ACTIVE | Noted: 2024-01-02

## 2024-01-02 LAB
2HR DELTA HS TROPONIN: -1 NG/L
ALBUMIN SERPL BCP-MCNC: 3.9 G/DL (ref 3.5–5)
ALP SERPL-CCNC: 61 U/L (ref 34–104)
ALT SERPL W P-5'-P-CCNC: 6 U/L (ref 7–52)
ANION GAP SERPL CALCULATED.3IONS-SCNC: 9 MMOL/L
AST SERPL W P-5'-P-CCNC: 17 U/L (ref 13–39)
ATRIAL RATE: 277 BPM
ATRIAL RATE: 88 BPM
BASOPHILS # BLD AUTO: 0.02 THOUSANDS/ÂΜL (ref 0–0.1)
BASOPHILS NFR BLD AUTO: 0 % (ref 0–1)
BILIRUB SERPL-MCNC: 0.78 MG/DL (ref 0.2–1)
BUN SERPL-MCNC: 12 MG/DL (ref 5–25)
CALCIUM SERPL-MCNC: 9.1 MG/DL (ref 8.4–10.2)
CARDIAC TROPONIN I PNL SERPL HS: 6 NG/L
CARDIAC TROPONIN I PNL SERPL HS: 7 NG/L
CHLORIDE SERPL-SCNC: 103 MMOL/L (ref 96–108)
CO2 SERPL-SCNC: 29 MMOL/L (ref 21–32)
CREAT SERPL-MCNC: 0.57 MG/DL (ref 0.6–1.3)
EOSINOPHIL # BLD AUTO: 0.04 THOUSAND/ÂΜL (ref 0–0.61)
EOSINOPHIL NFR BLD AUTO: 1 % (ref 0–6)
ERYTHROCYTE [DISTWIDTH] IN BLOOD BY AUTOMATED COUNT: 15 % (ref 11.6–15.1)
GFR SERPL CREATININE-BSD FRML MDRD: 82 ML/MIN/1.73SQ M
GLUCOSE SERPL-MCNC: 114 MG/DL (ref 65–140)
HCT VFR BLD AUTO: 35.6 % (ref 34.8–46.1)
HGB BLD-MCNC: 11.2 G/DL (ref 11.5–15.4)
IMM GRANULOCYTES # BLD AUTO: 0.06 THOUSAND/UL (ref 0–0.2)
IMM GRANULOCYTES NFR BLD AUTO: 1 % (ref 0–2)
LYMPHOCYTES # BLD AUTO: 1.03 THOUSANDS/ÂΜL (ref 0.6–4.47)
LYMPHOCYTES NFR BLD AUTO: 15 % (ref 14–44)
MAGNESIUM SERPL-MCNC: 1.8 MG/DL (ref 1.9–2.7)
MCH RBC QN AUTO: 27.4 PG (ref 26.8–34.3)
MCHC RBC AUTO-ENTMCNC: 31.5 G/DL (ref 31.4–37.4)
MCV RBC AUTO: 87 FL (ref 82–98)
MONOCYTES # BLD AUTO: 0.38 THOUSAND/ÂΜL (ref 0.17–1.22)
MONOCYTES NFR BLD AUTO: 6 % (ref 4–12)
NEUTROPHILS # BLD AUTO: 5.34 THOUSANDS/ÂΜL (ref 1.85–7.62)
NEUTS SEG NFR BLD AUTO: 77 % (ref 43–75)
NRBC BLD AUTO-RTO: 0 /100 WBCS
PLATELET # BLD AUTO: 258 THOUSANDS/UL (ref 149–390)
PMV BLD AUTO: 9.6 FL (ref 8.9–12.7)
POTASSIUM SERPL-SCNC: 3.2 MMOL/L (ref 3.5–5.3)
PROT SERPL-MCNC: 7.2 G/DL (ref 6.4–8.4)
QRS AXIS: 64 DEGREES
QRS AXIS: 75 DEGREES
QRSD INTERVAL: 130 MS
QRSD INTERVAL: 130 MS
QT INTERVAL: 362 MS
QT INTERVAL: 402 MS
QTC INTERVAL: 396 MS
QTC INTERVAL: 421 MS
RBC # BLD AUTO: 4.09 MILLION/UL (ref 3.81–5.12)
SODIUM SERPL-SCNC: 141 MMOL/L (ref 135–147)
T WAVE AXIS: -66 DEGREES
T WAVE AXIS: 71 DEGREES
VENTRICULAR RATE: 66 BPM
VENTRICULAR RATE: 72 BPM
WBC # BLD AUTO: 6.87 THOUSAND/UL (ref 4.31–10.16)

## 2024-01-02 PROCEDURE — 99285 EMERGENCY DEPT VISIT HI MDM: CPT | Performed by: EMERGENCY MEDICINE

## 2024-01-02 PROCEDURE — G1004 CDSM NDSC: HCPCS

## 2024-01-02 PROCEDURE — 85025 COMPLETE CBC W/AUTO DIFF WBC: CPT

## 2024-01-02 PROCEDURE — 73100 X-RAY EXAM OF WRIST: CPT

## 2024-01-02 PROCEDURE — 0PSJXZZ REPOSITION LEFT RADIUS, EXTERNAL APPROACH: ICD-10-PCS | Performed by: ORTHOPAEDIC SURGERY

## 2024-01-02 PROCEDURE — 71045 X-RAY EXAM CHEST 1 VIEW: CPT

## 2024-01-02 PROCEDURE — 99204 OFFICE O/P NEW MOD 45 MIN: CPT | Performed by: ORTHOPAEDIC SURGERY

## 2024-01-02 PROCEDURE — 73070 X-RAY EXAM OF ELBOW: CPT

## 2024-01-02 PROCEDURE — 99285 EMERGENCY DEPT VISIT HI MDM: CPT

## 2024-01-02 PROCEDURE — 73110 X-RAY EXAM OF WRIST: CPT

## 2024-01-02 PROCEDURE — 83735 ASSAY OF MAGNESIUM: CPT | Performed by: FAMILY MEDICINE

## 2024-01-02 PROCEDURE — 72125 CT NECK SPINE W/O DYE: CPT

## 2024-01-02 PROCEDURE — 25600 CLTX DST RDL FX/EPHYS SEP WO: CPT | Performed by: ORTHOPAEDIC SURGERY

## 2024-01-02 PROCEDURE — 93005 ELECTROCARDIOGRAM TRACING: CPT

## 2024-01-02 PROCEDURE — 70450 CT HEAD/BRAIN W/O DYE: CPT

## 2024-01-02 PROCEDURE — 99223 1ST HOSP IP/OBS HIGH 75: CPT | Performed by: FAMILY MEDICINE

## 2024-01-02 PROCEDURE — 36415 COLL VENOUS BLD VENIPUNCTURE: CPT

## 2024-01-02 PROCEDURE — 80053 COMPREHEN METABOLIC PANEL: CPT

## 2024-01-02 PROCEDURE — 84484 ASSAY OF TROPONIN QUANT: CPT

## 2024-01-02 RX ORDER — OXYCODONE HYDROCHLORIDE 5 MG/1
5 TABLET ORAL EVERY 6 HOURS PRN
Status: DISCONTINUED | OUTPATIENT
Start: 2024-01-02 | End: 2024-01-08 | Stop reason: HOSPADM

## 2024-01-02 RX ORDER — POTASSIUM CHLORIDE 20 MEQ/1
40 TABLET, EXTENDED RELEASE ORAL ONCE
Status: COMPLETED | OUTPATIENT
Start: 2024-01-02 | End: 2024-01-02

## 2024-01-02 RX ORDER — ACETAMINOPHEN 325 MG/1
650 TABLET ORAL EVERY 6 HOURS PRN
Status: DISCONTINUED | OUTPATIENT
Start: 2024-01-02 | End: 2024-01-04

## 2024-01-02 RX ORDER — QUETIAPINE FUMARATE 25 MG/1
50 TABLET, FILM COATED ORAL
Status: DISCONTINUED | OUTPATIENT
Start: 2024-01-02 | End: 2024-01-08 | Stop reason: HOSPADM

## 2024-01-02 RX ORDER — HEPARIN SODIUM 5000 [USP'U]/ML
5000 INJECTION, SOLUTION INTRAVENOUS; SUBCUTANEOUS EVERY 8 HOURS SCHEDULED
Status: DISCONTINUED | OUTPATIENT
Start: 2024-01-02 | End: 2024-01-02

## 2024-01-02 RX ORDER — HYDROMORPHONE HCL IN WATER/PF 6 MG/30 ML
0.2 PATIENT CONTROLLED ANALGESIA SYRINGE INTRAVENOUS ONCE
Status: COMPLETED | OUTPATIENT
Start: 2024-01-02 | End: 2024-01-02

## 2024-01-02 RX ORDER — SODIUM CHLORIDE 9 MG/ML
3 INJECTION INTRAVENOUS
Status: DISCONTINUED | OUTPATIENT
Start: 2024-01-02 | End: 2024-01-08 | Stop reason: HOSPADM

## 2024-01-02 RX ORDER — METOPROLOL SUCCINATE 100 MG/1
100 TABLET, EXTENDED RELEASE ORAL EVERY EVENING
Status: DISCONTINUED | OUTPATIENT
Start: 2024-01-02 | End: 2024-01-08 | Stop reason: HOSPADM

## 2024-01-02 RX ORDER — ONDANSETRON 2 MG/ML
4 INJECTION INTRAMUSCULAR; INTRAVENOUS EVERY 6 HOURS PRN
Status: DISCONTINUED | OUTPATIENT
Start: 2024-01-02 | End: 2024-01-08 | Stop reason: HOSPADM

## 2024-01-02 RX ORDER — LANOLIN ALCOHOL/MO/W.PET/CERES
9 CREAM (GRAM) TOPICAL
Status: DISCONTINUED | OUTPATIENT
Start: 2024-01-02 | End: 2024-01-08 | Stop reason: HOSPADM

## 2024-01-02 RX ORDER — LIDOCAINE HYDROCHLORIDE 10 MG/ML
30 INJECTION, SOLUTION EPIDURAL; INFILTRATION; INTRACAUDAL; PERINEURAL ONCE
Status: COMPLETED | OUTPATIENT
Start: 2024-01-02 | End: 2024-01-02

## 2024-01-02 RX ORDER — AMLODIPINE BESYLATE 5 MG/1
5 TABLET ORAL DAILY
Status: DISCONTINUED | OUTPATIENT
Start: 2024-01-02 | End: 2024-01-08 | Stop reason: HOSPADM

## 2024-01-02 RX ORDER — HEPARIN SODIUM 5000 [USP'U]/ML
5000 INJECTION, SOLUTION INTRAVENOUS; SUBCUTANEOUS EVERY 8 HOURS SCHEDULED
Status: DISCONTINUED | OUTPATIENT
Start: 2024-01-03 | End: 2024-01-08 | Stop reason: HOSPADM

## 2024-01-02 RX ADMIN — POTASSIUM CHLORIDE 40 MEQ: 1500 TABLET, EXTENDED RELEASE ORAL at 13:45

## 2024-01-02 RX ADMIN — AMLODIPINE BESYLATE 5 MG: 5 TABLET ORAL at 13:45

## 2024-01-02 RX ADMIN — QUETIAPINE FUMARATE 50 MG: 25 TABLET ORAL at 22:37

## 2024-01-02 RX ADMIN — METOPROLOL SUCCINATE 100 MG: 100 TABLET, EXTENDED RELEASE ORAL at 17:29

## 2024-01-02 RX ADMIN — HEPARIN SODIUM 5000 UNITS: 5000 INJECTION INTRAVENOUS; SUBCUTANEOUS at 14:35

## 2024-01-02 RX ADMIN — OXYCODONE HYDROCHLORIDE 5 MG: 5 TABLET ORAL at 18:24

## 2024-01-02 RX ADMIN — HYDROMORPHONE HYDROCHLORIDE 0.2 MG: 0.2 INJECTION, SOLUTION INTRAMUSCULAR; INTRAVENOUS; SUBCUTANEOUS at 14:45

## 2024-01-02 RX ADMIN — HEPARIN SODIUM 5000 UNITS: 5000 INJECTION INTRAVENOUS; SUBCUTANEOUS at 22:37

## 2024-01-02 RX ADMIN — LIDOCAINE HYDROCHLORIDE 30 ML: 10 INJECTION, SOLUTION EPIDURAL; INFILTRATION; INTRACAUDAL; PERINEURAL at 14:35

## 2024-01-02 RX ADMIN — MELATONIN 9 MG: at 22:37

## 2024-01-02 RX ADMIN — SERTRALINE HYDROCHLORIDE 75 MG: 50 TABLET ORAL at 13:45

## 2024-01-02 NOTE — ED ATTENDING ATTESTATION
1/2/2024  I, Marciano Gaona MD, saw and evaluated the patient. I have discussed the patient with the resident/non-physician practitioner and agree with the resident's/non-physician practitioner's findings, Plan of Care, and MDM as documented in the resident's/non-physician practitioner's note, except where noted. All available labs and Radiology studies were reviewed.  I was present for key portions of any procedure(s) performed by the resident/non-physician practitioner and I was immediately available to provide assistance.       At this point I agree with the current assessment done in the Emergency Department.  I have conducted an independent evaluation of this patient a history and physical is as follows:  Fall at home   no anticoagulation no aspirin  No complaints other than wrist pain  No chest pain or shortness of breath no fever or chills no abdominal pain no vomiting or diarrhea no headache  Plan: CT head C-spine x-ray wrist  ED Course         Critical Care Time  Procedures

## 2024-01-02 NOTE — TELEPHONE ENCOUNTER
Can you please check in with dgt what current dose of seroquel is.  The last I had was 25mg seroquel, but it looks like it was increased to 50mg through the hospital.  Of note, it appears pt is at ED today -we should check in after pt is dc'd from hospital to see if they made any recommendations concerning this medication.  TY

## 2024-01-02 NOTE — ASSESSMENT & PLAN NOTE
Daughter reports of persistent blood in stool since diagnosis of colon cancer.   Hgb remains stable at 11  Monitor hgb and stool
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
## 2024-01-02 NOTE — ASSESSMENT & PLAN NOTE
Patient is pleasant, alert, oriented to self, and place. Not time or situation.   Continue Zoloft.  Continue Seroquel 50 mg and melatonin at night

## 2024-01-02 NOTE — ASSESSMENT & PLAN NOTE
few months ago, CT scan which showed concern for apple core lesion in the transverse colon concerning for metastatic cancer.  At that time there was discussion about colonoscopy but per the notes the family and the patient did not want to pursue colonoscopy at that time.   Currently patient is not pursuing any treatment and focused on comfort measures only.   Following with Palliative care  Code status level 3 DNR/DNI

## 2024-01-02 NOTE — ASSESSMENT & PLAN NOTE
Patient presented from home via ambulance. As  per daughter, kavita was walking with a walker, but fell backwards and screamed in pain immediately. Daughter uncertain whether patialva had syncope.   In ED, CT head and C spine negative.   Left radius fracture noted, s/p splint.   Place in observation for syncope workup  Telemetry  Replete potassium  Orthostatic BP, PT/OT  Initial EKG concerning for rate controlled atrial fibrillation. No prior h/o afib.   Already on metoprolol.   Will not recommend anticoagulation for persistent hematochezia, and palliative care patient.   Will obtain echo

## 2024-01-03 ENCOUNTER — APPOINTMENT (OUTPATIENT)
Dept: NON INVASIVE DIAGNOSTICS | Facility: HOSPITAL | Age: 89
DRG: 563 | End: 2024-01-03
Attending: FAMILY MEDICINE
Payer: COMMERCIAL

## 2024-01-03 LAB
ANION GAP SERPL CALCULATED.3IONS-SCNC: 15 MMOL/L
AORTIC ROOT: 2.7 CM
AORTIC VALVE MEAN VELOCITY: 14.6 M/S
APICAL FOUR CHAMBER EJECTION FRACTION: 63 %
ASCENDING AORTA: 3.6 CM
AV AREA BY CONTINUOUS VTI: 1.7 CM2
AV AREA PEAK VELOCITY: 1.6 CM2
AV LVOT MEAN GRADIENT: 3 MMHG
AV LVOT PEAK GRADIENT: 6 MMHG
AV MEAN GRADIENT: 11 MMHG
AV PEAK GRADIENT: 23 MMHG
AV VALVE AREA: 1.68 CM2
AV VELOCITY RATIO: 0.5
BUN SERPL-MCNC: 13 MG/DL (ref 5–25)
CALCIUM SERPL-MCNC: 9.1 MG/DL (ref 8.4–10.2)
CHLORIDE SERPL-SCNC: 100 MMOL/L (ref 96–108)
CO2 SERPL-SCNC: 21 MMOL/L (ref 21–32)
CREAT SERPL-MCNC: 0.47 MG/DL (ref 0.6–1.3)
DOP CALC AO PEAK VEL: 2.4 M/S
DOP CALC AO VTI: 42.26 CM
DOP CALC LVOT AREA: 3.14 CM2
DOP CALC LVOT CARDIAC INDEX: 2.72 L/MIN/M2
DOP CALC LVOT CARDIAC OUTPUT: 4.84 L/MIN
DOP CALC LVOT DIAMETER: 2 CM
DOP CALC LVOT PEAK VEL VTI: 22.62 CM
DOP CALC LVOT PEAK VEL: 1.19 M/S
DOP CALC LVOT STROKE INDEX: 38.2 ML/M2
DOP CALC LVOT STROKE VOLUME: 71.03
E WAVE DECELERATION TIME: 144 MS
E/A RATIO: 2.26
FRACTIONAL SHORTENING: 33 (ref 28–44)
GFR SERPL CREATININE-BSD FRML MDRD: 87 ML/MIN/1.73SQ M
GLUCOSE SERPL-MCNC: 96 MG/DL (ref 65–140)
INTERVENTRICULAR SEPTUM IN DIASTOLE (PARASTERNAL SHORT AXIS VIEW): 1.4 CM
INTERVENTRICULAR SEPTUM: 1.4 CM (ref 0.6–1.1)
LAAS-AP2: 17.3 CM2
LAAS-AP4: 20.2 CM2
LEFT ATRIUM SIZE: 4.6 CM
LEFT ATRIUM VOLUME (MOD BIPLANE): 55 ML
LEFT ATRIUM VOLUME INDEX (MOD BIPLANE): 30.9 ML/M2
LEFT INTERNAL DIMENSION IN SYSTOLE: 2.4 CM (ref 2.1–4)
LEFT VENTRICULAR INTERNAL DIMENSION IN DIASTOLE: 3.6 CM (ref 3.5–6)
LEFT VENTRICULAR POSTERIOR WALL IN END DIASTOLE: 1.5 CM
LEFT VENTRICULAR STROKE VOLUME: 34 ML
LVSV (TEICH): 34 ML
MV PEAK A VEL: 0.31 M/S
MV PEAK E VEL: 70 CM/S
MV STENOSIS PRESSURE HALF TIME: 42 MS
MV VALVE AREA P 1/2 METHOD: 5.24
POTASSIUM SERPL-SCNC: 3.5 MMOL/L (ref 3.5–5.3)
RIGHT ATRIUM AREA SYSTOLE A4C: 20.4 CM2
RIGHT VENTRICLE ID DIMENSION: 4.5 CM
SL CV LEFT ATRIUM LENGTH A2C: 5.2 CM
SL CV LV EF: 65
SL CV PED ECHO LEFT VENTRICLE DIASTOLIC VOLUME (MOD BIPLANE) 2D: 55 ML
SL CV PED ECHO LEFT VENTRICLE SYSTOLIC VOLUME (MOD BIPLANE) 2D: 21 ML
SODIUM SERPL-SCNC: 136 MMOL/L (ref 135–147)
TR MAX PG: 40 MMHG
TR PEAK VELOCITY: 3.2 M/S
TRICUSPID ANNULAR PLANE SYSTOLIC EXCURSION: 1.8 CM
TRICUSPID VALVE PEAK REGURGITATION VELOCITY: 3.16 M/S

## 2024-01-03 PROCEDURE — NC001 PR NO CHARGE: Performed by: ORTHOPAEDIC SURGERY

## 2024-01-03 PROCEDURE — 97163 PT EVAL HIGH COMPLEX 45 MIN: CPT

## 2024-01-03 PROCEDURE — 93306 TTE W/DOPPLER COMPLETE: CPT

## 2024-01-03 PROCEDURE — 97167 OT EVAL HIGH COMPLEX 60 MIN: CPT

## 2024-01-03 PROCEDURE — 93306 TTE W/DOPPLER COMPLETE: CPT | Performed by: INTERNAL MEDICINE

## 2024-01-03 PROCEDURE — 80048 BASIC METABOLIC PNL TOTAL CA: CPT | Performed by: FAMILY MEDICINE

## 2024-01-03 PROCEDURE — 99233 SBSQ HOSP IP/OBS HIGH 50: CPT | Performed by: INTERNAL MEDICINE

## 2024-01-03 RX ORDER — POTASSIUM CHLORIDE 750 MG/1
10 TABLET, EXTENDED RELEASE ORAL 2 TIMES DAILY
Status: DISCONTINUED | OUTPATIENT
Start: 2024-01-03 | End: 2024-01-08 | Stop reason: HOSPADM

## 2024-01-03 RX ORDER — LANOLIN ALCOHOL/MO/W.PET/CERES
400 CREAM (GRAM) TOPICAL DAILY
Status: DISCONTINUED | OUTPATIENT
Start: 2024-01-03 | End: 2024-01-08 | Stop reason: HOSPADM

## 2024-01-03 RX ADMIN — METOPROLOL SUCCINATE 100 MG: 100 TABLET, EXTENDED RELEASE ORAL at 17:24

## 2024-01-03 RX ADMIN — POTASSIUM CHLORIDE 10 MEQ: 750 TABLET, EXTENDED RELEASE ORAL at 17:24

## 2024-01-03 RX ADMIN — MAGNESIUM OXIDE TAB 400 MG (241.3 MG ELEMENTAL MG) 400 MG: 400 (241.3 MG) TAB at 15:59

## 2024-01-03 RX ADMIN — HEPARIN SODIUM 5000 UNITS: 5000 INJECTION INTRAVENOUS; SUBCUTANEOUS at 21:07

## 2024-01-03 RX ADMIN — AMLODIPINE BESYLATE 5 MG: 5 TABLET ORAL at 09:29

## 2024-01-03 RX ADMIN — SERTRALINE HYDROCHLORIDE 75 MG: 50 TABLET ORAL at 09:29

## 2024-01-03 RX ADMIN — MELATONIN 9 MG: at 21:07

## 2024-01-03 RX ADMIN — QUETIAPINE FUMARATE 50 MG: 25 TABLET ORAL at 21:07

## 2024-01-03 RX ADMIN — HEPARIN SODIUM 5000 UNITS: 5000 INJECTION INTRAVENOUS; SUBCUTANEOUS at 07:43

## 2024-01-03 NOTE — PROGRESS NOTES
[facility]  Progress Note  Name: Hilary Oleary I  MRN: [id number]  Unit/Bed#: CW2 212-02 I Date of Admission: 1/2/2024   Date of Service: 1/3/2024 I Hospital Day: 0    Assessment/Plan   Blood in stool  Assessment & Plan  Daughter reports of persistent blood in stool since diagnosis of colon cancer.   Hgb remains stable at 11  Monitor hgb and stool    Closed fracture of distal end of left radius with routine healing  Assessment & Plan  Secondary to fall today.  Patient was evaluated by orthopedic, currently in sugar-tong splint, outpatient follow-up in 2 weeks, no operative management at this time    Mixed dementia (HCC)  Assessment & Plan  Patient is pleasant, alert, oriented to self, and place. Not time or situation.   Continue Zoloft.  Continue Seroquel 50 mg and melatonin at night    Colorectal cancer suspected  Assessment & Plan  few months ago, CT scan which showed concern for apple core lesion in the transverse colon concerning for metastatic cancer.  At that time there was discussion about colonoscopy but per the notes the family and the patient did not want to pursue colonoscopy at that time.   Currently patient is not pursuing any treatment and focused on comfort measures only.   Following with Palliative care  Code status level 3 DNR/DNI    Benign essential hypertension  Assessment & Plan  Continue home amlodipine and metoprolol     * Suspected Syncope and fall  Assessment & Plan  Patient presented from home via ambulance. As  per daughter, kavita was walking with a walker, but fell backwards and screamed in pain immediately. Daughter uncertain whether kavita had syncope.   In ED, CT head and C spine negative.   Left radius fracture noted, s/p splint.   Place in observation for syncope workup  Telemetry  Replete potassium  Orthostatic BP, PT/OT  Initial EKG concerning for rate controlled atrial fibrillation. No prior h/o afib.   Already on metoprolol.   Will not  recommend anticoagulation for persistent hematochezia, and palliative care patient.   Will obtain echo      1/3/24  -The fall was reviewed by the family on security camera installed in the house, appears that the patient stood up suddenly from chair level and felt dizzy afterwards but with back work to try and sit back on the chair and missed, does not appear like a syncopal episode, patient might have felt dizzy  -Echocardiogram without concerning findings  -No acute events on telemetry  -Maintain fall precautions  -PT OT recommended rehab however family would like to try and obtain PT at home through palliative care with the [facility], request has been forwarded to patient  at home, will await to have this conversation to follow-up tomorrow morning and likely discharge home with PT and OT                   VTE Pharmacologic Prophylaxis: VTE Score: 6 High Risk (Score >/= 5) - Pharmacological DVT Prophylaxis Contraindicated. Sequential Compression Devices Ordered.    Mobility:   Basic Mobility Inpatient Raw Score: 6  JH-HLM Goal: 2: Bed activities/Dependent transfer  JH-HLM Achieved: 5: Stand (1 or more minutes)  HLM Goal NOT achieved. Continue with multidisciplinary rounding and encourage appropriate mobility to improve upon HLM goals.    Patient Centered Rounds: I performed bedside rounds with nursing staff today.   Discussions with Specialists or Other Care Team Provider: none    Education and Discussions with Family / Patient: Updated  (daughter) via phone.    Total Time Spent on Date of Encounter in care of patient: 20 mins. This time was spent on one or more of the following: performing physical exam; counseling and coordination of care; obtaining or reviewing history; documenting in the medical record; reviewing/ordering tests, medications or procedures; communicating with other healthcare professionals and discussing with patient's family/caregivers.    Current Length of Stay: 0  day(s)  Current Patient Status: Inpatient   Certification Statement: The patient will continue to require additional inpatient hospital stay due to pending authorization for home PT and OT to palliative care program as an outpatient  Discharge Plan: Anticipate discharge tomorrow to home with home services.    Code Status: Level 3 - DNAR and DNI    Subjective:   Pleasant and cooperative, denies any specific complaints to me    Objective:     Vitals:   Temp (24hrs), Av.7 °F (37.1 °C), Min:98.2 °F (36.8 °C), Max:99.8 °F (37.7 °C)    Temp:  [98.2 °F (36.8 °C)-99.8 °F (37.7 °C)] 99.8 °F (37.7 °C)  HR:  [69-80] 78  Resp:  [20] 20  BP: (127-155)/(67-85) 142/72  SpO2:  [93 %-94 %] 93 %  Body mass index is 30.93 kg/m².     Input and Output Summary (last 24 hours):     Intake/Output Summary (Last 24 hours) at 1/3/2024 1659  Last data filed at 1/3/2024 0811  Gross per 24 hour   Intake 300 ml   Output 400 ml   Net -100 ml       Physical Exam:   Physical Exam  Vitals and nursing note reviewed.   Constitutional:       General: She is not in acute distress.     Appearance: She is well-developed.   HENT:      Head: Normocephalic and atraumatic.   Eyes:      Conjunctiva/sclera: Conjunctivae normal.   Cardiovascular:      Rate and Rhythm: Normal rate and regular rhythm.      Heart sounds: No murmur heard.  Pulmonary:      Effort: Pulmonary effort is normal. No respiratory distress.      Breath sounds: Normal breath sounds.   Abdominal:      Palpations: Abdomen is soft.      Tenderness: There is no abdominal tenderness.   Musculoskeletal:         General: No swelling.      Cervical back: Neck supple.   Skin:     General: Skin is warm and dry.   Neurological:      Mental Status: She is alert and oriented to person, place, and time.   Psychiatric:         Mood and Affect: Mood normal.          Additional Data:     Labs:  Results from last 7 days   Lab Units 24  1019   WBC Thousand/uL 6.87   HEMOGLOBIN g/dL 11.2*   HEMATOCRIT %  35.6   PLATELETS Thousands/uL 258   NEUTROS PCT % 77*   LYMPHS PCT % 15   MONOS PCT % 6   EOS PCT % 1     Results from last 7 days   Lab Units 01/03/24  0518 01/02/24  1019   SODIUM mmol/L 136 141   POTASSIUM mmol/L 3.5 3.2*   CHLORIDE mmol/L 100 103   CO2 mmol/L 21 29   BUN mg/dL 13 12   CREATININE mg/dL 0.47* 0.57*   ANION GAP mmol/L 15 9   CALCIUM mg/dL 9.1 9.1   ALBUMIN g/dL  --  3.9   TOTAL BILIRUBIN mg/dL  --  0.78   ALK PHOS U/L  --  61   ALT U/L  --  6*   AST U/L  --  17   GLUCOSE RANDOM mg/dL 96 114                       Lines/Drains:  Invasive Devices       Peripheral Intravenous Line  Duration             Peripheral IV 01/02/24 Right Antecubital 1 day                          Imaging: Reviewed radiology reports from this admission including: Upper extremity x-ray and other images done during admission    Recent Cultures (last 7 days):         Last 24 Hours Medication List:   Current Facility-Administered Medications   Medication Dose Route Frequency Provider Last Rate    acetaminophen  650 mg Oral Q6H PRN Zakia Bernstein MD      amLODIPine  5 mg Oral Daily Zakia Bernstein MD      heparin (porcine)  5,000 Units Subcutaneous Q8H [facility] Zakia Bernstein MD      magnesium Oxide  400 mg Oral Daily Alyse Roemro MD      melatonin  9 mg Oral HS Zakia Bernstein MD      metoprolol succinate  100 mg Oral QPM Zakia Bernstein MD      ondansetron  4 mg Intravenous Q6H PRN Zakia Bernstein MD      oxyCODONE  5 mg Oral Q6H PRN Zakia Bernstein MD      oxyCODONE  2.5 mg Oral Q6H PRN Zakia Bernstein MD      potassium chloride  10 mEq Oral BID Alyse Romero MD      QUEtiapine  50 mg Oral HS Zakia Bernstein MD      sertraline  75 mg Oral Daily Zakia Bernstein MD      sodium chloride (PF)  3 mL Intravenous Q1H PRN Osorio Headley MD          Today, Patient Was Seen By: Alyse Romero MD    **Please Note: This note may have been constructed using a voice recognition system.**
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
## 2024-01-03 NOTE — DISCHARGE INSTR - AVS FIRST PAGE
Discharge Instructions - Orthopedics  Hilary KALYAN Oleary 89 y.o. female MRN: [id number]  Unit/Bed#: cardiology    Weight Bearing Status:                                           Non-weight bearing left upper extremity in sugar tong splint and sling.    Pain:  Continue analgesics as directed    Dressing Instructions:   Please keep clean, dry and intact until follow up     Appt Instructions:   If you do not have your appointment, please call the clinic at [phone]  Otherwise follow up as scheduled.    Contact the office sooner if you experience any increased numbness/tingling in the extremities.
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
## 2024-01-03 NOTE — CASE MANAGEMENT
Case Management Assessment    Patient name Hilary Oleary  Location CW2 212/CW2 212-02 MRN [id number]  : 1934 Date 1/3/2024       Current Admission Date: 2024  Current Admission Diagnosis:Suspected Syncope and fall   Patient Active Problem List    Diagnosis Date Noted    Suspected Syncope and fall 2024    Closed fracture of distal end of left radius with routine healing 2024    Blood in stool 2024    Mixed dementia ([facility]) 2023    Obesity, morbid ([facility]) 2023    SIRS (systemic inflammatory response syndrome) ([facility]) 10/06/2023    Colorectal cancer suspected 10/06/2023    Lung nodule seen on imaging study 10/06/2023    C. difficile diarrhea 10/06/2023    Bilateral hearing loss 2023    Type 2 diabetes mellitus without complication, without long-term current use of insulin ([facility]) 2022    Metabolic encephalopathy 2021    Major depressive disorder with single episode, in full remission ([facility]) 10/19/2020    Constipation, slow transit 2019    Closed fracture of right hip with routine healing 2019    Thickened endometrium 2017    Pancreatic cyst 2017    Enteritis 2017    Age related osteoporosis 2016    Resting tremor 05/10/2016    Vitamin D deficiency 2014    Right bundle-branch block 10/29/2013    Leiomyoma of uterus 2013    Benign essential hypertension 2012    Mixed hyperlipidemia 2012    Gastroesophageal reflux disease without esophagitis 2012      LOS (days): 0  Geometric Mean LOS (GMLOS) (days):   Days to GMLOS:     OBJECTIVE:              Current admission status: Observation       Preferred Pharmacy:   [id number] - [place] - [address]  [address] [place] [address]  Phone: [phone] Fax: [phone]    [facility] - [place] - [address]  [address]  [place] [address]  Phone: [phone] Fax:  [phone]    Primary Care Provider: RUBY Richardson    Primary Insurance: [employer]  Secondary Insurance:     ASSESSMENT:  Active Health Care Proxies       [facility]ion[facility] Care Representative - Daughter   Primary Phone: [phone] (Mobile)  Home Phone: [phone]                      Obs Notice Signed: 01/03/24           Housing Stability: Unknown (10/8/2023)    Housing Stability Vital Sign     Unable to Pay for Housing in the Last Year: No     Number of Places Lived in the Last Year: Not on file     Unstable Housing in the Last Year: No   Food Insecurity: Not on file   Transportation Needs: No Transportation Needs (10/8/2023)    PRAPARE - Transportation     Lack of Transportation (Medical): No     Lack of Transportation (Non-Medical): No   Utilities: Not on file
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
## 2024-01-03 NOTE — CASE MANAGEMENT
Case Management Assessment & Discharge Planning Note    Patient name Hilary Oleary  Location CW2 212/CW2 212-02 MRN [id number]  : 1934 Date 1/3/2024       Current Admission Date: 2024  Current Admission Diagnosis:Suspected Syncope and fall   Patient Active Problem List    Diagnosis Date Noted    Suspected Syncope and fall 2024    Closed fracture of distal end of left radius with routine healing 2024    Blood in stool 2024    Mixed dementia ([facility]) 2023    Obesity, morbid ([facility]) 2023    SIRS (systemic inflammatory response syndrome) ([facility]) 10/06/2023    Colorectal cancer suspected 10/06/2023    Lung nodule seen on imaging study 10/06/2023    C. difficile diarrhea 10/06/2023    Bilateral hearing loss 2023    Type 2 diabetes mellitus without complication, without long-term current use of insulin ([facility]) 2022    Metabolic encephalopathy 2021    Major depressive disorder with single episode, in full remission ([facility]) 10/19/2020    Constipation, slow transit 2019    Closed fracture of right hip with routine healing 2019    Thickened endometrium 2017    Pancreatic cyst 2017    Enteritis 2017    Age related osteoporosis 2016    Resting tremor 05/10/2016    Vitamin D deficiency 2014    Right bundle-branch block 10/29/2013    Leiomyoma of uterus 2013    Benign essential hypertension 2012    Mixed hyperlipidemia 2012    Gastroesophageal reflux disease without esophagitis 2012      LOS (days): 0  Geometric Mean LOS (GMLOS) (days):   Days to GMLOS:     OBJECTIVE:              Current admission status: Inpatient       Preferred Pharmacy:   [facility]/pharmacy #[id number] - [place] - [address]  [address]  [place] [address]  Phone: [phone] Fax: [phone]    [facility] - [place] - [address]  [address]  [place] [address]  Phone:  [phone] Fax: [phone]    Primary Care Provider: RUBY Richardson    Primary Insurance: BL[employer] CR[employer] REP  Secondary Insurance:     ASSESSMENT:  Active Health Care Proxies       Pallavi Johnson [facility] Representative - Daughter   Primary Phone: [phone] (Mobile)  Home Phone: [phone]                           Readmission Root Cause  30 Day Readmission: No    Patient Information  Admitted from:: Home  Mental Status: Alert  During Assessment patient was accompanied by: Not accompanied during assessment  Assessment information provided by:: Daughter  Primary Caregiver: Family  Support Systems: Family members  Type of Current Residence: [address] [place] home  Upon entering residence, is there a bedroom on the main floor (no further steps)?: No  A bedroom is located on the following floor levels of residence (select all that apply):: 2nd Floor (Patient has bed on first floor)  Upon entering residence, is there a bathroom on the main floor (no further steps)?: No  Indicate which floors of current residence have a bathroom (select all the apply):: 2nd Floor (Patient has a chair lift)  Number of steps to 2nd floor from main floor: One Flight  Living Arrangements: Lives w/ Daughter    Activities of Daily Living Prior to Admission  Functional Status: Assistance  Does patient use assisted devices?: Yes  Assisted Devices (DME) used: Walker, Shower Chair, Other (Comment) (Commode)  Does patient have a history of Outpatient Therapy (PT/OT)?: Yes  Does patient have a history of HHC?: Yes  Does patient currently have HHC?: Yes (Patient is connected with home pallative care)    Current Home Health Care  Type of Current Home Care Services: Other (Comment) (Patient is connected with home pallative care)        Housing Stability: Low Risk  (1/3/2024)    Housing Stability Vital Sign     Unable to Pay for Housing in the Last Year: No     Number of Places Lived in the Last Year: 1     Unstable Housing in the Last Year:  No   Food Insecurity: No Food Insecurity (1/3/2024)    Hunger Vital Sign     Worried About Running Out of Food in the Last Year: Never true     Ran Out of Food in the Last Year: Never true   Transportation Needs: No Transportation Needs (1/3/2024)    PRAPARE - Transportation     Lack of Transportation (Medical): No     Lack of Transportation (Non-Medical): No   Utilities: Not At Risk (1/3/2024)    C Utilities     Threatened with loss of utilities: No       DISCHARGE DETAILS:    Discharge planning discussed with:: Patient's DaughterPallavi  Freedom of Choice: Yes     CM contacted family/caregiver?: Yes             Contacts  Patient Contacts: Pallavi Johnson  Relationship to Patient:: Family  Contact Method: Phone  Phone Number: [phone]  Reason/Outcome: Continuity of Care, Emergency Contact, Referral    Requested Home Health Care         Is the patient interested in HHC at discharge?: Yes  Home Health Discipline requested:: Physical Therapy               Additional Comments: CM called patient's daughter and introduced self and explained role. Obtained baseline informaton from patient's daughterPallavi. Patient is currently living at home with patient's daughterBianca. Patient has in home pallative care servies.                     Implemented All Fall Risk Interventions:  [place] to call system. Call bell, personal items and telephone within reach. Instruct patient to call for assistance. Room bathroom lighting operational. Non-slip footwear when patient is off stretcher. Physically safe environment: no spills, clutter or unnecessary equipment. Stretcher in lowest position, wheels locked, appropriate side rails in place. Provide visual cue, wrist band, yellow gown, etc. Monitor gait and stability. Monitor for mental status changes and reorient to person, place, and time. Review medications for side effects contributing to fall risk. Reinforce activity limits and safety measures with patient and family.
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
## 2024-01-03 NOTE — ASSESSMENT & PLAN NOTE
Patient presented from home via ambulance. As  per daughter, kavita was walking with a walker, but fell backwards and screamed in pain immediately. Daughter uncertain whether kavita had syncope.   In ED, CT head and C spine negative.   Left radius fracture noted, s/p splint.   Place in observation for syncope workup  Telemetry  Replete potassium  Orthostatic BP, PT/OT  Initial EKG concerning for rate controlled atrial fibrillation. No prior h/o afib.   Already on metoprolol.   Will not recommend anticoagulation for persistent hematochezia, and palliative care patient.   Will obtain echo      1/3/24  -The fall was reviewed by the family on security camera installed in the house, appears that the patient stood up suddenly from chair level and felt dizzy afterwards but with back work to try and sit back on the chair and missed, does not appear like a syncopal episode, patient might have felt dizzy  -Echocardiogram without concerning findings  -No acute events on telemetry  -Maintain fall precautions  -PT OT recommended rehab however family would like to try and obtain PT at home through palliative care with the [facility], request has been forwarded to patient  at home, will await to have this conversation to follow-up tomorrow morning and likely discharge home with PT and OT

## 2024-01-03 NOTE — ASSESSMENT & PLAN NOTE
Secondary to fall today.  Patient was evaluated by orthopedic, currently in sugar-tong splint, outpatient follow-up in 2 weeks, no operative management at this time

## 2024-01-03 NOTE — PROGRESS NOTES
Progress Note - Orthopedics   Hilary Oelary 89 y.o. female MRN: [id number]  Unit/Bed#: CW2 212-02      Subjective:    89 y.o.female with a left distal radius fracture s/p splinting. No acute events, no new complaints. Denies fevers, chills, CP, SOB, N/V, numbness or tingling. Patient states that she has a little bit of pain, but is doing well.    Labs:  0   Lab Value Date/Time    HCT 35.6 01/02/2024 1019    HCT 37.8 11/21/2023 1446    HCT 33.5 (L) 11/09/2023 0533    HCT 38.3 04/20/2015 0543    HCT 39.1 04/19/2015 0519    HCT 41.4 04/18/2015 0749    HGB 11.2 (L) 01/02/2024 1019    HGB 11.9 11/21/2023 1446    HGB 10.6 (L) 11/09/2023 0533    HGB 12.7 04/20/2015 0543    HGB 12.9 04/19/2015 0519    HGB 13.9 04/18/2015 0749    INR 1.21 (H) 11/05/2023 2111    INR 1.12 04/18/2015 0749    WBC 6.87 01/02/2024 1019    WBC 9.01 11/21/2023 1446    WBC 6.29 11/09/2023 0533    WBC 8.67 04/20/2015 0543    WBC 6.79 04/19/2015 0519    WBC 10.61 (H) 04/18/2015 0749       Meds:    Current Facility-Administered Medications:     acetaminophen (TYLENOL) tablet 650 mg, 650 mg, Oral, Q6H PRN, Zakia Bernstein MD    amLODIPine (NORVASC) tablet 5 mg, 5 mg, Oral, Daily, Zakia Bernstein MD, 5 mg at 01/02/24 1345    heparin (porcine) subcutaneous injection 5,000 Units, 5,000 Units, Subcutaneous, Q8H CRISTELA **AND** [CANCELED] Platelet count, , , AM Draw, Zakia Bernstein MD    melatonin tablet 9 mg, 9 mg, Oral, HS, Zakia Bernstein MD, 9 mg at 01/02/24 2237    metoprolol succinate (TOPROL-XL) 24 hr tablet 100 mg, 100 mg, Oral, QPM, Zakia Bernstein MD, 100 mg at 01/02/24 1729    ondansetron (ZOFRAN) injection 4 mg, 4 mg, Intravenous, Q6H PRN, Zakia Bernstein MD    oxyCODONE (ROXICODONE) IR tablet 5 mg, 5 mg, Oral, Q6H PRN, Zakia Bernstein MD, 5 mg at 01/02/24 1824    oxyCODONE (ROXICODONE) split tablet 2.5 mg, 2.5 mg, Oral, Q6H PRN, Zakia Bernstein MD    QUEtiapine (SEROquel) tablet 50 mg, 50 mg, Oral, HS, Zakia Bernstein MD, 50 mg at 01/02/24 2237    sertraline (ZOLOFT) tablet  "75 mg, 75 mg, Oral, Daily, Zakia Bernstein MD, 75 mg at 01/02/24 1345    Insert peripheral IV, , , Once **AND** sodium chloride (PF) 0.9 % injection 3 mL, 3 mL, Intravenous, Q1H PRN, Osorio Headley MD    Blood Culture:   Lab Results   Component Value Date    BLOODCX No Growth After 5 Days. 11/05/2023    BLOODCX No Growth After 5 Days. 11/05/2023       Wound Culture:   No results found for: \"WOUNDCULT\"    Ins and Outs:  I/O last 24 hours:  In: 120 [P.O.:120]  Out: 400 [Urine:400]          Physical:  Vitals:    01/02/24 2343   BP: 127/73   Pulse:    Resp:    Temp: 98.3 °F (36.8 °C)   SpO2:      Musculoskeletal: left upper extremity  LUE in sugar tong splint and sling.  Skin is clean, dry, intact.   Sensation intact to light touch in radial, ulnar, median distributions  Motor intact to radial, ulnar, median distributions  Extremities are warm and well-perfused with capillary refill less than 2 seconds in exposed digits.    Assessment:    89 y.o.female with left distal radius fracture.     Plan:  Non weight bearing left upper extremity in sugartong splint  Pain control per primary  Ice and elevation  Dispo: Ortho will follow  Follow up in outpatient clinic in 2 weeks    Leon Tovar MD    "

## 2024-01-04 PROBLEM — A04.72 C. DIFFICILE DIARRHEA: Status: RESOLVED | Noted: 2023-10-06 | Resolved: 2024-01-04

## 2024-01-04 PROBLEM — R94.31 ABNORMAL EKG: Status: ACTIVE | Noted: 2024-01-04

## 2024-01-04 PROCEDURE — 99232 SBSQ HOSP IP/OBS MODERATE 35: CPT | Performed by: NURSE PRACTITIONER

## 2024-01-04 PROCEDURE — 93005 ELECTROCARDIOGRAM TRACING: CPT

## 2024-01-04 RX ORDER — ACETAMINOPHEN 325 MG/1
975 TABLET ORAL EVERY 8 HOURS SCHEDULED
Status: DISCONTINUED | OUTPATIENT
Start: 2024-01-04 | End: 2024-01-04

## 2024-01-04 RX ORDER — ACETAMINOPHEN 325 MG/1
975 TABLET ORAL EVERY 8 HOURS SCHEDULED
Status: DISCONTINUED | OUTPATIENT
Start: 2024-01-04 | End: 2024-01-08 | Stop reason: HOSPADM

## 2024-01-04 RX ADMIN — ACETAMINOPHEN 975 MG: 325 TABLET, FILM COATED ORAL at 13:39

## 2024-01-04 RX ADMIN — HEPARIN SODIUM 5000 UNITS: 5000 INJECTION INTRAVENOUS; SUBCUTANEOUS at 12:39

## 2024-01-04 RX ADMIN — QUETIAPINE FUMARATE 50 MG: 25 TABLET ORAL at 22:26

## 2024-01-04 RX ADMIN — ACETAMINOPHEN 975 MG: 325 TABLET, FILM COATED ORAL at 22:26

## 2024-01-04 RX ADMIN — METOPROLOL SUCCINATE 100 MG: 100 TABLET, EXTENDED RELEASE ORAL at 16:55

## 2024-01-04 RX ADMIN — OXYCODONE HYDROCHLORIDE 5 MG: 5 TABLET ORAL at 13:39

## 2024-01-04 RX ADMIN — HEPARIN SODIUM 5000 UNITS: 5000 INJECTION INTRAVENOUS; SUBCUTANEOUS at 06:05

## 2024-01-04 RX ADMIN — MELATONIN 9 MG: at 22:26

## 2024-01-04 RX ADMIN — SERTRALINE HYDROCHLORIDE 75 MG: 50 TABLET ORAL at 07:48

## 2024-01-04 RX ADMIN — HEPARIN SODIUM 5000 UNITS: 5000 INJECTION INTRAVENOUS; SUBCUTANEOUS at 22:26

## 2024-01-04 RX ADMIN — POTASSIUM CHLORIDE 10 MEQ: 750 TABLET, EXTENDED RELEASE ORAL at 07:48

## 2024-01-04 RX ADMIN — MAGNESIUM OXIDE TAB 400 MG (241.3 MG ELEMENTAL MG) 400 MG: 400 (241.3 MG) TAB at 07:48

## 2024-01-04 RX ADMIN — AMLODIPINE BESYLATE 5 MG: 5 TABLET ORAL at 07:48

## 2024-01-04 RX ADMIN — POTASSIUM CHLORIDE 10 MEQ: 750 TABLET, EXTENDED RELEASE ORAL at 16:55

## 2024-01-04 NOTE — ASSESSMENT & PLAN NOTE
Secondary to fall    Ortho consult appreciated  Xray on admission- Impacted distal radius fracture with intra-articular extension.   Post splinting- Distal radial fracture appears well aligned. Possible scapholunate ligament tear.   TAWANNA MARIA/w sugar-tong splint  outpatient follow-up in 2 weeks, non operative management at this time

## 2024-01-04 NOTE — UTILIZATION REVIEW
NOTIFICATION OF INPATIENT ADMISSION   AUTHORIZATION REQUEST   SERVICING FACILITY:   [facility]  Address: [address] [place] [address]  Tax ID: [id number]  NPI: [id number] ATTENDING PROVIDER:  Attending Name and NPI#: Tonja Araujo Md [[id number]]  Address: [address] [place] [address]  Phone: [phone]   ADMISSION INFORMATION:  Place of Service: Inpatient [facility]  Place of Service Code: 21  Inpatient Admission Date/Time: 1/3/24  4:43 PM  Discharge Date/Time: No discharge date for patient encounter.  Admitting Diagnosis Code/Description:  Fx radius head-closed [S52.123A]  Pain [R52]  Fall, initial encounter [W19.XXXA]     UTILIZATION REVIEW CONTACT:  Umer Rainey Utilization   Network Utilization Review Department  Phone: [phone]  Fax: [phone]  Email: [email or web address]  Contact for approvals/pending authorizations, clinical reviews, and discharge.     PHYSICIAN ADVISORY SERVICES:  Medical Necessity Denial & Aehf-yz-Kekc Review  Phone: [phone]  Fax: [phone]  Email: [email or web address]     DISCHARGE SUPPORT TEAM:  For Patients Discharge Needs & Updates  Phone: [phone] opt. 2 Fax: [phone]  Email: [email or web address]

## 2024-01-04 NOTE — ASSESSMENT & PLAN NOTE
Noted on admission concerning for afib  Repeat EKG today   No history of reported afib noted in the chart  PTA on metoprolol- continue   Defer on AC given hematochezia   No events reported on tele   Echo 1/3- Left ventricular cavity size is normal. Wall thickness is moderately increased. There is moderate concentric hypertrophy. EF 65%. Right ventricular cavity size is moderately dilated. Systolic function is normal. The left atrium is moderately dilated, right atrium dilated. There is aortic valve sclerosis. Tricuspid valve moderate regurgitation. The ascending aorta is mildly dilated.

## 2024-01-04 NOTE — CASE MANAGEMENT
Case Management Discharge Planning Note    Patient name Hilary Oleary  Location [facility] 609/[facility] 609-01 MRN [id number]  : 1934 Date 2024       Current Admission Date: 2024  Current Admission Diagnosis:Suspected Syncope and fall   Patient Active Problem List    Diagnosis Date Noted    Suspected Syncope and fall 2024    Closed fracture of distal end of left radius with routine healing 2024    Blood in stool 2024    Mixed dementia ([facility]) 2023    Obesity, morbid ([facility]) 2023    SIRS (systemic inflammatory response syndrome) ([facility]) 10/06/2023    Colorectal cancer suspected 10/06/2023    Lung nodule seen on imaging study 10/06/2023    C. difficile diarrhea 10/06/2023    Bilateral hearing loss 2023    Type 2 diabetes mellitus without complication, without long-term current use of insulin ([facility]) 2022    Metabolic encephalopathy 2021    Major depressive disorder with single episode, in full remission ([facility]) 10/19/2020    Constipation, slow transit 2019    Closed fracture of right hip with routine healing 2019    Thickened endometrium 2017    Pancreatic cyst 2017    Enteritis 2017    Age related osteoporosis 2016    Resting tremor 05/10/2016    Vitamin D deficiency 2014    Right bundle-branch block 10/29/2013    Leiomyoma of uterus 2013    Benign essential hypertension 2012    Mixed hyperlipidemia 2012    Gastroesophageal reflux disease without esophagitis 2012      LOS (days): 1  Geometric Mean LOS (GMLOS) (days):   Days to GMLOS:     OBJECTIVE:  Risk of Unplanned Readmission Score: 24.47         Current admission status: Inpatient   Preferred Pharmacy:   [facility]/pharmacy #[id number] - [place] - [address]  [address]  [place] [address]  Phone: [phone] Fax: [phone]    [facility] [place] - [address]  [address]  [place]  [address]  Phone: [phone] Fax: [phone]    Primary Care Provider: RUBY Richardson    Primary Insurance: [employer]  Secondary Insurance:     DISCHARGE DETAILS:    Other Referral/Resources/Interventions Provided:  Referral Comments: TC from daughter, Pallavi, would like referral to  TCF, if  TCF unable to take patient, family will take patient home with C, referral for TCF entered in [facility]

## 2024-01-04 NOTE — ASSESSMENT & PLAN NOTE
few months ago, CT scan which showed concern for apple core lesion in the transverse colon concerning for metastatic cancer.  At that time there was discussion about colonoscopy but per the notes the family and the patient did not want to pursue colonoscopy at that time.   Currently patient is not pursuing any treatment and focused on comfort measures only.   [facility]   Code status level 3 DNR/DNI

## 2024-01-04 NOTE — PLAN OF CARE
Problem: PAIN - ADULT  Goal: Verbalizes/displays adequate comfort level or baseline comfort level  Description: Interventions:  - Encourage patient to monitor pain and request assistance  - Assess pain using appropriate pain scale  - Administer analgesics based on type and severity of pain and evaluate response  - Implement non-pharmacological measures as appropriate and evaluate response  - Consider cultural and social influences on pain and pain management  - Notify physician/advanced practitioner if interventions unsuccessful or patient reports new pain  Outcome: Progressing     Problem: INFECTION - ADULT  Goal: Absence or prevention of progression during hospitalization  Description: INTERVENTIONS:  - Assess and monitor for signs and symptoms of infection  - Monitor lab/diagnostic results  - Monitor all insertion sites, i.e. indwelling lines, tubes, and drains  - Monitor endotracheal if appropriate and nasal secretions for changes in amount and color  - [place] appropriate cooling/warming therapies per order  - Administer medications as ordered  - Instruct and encourage patient and family to use good hand hygiene technique  - Identify and instruct in appropriate isolation precautions for identified infection/condition  Outcome: Progressing  Goal: Absence of fever/infection during neutropenic period  Description: INTERVENTIONS:  - Monitor WBC    Outcome: Progressing     Problem: SAFETY ADULT  Goal: Patient will remain free of falls  Description: INTERVENTIONS:  - Educate patient/family on patient safety including physical limitations  - Instruct patient to call for assistance with activity   - Consult OT/PT to assist with strengthening/mobility   - Keep Call bell within reach  - Keep bed low and locked with side rails adjusted as appropriate  - Keep care items and personal belongings within reach  - Initiate and maintain comfort rounds  - Make Fall Risk Sign visible to staff  - Offer Toileting every 2 Hours,  in advance of need  - Initiate/Maintain on alarm  - Obtain necessary fall risk management equipment:   - Apply yellow socks and bracelet for high fall risk patients  - Consider moving patient to room near nurses station  Outcome: Progressing  Goal: Maintain or return to baseline ADL function  Description: INTERVENTIONS:  -  Assess patient's ability to carry out ADLs; assess patient's baseline for ADL function and identify physical deficits which impact ability to perform ADLs (bathing, care of mouth/teeth, toileting, grooming, dressing, etc.)  - Assess/evaluate cause of self-care deficits   - Assess range of motion  - Assess patient's mobility; develop plan if impaired  - Assess patient's need for assistive devices and provide as appropriate  - Encourage maximum independence but intervene and supervise when necessary  - Involve family in performance of ADLs  - Assess for home care needs following discharge   - Consider OT consult to assist with ADL evaluation and planning for discharge  - Provide patient education as appropriate  Outcome: Progressing  Goal: Maintains/Returns to pre admission functional level  Description: INTERVENTIONS:  - Perform AM-PAC 6 Click Basic Mobility/ Daily Activity assessment daily.  - Set and communicate daily mobility goal to care team and patient/family/caregiver.   - Collaborate with rehabilitation services on mobility goals if consulted  - Perform Range of Motion 3 times a day.  - Reposition patient every 2 hours.  - Dangle patient 2 times a day  - Stand patient 2 times a day  - Ambulate patient 2 times a day  - Out of bed to chair 2 times a day   - Out of bed for meals 2 times a day  - Out of bed for toileting  - Record patient progress and toleration of activity level   Outcome: Progressing     Problem: DISCHARGE PLANNING  Goal: Discharge to home or other facility with appropriate resources  Description: INTERVENTIONS:  - Identify barriers to discharge w/patient and caregiver  -  Arrange for needed discharge resources and transportation as appropriate  - Identify discharge learning needs (meds, wound care, etc.)  - Arrange for interpretive services to assist at discharge as needed  - Refer to Case Management Department for coordinating discharge planning if the patient needs post-hospital services based on physician/advanced practitioner order or complex needs related to functional status, cognitive ability, or social support system  Outcome: Progressing     Problem: Knowledge Deficit  Goal: Patient/family/caregiver demonstrates understanding of disease process, treatment plan, medications, and discharge instructions  Description: Complete learning assessment and assess knowledge base.  Interventions:  - Provide teaching at level of understanding  - Provide teaching via preferred learning methods  Outcome: Progressing

## 2024-01-04 NOTE — ASSESSMENT & PLAN NOTE
Patient presented from home via ambulance. As  per daughter, patient was walking with a walker, but fell backwards and screamed in pain immediately.  Fall was reviewed by the family on security camera installed in the house, appears that the patient stood up suddenly from chair level and felt dizzy afterwards but with back work to try and sit back on the chair and missed, does not appear like a syncopal episode, patient might have felt dizzy  In ED, CT head and C spine negative.   Left radius fracture noted, s/p splint- per ortho non operative   Place in observation for syncope workup  PT/OT- recommending rehab- pending auth from TCU   Initial EKG concerning for rate controlled atrial fibrillation. No prior h/o afib.   Already on metoprolol.   Will not recommend anticoagulation for persistent hematochezia, and palliative care patient.   Echo without concerning findings   No acute events on tele

## 2024-01-04 NOTE — PROGRESS NOTES
[facility]  Progress Note  Name: Hilary Oleary I  MRN: [id number]  [facility] I Date of Admission: 1/2/2024   Date of Service: 1/4/2024 I Hospital Day: 1    Assessment/Plan   * Suspected Syncope and fall  Assessment & Plan  Patient presented from home via ambulance. As  per daughter, patient was walking with a walker, but fell backwards and screamed in pain immediately.  Fall was reviewed by the family on security camera installed in the house, appears that the patient stood up suddenly from chair level and felt dizzy afterwards but with back work to try and sit back on the chair and missed, does not appear like a syncopal episode, patient might have felt dizzy  In ED, CT head and C spine negative.   Left radius fracture noted, s/p splint- per ortho non operative   Place in observation for syncope workup  PT/OT- recommending rehab- pending auth from TCU   Initial EKG concerning for rate controlled atrial fibrillation. No prior h/o afib.   Already on metoprolol.   Will not recommend anticoagulation for persistent hematochezia, and palliative care patient.   Echo without concerning findings   No acute events on tele     Closed fracture of distal end of left radius with routine healing  Assessment & Plan  Secondary to fall    Ortho consult appreciated  Xray on admission- Impacted distal radius fracture with intra-articular extension.   Post splinting- Distal radial fracture appears well aligned. Possible scapholunate ligament tear.   TAWANNA MELTON  C/w sugar-tong splint  outpatient follow-up in 2 weeks, non operative management at this time    Abnormal EKG  Assessment & Plan  Noted on admission concerning for afib  Repeat EKG today   No history of reported afib noted in the chart  PTA on metoprolol- continue   Defer on AC given hematochezia   No events reported on tele   Echo 1/3- Left ventricular cavity size is normal. Wall thickness is moderately increased. There is  moderate concentric hypertrophy. EF 65%. Right ventricular cavity size is moderately dilated. Systolic function is normal. The left atrium is moderately dilated, right atrium dilated. There is aortic valve sclerosis. Tricuspid valve moderate regurgitation. The ascending aorta is mildly dilated.    Blood in stool  Assessment & Plan  Daughter reports of persistent blood in stool since diagnosis of colon cancer.   Hgb remains stable at 11  Monitor hgb and stool    Mixed dementia (HCC)  Assessment & Plan  Patient is pleasant, alert, oriented to self, and place. Not time or situation.   Continue Zoloft.  Continue Seroquel 50 mg and melatonin at night    Colorectal cancer suspected  Assessment & Plan  few months ago, CT scan which showed concern for apple core lesion in the transverse colon concerning for metastatic cancer.  At that time there was discussion about colonoscopy but per the notes the family and the patient did not want to pursue colonoscopy at that time.   Currently patient is not pursuing any treatment and focused on comfort measures only.   [facility]   Code status level 3 DNR/DNI    Benign essential hypertension  Assessment & Plan  Continue home amlodipine 5mg daily and metoprolol 100mg daily           VTE Pharmacologic Prophylaxis: VTE Score: 6 High Risk (Score >/= 5) - Pharmacological DVT Prophylaxis Ordered: heparin. Sequential Compression Devices Ordered.    Mobility:   Basic Mobility Inpatient Raw Score: 6  JH-HLM Goal: 2: Bed activities/Dependent transfer  JH-HLM Achieved: 3: Sit at edge of bed  HLM Goal NOT achieved. Continue with multidisciplinary rounding and encourage appropriate mobility to improve upon HLM goals.    Patient Centered Rounds: I performed bedside rounds with nursing staff today.   Discussions with Specialists or Other Care Team Provider: d/w RN     Education and Discussions with Family / Patient: Updated  (daughter) via  phone.    Total Time Spent on Date of Encounter in care of patient: 35 mins. This time was spent on one or more of the following: performing physical exam; counseling and coordination of care; obtaining or reviewing history; documenting in the medical record; reviewing/ordering tests, medications or procedures; communicating with other healthcare professionals and discussing with patient's family/caregivers.    Current Length of Stay: 1 day(s)  Current Patient Status: Inpatient   Certification Statement: The patient will continue to require additional inpatient hospital stay due to pending if TCU will be accepted   Discharge Plan: Anticipate discharge in 24-48 hrs to rehab facility.    Code Status: Level 3 - DNAR and DNI    Subjective:   Patient lying in bed.  Reports she just woke up from a nap.  Reports that she is having 9 out of 10 pain in her left arm.  Denies any other complaints.  No overnight events per RN    Objective:     Vitals:   Temp (24hrs), Av.3 °F (37.4 °C), Min:98.6 °F (37 °C), Max:100.1 °F (37.8 °C)    Temp:  [98.6 °F (37 °C)-100.1 °F (37.8 °C)] 98.6 °F (37 °C)  HR:  [73-85] 77  Resp:  [16-20] 16  BP: (121-130)/(62-78) 130/72  SpO2:  [91 %-97 %] 95 %  Body mass index is 30.93 kg/m².     Input and Output Summary (last 24 hours):     Intake/Output Summary (Last 24 hours) at 2024 1624  Last data filed at 2024 1300  Gross per 24 hour   Intake 325 ml   Output 225 ml   Net 100 ml       Physical Exam:   Physical Exam  Vitals and nursing note reviewed.   Constitutional:       General: She is not in acute distress.     Appearance: She is well-developed. She is not ill-appearing.   Cardiovascular:      Rate and Rhythm: Normal rate and regular rhythm.      Heart sounds: Normal heart sounds. No murmur heard.  Pulmonary:      Effort: Pulmonary effort is normal. No respiratory distress.      Breath sounds: Normal breath sounds. No wheezing or rales.   Abdominal:      General: Bowel sounds are normal.  There is no distension.      Palpations: Abdomen is soft.      Tenderness: There is no abdominal tenderness.   Musculoskeletal:         General: No swelling.      Comments: Left arm splint intact   Skin:     General: Skin is warm and dry.   Neurological:      Mental Status: She is alert. Mental status is at baseline.      Comments: Oriented to person and place   Psychiatric:         Mood and Affect: Mood normal.          Additional Data:     Labs:  Results from last 7 days   Lab Units 01/02/24  1019   WBC Thousand/uL 6.87   HEMOGLOBIN g/dL 11.2*   HEMATOCRIT % 35.6   PLATELETS Thousands/uL 258   NEUTROS PCT % 77*   LYMPHS PCT % 15   MONOS PCT % 6   EOS PCT % 1     Results from last 7 days   Lab Units 01/03/24  0518 01/02/24  1019   SODIUM mmol/L 136 141   POTASSIUM mmol/L 3.5 3.2*   CHLORIDE mmol/L 100 103   CO2 mmol/L 21 29   BUN mg/dL 13 12   CREATININE mg/dL 0.47* 0.57*   ANION GAP mmol/L 15 9   CALCIUM mg/dL 9.1 9.1   ALBUMIN g/dL  --  3.9   TOTAL BILIRUBIN mg/dL  --  0.78   ALK PHOS U/L  --  61   ALT U/L  --  6*   AST U/L  --  17   GLUCOSE RANDOM mg/dL 96 114                       Lines/Drains:  Invasive Devices       Peripheral Intravenous Line  Duration             Peripheral IV 01/02/24 Right Antecubital 2 days              Drain  Duration             External Urinary Catheter <1 day                          Imaging: Reviewed radiology reports from this admission including: CT head and xray(s)    Recent Cultures (last 7 days):         Last 24 Hours Medication List:   Current Facility-Administered Medications   Medication Dose Route Frequency Provider Last Rate    acetaminophen  975 mg Oral Q8H RUBY Roth      amLODIPine  5 mg Oral Daily Zakia Bernstein MD      heparin (porcine)  5,000 Units Subcutaneous Q8H CRISTELA Bernstein MD      magnesium Oxide  400 mg Oral Daily Alyse Romero MD      melatonin  9 mg Oral HS Zakia Bernstein MD      metoprolol succinate  100 mg Oral QPM Zakia Bernstein MD       ondansetron  4 mg Intravenous Q6H PRN Zakia Bernstein MD      oxyCODONE  5 mg Oral Q6H PRN Zakia Bernstein MD      oxyCODONE  2.5 mg Oral Q6H PRN Zakia Bernstein MD      potassium chloride  10 mEq Oral BID Alyse Romero MD      QUEtiapine  50 mg Oral HS Zakia Bernstein MD      sertraline  75 mg Oral Daily Zakia Bernstein MD      sodium chloride (PF)  3 mL Intravenous Q1H PRN Osorio Headley MD          Today, Patient Was Seen By: RUBY Nelson    **Please Note: This note may have been constructed using a voice recognition system.**

## 2024-01-04 NOTE — CASE MANAGEMENT
Case Management Discharge Planning Note    Patient name Hilary Oleary  Location [facility] 609/[facility] 609-01 MRN [id number]  : 1934 Date 2024       Current Admission Date: 2024  Current Admission Diagnosis:Suspected Syncope and fall   Patient Active Problem List    Diagnosis Date Noted    Suspected Syncope and fall 2024    Closed fracture of distal end of left radius with routine healing 2024    Blood in stool 2024    Mixed dementia ([facility]) 2023    Obesity, morbid ([facility]) 2023    SIRS (systemic inflammatory response syndrome) ([facility]) 10/06/2023    Colorectal cancer suspected 10/06/2023    Lung nodule seen on imaging study 10/06/2023    C. difficile diarrhea 10/06/2023    Bilateral hearing loss 2023    Type 2 diabetes mellitus without complication, without long-term current use of insulin ([facility]) 2022    Metabolic encephalopathy 2021    Major depressive disorder with single episode, in full remission ([facility]) 10/19/2020    Constipation, slow transit 2019    Closed fracture of right hip with routine healing 2019    Thickened endometrium 2017    Pancreatic cyst 2017    Enteritis 2017    Age related osteoporosis 2016    Resting tremor 05/10/2016    Vitamin D deficiency 2014    Right bundle-branch block 10/29/2013    Leiomyoma of uterus 2013    Benign essential hypertension 2012    Mixed hyperlipidemia 2012    Gastroesophageal reflux disease without esophagitis 2012      LOS (days): 1  Geometric Mean LOS (GMLOS) (days):   Days to GMLOS:     OBJECTIVE:  Risk of Unplanned Readmission Score: 24.42         Current admission status: Inpatient   Preferred Pharmacy:   [facility]/pharmacy #[id number] - [place] - [address]  [address]  [place] [address]  Phone: [phone] Fax: [phone]    [facility] - [place] - [address]  [address]  [place]  [address]  Phone: [phone] Fax: [phone]    Primary Care Provider: RUBY Richardson    Primary Insurance: [employer] [facility] RE[facility]  Secondary Insurance:     DISCHARGE DETAILS:    Discharge planning discussed with:: Pallavi Arteaga  Freedom of Choice: Yes  Comments - Freedom of Choice: Discussed FOC  CM contacted family/caregiver?: Yes     Other Referral/Resources/Interventions Provided:  Referral Comments: This CM spoke with Pallavi regarding different levels of STR.  Pallavi would like to discuss options with her daughter and sister.  Pallavi states she will call this CM back

## 2024-01-04 NOTE — PLAN OF CARE
Problem: PAIN - ADULT  Goal: Verbalizes/displays adequate comfort level or baseline comfort level  Description: Interventions:  - Encourage patient to monitor pain and request assistance  - Assess pain using appropriate pain scale  - Administer analgesics based on type and severity of pain and evaluate response  - Implement non-pharmacological measures as appropriate and evaluate response  - Consider cultural and social influences on pain and pain management  - Notify physician/advanced practitioner if interventions unsuccessful or patient reports new pain  Outcome: Progressing     Problem: INFECTION - ADULT  Goal: Absence or prevention of progression during hospitalization  Description: INTERVENTIONS:  - Assess and monitor for signs and symptoms of infection  - Monitor lab/diagnostic results  - Monitor all insertion sites, i.e. indwelling lines, tubes, and drains  - Monitor endotracheal if appropriate and nasal secretions for changes in amount and color  - [place] appropriate cooling/warming therapies per order  - Administer medications as ordered  - Instruct and encourage patient and family to use good hand hygiene technique  - Identify and instruct in appropriate isolation precautions for identified infection/condition  Outcome: Progressing  Goal: Absence of fever/infection during neutropenic period  Description: INTERVENTIONS:  - Monitor WBC    Outcome: Progressing     Problem: SAFETY ADULT  Goal: Patient will remain free of falls  Description: INTERVENTIONS:  - Educate patient/family on patient safety including physical limitations  - Instruct patient to call for assistance with activity   - Consult OT/PT to assist with strengthening/mobility   - Keep Call bell within reach  - Keep bed low and locked with side rails adjusted as appropriate  - Keep care items and personal belongings within reach  - Initiate and maintain comfort rounds  - Make Fall Risk Sign visible to staff  - Offer Toileting every 2 Hours,  in advance of need  - Initiate/Maintain on alarm  - Obtain necessary fall risk management equipment:   - Apply yellow socks and bracelet for high fall risk patients  - Consider moving patient to room near nurses station  Outcome: Progressing  Goal: Maintain or return to baseline ADL function  Description: INTERVENTIONS:  -  Assess patient's ability to carry out ADLs; assess patient's baseline for ADL function and identify physical deficits which impact ability to perform ADLs (bathing, care of mouth/teeth, toileting, grooming, dressing, etc.)  - Assess/evaluate cause of self-care deficits   - Assess range of motion  - Assess patient's mobility; develop plan if impaired  - Assess patient's need for assistive devices and provide as appropriate  - Encourage maximum independence but intervene and supervise when necessary  - Involve family in performance of ADLs  - Assess for home care needs following discharge   - Consider OT consult to assist with ADL evaluation and planning for discharge  - Provide patient education as appropriate  Outcome: Progressing  Goal: Maintains/Returns to pre admission functional level  Description: INTERVENTIONS:  - Perform AM-PAC 6 Click Basic Mobility/ Daily Activity assessment daily.  - Set and communicate daily mobility goal to care team and patient/family/caregiver.   - Collaborate with rehabilitation services on mobility goals if consulted  - Perform Range of Motion 3 times a day.  - Reposition patient every 2 hours.  - Dangle patient 2 times a day  - Stand patient 2 times a day  - Ambulate patient 2 times a day  - Out of bed to chair 2 times a day   - Out of bed for meals 2 times a day  - Out of bed for toileting  - Record patient progress and toleration of activity level   Outcome: Progressing     Problem: DISCHARGE PLANNING  Goal: Discharge to home or other facility with appropriate resources  Description: INTERVENTIONS:  - Identify barriers to discharge w/patient and caregiver  -  Arrange for needed discharge resources and transportation as appropriate  - Identify discharge learning needs (meds, wound care, etc.)  - Arrange for interpretive services to assist at discharge as needed  - Refer to Case Management Department for coordinating discharge planning if the patient needs post-hospital services based on physician/advanced practitioner order or complex needs related to functional status, cognitive ability, or social support system  Outcome: Progressing     Problem: Knowledge Deficit  Goal: Patient/family/caregiver demonstrates understanding of disease process, treatment plan, medications, and discharge instructions  Description: Complete learning assessment and assess knowledge base.  Interventions:  - Provide teaching at level of understanding  - Provide teaching via preferred learning methods  Outcome: Progressing     Problem: Prexisting or High Potential for Compromised Skin Integrity  Goal: Skin integrity is maintained or improved  Description: INTERVENTIONS:  - Identify patients at risk for skin breakdown  - Assess and monitor skin integrity  - Assess and monitor nutrition and hydration status  - Monitor labs   - Assess for incontinence   - Turn and reposition patient  - Assist with mobility/ambulation  - Relieve pressure over bony prominences  - Avoid friction and shearing  - Provide appropriate hygiene as needed including keeping skin clean and dry  - Evaluate need for skin moisturizer/barrier cream  - Collaborate with interdisciplinary team   - Patient/family teaching  - Consider wound care consult   Outcome: Progressing

## 2024-01-05 LAB
ATRIAL RATE: 76 BPM
P AXIS: 90 DEGREES
PR INTERVAL: 176 MS
QRS AXIS: 24 DEGREES
QRSD INTERVAL: 124 MS
QT INTERVAL: 526 MS
QTC INTERVAL: 591 MS
T WAVE AXIS: 5 DEGREES
VENTRICULAR RATE: 76 BPM

## 2024-01-05 PROCEDURE — 97530 THERAPEUTIC ACTIVITIES: CPT

## 2024-01-05 PROCEDURE — 97535 SELF CARE MNGMENT TRAINING: CPT

## 2024-01-05 PROCEDURE — 99232 SBSQ HOSP IP/OBS MODERATE 35: CPT | Performed by: NURSE PRACTITIONER

## 2024-01-05 PROCEDURE — 97116 GAIT TRAINING THERAPY: CPT

## 2024-01-05 RX ORDER — POLYETHYLENE GLYCOL 3350 17 G/17G
17 POWDER, FOR SOLUTION ORAL DAILY
Status: DISCONTINUED | OUTPATIENT
Start: 2024-01-05 | End: 2024-01-08 | Stop reason: HOSPADM

## 2024-01-05 RX ORDER — AMOXICILLIN 250 MG
2 CAPSULE ORAL 2 TIMES DAILY
Status: DISCONTINUED | OUTPATIENT
Start: 2024-01-05 | End: 2024-01-08 | Stop reason: HOSPADM

## 2024-01-05 RX ADMIN — HEPARIN SODIUM 5000 UNITS: 5000 INJECTION INTRAVENOUS; SUBCUTANEOUS at 22:32

## 2024-01-05 RX ADMIN — SENNOSIDES AND DOCUSATE SODIUM 2 TABLET: 50; 8.6 TABLET ORAL at 20:20

## 2024-01-05 RX ADMIN — ACETAMINOPHEN 975 MG: 325 TABLET, FILM COATED ORAL at 05:36

## 2024-01-05 RX ADMIN — POLYETHYLENE GLYCOL 3350 17 G: 17 POWDER, FOR SOLUTION ORAL at 20:20

## 2024-01-05 RX ADMIN — METOPROLOL SUCCINATE 100 MG: 100 TABLET, EXTENDED RELEASE ORAL at 17:15

## 2024-01-05 RX ADMIN — POTASSIUM CHLORIDE 10 MEQ: 750 TABLET, EXTENDED RELEASE ORAL at 09:31

## 2024-01-05 RX ADMIN — SERTRALINE HYDROCHLORIDE 75 MG: 50 TABLET ORAL at 09:30

## 2024-01-05 RX ADMIN — MAGNESIUM OXIDE TAB 400 MG (241.3 MG ELEMENTAL MG) 400 MG: 400 (241.3 MG) TAB at 09:30

## 2024-01-05 RX ADMIN — POTASSIUM CHLORIDE 10 MEQ: 750 TABLET, EXTENDED RELEASE ORAL at 17:15

## 2024-01-05 RX ADMIN — HEPARIN SODIUM 5000 UNITS: 5000 INJECTION INTRAVENOUS; SUBCUTANEOUS at 14:48

## 2024-01-05 RX ADMIN — HEPARIN SODIUM 5000 UNITS: 5000 INJECTION INTRAVENOUS; SUBCUTANEOUS at 05:36

## 2024-01-05 RX ADMIN — QUETIAPINE FUMARATE 50 MG: 25 TABLET ORAL at 22:32

## 2024-01-05 RX ADMIN — ACETAMINOPHEN 975 MG: 325 TABLET, FILM COATED ORAL at 22:32

## 2024-01-05 RX ADMIN — MELATONIN 9 MG: at 22:32

## 2024-01-05 RX ADMIN — AMLODIPINE BESYLATE 5 MG: 5 TABLET ORAL at 09:30

## 2024-01-05 NOTE — PHYSICAL THERAPY NOTE
Physical Therapy Progress Note     01/05/24 1445   PT Last Visit   PT Visit Date 01/05/24   Note Type   Note Type Treatment   Pain Assessment   Pain Score No Pain   Restrictions/Precautions   LUE Weight Bearing Per Order NWB   Braces or Orthoses Sling;Splint   Other Precautions Cognitive;Chair Alarm;Bed Alarm;WBS;Fall Risk;Pain   Subjective   Subjective pt encountered seated in recliner, pleasant and agreeable to treatment.  Reports no complaints when prompted.  Son & daughter present at different points during session, whom she identified correctly, but initially had to correct herself when she mentioned her  initially.  pt able to identify she was in a hospital room, but otherwise has limited short term memory & asks repetitive questions.   Transfers   Sit to Stand 3  Moderate assistance   Additional items Assist x 1  (+ standby)   Stand to Sit 3  Moderate assistance   Additional items Assist x 1;Armrests;Increased time required;Verbal cues   Ambulation/Elevation   Gait pattern Step to;Short stride;Foward flexed;Inconsistent sarahi;Shuffling;Decreased foot clearance;Narrow ZAFAR;Improper Weight shift;R Knee Renee;L Knee Renee;Poor UE support   Gait Assistance 3  Moderate assist   Additional items Assist x 1  (+ 2nd for AD management, tactile cues & chair follow)   Assistive Device Tray-walker   Distance 5', 7'   Balance   Static Sitting Fair   Static Standing Poor   Ambulatory Poor -   Activity Tolerance   Activity Tolerance Patient tolerated treatment well;Patient limited by fatigue   Nurse Made Aware JOSELUIS Frost   Assessment   Prognosis Good   Problem List Decreased strength;Decreased endurance;Impaired balance;Decreased mobility;Pain;Decreased cognition;Impaired judgement;Decreased safety awareness;Obesity   Assessment Pt demonstrated improved mobiilty today, requiring primarily Ax1 for transfers to & from recliner, but continues to require considerable assist for gait tasks due to impaired balance,  gait sequencing, LE strength & poor carryover of instructions without near constant instructions.  Pt did demonsrate slight improvement during 2nd trial where she demosntrated improved LE stability & quicker pace, but fatigued in similar way compared to 1st trial.  Time taken to recover after trials , at which point pt reports no change in status when prompted.  Discussed use of 1 handed device with supervising PT prior to session given NWB status LINH, who cleared pt for use of this device this session.  PT POC & d/c recommendations remain appropriate at this time to safely progress to highest level of independence & reduce long term burden of care.   Goals   Patient Goals to rest   STG Expiration Date 01/17/24   PT Treatment Day 1   Plan   Treatment/Interventions Functional transfer training;LE strengthening/ROM;Elevations;Therapeutic exercise;Endurance training;Patient/family training;Equipment eval/education;Bed mobility;Gait training;Cognitive reorientation   Progress Progressing toward goals   PT Frequency 2-3x/wk   Discharge Recommendation   Rehab Resource Intensity Level, PT II (Moderate Resource Intensity)   Equipment Recommended   (continue to assess)   AM-PAC Basic Mobility Inpatient   Turning in Flat Bed Without Bedrails 2   Lying on Back to Sitting on Edge of Flat Bed Without Bedrails 1   Moving Bed to Chair 1   Standing Up From Chair Using Arms 2   Walk in Room 1   Climb 3-5 Stairs With Railing 1   Basic Mobility Inpatient Raw Score 8   Turning Head Towards Sound 4   Follow Simple Instructions 3   Low Function Basic Mobility Raw Score  15   Low Function Basic Mobility Standardized Score  23.9   Highest Level Of Mobility   JH-HLM Goal 3: Sit at edge of bed   JH-HLM Achieved 6: Walk 10 steps or more       Koko Gupta PTA    An [facility] Basic Mobility Raw Score less than 17 suggests pt would benefit from post acute rehab.  Please also refer to the recommendation of the Physical Therapist for safe  discharge planning.

## 2024-01-05 NOTE — PLAN OF CARE
Problem: PAIN - ADULT  Goal: Verbalizes/displays adequate comfort level or baseline comfort level  Description: Interventions:  - Encourage patient to monitor pain and request assistance  - Assess pain using appropriate pain scale  - Administer analgesics based on type and severity of pain and evaluate response  - Implement non-pharmacological measures as appropriate and evaluate response  - Notify physician/advanced practitioner if interventions unsuccessful or patient reports new pain  Outcome: Progressing     Problem: SAFETY ADULT  Goal: Patient will remain free of falls  Description: INTERVENTIONS:  - Educate patient/family on patient safety including physical limitations  - Instruct patient to call for assistance with activity   - Consult OT/PT to assist with strengthening/mobility   - Keep Call bell within reach  - Keep bed low and locked with side rails adjusted as appropriate  - Keep care items and personal belongings within reach  - Initiate and maintain comfort rounds  - Make Fall Risk Sign visible to staff  - Offer Toileting every 2 Hours, in advance of need  - Initiate/Maintain on alarm  - Obtain necessary fall risk management equipment:   - Apply yellow socks and bracelet for high fall risk patients  - Consider moving patient to room near nurses station  Outcome: Progressing     Problem: Prexisting or High Potential for Compromised Skin Integrity  Goal: Skin integrity is maintained or improved  Description: INTERVENTIONS:  - Identify patients at risk for skin breakdown  - Assess and monitor skin integrity  - Assess and monitor nutrition and hydration status  - Monitor labs   - Assess for incontinence   - Turn and reposition patient  - Assist with mobility/ambulation  - Relieve pressure over bony prominences  - Avoid friction and shearing  - Provide appropriate hygiene as needed including keeping skin clean and dry  - Evaluate need for skin moisturizer/barrier cream  - Collaborate with  interdisciplinary team   - Patient/family teaching  - Consider wound care consult   Outcome: Progressing

## 2024-01-05 NOTE — ASSESSMENT & PLAN NOTE
Noted on admission concerning for afib, repeat EKG on 1/4 baseline wander noted, SR with occasional PVCs noted   No history of reported afib noted in the chart  PTA on metoprolol- continue   Defer on AC given hematochezia   No events reported on tele   Echo 1/3- Left ventricular cavity size is normal. Wall thickness is moderately increased. There is moderate concentric hypertrophy. EF 65%. Right ventricular cavity size is moderately dilated. Systolic function is normal. The left atrium is moderately dilated, right atrium dilated. There is aortic valve sclerosis. Tricuspid valve moderate regurgitation. The ascending aorta is mildly dilated.

## 2024-01-05 NOTE — PROGRESS NOTES
Patient:    MRN:  [id number]    [place] Request ID:  [id number]    Level of care reserved:  Skilled Nursing Facility    Partner Reserved:  [facility] MIRANDA, [place]un[place] [id number] [phone]    Clinical needs requested:    Geography searched:  10 miles around [phone]    Start of Service:    Request sent:  12:24pm EST on 1/4/2024 by Sharmin Nix    Partner reserved:  2:23pm EST on 1/5/2024 by Sharmin Nix    Choice list shared:  2:22pm EST on 1/5/2024 by Sharmin Nix

## 2024-01-05 NOTE — CASE MANAGEMENT
Case Management Discharge Planning Note    Patient name Hilary Oleary  [facility] MRN [id number]  : 1934 Date 2024       Current Admission Date: 2024  Current Admission Diagnosis:Suspected Syncope and fall   Patient Active Problem List    Diagnosis Date Noted    Abnormal EKG 2024    Suspected Syncope and fall 2024    Closed fracture of distal end of left radius with routine healing 2024    Blood in stool 2024    Mixed dementia ([facility]) 2023    Obesity, morbid ([facility]) 2023    SIRS (systemic inflammatory response syndrome) ([facility]) 10/06/2023    Colorectal cancer suspected 10/06/2023    Lung nodule seen on imaging study 10/06/2023    Bilateral hearing loss 2023    Type 2 diabetes mellitus without complication, without long-term current use of insulin ([facility]) 2022    Metabolic encephalopathy 2021    Major depressive disorder with single episode, in full remission ([facility]) 10/19/2020    Constipation, slow transit 2019    Closed fracture of right hip with routine healing 2019    Thickened endometrium 2017    Pancreatic cyst 2017    Enteritis 2017    Age related osteoporosis 2016    Resting tremor 05/10/2016    Vitamin D deficiency 2014    Right bundle-branch block 10/29/2013    Leiomyoma of uterus 2013    Benign essential hypertension 2012    Mixed hyperlipidemia 2012    Gastroesophageal reflux disease without esophagitis 2012      LOS (days): 2  Geometric Mean LOS (GMLOS) (days): 2.8  Days to GMLOS:0.9     OBJECTIVE:  Risk of Unplanned Readmission Score: 22.83         Current admission status: Inpatient   Preferred Pharmacy:   [id number] - [place] - [address]  [address]  [place] [address]  Phone: [phone] Fax: [phone]    [facility] - [place] - [address]  [address]  [place]  [address]  Phone: [phone] Fax: [phone]    Primary Care Provider: RUBY Richardson    Primary Insurance: [employer]  Secondary Insurance:     DISCHARGE DETAILS:    Discharge planning discussed with:: DaughterBianca    Other Referral/Resources/Interventions Provided:  Interventions: Short Term Rehab  Referral Comments: TC to Bianca, daughter, who states that she wants patient to go to [facility], does not want to do home with home health at this time.  Message sent to [facility] liason to call Bianca regarding [facility] admission

## 2024-01-05 NOTE — PLAN OF CARE
Problem: PAIN - ADULT  Goal: Verbalizes/displays adequate comfort level or baseline comfort level  Description: Interventions:  - Encourage patient to monitor pain and request assistance  - Assess pain using appropriate pain scale  - Administer analgesics based on type and severity of pain and evaluate response  - Implement non-pharmacological measures as appropriate and evaluate response  - Consider cultural and social influences on pain and pain management  - Notify physician/advanced practitioner if interventions unsuccessful or patient reports new pain  Outcome: Progressing     Problem: INFECTION - ADULT  Goal: Absence or prevention of progression during hospitalization  Description: INTERVENTIONS:  - Assess and monitor for signs and symptoms of infection  - Monitor lab/diagnostic results  - Monitor all insertion sites, i.e. indwelling lines, tubes, and drains  - Monitor endotracheal if appropriate and nasal secretions for changes in amount and color  - [place] appropriate cooling/warming therapies per order  - Administer medications as ordered  - Instruct and encourage patient and family to use good hand hygiene technique  - Identify and instruct in appropriate isolation precautions for identified infection/condition  Outcome: Progressing  Goal: Absence of fever/infection during neutropenic period  Description: INTERVENTIONS:  - Monitor WBC    Outcome: Progressing     Problem: SAFETY ADULT  Goal: Patient will remain free of falls  Description: INTERVENTIONS:  - Educate patient/family on patient safety including physical limitations  - Instruct patient to call for assistance with activity   - Consult OT/PT to assist with strengthening/mobility   - Keep Call bell within reach  - Keep bed low and locked with side rails adjusted as appropriate  - Keep care items and personal belongings within reach  - Initiate and maintain comfort rounds  - Make Fall Risk Sign visible to staff  - Offer Toileting every 2 Hours,  in advance of need  - Initiate/Maintain on alarm  - Obtain necessary fall risk management equipment:   - Apply yellow socks and bracelet for high fall risk patients  - Consider moving patient to room near nurses station  Outcome: Progressing  Goal: Maintain or return to baseline ADL function  Description: INTERVENTIONS:  -  Assess patient's ability to carry out ADLs; assess patient's baseline for ADL function and identify physical deficits which impact ability to perform ADLs (bathing, care of mouth/teeth, toileting, grooming, dressing, etc.)  - Assess/evaluate cause of self-care deficits   - Assess range of motion  - Assess patient's mobility; develop plan if impaired  - Assess patient's need for assistive devices and provide as appropriate  - Encourage maximum independence but intervene and supervise when necessary  - Involve family in performance of ADLs  - Assess for home care needs following discharge   - Consider OT consult to assist with ADL evaluation and planning for discharge  - Provide patient education as appropriate  Outcome: Progressing  Goal: Maintains/Returns to pre admission functional level  Description: INTERVENTIONS:  - Perform AM-PAC 6 Click Basic Mobility/ Daily Activity assessment daily.  - Set and communicate daily mobility goal to care team and patient/family/caregiver.   - Collaborate with rehabilitation services on mobility goals if consulted  - Perform Range of Motion 3 times a day.  - Reposition patient every 2 hours.  - Dangle patient 2 times a day  - Stand patient 2 times a day  - Ambulate patient 2 times a day  - Out of bed to chair 2 times a day   - Out of bed for meals 2 times a day  - Out of bed for toileting  - Record patient progress and toleration of activity level   Outcome: Progressing     Problem: DISCHARGE PLANNING  Goal: Discharge to home or other facility with appropriate resources  Description: INTERVENTIONS:  - Identify barriers to discharge w/patient and caregiver  -  Arrange for needed discharge resources and transportation as appropriate  - Identify discharge learning needs (meds, wound care, etc.)  - Arrange for interpretive services to assist at discharge as needed  - Refer to Case Management Department for coordinating discharge planning if the patient needs post-hospital services based on physician/advanced practitioner order or complex needs related to functional status, cognitive ability, or social support system  Outcome: Progressing     Problem: Knowledge Deficit  Goal: Patient/family/caregiver demonstrates understanding of disease process, treatment plan, medications, and discharge instructions  Description: Complete learning assessment and assess knowledge base.  Interventions:  - Provide teaching at level of understanding  - Provide teaching via preferred learning methods  Outcome: Progressing     Problem: Prexisting or High Potential for Compromised Skin Integrity  Goal: Skin integrity is maintained or improved  Description: INTERVENTIONS  - Identify patients at risk for skin breakdown  - Assess and monitor skin integrity  - Assess and monitor nutrition and hydration status  - Monitor labs   - Assess for incontinence   - Turn and reposition patient  - Assist with mobility/ambulation  - Relieve pressure over bony prominences  - Avoid friction and shearing  - Provide appropriate hygiene as needed including keeping skin clean and dry  - Evaluate need for skin moisturizer/barrier cream  - Collaborate with interdisciplinary team   - Patient/family teaching  - Consider wound care consult   Outcome: Progressing

## 2024-01-05 NOTE — PROGRESS NOTES
[facility]  Progress Note  Name: Hilary Oleary I  MRN: [id number]  [facility] Date of Admission: 1/2/2024   Date of Service: 1/5/2024 I Hospital Day: 2    Assessment/Plan   * Suspected Syncope and fall  Assessment & Plan  Patient presented from home via ambulance. As  per daughter, patient was walking with a walker, but fell backwards and screamed in pain immediately.  Fall was reviewed by the family on security camera installed in the house, appears that the patient stood up suddenly from chair level and felt dizzy afterwards but with back work to try and sit back on the chair and missed, does not appear like a syncopal episode, patient might have felt dizzy  In ED, CT head and C spine negative.   Left radius fracture noted, s/p splint- per ortho non operative   PT/OT- recommending rehab- pending auth from TCU   Initial EKG concerning for rate controlled atrial fibrillation. No prior h/o afib. Repeat EKG 1/4 showing baseline wander and SR with PVCs   Already on metoprolol.   Will not recommend anticoagulation for persistent hematochezia, and palliative care patient.   Echo without concerning findings   No events reported on tele     Closed fracture of distal end of left radius with routine healing  Assessment & Plan  Secondary to fall    Ortho consult appreciated  Xray on admission- Impacted distal radius fracture with intra-articular extension.   Post splinting- Distal radial fracture appears well aligned. Possible scapholunate ligament tear.   TAWANNA MELTON  C/w sugar-tong splint  outpatient follow-up in 2 weeks, non operative management at this time  PT/OT recommending rehab at discharge     Abnormal EKG  Assessment & Plan  Noted on admission concerning for afib, repeat EKG on 1/4 baseline wander noted, SR with occasional PVCs noted   No history of reported afib noted in the chart  PTA on metoprolol- continue   Defer on AC given hematochezia   No events reported on  tele   Echo 1/3- Left ventricular cavity size is normal. Wall thickness is moderately increased. There is moderate concentric hypertrophy. EF 65%. Right ventricular cavity size is moderately dilated. Systolic function is normal. The left atrium is moderately dilated, right atrium dilated. There is aortic valve sclerosis. Tricuspid valve moderate regurgitation. The ascending aorta is mildly dilated.    Blood in stool  Assessment & Plan  Daughter reports of persistent blood in stool since diagnosis of colon cancer.   Hgb remains stable at 11  Monitor hgb intermittently    Mixed dementia (HCC)  Assessment & Plan  Patient is pleasant, alert, oriented to self, and place. Not time or situation.   Continue Zoloft.  Continue Seroquel 50 mg and melatonin at night    Colorectal cancer suspected  Assessment & Plan  Few months ago, CT scan which showed concern for apple core lesion in the transverse colon concerning for metastatic cancer.  At that time there was discussion about colonoscopy but per the notes the family and the patient did not want to pursue colonoscopy at that time.   Currently patient is not pursuing any treatment and focused on comfort measures only.   [facility]   Code status level 3 DNR/DNI    Benign essential hypertension  Assessment & Plan  Continue home amlodipine 5mg daily and metoprolol 100mg daily           VTE Pharmacologic Prophylaxis: VTE Score: 6 High Risk (Score >/= 5) - Pharmacological DVT Prophylaxis Ordered: heparin. Sequential Compression Devices Ordered.    Mobility:   Basic Mobility Inpatient Raw Score: 6  JH-HLM Goal: 2: Bed activities/Dependent transfer  JH-HLM Achieved: 4: Move to chair/commode  HLM Goal NOT achieved. Continue with multidisciplinary rounding and encourage appropriate mobility to improve upon HLM goals.    Patient Centered Rounds: I performed bedside rounds with nursing staff today.   Discussions with Specialists or Other Care Team  Provider: Discussed with RN and case management    Education and Discussions with Family / Patient: Updated  (daughter) via phone.    Total Time Spent on Date of Encounter in care of patient: 35 mins. This time was spent on one or more of the following: performing physical exam; counseling and coordination of care; obtaining or reviewing history; documenting in the medical record; reviewing/ordering tests, medications or procedures; communicating with other healthcare professionals and discussing with patient's family/caregivers.    Current Length of Stay: 2 day(s)  Current Patient Status: Inpatient   Certification Statement: The patient will continue to require additional inpatient hospital stay due to pending placement   Discharge Plan: Anticipate discharge in >72 hrs to rehab facility.    Code Status: Level 3 - DNAR and DNI    Subjective:   Pt sitting in bed, denies any complaints, no pain. No events per RN     Objective:     Vitals:   Temp (24hrs), Av.5 °F (36.9 °C), Min:98.2 °F (36.8 °C), Max:98.8 °F (37.1 °C)    Temp:  [98.2 °F (36.8 °C)-98.8 °F (37.1 °C)] 98.2 °F (36.8 °C)  HR:  [70-76] 70  Resp:  [16-18] 16  BP: (111-132)/(61-71) 132/70  SpO2:  [91 %-97 %] 97 %  Body mass index is 30.93 kg/m².     Input and Output Summary (last 24 hours):     Intake/Output Summary (Last 24 hours) at 2024 1433  Last data filed at 2024 0929  Gross per 24 hour   Intake 150 ml   Output 600 ml   Net -450 ml       Physical Exam:   Physical Exam  Vitals and nursing note reviewed.   Constitutional:       General: She is not in acute distress.     Appearance: She is well-developed. She is not ill-appearing.   Cardiovascular:      Rate and Rhythm: Normal rate and regular rhythm.      Heart sounds: Normal heart sounds. No murmur heard.  Pulmonary:      Effort: Pulmonary effort is normal. No respiratory distress.      Breath sounds: Normal breath sounds. No wheezing or rales.   Abdominal:      General: Bowel  sounds are normal. There is no distension.      Palpations: Abdomen is soft.      Tenderness: There is no abdominal tenderness.   Musculoskeletal:         General: Tenderness (left arm splint) present. No swelling.   Skin:     General: Skin is warm and dry.   Neurological:      Mental Status: She is alert. Mental status is at baseline.      Comments: Oriented x2 to person and place not time    Psychiatric:         Mood and Affect: Mood normal.          Additional Data:     Labs:  Results from last 7 days   Lab Units 01/02/24  1019   WBC Thousand/uL 6.87   HEMOGLOBIN g/dL 11.2*   HEMATOCRIT % 35.6   PLATELETS Thousands/uL 258   NEUTROS PCT % 77*   LYMPHS PCT % 15   MONOS PCT % 6   EOS PCT % 1     Results from last 7 days   Lab Units 01/03/24  0518 01/02/24  1019   SODIUM mmol/L 136 141   POTASSIUM mmol/L 3.5 3.2*   CHLORIDE mmol/L 100 103   CO2 mmol/L 21 29   BUN mg/dL 13 12   CREATININE mg/dL 0.47* 0.57*   ANION GAP mmol/L 15 9   CALCIUM mg/dL 9.1 9.1   ALBUMIN g/dL  --  3.9   TOTAL BILIRUBIN mg/dL  --  0.78   ALK PHOS U/L  --  61   ALT U/L  --  6*   AST U/L  --  17   GLUCOSE RANDOM mg/dL 96 114                       Lines/Drains:  Invasive Devices       Peripheral Intravenous Line  Duration             Peripheral IV 01/02/24 Right Antecubital 3 days              Drain  Duration             External Urinary Catheter 1 day                          Imaging: Reviewed radiology reports from this admission including: CT head and xray(s)    Recent Cultures (last 7 days):         Last 24 Hours Medication List:   Current Facility-Administered Medications   Medication Dose Route Frequency Provider Last Rate    acetaminophen  975 mg Oral Q8H RUBY Roth      amLODIPine  5 mg Oral Daily Zakia Bernstein MD      heparin (porcine)  5,000 Units Subcutaneous Q8H [facility] Zakia Bernstein MD      magnesium Oxide  400 mg Oral Daily Alyse Romero MD      melatonin  9 mg Oral HS Zakia Bernstein MD      metoprolol succinate  100 mg  Oral QPM Zakia Bernstein MD      ondansetron  4 mg Intravenous Q6H PRN Zakia Bernstein MD      oxyCODONE  5 mg Oral Q6H PRN Zakia Bernstein MD      oxyCODONE  2.5 mg Oral Q6H PRN Zakia Bernstein MD      potassium chloride  10 mEq Oral BID Alyse Romero MD      QUEtiapine  50 mg Oral HS Zakia Bernstein MD      sertraline  75 mg Oral Daily Zakia Bernstein MD      sodium chloride (PF)  3 mL Intravenous Q1H PRN Osorio Headley MD          Today, Patient Was Seen By: RUBY Nelson    **Please Note: This note may have been constructed using a voice recognition system.**

## 2024-01-05 NOTE — ASSESSMENT & PLAN NOTE
Secondary to fall    Ortho consult appreciated  Xray on admission- Impacted distal radius fracture with intra-articular extension.   Post splinting- Distal radial fracture appears well aligned. Possible scapholunate ligament tear.   TAWANNA MARIA/w sugar-tong splint  outpatient follow-up in 2 weeks, non operative management at this time  PT/OT recommending rehab at discharge

## 2024-01-05 NOTE — CASE MANAGEMENT
[facility] received request for authorization from Care Manager.  Authorization request for: SNF  Facility Name: [facility]  NPI:[id number]  Facility MD: Dr. Dowd    NPI: [id number]  Authorization initiated by contacting insurance: [employer]   Via: [employer]   Clinicals submitted via: [employer] attachment   Pending Reference #: [id number]

## 2024-01-05 NOTE — DISCHARGE INSTR - OTHER ORDERS
Skin Care Plan:  1-Cleanse B/L Abdomen/Pannus Folds with soap and water. Pat dry. Apply Interdry to skin folds (leave 2 inches exposed to wick away moisture). May leave in place for up to 5 days or can be changed sooner due to soilage. May wash, dry, and reuse for patient. DO NOT APPLY CREAMS OR POWDERS TO SKIN THAT INTERDRY WILL LAY IN.  2-Turn/reposition q2h or when medically stable for pressure re-distribution on skin .  3-Elevate heels to offload pressure.  4-Moisturize skin daily with skin nourishing cream  5-Ehob cushion in chair when out of bed.  6-Preventative Hydraguard to bilateral sacro-buttocks and heels BID and PRN.

## 2024-01-05 NOTE — OCCUPATIONAL THERAPY NOTE
Occupational Therapy Treatment Note:      01/05/24 1445   Note Type   Note Type Treatment   Pain Assessment   Pain Score No Pain   Restrictions/Precautions   LUE Weight Bearing Per Order NWB   Braces or Orthoses Sling;Splint   Other Precautions Cognitive;Chair Alarm;Bed Alarm;WBS;Fall Risk;Pain   ADL   Where Assessed Chair   Equipment Provided   (pt is r hand dominant)   Grooming Assistance 5  Supervision/Setup   UB Dressing Assistance 3  Moderate Assistance   LB Dressing Assistance 2  Maximal Assistance   Toileting Assistance  2  Maximal Assistance   Functional Standing Tolerance   Time p+ balance   Transfers   Sit to Stand 3  Moderate assistance   Additional items Assist x 1  (+ standby)   Stand to Sit 3  Moderate assistance   Additional items Assist x 1;Armrests;Increased time required;Verbal cues   Functional Mobility   Functional Mobility 3  Moderate assistance   Additional Comments mod a x 1 min of another. pt with poor sequencing of patricia walker   Cognition   Overall Cognitive Status Impaired   Arousal/Participation Alert   Attention Attends with cues to redirect   Orientation Level Disoriented to time   Memory Decreased short term memory;Decreased recall of recent events;Decreased recall of precautions   Following Commands Follows one step commands without difficulty   Activity Tolerance   Activity Tolerance Patient tolerated treatment well   Assessment   Assessment pt participated in pm ot session and was seen focusing on grooming tasks, ub dressing seated in chair. pt was able to stand with asst x 1-2 for clothing management (total asst) she was able to walk wit hasst x 2 for balance and patricia walker management. pt with improvement with 2 and 3rd stands from chair. pts son initially was present followed by dtr whom was present for end of session   Plan   Treatment Interventions ADL retraining;Functional transfer training;Endurance training;Cognitive reorientation;Patient/family training;Equipment  evaluation/education;Activityengagement   Goal Expiration Date 01/17/24   OT Treatment Day 1   OT Frequency 2-3x/wk   Discharge Recommendation   Rehab Resource Intensity Level, OT II (Moderate Resource Intensity)   AM-PAC Daily Activity Inpatient   Lower Body Dressing 1   Bathing 2   Toileting 2   Upper Body Dressing 2   Grooming 3   Eating 3   Daily Activity Raw Score 13   Daily Activity Standardized Score (Calc for Raw Score >=11) 32.03   AM-PAC Applied Cognition Inpatient   Following a Speech/Presentation 1   Understanding Ordinary Conversation 3   Taking Medications 1   Remembering Where Things Are Placed or Put Away 2   Remembering List of 4-5 Errands 1   Taking Care of Complicated Tasks 1   Applied Cognition Raw Score 9   Applied Cognition Standardized Score 22.48     April A Storm
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
## 2024-01-05 NOTE — ASSESSMENT & PLAN NOTE
Daughter reports of persistent blood in stool since diagnosis of colon cancer.   Hgb remains stable at 11  Monitor hgb intermittently

## 2024-01-05 NOTE — PLAN OF CARE
Problem: PHYSICAL THERAPY ADULT  Goal: Performs mobility at highest level of function for planned discharge setting.  See evaluation for individualized goals.  Description: Treatment/Interventions: ADL retraining, Functional transfer training, LE strengthening/ROM, Therapeutic exercise, Endurance training, Patient/family training, Equipment eval/education, Bed mobility, Gait training, Spoke to nursing, Spoke to case management, OT, Elevations, Cognitive reorientation  Equipment Recommended:  (tbd)       See flowsheet documentation for full assessment, interventions and recommendations.  Outcome: Progressing  Note: Prognosis: Good  Problem List: Decreased strength, Decreased endurance, Impaired balance, Decreased mobility, Pain, Decreased cognition, Impaired judgement, Decreased safety awareness, Obesity  Assessment: Pt demonstrated improved mobiilty today, requiring primarily Ax1 for transfers to & from recliner, but continues to require considerable assist for gait tasks due to impaired balance, gait sequencing, LE strength & poor carryover of instructions without near constant instructions.  Pt did demonsrate slight improvement during 2nd trial where she demosntrated improved LE stability & quicker pace, but fatigued in similar way compared to 1st trial.  Time taken to recover after trials , at which point pt reports no change in status when prompted.  Discussed use of 1 handed device with supervising PT prior to session given NWB status LINH, who cleared pt for use of this device this session.  PT POC & d/c recommendations remain appropriate at this time to safely progress to highest level of independence & reduce long term burden of care.        Rehab Resource Intensity Level, PT: II (Moderate Resource Intensity)    See flowsheet documentation for full assessment.

## 2024-01-05 NOTE — WOUND OSTOMY CARE
Consult Note - Wound   Hilary Oleary 89 y.o. female MRN: [id number]  Unit/Bed#: [facility] 609-01 Encounter: [id number]        History and Present Illness:  Patient is an 88 yo female that was admitted to [facility] for treatment of suspected syncope and fall. Patient confused on assessment. Patient has a PMH of uspected colon cancer, HTN, mixed dementia. Patient is a min/mod assist for turning and repositioning. Patient is incontinent at times of bowel and bladder. On assessment, patient is seen sitting OOB in recliner with b/l feet elevated. BMI of 30.93 with significant skin folds.     Wound Care was consulted for excoriation in abdomen/pannus folds and sacral redness     Assessment Findings:   B/L heels are dry intact and francisco with no skin loss or wounds present. Recommend preventative Hydraguard Cream and proper offloading/ repositioning.      B/L sacro-buttocks is dry, intact, pink in color, with scattered areas of hyperpigmentation and scabs and blanches. No skin loss or wounds present. Recommend preventative hydragaurd to area due to incontinence.   B/L Abdomen/Pannus Folds: skin is intact, peeling and fragile in moist deep skin folds. No skin loss or drainage present. Recommend interdry for areas.     No induration, fluctuance, odor, warmth/temperature differences, redness, or purulence noted to the above noted wounds and skin areas assessed. New dressings applied per orders listed below. Patient tolerated well- no s/s of non-verbal pain or discomfort observed during the encounter. Bedside nurse aware of plan of care. See flow sheets for more detailed assessment findings.      Orders listed below and wound care will sign off, call or tiger text with questions.     Skin Care Plan:  1-Cleanse B/L Abdomen/Pannus Folds with soap and water. Pat dry. Apply Interdry to skin folds (leave 2 inches exposed to wick away moisture). May leave in place for up to 5 days or can be changed sooner due to soilage. May wash,  dry, and reuse for patient. DO NOT APPLY CREAMS OR POWDERS TO SKIN THAT INTERDRY WILL LAY IN.  2-Turn/reposition q2h or when medically stable for pressure re-distribution on skin .  3-Elevate heels to offload pressure.  4-Moisturize skin daily with skin nourishing cream  5-Ehob cushion in chair when out of bed.  6-Preventative Hydraguard to bilateral sacro-buttocks and heels BID and PRN.       Wounds:  Wound 01/05/24 Sacrum (Active)   Wound Image   01/05/24 1230   Wound Description Intact 01/05/24 1230   Tammi-wound Assessment Intact 01/05/24 1230   Wound Length (cm) 0 cm 01/05/24 1230   Wound Width (cm) 0 cm 01/05/24 1230   Wound Depth (cm) 0 cm 01/05/24 1230   Wound Surface Area (cm^2) 0 cm^2 01/05/24 1230   Wound Volume (cm^3) 0 cm^3 01/05/24 1230   Calculated Wound Volume (cm^3) 0 cm^3 01/05/24 1230       Wound 01/05/24 Abdomen Anterior (Active)   Wound Image    01/05/24 1229   Wound Description Pink 01/05/24 1230   Tammi-wound Assessment Fragile 01/05/24 1230   Wound Length (cm) 0 cm 01/05/24 1230   Wound Width (cm) 0 cm 01/05/24 1230   Wound Depth (cm) 0 cm 01/05/24 1230   Wound Surface Area (cm^2) 0 cm^2 01/05/24 1230   Wound Volume (cm^3) 0 cm^3 01/05/24 1230   Calculated Wound Volume (cm^3) 0 cm^3 01/05/24 1230               Tammy Eduardo RN, BSN, CWOCN

## 2024-01-05 NOTE — PLAN OF CARE
Problem: OCCUPATIONAL THERAPY ADULT  Goal: Performs self-care activities at highest level of function for planned discharge setting.  See evaluation for individualized goals.  Description: Treatment Interventions: ADL retraining, Functional transfer training, Endurance training, Cognitive reorientation, Patient/family training, Equipment evaluation/education, Activityengagement  Equipment Recommended:  (TBD/continue to assess)       See flowsheet documentation for full assessment, interventions and recommendations.   1/5/2024 1629 by CARLY Cordoba  Outcome: Progressing  Note: Limitation: Decreased ADL status, Decreased UE ROM, Decreased UE strength, Decreased Safe judgement during ADL, Decreased cognition, Decreased endurance, Decreased fine motor control, Decreased self-care trans, Decreased high-level ADLs  Prognosis: Fair  Assessment: pt participated in pm ot session and was seen focusing on grooming tasks, ub dressing seated in chair. pt was able to stand with asst x 1-2 for clothing management (total asst) she was able to walk wit hasst x 2 for balance and patricia walker management. pt with improvement with 2 and 3rd stands from chair. pts son initially was present followed by dtr whom was present for end of session     Rehab Resource Intensity Level, OT: II (Moderate Resource Intensity)       1/5/2024 1628 by CARLY Cordoba  Note: Limitation: Decreased ADL status, Decreased UE ROM, Decreased UE strength, Decreased Safe judgement during ADL, Decreased cognition, Decreased endurance, Decreased fine motor control, Decreased self-care trans, Decreased high-level ADLs  Prognosis: Fair  Assessment: pt participated in pm ot session and was seen focusing on grooming tasks, ub dressing seated in chair. pt was able to stand with asst x 1-2 for clothing management (total asst) she was able to walk wit hasst x 2 for balance and patricia walker management. pt with improvement with 2 and 3rd stands from chair. pts son  initially was present followed by dtr whom was present for end of session     Rehab Resource Intensity Level, OT: II (Moderate Resource Intensity)     April A Storm

## 2024-01-05 NOTE — CASE MANAGEMENT
Case Management Discharge Planning Note    Patient name Hilary Oleary  Location [facility] 609/[facility] 609-01 MRN [id number]  : 1934 Date 2024       Current Admission Date: 2024  Current Admission Diagnosis:Suspected Syncope and fall   Patient Active Problem List    Diagnosis Date Noted    Abnormal EKG 2024    Suspected Syncope and fall 2024    Closed fracture of distal end of left radius with routine healing 2024    Blood in stool 2024    Mixed dementia ([facility]) 2023    Obesity, morbid ([facility]) 2023    SIRS (systemic inflammatory response syndrome) ([facility]) 10/06/2023    Colorectal cancer suspected 10/06/2023    Lung nodule seen on imaging study 10/06/2023    Bilateral hearing loss 2023    Type 2 diabetes mellitus without complication, without long-term current use of insulin ([facility]) 2022    Metabolic encephalopathy 2021    Major depressive disorder with single episode, in full remission ([facility]) 10/19/2020    Constipation, slow transit 2019    Closed fracture of right hip with routine healing 2019    Thickened endometrium 2017    Pancreatic cyst 2017    Enteritis 2017    Age related osteoporosis 2016    Resting tremor 05/10/2016    Vitamin D deficiency 2014    Right bundle-branch block 10/29/2013    Leiomyoma of uterus 2013    Benign essential hypertension 2012    Mixed hyperlipidemia 2012    Gastroesophageal reflux disease without esophagitis 2012      LOS (days): 2  Geometric Mean LOS (GMLOS) (days): 2.8  Days to GMLOS:0.8     OBJECTIVE:  Risk of Unplanned Readmission Score: 22.83         Current admission status: Inpatient   Preferred Pharmacy:   [id number] [place] - [address]  [address]  [place] [address]  Phone: [phone] Fax: [phone]    [facility] [place] - [address]  [address]  [place]  [address]  Phone: [phone] Fax: [phone]    Primary Care Provider: RBUY Richardson    Primary Insurance: [employer]  Secondary Insurance:     DISCHARGE DETAILS:    Other Referral/Resources/Interventions Provided:  Interventions: Short Term Rehab  Referral Comments: Post acute auth tasked to CM DC support, as bed at [facility] may be available on Monday of next week

## 2024-01-05 NOTE — ASSESSMENT & PLAN NOTE
Patient presented from home via ambulance. As  per daughter, patient was walking with a walker, but fell backwards and screamed in pain immediately.  Fall was reviewed by the family on security camera installed in the house, appears that the patient stood up suddenly from chair level and felt dizzy afterwards but with back work to try and sit back on the chair and missed, does not appear like a syncopal episode, patient might have felt dizzy  In ED, CT head and C spine negative.   Left radius fracture noted, s/p splint- per ortho non operative   PT/OT- recommending rehab- pending auth from TCU   Initial EKG concerning for rate controlled atrial fibrillation. No prior h/o afib. Repeat EKG 1/4 showing baseline wander and SR with PVCs   Already on metoprolol.   Will not recommend anticoagulation for persistent hematochezia, and palliative care patient.   Echo without concerning findings   No events reported on tele

## 2024-01-06 PROCEDURE — 99232 SBSQ HOSP IP/OBS MODERATE 35: CPT | Performed by: NURSE PRACTITIONER

## 2024-01-06 RX ORDER — BISACODYL 10 MG
10 SUPPOSITORY, RECTAL RECTAL ONCE
Status: COMPLETED | OUTPATIENT
Start: 2024-01-06 | End: 2024-01-06

## 2024-01-06 RX ADMIN — ACETAMINOPHEN 975 MG: 325 TABLET, FILM COATED ORAL at 21:35

## 2024-01-06 RX ADMIN — HEPARIN SODIUM 5000 UNITS: 5000 INJECTION INTRAVENOUS; SUBCUTANEOUS at 21:35

## 2024-01-06 RX ADMIN — BISACODYL 10 MG: 10 SUPPOSITORY RECTAL at 15:25

## 2024-01-06 RX ADMIN — HEPARIN SODIUM 5000 UNITS: 5000 INJECTION INTRAVENOUS; SUBCUTANEOUS at 06:54

## 2024-01-06 RX ADMIN — SENNOSIDES AND DOCUSATE SODIUM 2 TABLET: 50; 8.6 TABLET ORAL at 17:25

## 2024-01-06 RX ADMIN — ACETAMINOPHEN 975 MG: 325 TABLET, FILM COATED ORAL at 15:24

## 2024-01-06 RX ADMIN — METOPROLOL SUCCINATE 100 MG: 100 TABLET, EXTENDED RELEASE ORAL at 17:25

## 2024-01-06 RX ADMIN — SERTRALINE HYDROCHLORIDE 75 MG: 50 TABLET ORAL at 09:01

## 2024-01-06 RX ADMIN — MAGNESIUM OXIDE TAB 400 MG (241.3 MG ELEMENTAL MG) 400 MG: 400 (241.3 MG) TAB at 09:01

## 2024-01-06 RX ADMIN — POLYETHYLENE GLYCOL 3350 17 G: 17 POWDER, FOR SOLUTION ORAL at 09:01

## 2024-01-06 RX ADMIN — MELATONIN 9 MG: at 21:35

## 2024-01-06 RX ADMIN — SENNOSIDES AND DOCUSATE SODIUM 2 TABLET: 50; 8.6 TABLET ORAL at 09:01

## 2024-01-06 RX ADMIN — HEPARIN SODIUM 5000 UNITS: 5000 INJECTION INTRAVENOUS; SUBCUTANEOUS at 15:25

## 2024-01-06 RX ADMIN — POTASSIUM CHLORIDE 10 MEQ: 750 TABLET, EXTENDED RELEASE ORAL at 17:25

## 2024-01-06 RX ADMIN — AMLODIPINE BESYLATE 5 MG: 5 TABLET ORAL at 09:01

## 2024-01-06 RX ADMIN — QUETIAPINE FUMARATE 50 MG: 25 TABLET ORAL at 21:35

## 2024-01-06 RX ADMIN — ACETAMINOPHEN 975 MG: 325 TABLET, FILM COATED ORAL at 06:54

## 2024-01-06 RX ADMIN — POTASSIUM CHLORIDE 10 MEQ: 750 TABLET, EXTENDED RELEASE ORAL at 09:01

## 2024-01-06 NOTE — CASE MANAGEMENT
Case Management Discharge Planning Note    Patient name Hilary Oleary  Location [facility] 609/[facility] 609-01 MRN [id number]  : 1934 Date 2024       Current Admission Date: 2024  Current Admission Diagnosis:Suspected Syncope and fall   Patient Active Problem List    Diagnosis Date Noted    Abnormal EKG 2024    Suspected Syncope and fall 2024    Closed fracture of distal end of left radius with routine healing 2024    Blood in stool 2024    Mixed dementia ([facility]) 2023    Obesity, morbid ([facility]) 2023    SIRS (systemic inflammatory response syndrome) ([facility]) 10/06/2023    Colorectal cancer suspected 10/06/2023    Lung nodule seen on imaging study 10/06/2023    Bilateral hearing loss 2023    Type 2 diabetes mellitus without complication, without long-term current use of insulin ([facility]) 2022    Metabolic encephalopathy 2021    Major depressive disorder with single episode, in full remission ([facility]) 10/19/2020    Constipation, slow transit 2019    Closed fracture of right hip with routine healing 2019    Thickened endometrium 2017    Pancreatic cyst 2017    Enteritis 2017    Age related osteoporosis 2016    Resting tremor 05/10/2016    Vitamin D deficiency 2014    Right bundle-branch block 10/29/2013    Leiomyoma of uterus 2013    Benign essential hypertension 2012    Mixed hyperlipidemia 2012    Gastroesophageal reflux disease without esophagitis 2012      LOS (days): 3  Geometric Mean LOS (GMLOS) (days): 2.8  Days to GMLOS:0.1     OBJECTIVE:  Risk of Unplanned Readmission Score: 23.53         Current admission status: Inpatient   Preferred Pharmacy:   [id number] [place] - [address]  [address]  [place] [address]  Phone: [phone] Fax: [phone]    [facility] [place] [place] - [address]  [address]  [place]  [address]  Phone: [phone] Fax: [phone]    Primary Care Provider: RUBY Richardson    Primary Insurance: [employer] RE[employer]  Secondary Insurance:     DISCHARGE DETAILS:         Additional Comments: DONNIE ARIZMENDI received update from [facility] that Auth was received for pt to transition to [facility]. DONNIE ARIZMENDI reached out to  TCF and spoke to charge nurse and was made aware that they are not able to admit over the weekend and a bed may be available on Monday. DONNIE ARIZMENDI notified pt's provider.

## 2024-01-06 NOTE — PLAN OF CARE
Problem: PAIN - ADULT  Goal: Verbalizes/displays adequate comfort level or baseline comfort level  Description: Interventions:  - Encourage patient to monitor pain and request assistance  - Assess pain using appropriate pain scale  - Administer analgesics based on type and severity of pain and evaluate response  - Implement non-pharmacological measures as appropriate and evaluate response  - Consider cultural and social influences on pain and pain management  - Notify physician/advanced practitioner if interventions unsuccessful or patient reports new pain  Outcome: Progressing     Problem: INFECTION - ADULT  Goal: Absence or prevention of progression during hospitalization  Description: INTERVENTIONS:  - Assess and monitor for signs and symptoms of infection  - Monitor lab/diagnostic results  - Monitor all insertion sites, i.e. indwelling lines, tubes, and drains  - Monitor endotracheal if appropriate and nasal secretions for changes in amount and color  - [place] appropriate cooling/warming therapies per order  - Administer medications as ordered  - Instruct and encourage patient and family to use good hand hygiene technique  - Identify and instruct in appropriate isolation precautions for identified infection/condition  Outcome: Progressing  Goal: Absence of fever/infection during neutropenic period  Description: INTERVENTIONS:  - Monitor WBC    Outcome: Progressing     Problem: SAFETY ADULT  Goal: Patient will remain free of falls  Description: INTERVENTIONS:  - Educate patient/family on patient safety including physical limitations  - Instruct patient to call for assistance with activity   - Consult OT/PT to assist with strengthening/mobility   - Keep Call bell within reach  - Keep bed low and locked with side rails adjusted as appropriate  - Keep care items and personal belongings within reach  - Initiate and maintain comfort rounds  - Make Fall Risk Sign visible to staff  - Offer Toileting every 2 Hours,  in advance of need  - Initiate/Maintain on alarm  - Obtain necessary fall risk management equipment:   - Apply yellow socks and bracelet for high fall risk patients  - Consider moving patient to room near nurses station  Outcome: Progressing  Goal: Maintain or return to baseline ADL function  Description: INTERVENTIONS:  -  Assess patient's ability to carry out ADLs; assess patient's baseline for ADL function and identify physical deficits which impact ability to perform ADLs (bathing, care of mouth/teeth, toileting, grooming, dressing, etc.)  - Assess/evaluate cause of self-care deficits   - Assess range of motion  - Assess patient's mobility; develop plan if impaired  - Assess patient's need for assistive devices and provide as appropriate  - Encourage maximum independence but intervene and supervise when necessary  - Involve family in performance of ADLs  - Assess for home care needs following discharge   - Consider OT consult to assist with ADL evaluation and planning for discharge  - Provide patient education as appropriate  Outcome: Progressing  Goal: Maintains/Returns to pre admission functional level  Description: INTERVENTIONS:  - Perform AM-PAC 6 Click Basic Mobility/ Daily Activity assessment daily.  - Set and communicate daily mobility goal to care team and patient/family/caregiver.   - Collaborate with rehabilitation services on mobility goals if consulted  - Perform Range of Motion 3 times a day.  - Reposition patient every 2 hours.  - Dangle patient 2 times a day  - Stand patient 2 times a day  - Ambulate patient 2 times a day  - Out of bed to chair 2 times a day   - Out of bed for meals 2 times a day  - Out of bed for toileting  - Record patient progress and toleration of activity level   Outcome: Progressing     Problem: DISCHARGE PLANNING  Goal: Discharge to home or other facility with appropriate resources  Description: INTERVENTIONS:  - Identify barriers to discharge w/patient and caregiver  -  Arrange for needed discharge resources and transportation as appropriate  - Identify discharge learning needs (meds, wound care, etc.)  - Arrange for interpretive services to assist at discharge as needed  - Refer to Case Management Department for coordinating discharge planning if the patient needs post-hospital services based on physician/advanced practitioner order or complex needs related to functional status, cognitive ability, or social support system  Outcome: Progressing     Problem: Knowledge Deficit  Goal: Patient/family/caregiver demonstrates understanding of disease process, treatment plan, medications, and discharge instructions  Description: Complete learning assessment and assess knowledge base.  Interventions:  - Provide teaching at level of understanding  - Provide teaching via preferred learning methods  Outcome: Progressing     Problem: Prexisting or High Potential for Compromised Skin Integrity  Goal: Skin integrity is maintained or improved  Description: INTERVENTIONS:  - Identify patients at risk for skin breakdown  - Assess and monitor skin integrity  - Assess and monitor nutrition and hydration status  - Monitor labs   - Assess for incontinence   - Turn and reposition patient  - Assist with mobility/ambulation  - Relieve pressure over bony prominences  - Avoid friction and shearing  - Provide appropriate hygiene as needed including keeping skin clean and dry  - Evaluate need for skin moisturizer/barrier cream  - Collaborate with interdisciplinary team   - Patient/family teaching  - Consider wound care consult   Outcome: Progressing

## 2024-01-06 NOTE — ASSESSMENT & PLAN NOTE
Patient presented from home via ambulance. As  per daughter, patient was walking with a walker, but fell backwards and screamed in pain immediately.  Fall was reviewed by the family on security camera installed in the house, appears that the patient stood up suddenly from chair level and felt dizzy afterwards but with back work to try and sit back on the chair and missed, does not appear like a syncopal episode, patient might have felt dizzy  In ED, CT head and C spine negative.   Left radius fracture noted, s/p splint- per ortho non operative   PT/OT- recommending rehab- auth obtained for  TCF- d/c on Monday when bed is available   Initial EKG concerning for rate controlled atrial fibrillation. No prior h/o afib. Repeat EKG 1/4 showing baseline wander and SR with PVCs   Already on metoprolol.   Will not recommend anticoagulation for persistent hematochezia, and palliative care patient.   Echo without concerning findings   No events reported on tele

## 2024-01-06 NOTE — CASE MANAGEMENT
Case Management Discharge Planning Note    Patient name Hilary Oleary  Location [facility] 609/[facility] 609-01 MRN [id number]  : 1934 Date 2024       Current Admission Date: 2024  Current Admission Diagnosis:Suspected Syncope and fall   Patient Active Problem List    Diagnosis Date Noted    Abnormal EKG 2024    Suspected Syncope and fall 2024    Closed fracture of distal end of left radius with routine healing 2024    Blood in stool 2024    Mixed dementia ([facility]) 2023    Obesity, morbid ([facility]) 2023    SIRS (systemic inflammatory response syndrome) ([facility]) 10/06/2023    Colorectal cancer suspected 10/06/2023    Lung nodule seen on imaging study 10/06/2023    Bilateral hearing loss 2023    Type 2 diabetes mellitus without complication, without long-term current use of insulin ([facility]) 2022    Metabolic encephalopathy 2021    Major depressive disorder with single episode, in full remission ([facility]) 10/19/2020    Constipation, slow transit 2019    Closed fracture of right hip with routine healing 2019    Thickened endometrium 2017    Pancreatic cyst 2017    Enteritis 2017    Age related osteoporosis 2016    Resting tremor 05/10/2016    Vitamin D deficiency 2014    Right bundle-branch block 10/29/2013    Leiomyoma of uterus 2013    Benign essential hypertension 2012    Mixed hyperlipidemia 2012    Gastroesophageal reflux disease without esophagitis 2012      LOS (days): 3  Geometric Mean LOS (GMLOS) (days): 2.8  Days to GMLOS:0.1     OBJECTIVE:  Risk of Unplanned Readmission Score: 23.53         Current admission status: Inpatient   Preferred Pharmacy:   [id number] [place] - [address]  [address]  [place] [address]  Phone: [phone] Fax: [phone]    [facility] [place] - [place] - [address]  [address]  [place]  [address]  Phone: [phone] Fax: [phone]    Primary Care Provider: RBUY Richardson    Primary Insurance: BLUE CROSS  REP  Secondary Insurance:     DISCHARGE DETAILS:                                                                                                               Facility Insurance Auth Number: [id number]

## 2024-01-06 NOTE — ASSESSMENT & PLAN NOTE
Secondary to fall    Ortho consult appreciated  Xray on admission- Impacted distal radius fracture with intra-articular extension.   Post splinting- Distal radial fracture appears well aligned. Possible scapholunate ligament tear.   TAWANNA MELTON  C/w sugar-tong splint  outpatient follow-up in 2 weeks, non operative management at this time  PT/OT recommending rehab at discharge- insurance auth obtained, plan to d/c to [facility] on Monday when bed is available

## 2024-01-06 NOTE — CASE MANAGEMENT
[facility] has received approved authorization from insurance:   JUAN  Called in by Rep: Evin RAI#  [phone]  Authorization received for: SNF  Facility: [facility]  Authorization #: [id number]  Start of Care: 1/6  Next Review Date: 1/12  Continued Stay Care Coordinator:  Marly   Submit next review to: fax# [phone]   Care Manager notified: Teo MOORE

## 2024-01-06 NOTE — PROGRESS NOTES
[facility]  Progress Note  Name: Hilary Oleary I  MRN: [id number]  Unit/Bed#: [facility] 609-01 I Date of Admission: 1/2/2024   Date of Service: 1/6/2024 I Hospital Day: 3    Assessment/Plan   * Suspected Syncope and fall  Assessment & Plan  Patient presented from home via ambulance. As  per daughter, patient was walking with a walker, but fell backwards and screamed in pain immediately.  Fall was reviewed by the family on security camera installed in the house, appears that the patient stood up suddenly from chair level and felt dizzy afterwards but with back work to try and sit back on the chair and missed, does not appear like a syncopal episode, patient might have felt dizzy  In ED, CT head and C spine negative.   Left radius fracture noted, s/p splint- per ortho non operative   PT/OT- recommending rehab- auth obtained for  TCF- d/c on Monday when bed is available   Initial EKG concerning for rate controlled atrial fibrillation. No prior h/o afib. Repeat EKG 1/4 showing baseline wander and SR with PVCs   Already on metoprolol.   Will not recommend anticoagulation for persistent hematochezia, and palliative care patient.   Echo without concerning findings   No events reported on tele     Closed fracture of distal end of left radius with routine healing  Assessment & Plan  Secondary to fall    Ortho consult appreciated  Xray on admission- Impacted distal radius fracture with intra-articular extension.   Post splinting- Distal radial fracture appears well aligned. Possible scapholunate ligament tear.   TAWANNA MELTON  C/w sugar-tong splint  outpatient follow-up in 2 weeks, non operative management at this time  PT/OT recommending rehab at discharge- insurance auth obtained, plan to d/c to  TCF on Monday when bed is available     Abnormal EKG  Assessment & Plan  Noted on admission concerning for afib, repeat EKG on 1/4 baseline wander noted, SR with occasional PVCs noted   No history of  reported afib noted in the chart  PTA on metoprolol- continue   Defer on AC given hematochezia   No events reported on tele   Echo 1/3- Left ventricular cavity size is normal. Wall thickness is moderately increased. There is moderate concentric hypertrophy. EF 65%. Right ventricular cavity size is moderately dilated. Systolic function is normal. The left atrium is moderately dilated, right atrium dilated. There is aortic valve sclerosis. Tricuspid valve moderate regurgitation. The ascending aorta is mildly dilated.    Blood in stool  Assessment & Plan  Daughter reports of persistent blood in stool since diagnosis of colon cancer.   Hgb remains stable at 11  Monitor hgb intermittently    Mixed dementia (HCC)  Assessment & Plan  Patient is pleasant, alert, oriented to self, and place. Not time or situation.   Continue Zoloft.  Continue Seroquel 50 mg and melatonin at night    Colorectal cancer suspected  Assessment & Plan  Few months ago, CT scan which showed concern for apple core lesion in the transverse colon concerning for metastatic cancer.  At that time there was discussion about colonoscopy but per the notes the family and the patient did not want to pursue colonoscopy at that time.   Currently patient is not pursuing any treatment and focused on comfort measures only.   [facility]   Code status level 3 DNR/DNI    Benign essential hypertension  Assessment & Plan  Continue home amlodipine 5mg daily and metoprolol 100mg daily         VTE Pharmacologic Prophylaxis: VTE Score: 6 High Risk (Score >/= 5) - Pharmacological DVT Prophylaxis Ordered: heparin. Sequential Compression Devices Ordered.    Mobility:   Basic Mobility Inpatient Raw Score: 12  JH-HLM Goal: 4: Move to chair/commode  JH-HLM Achieved: 4: Move to chair/commode  HLM Goal NOT achieved. Continue with multidisciplinary rounding and encourage appropriate mobility to improve upon HLM goals.    Patient Centered Rounds:  I performed bedside rounds with nursing staff today.   Discussions with Specialists or Other Care Team Provider: d/w RN d/w CM     Education and Discussions with Family / Patient: Updated  (daughter) at bedside.    Total Time Spent on Date of Encounter in care of patient: 35 mins. This time was spent on one or more of the following: performing physical exam; counseling and coordination of care; obtaining or reviewing history; documenting in the medical record; reviewing/ordering tests, medications or procedures; communicating with other healthcare professionals and discussing with patient's family/caregivers.    Current Length of Stay: 3 day(s)  Current Patient Status: Inpatient   Certification Statement: The patient will continue to require additional inpatient hospital stay due to pending bed availability   Discharge Plan: Anticipate discharge in 48 hrs to rehab facility.    Code Status: Level 3 - DNAR and DNI    Subjective:   Patient sitting in bed eating lunch.  She reports that she has 5 out of 10 pain in the left arm.  She denies all other complaints.  No events per RN.    Objective:     Vitals:   Temp (24hrs), Av °F (36.7 °C), Min:97.7 °F (36.5 °C), Max:98.2 °F (36.8 °C)    Temp:  [97.7 °F (36.5 °C)-98.2 °F (36.8 °C)] 98.1 °F (36.7 °C)  HR:  [70-74] 71  Resp:  [16-19] 19  BP: (121-132)/(70-73) 130/73  SpO2:  [93 %-97 %] 93 %  Body mass index is 30.93 kg/m².     Input and Output Summary (last 24 hours):     Intake/Output Summary (Last 24 hours) at 2024 1409  Last data filed at 2024 0600  Gross per 24 hour   Intake --   Output 700 ml   Net -700 ml       Physical Exam:   Physical Exam  Vitals and nursing note reviewed.   Constitutional:       General: She is not in acute distress.     Appearance: She is well-developed.   Cardiovascular:      Rate and Rhythm: Normal rate and regular rhythm.      Heart sounds: Normal heart sounds. No murmur heard.  Pulmonary:      Effort: Pulmonary effort  is normal. No respiratory distress.      Breath sounds: Normal breath sounds. No wheezing or rales.   Abdominal:      General: Bowel sounds are normal. There is no distension.      Palpations: Abdomen is soft.      Tenderness: There is no abdominal tenderness.   Musculoskeletal:         General: Swelling (Left hand swelling) and tenderness present.      Comments: Left arm splint   Skin:     General: Skin is warm and dry.      Capillary Refill: Capillary refill takes less than 2 seconds.   Neurological:      Mental Status: She is alert. Mental status is at baseline. She is confused.      Motor: Weakness present.   Psychiatric:         Mood and Affect: Mood normal.          Additional Data:     Labs:  Results from last 7 days   Lab Units 01/02/24  1019   WBC Thousand/uL 6.87   HEMOGLOBIN g/dL 11.2*   HEMATOCRIT % 35.6   PLATELETS Thousands/uL 258   NEUTROS PCT % 77*   LYMPHS PCT % 15   MONOS PCT % 6   EOS PCT % 1     Results from last 7 days   Lab Units 01/03/24  0518 01/02/24  1019   SODIUM mmol/L 136 141   POTASSIUM mmol/L 3.5 3.2*   CHLORIDE mmol/L 100 103   CO2 mmol/L 21 29   BUN mg/dL 13 12   CREATININE mg/dL 0.47* 0.57*   ANION GAP mmol/L 15 9   CALCIUM mg/dL 9.1 9.1   ALBUMIN g/dL  --  3.9   TOTAL BILIRUBIN mg/dL  --  0.78   ALK PHOS U/L  --  61   ALT U/L  --  6*   AST U/L  --  17   GLUCOSE RANDOM mg/dL 96 114                       Lines/Drains:  Invasive Devices       Peripheral Intravenous Line  Duration             Peripheral IV 01/02/24 Right Antecubital 4 days    Peripheral IV 01/06/24 Dorsal (posterior);Right Forearm <1 day              Drain  Duration             External Urinary Catheter 2 days                          Imaging: Reviewed radiology reports from this admission including: chest xray and CT head    Recent Cultures (last 7 days):         Last 24 Hours Medication List:   Current Facility-Administered Medications   Medication Dose Route Frequency Provider Last Rate    acetaminophen  975 mg Oral  Q8H [facility] RUBY Nelson      amLODIPine  5 mg Oral Daily Zakia Bernstein MD      heparin (porcine)  5,000 Units Subcutaneous Q8H [facility] Zakia Bernstein MD      magnesium Oxide  400 mg Oral Daily Alyse Romero MD      melatonin  9 mg Oral HS Zakia Bernstein MD      metoprolol succinate  100 mg Oral QPM Zakia Bernstein MD      ondansetron  4 mg Intravenous Q6H PRN Zakia Bernstein MD      oxyCODONE  5 mg Oral Q6H PRN Zakia Bernstein MD      oxyCODONE  2.5 mg Oral Q6H PRN Zakia Bernstein MD      polyethylene glycol  17 g Oral Daily Inna Clemente MD      potassium chloride  10 mEq Oral BID Alyse Romero MD      QUEtiapine  50 mg Oral HS Zakia Bernstein MD      senna-docusate sodium  2 tablet Oral BID Inna Clemente MD      sertraline  75 mg Oral Daily Zakia Bernstein MD      sodium chloride (PF)  3 mL Intravenous Q1H PRN Osorio Headley MD          Today, Patient Was Seen By: RUBY Nelson    **Please Note: This note may have been constructed using a voice recognition system.**

## 2024-01-07 PROCEDURE — 97112 NEUROMUSCULAR REEDUCATION: CPT

## 2024-01-07 PROCEDURE — 99232 SBSQ HOSP IP/OBS MODERATE 35: CPT | Performed by: NURSE PRACTITIONER

## 2024-01-07 PROCEDURE — 97535 SELF CARE MNGMENT TRAINING: CPT

## 2024-01-07 PROCEDURE — 97530 THERAPEUTIC ACTIVITIES: CPT

## 2024-01-07 RX ADMIN — ACETAMINOPHEN 975 MG: 325 TABLET, FILM COATED ORAL at 05:50

## 2024-01-07 RX ADMIN — SENNOSIDES AND DOCUSATE SODIUM 2 TABLET: 50; 8.6 TABLET ORAL at 17:12

## 2024-01-07 RX ADMIN — MELATONIN 9 MG: at 22:02

## 2024-01-07 RX ADMIN — HEPARIN SODIUM 5000 UNITS: 5000 INJECTION INTRAVENOUS; SUBCUTANEOUS at 22:02

## 2024-01-07 RX ADMIN — POLYETHYLENE GLYCOL 3350 17 G: 17 POWDER, FOR SOLUTION ORAL at 08:44

## 2024-01-07 RX ADMIN — METOPROLOL SUCCINATE 100 MG: 100 TABLET, EXTENDED RELEASE ORAL at 17:12

## 2024-01-07 RX ADMIN — SERTRALINE HYDROCHLORIDE 75 MG: 50 TABLET ORAL at 08:44

## 2024-01-07 RX ADMIN — ACETAMINOPHEN 975 MG: 325 TABLET, FILM COATED ORAL at 22:02

## 2024-01-07 RX ADMIN — HEPARIN SODIUM 5000 UNITS: 5000 INJECTION INTRAVENOUS; SUBCUTANEOUS at 15:11

## 2024-01-07 RX ADMIN — QUETIAPINE FUMARATE 50 MG: 25 TABLET ORAL at 22:03

## 2024-01-07 RX ADMIN — POTASSIUM CHLORIDE 10 MEQ: 750 TABLET, EXTENDED RELEASE ORAL at 17:12

## 2024-01-07 RX ADMIN — HEPARIN SODIUM 5000 UNITS: 5000 INJECTION INTRAVENOUS; SUBCUTANEOUS at 05:50

## 2024-01-07 RX ADMIN — POTASSIUM CHLORIDE 10 MEQ: 750 TABLET, EXTENDED RELEASE ORAL at 08:44

## 2024-01-07 RX ADMIN — SENNOSIDES AND DOCUSATE SODIUM 2 TABLET: 50; 8.6 TABLET ORAL at 08:44

## 2024-01-07 RX ADMIN — AMLODIPINE BESYLATE 5 MG: 5 TABLET ORAL at 08:44

## 2024-01-07 RX ADMIN — MAGNESIUM OXIDE TAB 400 MG (241.3 MG ELEMENTAL MG) 400 MG: 400 (241.3 MG) TAB at 08:44

## 2024-01-07 NOTE — ASSESSMENT & PLAN NOTE
Patient presented from home via ambulance. As  per daughter, patient was walking with a walker, but fell backwards and screamed in pain immediately.  Fall was reviewed by the family on security camera installed in the house, appears that the patient stood up suddenly from chair level and felt dizzy afterwards but with back work to try and sit back on the chair and missed, does not appear like a syncopal episode, patient might have felt dizzy  CT head and C spine negative.   Left radius fracture noted, see plan below   Initial EKG concerning for rate controlled atrial fibrillation, see plan below  Echo without concerning findings   No events reported on tele   Dispo planning to rehab

## 2024-01-07 NOTE — PLAN OF CARE
Problem: OCCUPATIONAL THERAPY ADULT  Goal: Performs self-care activities at highest level of function for planned discharge setting.  See evaluation for individualized goals.  Description: Treatment Interventions: ADL retraining, Functional transfer training, Endurance training, Cognitive reorientation, Patient/family training, Equipment evaluation/education, Activityengagement  Equipment Recommended:  (TBD/continue to assess)       See flowsheet documentation for full assessment, interventions and recommendations.   Outcome: Progressing  Note: Limitation: Decreased ADL status, Decreased UE ROM, Decreased UE strength, Decreased Safe judgement during ADL, Decreased cognition, Decreased endurance, Decreased fine motor control, Decreased self-care trans, Decreased high-level ADLs  Prognosis: Fair  Assessment: Pt seen for Occupational Therapy session with focus on activity tolerance, bed mob, functional transfers for pt engagement in UB/LB self-care tasks. Pt cleared by JOSELUIS/ Margot for pt participated in therapy session. Pt presented supine/HOB raised pt awake/alert and agreeable to participate in therapy following pt identifiers confirmed.Pt was unable to report her therapy goal 2* pt cognitive impairments however was agreeable to get out of bed for lunch. Pt required assist for bed mob and functional transfers/stand pivot to bedside chair 2* decreased overall strength, balance and limited activity tolerance. Pt instructed on one handed LB dressing technique to doff and elvis socks while seated at bedside chair. Pt would benefit from additional instruction/ technique practice 2* pt cognitive and memory deficits.   Pt will require post acute rehab service to continue to address these above noted pt deficit which currently impair pt ADL and functional mob. Pt set up to bedside chair post session, chair alarm activated and all needs within pt reac     Rehab Resource Intensity Level, OT: II (Moderate Resource Intensity)

## 2024-01-07 NOTE — PLAN OF CARE
Problem: PHYSICAL THERAPY ADULT  Goal: Performs mobility at highest level of function for planned discharge setting.  See evaluation for individualized goals.  Description: Treatment/Interventions: ADL retraining, Functional transfer training, LE strengthening/ROM, Therapeutic exercise, Endurance training, Patient/family training, Equipment eval/education, Bed mobility, Gait training, Spoke to nursing, Spoke to case management, OT, Elevations, Cognitive reorientation  Equipment Recommended:  (tbd)       See flowsheet documentation for full assessment, interventions and recommendations.  Outcome: Progressing  Note: Prognosis: Good  Problem List: Decreased strength, Decreased endurance, Impaired balance, Decreased mobility, Pain, Decreased cognition, Impaired judgement, Decreased safety awareness, Obesity  Assessment: The patient demonstrated some improvement in her balance, but she continues to fatigue easily. She benefits from one-step instruction throughout the session. Continued orientation was necessary as the patient remains unaware of her location and situation. She was able to ambulate a few feet, but she continues to have difficulty. She will benefit from continued therapies in order to safely maximize her functional mobility.  Barriers to Discharge: Inaccessible home environment, Decreased caregiver support     Rehab Resource Intensity Level, PT: II (Moderate Resource Intensity)    See flowsheet documentation for full assessment.

## 2024-01-07 NOTE — ASSESSMENT & PLAN NOTE
Noted on admission concerning for afib, repeat EKG on 1/4 baseline wander noted, SR with occasional PVCs noted   No history of reported afib noted in the chart  Continue PTA on metoprolol  Defer on AC given hematochezia   No events reported on tele   Echo 1/3- Left ventricular cavity size is normal. Wall thickness is moderately increased. There is moderate concentric hypertrophy. EF 65%. Right ventricular cavity size is moderately dilated. Systolic function is normal. The left atrium is moderately dilated, right atrium dilated. There is aortic valve sclerosis. Tricuspid valve moderate regurgitation. The ascending aorta is mildly dilated.

## 2024-01-07 NOTE — DISCHARGE SUMMARY
[facility]  Discharge- Hilary Oleary 5/22/1934, 89 y.o. female MRN: [id number]  Unit/Bed#: [facility] 609-01 Encounter: [id number]  Primary Care Provider: RUBY Richardson   Date and time admitted to hospital: 1/2/2024  9:00 AM    * Suspected Syncope and fall  Assessment & Plan  Patient presented from home via ambulance. As  per daughter, patient was walking with a walker, but fell backwards and screamed in pain immediately.  Fall was reviewed by the family on security camera installed in the house, appears that the patient stood up suddenly from chair level and felt dizzy afterwards but with back work to try and sit back on the chair and missed, does not appear like a syncopal episode, patient might have felt dizzy  CT head and C spine negative.   Left radius fracture noted, see plan below   Initial EKG concerning for rate controlled atrial fibrillation, see plan below  Echo without concerning findings   No events reported on tele   Dispo planning to rehab     Abnormal EKG  Assessment & Plan  Noted on admission concerning for afib, repeat EKG on 1/4 baseline wander noted, SR with occasional PVCs noted   No history of reported afib noted in the chart  Continue PTA on metoprolol  Defer on AC given hematochezia   No events reported on tele   Echo 1/3- Left ventricular cavity size is normal. Wall thickness is moderately increased. There is moderate concentric hypertrophy. EF 65%. Right ventricular cavity size is moderately dilated. Systolic function is normal. The left atrium is moderately dilated, right atrium dilated. There is aortic valve sclerosis. Tricuspid valve moderate regurgitation. The ascending aorta is mildly dilated.    Closed fracture of distal end of left radius with routine healing  Assessment & Plan  POA, Secondary to fall    Xray on admission- Impacted distal radius fracture with intra-articular extension.   Ortho consult appreciated, recommending non operative  management at this time  Post splinting XR: Distal radial fracture appears well aligned. Possible scapholunate ligament tear.   NWB LUE, continue sugar-tong splint  Outpatient follow-up in 2 weeks  PT/OT recommending rehab, patient discharged to [facility]    Colorectal cancer suspected  Assessment & Plan  Few months ago, CT scan which showed concern for apple core lesion in the transverse colon concerning for metastatic cancer.  At that time there was discussion about colonoscopy but per the notes the family and the patient did not want to pursue colonoscopy at that time.   Currently patient is not pursuing any treatment and focused on comfort measures only.  [facility]   Code status level 3 DNR/DNI    Blood in stool  Assessment & Plan  Daughter reports of persistent blood in stool since diagnosis of colon cancer.   Hgb remains stable, monitor intermittently    Mixed dementia (HCC)  Assessment & Plan  Mental status at baseline, pleasantly confused   Continue Zoloft, Seroquel, and melatonin     Benign essential hypertension  Assessment & Plan  Continue home amlodipine 5mg daily and metoprolol 100mg daily      Medical Problems       Resolved Problems  Date Reviewed: 1/8/2024   None       Discharging Physician / Practitioner: Gladis Smart PA-C  PCP: RBUY Richardson  Admission Date:   Admission Orders (From admission, onward)       Ordered        01/03/24 1643  Inpatient Admission  Once            01/02/24 1242  Place in Observation  Once                          Discharge Date: 01/08/24    Consultations During Hospital Stay:  Orthopedics     Procedures Performed:   S/P closed reduction of a left distal radius fracture   1/3/24 left elbow x-ray- No acute osseous abnormality.   1/3/2024 left wrist x-ray- Study limited by splint artifact. Distal radial fracture appears well aligned. Possible scapholunate ligament tear.   1/2/2024 CT head without contrast-no acute intracranial  abnormality  1/2/2024 CT spine cervical without contrast-no cervical spine fracture or traumatic malalignment  1/2/2024 chest x-ray-no acute cardiopulmonary disease.  Granulomatous disease.  1-2-24 left wrist x-ray impacted distal radius fracture with intra-articular extension    Significant Findings / Test Results:   Impacted distal radius fracture status post reduction    Incidental Findings:   None    Test Results Pending at Discharge (will require follow up):   None     Outpatient Tests Requested:  None    Complications: None    Reason for Admission: Fall    Hospital Course:   Hilary Oleary is a 89 y.o. female patient who originally presented to the hospital on 1/2/2024 due to fall. Patient presented from home via ambulance. As  per daughter, patient was walking with a walker, but fell backwards and screamed in pain immediately.  Fall was reviewed by the family on security camera installed in the house, appears that the patient stood up suddenly from chair level and felt dizzy afterwards but with back work to try and sit back on the chair and missed, does not appear like a syncopal episode.  Patient was noted to have a impacted distal radius fracture status post reduction by orthopedics in the emergency room.  She should remain nonweightbearing to the left upper extremity and continue with sugar-tong splint plan for outpatient follow-up with orthopedics in 2 weeks for nonoperative management.  The patient was evaluated by physical therapy and Auth was obtained for [facility] which she will be discharged to for rehab.    Please see above list of diagnoses and related plan for additional information.     Condition at Discharge: good    Discharge Day Visit / Exam: see today's progress note     Discussion with Family:  CM has communicated dispo plan with patient's daughter .     Discharge instructions/Information to patient and family:   See after visit summary for information provided to patient and family.       Provisions for Follow-Up Care:  See after visit summary for information related to follow-up care and any pertinent home health orders.      Mobility at time of Discharge:   Basic Mobility Inpatient Raw Score: 11  JH-HLM Goal: 4: Move to chair/commode  JH-HLM Achieved: 4: Move to chair/commode  HLM Goal NOT achieved. Continue to encourage mobility in post discharge setting.     [facility]    Planned Readmission: no     Discharge Statement:  I spent 45 minutes discharging the patient. This time was spent on the day of discharge. I had direct contact with the patient on the day of discharge. Greater than 50% of the total time was spent examining patient, answering all patient questions, arranging and discussing plan of care with patient as well as directly providing post-discharge instructions.  Additional time then spent on discharge activities.    Discharge Medications:  See after visit summary for reconciled discharge medications provided to patient and/or family.      **Please Note: This note may have been constructed using a voice recognition system**

## 2024-01-07 NOTE — OCCUPATIONAL THERAPY NOTE
"  Occupational Therapy Progress Note     Patient Name: Hilary Oleary  Today's Date: 1/7/2024  Problem List  Principal Problem:    Suspected Syncope and fall  Active Problems:    Benign essential hypertension    Colorectal cancer suspected    Mixed dementia (HCC)    Closed fracture of distal end of left radius with routine healing    Blood in stool    Abnormal EKG         Occupational Therapy Treatment Note     01/07/24 1240   OT Last Visit   OT Visit Date 01/07/24   Note Type   Note Type Treatment   Pain Assessment   Pain Assessment Tool 0-10   Pain Score No Pain   Restrictions/Precautions   Weight Bearing Precautions Per Order Yes   LUE Weight Bearing Per Order NWB   Braces or Orthoses Sling;Splint   Lifestyle   Autonomy Assistance with ADL's/IaDL's +RW with functional mobiltiy (per chart review, unclear if requires assistancew with mobility)   Reciprocal Relationships Daughter/family   Service to Others retired   Intrinsic Gratification jokes   ADL   Where Assessed Chair   Grooming Assistance 5  Supervision/Setup   UB Bathing Assistance 3  Moderate Assistance   LB Bathing Assistance 3  Moderate Assistance   UB Dressing Assistance 3  Moderate Assistance   LB Dressing Assistance 3  Moderate Assistance   LB Dressing Deficit Don/doff R sock;Don/doff L sock   LB Dressing Comments pt instructed on one handed dressing technique   Toileting Assistance  2  Maximal Assistance   Bed Mobility   Supine to Sit 3  Moderate assistance   Additional items Assist x 1   Transfers   Sit to Stand 3  Moderate assistance   Additional items Assist x 1   Stand to Sit 3  Moderate assistance   Additional items Assist x 1   Stand pivot 3  Moderate assistance   Additional items Assist x 2   Subjective   Subjective pt stated \" Why am I here?\"   Cognition   Overall Cognitive Status Impaired   Arousal/Participation Alert   Attention Attends with cues to redirect   Orientation Level Oriented to person;Disoriented to place;Disoriented to " time;Disoriented to situation   Memory Decreased short term memory;Decreased recall of recent events;Decreased recall of precautions   Following Commands Follows one step commands without difficulty   Activity Tolerance   Activity Tolerance Patient tolerated treatment well   Assessment   Assessment Pt seen for Occupational Therapy session with focus on activity tolerance, bed mob, functional transfers for pt engagement in UB/LB self-care tasks. Pt cleared by JOSELUIS/ Margot for pt participated in therapy session. Pt presented supine/HOB raised pt awake/alert and agreeable to participate in therapy following pt identifiers confirmed.Pt was unable to report her therapy goal 2* pt cognitive impairments however was agreeable to get out of bed for lunch. Pt required assist for bed mob and functional transfers/stand pivot to bedside chair 2* decreased overall strength, balance and limited activity tolerance. Pt instructed on one handed LB dressing technique to doff and elvis socks while seated at bedside chair. Pt would benefit from additional instruction/ technique practice 2* pt cognitive and memory deficits.   Pt will require post acute rehab service to continue to address these above noted pt deficit which currently impair pt ADL and functional mob. Pt set up to bedside chair post session, chair alarm activated and all needs within pt reac   Plan   Treatment Interventions ADL retraining;Functional transfer training   Goal Expiration Date 01/17/24   OT Treatment Day 2   OT Frequency 2-3x/wk   Discharge Recommendation   Rehab Resource Intensity Level, OT II (Moderate Resource Intensity)   AM-PAC Daily Activity Inpatient   Lower Body Dressing 2   Bathing 2   Toileting 2   Upper Body Dressing 2   Grooming 2   Eating 2   Daily Activity Raw Score 12   Daily Activity Standardized Score (Calc for Raw Score >=11) 30.6   Turning Head Towards Sound 4   Follow Simple Instructions 3   Low Function Daily Activity Raw Score 19   Low  Function Daily Activity Standardized Score  31.34   AM-PAC Applied Cognition Inpatient   Following a Speech/Presentation 1   Understanding Ordinary Conversation 3   Taking Medications 1   Remembering Where Things Are Placed or Put Away 2   Remembering List of 4-5 Errands 1   Taking Care of Complicated Tasks 1   Applied Cognition Raw Score 9   Applied Cognition Standardized Score 22.48   Barthel Index   Feeding 5   Bathing 0   Grooming Score 0   Dressing Score 5   Bladder Score 0   Bowels Score 10   Toilet Use Score 0   Transfers (Bed/Chair) Score 5   Mobility (Level Surface) Score 0   Stairs Score 0   Barthel Index Score 25   Modified Flaco Scale   Modified Cornelia Scale 4       Kita CARROLL/ALYSSA

## 2024-01-07 NOTE — ASSESSMENT & PLAN NOTE
Daughter reports of persistent blood in stool since diagnosis of colon cancer.   Hgb remains stable, monitor intermittently

## 2024-01-07 NOTE — PROGRESS NOTES
[facility]  Progress Note  Name: Hilary Oleary I  MRN: [id number]  Unit/Bed#: [facility] 609-01 I Date of Admission: 1/2/2024   Date of Service: 1/7/2024 I Hospital Day: 4    Assessment/Plan   * Suspected Syncope and fall  Assessment & Plan  Patient presented from home via ambulance. As  per daughter, patient was walking with a walker, but fell backwards and screamed in pain immediately.  Fall was reviewed by the family on security camera installed in the house, appears that the patient stood up suddenly from chair level and felt dizzy afterwards but with back work to try and sit back on the chair and missed, does not appear like a syncopal episode, patient might have felt dizzy  In ED, CT head and C spine negative.   Left radius fracture noted, s/p splint- per ortho non operative   PT/OT- recommending rehab- auth obtained for  TCF- d/c on Monday when bed is available   Initial EKG concerning for rate controlled atrial fibrillation. No prior h/o afib. Repeat EKG 1/4 showing baseline wander and SR with PVCs   Already on metoprolol.   Will not recommend anticoagulation for persistent hematochezia, and palliative care patient.   Echo without concerning findings   No events reported on tele     Closed fracture of distal end of left radius with routine healing  Assessment & Plan  Secondary to fall    Ortho consult appreciated  Xray on admission- Impacted distal radius fracture with intra-articular extension.   Post splinting- Distal radial fracture appears well aligned. Possible scapholunate ligament tear.   TAWANNA MELTON  C/w sugar-tong splint  outpatient follow-up in 2 weeks, non operative management at this time  PT/OT recommending rehab at discharge- insurance auth obtained, plan to d/c to  TCF on Monday when bed is available     Abnormal EKG  Assessment & Plan  Noted on admission concerning for afib, repeat EKG on 1/4 baseline wander noted, SR with occasional PVCs noted   No history of  reported afib noted in the chart  PTA on metoprolol- continue   Defer on AC given hematochezia   No events reported on tele   Echo 1/3- Left ventricular cavity size is normal. Wall thickness is moderately increased. There is moderate concentric hypertrophy. EF 65%. Right ventricular cavity size is moderately dilated. Systolic function is normal. The left atrium is moderately dilated, right atrium dilated. There is aortic valve sclerosis. Tricuspid valve moderate regurgitation. The ascending aorta is mildly dilated.    Blood in stool  Assessment & Plan  Daughter reports of persistent blood in stool since diagnosis of colon cancer.   Hgb remains stable at 11  Monitor hgb intermittently    Mixed dementia (HCC)  Assessment & Plan  Patient is pleasant, alert, oriented to self, and place. Not time or situation.   Continue Zoloft.  Continue Seroquel 50 mg and melatonin at night    Colorectal cancer suspected  Assessment & Plan  Few months ago, CT scan which showed concern for apple core lesion in the transverse colon concerning for metastatic cancer.  At that time there was discussion about colonoscopy but per the notes the family and the patient did not want to pursue colonoscopy at that time.   Currently patient is not pursuing any treatment and focused on comfort measures only.   [facility]   Code status level 3 DNR/DNI    Benign essential hypertension  Assessment & Plan  Continue home amlodipine 5mg daily and metoprolol 100mg daily           VTE Pharmacologic Prophylaxis: VTE Score: 6 High Risk (Score >/= 5) - Pharmacological DVT Prophylaxis Ordered: heparin. Sequential Compression Devices Ordered.    Mobility:   Basic Mobility Inpatient Raw Score: 12  JH-HLM Goal: 4: Move to chair/commode  JH-HLM Achieved: 4: Move to chair/commode  HLM Goal NOT achieved. Continue with multidisciplinary rounding and encourage appropriate mobility to improve upon HLM goals.    Patient Centered  Rounds: I performed bedside rounds with nursing staff today.   Discussions with Specialists or Other Care Team Provider: d/w RN    Education and Discussions with Family / Patient: Updated  (daughter) via phone.    Total Time Spent on Date of Encounter in care of patient: 35 mins. This time was spent on one or more of the following: performing physical exam; counseling and coordination of care; obtaining or reviewing history; documenting in the medical record; reviewing/ordering tests, medications or procedures; communicating with other healthcare professionals and discussing with patient's family/caregivers.    Current Length of Stay: 4 day(s)  Current Patient Status: Inpatient   Certification Statement: The patient will continue to require additional inpatient hospital stay due to pending placement   Discharge Plan: Anticipate discharge tomorrow to rehab facility.    Code Status: Level 3 - DNAR and DNI    Subjective:   Pt sitting oob in the chair pleasantly confused. Denies any complaints. Per RN +BM yesterday. No other events     Objective:     Vitals:   Temp (24hrs), Av.6 °F (36.4 °C), Min:97 °F (36.1 °C), Max:97.9 °F (36.6 °C)    Temp:  [97 °F (36.1 °C)-97.9 °F (36.6 °C)] 97.6 °F (36.4 °C)  HR:  [65-78] 65  Resp:  [16-18] 16  BP: (122-131)/(59-96) 128/83  SpO2:  [93 %-99 %] 96 %  Body mass index is 30.93 kg/m².     Input and Output Summary (last 24 hours):     Intake/Output Summary (Last 24 hours) at 2024 1424  Last data filed at 2024 0501  Gross per 24 hour   Intake --   Output 800 ml   Net -800 ml       Physical Exam:   Physical Exam  Vitals and nursing note reviewed.   Constitutional:       General: She is not in acute distress.     Appearance: She is well-developed. She is not ill-appearing.   Eyes:      Conjunctiva/sclera: Conjunctivae normal.   Cardiovascular:      Rate and Rhythm: Normal rate and regular rhythm.      Heart sounds: Murmur heard.   Pulmonary:      Effort: Pulmonary  effort is normal. No respiratory distress.      Breath sounds: Normal breath sounds. No wheezing or rales.   Abdominal:      General: Bowel sounds are normal. There is no distension.      Palpations: Abdomen is soft.      Tenderness: There is no abdominal tenderness.   Musculoskeletal:         General: Swelling (Left hand) and tenderness (Left arm with splint in place) present.   Skin:     General: Skin is warm and dry.   Neurological:      Mental Status: She is alert. Mental status is at baseline.      Comments: Oriented to person   Psychiatric:         Mood and Affect: Mood normal.          Additional Data:     Labs:  Results from last 7 days   Lab Units 01/02/24  1019   WBC Thousand/uL 6.87   HEMOGLOBIN g/dL 11.2*   HEMATOCRIT % 35.6   PLATELETS Thousands/uL 258   NEUTROS PCT % 77*   LYMPHS PCT % 15   MONOS PCT % 6   EOS PCT % 1     Results from last 7 days   Lab Units 01/03/24  0518 01/02/24  1019   SODIUM mmol/L 136 141   POTASSIUM mmol/L 3.5 3.2*   CHLORIDE mmol/L 100 103   CO2 mmol/L 21 29   BUN mg/dL 13 12   CREATININE mg/dL 0.47* 0.57*   ANION GAP mmol/L 15 9   CALCIUM mg/dL 9.1 9.1   ALBUMIN g/dL  --  3.9   TOTAL BILIRUBIN mg/dL  --  0.78   ALK PHOS U/L  --  61   ALT U/L  --  6*   AST U/L  --  17   GLUCOSE RANDOM mg/dL 96 114                       Lines/Drains:  Invasive Devices       Peripheral Intravenous Line  Duration             Peripheral IV 01/06/24 Dorsal (posterior);Right Forearm 1 day              Drain  Duration             External Urinary Catheter 3 days                          Imaging: Reviewed radiology reports from this admission including: CT head and xray(s)    Recent Cultures (last 7 days):         Last 24 Hours Medication List:   Current Facility-Administered Medications   Medication Dose Route Frequency Provider Last Rate    acetaminophen  975 mg Oral Q8H [facility] RUBY Nelson      amLODIPine  5 mg Oral Daily Zakia Bernstein MD      heparin (porcine)  5,000 Units Subcutaneous Q8H CRISTELA  Zakia Bernstein MD      magnesium Oxide  400 mg Oral Daily Alyse Romero MD      melatonin  9 mg Oral HS Zakia Bernstein MD      metoprolol succinate  100 mg Oral QPM Zakia Bernstein MD      ondansetron  4 mg Intravenous Q6H PRN Zakia Bernstein MD      oxyCODONE  5 mg Oral Q6H PRN Zakia Bernstein MD      oxyCODONE  2.5 mg Oral Q6H PRN Zakia Bernstein MD      polyethylene glycol  17 g Oral Daily Inna Clemente MD      potassium chloride  10 mEq Oral BID Alyse Romero MD      QUEtiapine  50 mg Oral HS Zakia Bernstein MD      senna-docusate sodium  2 tablet Oral BID Inna Clemente MD      sertraline  75 mg Oral Daily Zakia Bernstein MD      sodium chloride (PF)  3 mL Intravenous Q1H PRN Osorio Headley MD          Today, Patient Was Seen By: RUBY Nelson    **Please Note: This note may have been constructed using a voice recognition system.**

## 2024-01-07 NOTE — ASSESSMENT & PLAN NOTE
POA, Secondary to fall    Xray on admission- Impacted distal radius fracture with intra-articular extension.   Ortho consult appreciated, recommending non operative management at this time  Post splinting XR: Distal radial fracture appears well aligned. Possible scapholunate ligament tear.   TAWANNA MELTON, continue sugar-tong splint  oOtpatient follow-up in 2 weeks  PT/OT recommending rehab at discharge- plan for d/c to [facility] tomorrow once bed available

## 2024-01-07 NOTE — PHYSICAL THERAPY NOTE
Physical Therapy Progress Note     01/07/24 1245   PT Last Visit   PT Visit Date 01/07/24   Note Type   Note Type Treatment   Pain Assessment   Pain Assessment Tool 0-10   Pain Score No Pain   Restrictions/Precautions   RUE Weight Bearing Per Order WBAT   LUE Weight Bearing Per Order NWB   Braces or Orthoses Sling;Splint  (LUE.)   Other Precautions Cognitive;Fall Risk;Bed Alarm;Chair Alarm  (Alarm active post session.)   Subjective   Subjective The patient asking where she is and why she is here. She is amenable to work with therapy.   Bed Mobility   Supine to Sit 3  Moderate assistance   Additional items Assist x 1;Increased time required;Verbal cues;LE management   Transfers   Sit to Stand 4  Minimal assistance   Additional items Assist x 1;Increased time required;Verbal cues   Stand to Sit 4  Minimal assistance   Additional items Assist x 1;Increased time required;Verbal cues   Stand pivot 4  Minimal assistance   Additional items Assist x 2;Increased time required;Verbal cues   Ambulation/Elevation   Gait pattern Excessively slow;Short stride;Step to;Inconsistent sarahi   Gait Assistance 3  Moderate assist   Additional items Assist x 1;Verbal cues   Assistive Device Other (Comment)  (Hand-hold assistance.)   Distance 5 feet.   Balance   Static Sitting Fair +   Dynamic Sitting Fair -   Static Standing Poor +   Dynamic Standing Poor   Ambulatory Poor   Activity Tolerance   Activity Tolerance Patient tolerated treatment well;Patient limited by fatigue   Nurse Made Aware JOSELUIS Frost.   Assessment   Prognosis Good   Problem List Decreased strength;Decreased endurance;Impaired balance;Decreased mobility;Pain;Decreased cognition;Impaired judgement;Decreased safety awareness;Obesity   Assessment The patient demonstrated some improvement in her balance, but she continues to fatigue easily. She benefits from one-step instruction throughout the session. Continued orientation was necessary as the patient remains unaware of  her location and situation. She was able to ambulate a few feet, but she continues to have difficulty. She will benefit from continued therapies in order to safely maximize her functional mobility.   Barriers to Discharge Inaccessible home environment;Decreased caregiver support   Goals   Patient Goals To eat lunch.   STG Expiration Date 01/17/24   PT Treatment Day 2   Plan   Treatment/Interventions Functional transfer training;LE strengthening/ROM;Therapeutic exercise;Endurance training;Patient/family training;Bed mobility;Gait training;Elevations   Progress Progressing toward goals   PT Frequency 2-3x/wk   Discharge Recommendation   Rehab Resource Intensity Level, PT II (Moderate Resource Intensity)   AM-PAC Basic Mobility Inpatient   Turning in Flat Bed Without Bedrails 2   Lying on Back to Sitting on Edge of Flat Bed Without Bedrails 2   Moving Bed to Chair 2   Standing Up From Chair Using Arms 2   Walk in Room 2   Climb 3-5 Stairs With Railing 1   Basic Mobility Inpatient Raw Score 11   Basic Mobility Standardized Score 30.25   Highest Level Of Mobility   -HLM Goal 4: Move to chair/commode   JH-HLM Achieved 5: Stand (1 or more minutes)         An AM-PAC Basic Mobility raw score less than 16 suggests the patient may benefit from discharge to post-acute rehab services.    Karlo Troncoso, PTA

## 2024-01-07 NOTE — ASSESSMENT & PLAN NOTE
Secondary to fall    Ortho consult appreciated  Xray on admission- Impacted distal radius fracture with intra-articular extension.   Post splinting- Distal radial fracture appears well aligned. Possible scapholunate ligament tear.   TAWANNA MELTON  C/w sugar-tong splint  outpatient follow-up in 2 weeks, non operative management at this time  PT/OT recommending rehab at discharge- insurance auth obtained, plan to d/c to [facility] on Monday when bed is available

## 2024-01-08 VITALS
BODY MASS INDEX: 31.12 KG/M2 | SYSTOLIC BLOOD PRESSURE: 127 MMHG | OXYGEN SATURATION: 97 % | HEART RATE: 69 BPM | HEIGHT: 62 IN | TEMPERATURE: 98.9 F | DIASTOLIC BLOOD PRESSURE: 75 MMHG | RESPIRATION RATE: 18 BRPM | WEIGHT: 169.09 LBS

## 2024-01-08 LAB
ANION GAP SERPL CALCULATED.3IONS-SCNC: 9 MMOL/L
BUN SERPL-MCNC: 13 MG/DL (ref 5–25)
CALCIUM SERPL-MCNC: 9.2 MG/DL (ref 8.4–10.2)
CHLORIDE SERPL-SCNC: 104 MMOL/L (ref 96–108)
CO2 SERPL-SCNC: 25 MMOL/L (ref 21–32)
CREAT SERPL-MCNC: 0.58 MG/DL (ref 0.6–1.3)
ERYTHROCYTE [DISTWIDTH] IN BLOOD BY AUTOMATED COUNT: 15.4 % (ref 11.6–15.1)
FLUAV RNA RESP QL NAA+PROBE: NEGATIVE
FLUBV RNA RESP QL NAA+PROBE: NEGATIVE
GFR SERPL CREATININE-BSD FRML MDRD: 81 ML/MIN/1.73SQ M
GLUCOSE SERPL-MCNC: 82 MG/DL (ref 65–140)
HCT VFR BLD AUTO: 39.6 % (ref 34.8–46.1)
HGB BLD-MCNC: 12 G/DL (ref 11.5–15.4)
MCH RBC QN AUTO: 27.8 PG (ref 26.8–34.3)
MCHC RBC AUTO-ENTMCNC: 30.3 G/DL (ref 31.4–37.4)
MCV RBC AUTO: 92 FL (ref 82–98)
PLATELET # BLD AUTO: 275 THOUSANDS/UL (ref 149–390)
PMV BLD AUTO: 10.3 FL (ref 8.9–12.7)
POTASSIUM SERPL-SCNC: 4.2 MMOL/L (ref 3.5–5.3)
RBC # BLD AUTO: 4.31 MILLION/UL (ref 3.81–5.12)
RSV RNA RESP QL NAA+PROBE: NEGATIVE
SARS-COV-2 RNA RESP QL NAA+PROBE: NEGATIVE
SODIUM SERPL-SCNC: 138 MMOL/L (ref 135–147)
WBC # BLD AUTO: 7.52 THOUSAND/UL (ref 4.31–10.16)

## 2024-01-08 PROCEDURE — NC001 PR NO CHARGE: Performed by: PHYSICIAN ASSISTANT

## 2024-01-08 PROCEDURE — 0241U HB NFCT DS VIR RESP RNA 4 TRGT: CPT | Performed by: PHYSICIAN ASSISTANT

## 2024-01-08 PROCEDURE — 99238 HOSP IP/OBS DSCHRG MGMT 30/<: CPT | Performed by: PHYSICIAN ASSISTANT

## 2024-01-08 PROCEDURE — 80048 BASIC METABOLIC PNL TOTAL CA: CPT | Performed by: NURSE PRACTITIONER

## 2024-01-08 PROCEDURE — 85027 COMPLETE CBC AUTOMATED: CPT | Performed by: NURSE PRACTITIONER

## 2024-01-08 RX ORDER — LANOLIN ALCOHOL/MO/W.PET/CERES
400 CREAM (GRAM) TOPICAL DAILY
Status: ON HOLD
Start: 2024-01-09

## 2024-01-08 RX ORDER — OXYCODONE HYDROCHLORIDE 5 MG/1
5 TABLET ORAL EVERY 6 HOURS PRN
Qty: 10 TABLET | Refills: 0 | Status: ON HOLD | OUTPATIENT
Start: 2024-01-08 | End: 2024-01-18

## 2024-01-08 RX ORDER — POTASSIUM CHLORIDE 750 MG/1
10 TABLET, EXTENDED RELEASE ORAL 2 TIMES DAILY
Status: ON HOLD
Start: 2024-01-08

## 2024-01-08 RX ORDER — POLYETHYLENE GLYCOL 3350 17 G/17G
17 POWDER, FOR SOLUTION ORAL DAILY
Status: ON HOLD
Start: 2024-01-09

## 2024-01-08 RX ORDER — OXYCODONE HYDROCHLORIDE 5 MG/1
5 TABLET ORAL EVERY 6 HOURS PRN
Qty: 10 TABLET | Refills: 0 | Status: SHIPPED | OUTPATIENT
Start: 2024-01-08 | End: 2024-01-08

## 2024-01-08 RX ADMIN — HEPARIN SODIUM 5000 UNITS: 5000 INJECTION INTRAVENOUS; SUBCUTANEOUS at 06:15

## 2024-01-08 RX ADMIN — OXYCODONE HYDROCHLORIDE 5 MG: 5 TABLET ORAL at 07:57

## 2024-01-08 RX ADMIN — SERTRALINE HYDROCHLORIDE 75 MG: 50 TABLET ORAL at 09:19

## 2024-01-08 RX ADMIN — ACETAMINOPHEN 975 MG: 325 TABLET, FILM COATED ORAL at 14:15

## 2024-01-08 RX ADMIN — AMLODIPINE BESYLATE 5 MG: 5 TABLET ORAL at 09:19

## 2024-01-08 RX ADMIN — POTASSIUM CHLORIDE 10 MEQ: 750 TABLET, EXTENDED RELEASE ORAL at 09:19

## 2024-01-08 RX ADMIN — ACETAMINOPHEN 975 MG: 325 TABLET, FILM COATED ORAL at 06:15

## 2024-01-08 RX ADMIN — MAGNESIUM OXIDE TAB 400 MG (241.3 MG ELEMENTAL MG) 400 MG: 400 (241.3 MG) TAB at 09:19

## 2024-01-08 RX ADMIN — HEPARIN SODIUM 5000 UNITS: 5000 INJECTION INTRAVENOUS; SUBCUTANEOUS at 14:15

## 2024-01-08 RX ADMIN — OXYCODONE HYDROCHLORIDE 5 MG: 5 TABLET ORAL at 14:15

## 2024-01-08 NOTE — PROGRESS NOTES
[facility]  Progress Note  Name: Hilary Oleary I  MRN: [id number]  Unit/Bed#: [facility] 609-01 I Date of Admission: 1/2/2024   Date of Service: 1/8/2024 I Hospital Day: 5    Assessment/Plan   * Suspected Syncope and fall  Assessment & Plan  Patient presented from home via ambulance. As  per daughter, patient was walking with a walker, but fell backwards and screamed in pain immediately.  Fall was reviewed by the family on security camera installed in the house, appears that the patient stood up suddenly from chair level and felt dizzy afterwards but with back work to try and sit back on the chair and missed, does not appear like a syncopal episode, patient might have felt dizzy  CT head and C spine negative.   Left radius fracture noted, see plan below   Initial EKG concerning for rate controlled atrial fibrillation, see plan below  Echo without concerning findings   No events reported on tele   Dispo planning to rehab     Abnormal EKG  Assessment & Plan  Noted on admission concerning for afib, repeat EKG on 1/4 baseline wander noted, SR with occasional PVCs noted   No history of reported afib noted in the chart  Continue PTA on metoprolol  Defer on AC given hematochezia   No events reported on tele   Echo 1/3- Left ventricular cavity size is normal. Wall thickness is moderately increased. There is moderate concentric hypertrophy. EF 65%. Right ventricular cavity size is moderately dilated. Systolic function is normal. The left atrium is moderately dilated, right atrium dilated. There is aortic valve sclerosis. Tricuspid valve moderate regurgitation. The ascending aorta is mildly dilated.    Closed fracture of distal end of left radius with routine healing  Assessment & Plan  POA, Secondary to fall    Xray on admission- Impacted distal radius fracture with intra-articular extension.   Ortho consult appreciated, recommending non operative management at this time  Post splinting XR:  Distal radial fracture appears well aligned. Possible scapholunate ligament tear.   NWB LUE, continue sugar-tong splint  oOtpatient follow-up in 2 weeks  PT/OT recommending rehab at discharge- plan for d/c to [facility] tomorrow once bed available     Colorectal cancer suspected  Assessment & Plan  Few months ago, CT scan which showed concern for apple core lesion in the transverse colon concerning for metastatic cancer.  At that time there was discussion about colonoscopy but per the notes the family and the patient did not want to pursue colonoscopy at that time.   Currently patient is not pursuing any treatment and focused on comfort measures only.  [facility]   Code status level 3 DNR/DNI    Blood in stool  Assessment & Plan  Daughter reports of persistent blood in stool since diagnosis of colon cancer.   Hgb remains stable, monitor intermittently    Mixed dementia (HCC)  Assessment & Plan  Mental status at baseline, pleasantly confused   Continue Zoloft, Seroquel, and melatonin     Benign essential hypertension  Assessment & Plan  Continue home amlodipine 5mg daily and metoprolol 100mg daily         VTE Pharmacologic Prophylaxis: VTE Score: 6 High Risk (Score >/= 5) - Pharmacological DVT Prophylaxis Ordered: heparin. Sequential Compression Devices Ordered.    Mobility:   Basic Mobility Inpatient Raw Score: 11  JH-HLM Goal: 4: Move to chair/commode  JH-HLM Achieved: 4: Move to chair/commode  HLM Goal achieved. Continue to encourage appropriate mobility.    Patient Centered Rounds: I performed bedside rounds with nursing staff today.   Discussions with Specialists or Other Care Team Provider: case management     Education and Discussions with Family / Patient:  defer to CM, no new medical updates.     Total Time Spent on Date of Encounter in care of patient: 15 mins. This time was spent on one or more of the following: performing physical exam; counseling and coordination of care;  obtaining or reviewing history; documenting in the medical record; reviewing/ordering tests, medications or procedures; communicating with other healthcare professionals and discussing with patient's family/caregivers.    Current Length of Stay: 5 day(s)  Current Patient Status: Inpatient   Certification Statement: The patient will continue to require additional inpatient hospital stay due to pending rehab placement   Discharge Plan: Anticipate discharge tomorrow to rehab facility.    Code Status: Level 3 - DNAR and DNI    Subjective:   Patient sleeping comfortably, easily arouses to voice. She offers no complaints today. Pleasantly confused.     Objective:     Vitals:   Temp (24hrs), Av.1 °F (36.7 °C), Min:98 °F (36.7 °C), Max:98.1 °F (36.7 °C)    Temp:  [98 °F (36.7 °C)-98.1 °F (36.7 °C)] 98.1 °F (36.7 °C)  HR:  [66-76] 76  BP: (121-131)/(64-79) 121/64  SpO2:  [96 %-97 %] 97 %  Body mass index is 30.93 kg/m².     Input and Output Summary (last 24 hours):   No intake or output data in the 24 hours ending 24 1249    Physical Exam:   Physical Exam  Vitals and nursing note reviewed.   Constitutional:       General: She is not in acute distress.     Appearance: She is obese.   Cardiovascular:      Rate and Rhythm: Normal rate.   Pulmonary:      Effort: Pulmonary effort is normal. No respiratory distress.   Skin:     General: Skin is warm and dry.      Coloration: Skin is not pale.      Findings: No erythema.   Neurological:      General: No focal deficit present.      Mental Status: She is alert. Mental status is at baseline.   Psychiatric:         Cognition and Memory: Cognition is impaired.          Additional Data:     Labs:  Results from last 7 days   Lab Units 24  0614 24  1019   WBC Thousand/uL 7.52 6.87   HEMOGLOBIN g/dL 12.0 11.2*   HEMATOCRIT % 39.6 35.6   PLATELETS Thousands/uL 275 258   NEUTROS PCT %  --  77*   LYMPHS PCT %  --  15   MONOS PCT %  --  6   EOS PCT %  --  1     Results  from last 7 days   Lab Units 01/08/24  0614 01/03/24  0518 01/02/24  1019   SODIUM mmol/L 138   < > 141   POTASSIUM mmol/L 4.2   < > 3.2*   CHLORIDE mmol/L 104   < > 103   CO2 mmol/L 25   < > 29   BUN mg/dL 13   < > 12   CREATININE mg/dL 0.58*   < > 0.57*   ANION GAP mmol/L 9   < > 9   CALCIUM mg/dL 9.2   < > 9.1   ALBUMIN g/dL  --   --  3.9   TOTAL BILIRUBIN mg/dL  --   --  0.78   ALK PHOS U/L  --   --  61   ALT U/L  --   --  6*   AST U/L  --   --  17   GLUCOSE RANDOM mg/dL 82   < > 114    < > = values in this interval not displayed.                       Lines/Drains:  Invasive Devices       Peripheral Intravenous Line  Duration             Peripheral IV 01/06/24 Dorsal (posterior);Right Forearm 2 days              Drain  Duration             External Urinary Catheter 4 days                          Imaging: No pertinent imaging reviewed.    Recent Cultures (last 7 days):         Last 24 Hours Medication List:   Current Facility-Administered Medications   Medication Dose Route Frequency Provider Last Rate    acetaminophen  975 mg Oral Q8H [facility] RUBY Nelson      amLODIPine  5 mg Oral Daily Zakia Bernstein MD      heparin (porcine)  5,000 Units Subcutaneous Q8H [facility] Zakia Bernstein MD      magnesium Oxide  400 mg Oral Daily Alyse Romero MD      melatonin  9 mg Oral HS Zakia Bernstein MD      metoprolol succinate  100 mg Oral QPM Zakia Bernstein MD      ondansetron  4 mg Intravenous Q6H PRN Zakia Bernstein MD      oxyCODONE  5 mg Oral Q6H PRN Zakai Bernstein MD      oxyCODONE  2.5 mg Oral Q6H PRN Zakia Bernstein MD      polyethylene glycol  17 g Oral Daily Inna Clemente MD      potassium chloride  10 mEq Oral BID Alyse Romero MD      QUEtiapine  50 mg Oral HS Zakia Bernstein MD      senna-docusate sodium  2 tablet Oral BID Inna Clemente MD      sertraline  75 mg Oral Daily Zakia Bernstein MD      sodium chloride (PF)  3 mL Intravenous Q1H PRN Osorio Headley MD          Today, Patient Was Seen By: Gladis  SURI Smart PA-C    **Please Note: This note may have been constructed using a voice recognition system.**

## 2024-01-08 NOTE — CASE MANAGEMENT
Case Management Discharge Planning Note    Patient name Hilary Oleary  Location [facility] 609/[facility] 609-01 MRN [id number]  : 1934 Date 2024       Current Admission Date: 2024  Current Admission Diagnosis:Suspected Syncope and fall   Patient Active Problem List    Diagnosis Date Noted    Abnormal EKG 2024    Suspected Syncope and fall 2024    Closed fracture of distal end of left radius with routine healing 2024    Blood in stool 2024    Mixed dementia ([facility]) 2023    Obesity, morbid ([facility]) 2023    SIRS (systemic inflammatory response syndrome) ([facility]) 10/06/2023    Colorectal cancer suspected 10/06/2023    Lung nodule seen on imaging study 10/06/2023    Bilateral hearing loss 2023    Type 2 diabetes mellitus without complication, without long-term current use of insulin ([facility]) 2022    Metabolic encephalopathy 2021    Major depressive disorder with single episode, in full remission ([facility]) 10/19/2020    Constipation, slow transit 2019    Closed fracture of right hip with routine healing 2019    Thickened endometrium 2017    Pancreatic cyst 2017    Enteritis 2017    Age related osteoporosis 2016    Resting tremor 05/10/2016    Vitamin D deficiency 2014    Right bundle-branch block 10/29/2013    Leiomyoma of uterus 2013    Benign essential hypertension 2012    Mixed hyperlipidemia 2012    Gastroesophageal reflux disease without esophagitis 2012      LOS (days): 5  Geometric Mean LOS (GMLOS) (days): 2.8  Days to GMLOS:-2.1     OBJECTIVE:  Risk of Unplanned Readmission Score: 24.22         Current admission status: Inpatient   Preferred Pharmacy:   [id number] [place] - [address]  [address]  [place] [address]  Phone: [phone] Fax: [phone]    [facility] [place] - [address]  [address]  [place]  [address]  Phone: [phone] Fax: [phone]    Primary Care Provider: RUBY Richardson    Primary Insurance: [employer] RE[employer]  Secondary Insurance:     DISCHARGE DETAILS:    Other Referral/Resources/Interventions Provided:  Referral Comments: Post acute auth received for [facility], [facility] able to offer bed today, daughter Bianca aware and in agreement    Transport at Discharge : [facility]  Dispatcher Contacted: Yes  Number/Name of Dispatcher: [facility]  Transported by (Company and Unit #): [employer]  ETA of Transport (Date): 01/08/24  ETA of Transport (Time): 1600     IMM Given (Date):: 01/08/24  IMM Given to:: Family    IMM reviewed with patient's caregiver, patient's caregiver agrees with discharge determination.    Accepting Facility Name, City & State :  [facility]  Receiving Facility/Agency Phone Number: [phone]  Facility/Agency Fax Number: [phone]

## 2024-01-08 NOTE — PLAN OF CARE
Problem: PAIN - ADULT  Goal: Verbalizes/displays adequate comfort level or baseline comfort level  Description: Interventions:  - Encourage patient to monitor pain and request assistance  - Assess pain using appropriate pain scale  - Administer analgesics based on type and severity of pain and evaluate response  - Implement non-pharmacological measures as appropriate and evaluate response  - Consider cultural and social influences on pain and pain management  - Notify physician/advanced practitioner if interventions unsuccessful or patient reports new pain  Outcome: Adequate for Discharge     Problem: INFECTION - ADULT  Goal: Absence or prevention of progression during hospitalization  Description: INTERVENTIONS:  - Assess and monitor for signs and symptoms of infection  - Monitor lab/diagnostic results  - Monitor all insertion sites, i.e. indwelling lines, tubes, and drains  - Monitor endotracheal if appropriate and nasal secretions for changes in amount and color  - [place] appropriate cooling/warming therapies per order  - Administer medications as ordered  - Instruct and encourage patient and family to use good hand hygiene technique  - Identify and instruct in appropriate isolation precautions for identified infection/condition  Outcome: Adequate for Discharge  Goal: Absence of fever/infection during neutropenic period  Description: INTERVENTIONS:  - Monitor WBC    Outcome: Adequate for Discharge

## 2024-01-08 NOTE — CASE MANAGEMENT
Case Management Discharge Planning Note    Patient name Hilary Oleary  Location [facility] 609/[facility] 609-01 MRN [id number]  : 1934 Date 2024       Current Admission Date: 2024  Current Admission Diagnosis:Suspected Syncope and fall   Patient Active Problem List    Diagnosis Date Noted    Abnormal EKG 2024    Suspected Syncope and fall 2024    Closed fracture of distal end of left radius with routine healing 2024    Blood in stool 2024    Mixed dementia ([facility]) 2023    Obesity, morbid ([facility]) 2023    SIRS (systemic inflammatory response syndrome) ([facility]) 10/06/2023    Colorectal cancer suspected 10/06/2023    Lung nodule seen on imaging study 10/06/2023    Bilateral hearing loss 2023    Type 2 diabetes mellitus without complication, without long-term current use of insulin ([facility]) 2022    Metabolic encephalopathy 2021    Major depressive disorder with single episode, in full remission ([facility]) 10/19/2020    Constipation, slow transit 2019    Closed fracture of right hip with routine healing 2019    Thickened endometrium 2017    Pancreatic cyst 2017    Enteritis 2017    Age related osteoporosis 2016    Resting tremor 05/10/2016    Vitamin D deficiency 2014    Right bundle-branch block 10/29/2013    Leiomyoma of uterus 2013    Benign essential hypertension 2012    Mixed hyperlipidemia 2012    Gastroesophageal reflux disease without esophagitis 2012      LOS (days): 5  Geometric Mean LOS (GMLOS) (days): 2.8  Days to GMLOS:-2.1     OBJECTIVE:  Risk of Unplanned Readmission Score: 24.22         Current admission status: Inpatient   Preferred Pharmacy:   [id number] [place] - [address]  [address]  [place] [address]  Phone: [phone] Fax: [phone]    [facility] [place] - [address]  [address]  [place]  [address]  Phone: [phone] Fax: [phone]    Primary Care Provider: RUYB Richardson    Primary Insurance: [employer] REP  Secondary Insurance:     DISCHARGE DETAILS: Transport time pushed to 1715 per [facility] request, daughterBianca, made aware

## 2024-01-08 NOTE — ASSESSMENT & PLAN NOTE
POA, Secondary to fall    Xray on admission- Impacted distal radius fracture with intra-articular extension.   Ortho consult appreciated, recommending non operative management at this time  Post splinting XR: Distal radial fracture appears well aligned. Possible scapholunate ligament tear.   TAWANNA MELTON, continue sugar-tong splint  Outpatient follow-up in 2 weeks  PT/OT recommending rehab, patient discharged to  TCU

## 2024-01-09 ENCOUNTER — NURSING HOME VISIT (OUTPATIENT)
Dept: GERIATRICS | Facility: OTHER | Age: 89
End: 2024-01-09
Payer: COMMERCIAL

## 2024-01-09 DIAGNOSIS — F32.5 MAJOR DEPRESSIVE DISORDER WITH SINGLE EPISODE, IN FULL REMISSION (HCC): ICD-10-CM

## 2024-01-09 DIAGNOSIS — E78.2 MIXED HYPERLIPIDEMIA: ICD-10-CM

## 2024-01-09 DIAGNOSIS — S52.502D CLOSED FRACTURE OF DISTAL END OF LEFT RADIUS WITH ROUTINE HEALING, UNSPECIFIED FRACTURE MORPHOLOGY, SUBSEQUENT ENCOUNTER: Primary | ICD-10-CM

## 2024-01-09 DIAGNOSIS — K21.9 GASTROESOPHAGEAL REFLUX DISEASE WITHOUT ESOPHAGITIS: ICD-10-CM

## 2024-01-09 DIAGNOSIS — G30.9 MIXED DEMENTIA (HCC): ICD-10-CM

## 2024-01-09 DIAGNOSIS — C19 COLORECTAL CANCER (HCC): ICD-10-CM

## 2024-01-09 DIAGNOSIS — R94.31 ABNORMAL EKG: ICD-10-CM

## 2024-01-09 DIAGNOSIS — K92.1 BLOOD IN STOOL: ICD-10-CM

## 2024-01-09 DIAGNOSIS — M80.00XD AGE-RELATED OSTEOPOROSIS WITH CURRENT PATHOLOGICAL FRACTURE WITH ROUTINE HEALING, SUBSEQUENT ENCOUNTER: Chronic | ICD-10-CM

## 2024-01-09 DIAGNOSIS — G89.11 ACUTE PAIN DUE TO TRAUMA: ICD-10-CM

## 2024-01-09 DIAGNOSIS — E11.9 TYPE 2 DIABETES MELLITUS WITHOUT COMPLICATION, WITHOUT LONG-TERM CURRENT USE OF INSULIN (HCC): ICD-10-CM

## 2024-01-09 DIAGNOSIS — F02.80 MIXED DEMENTIA (HCC): ICD-10-CM

## 2024-01-09 DIAGNOSIS — K86.2 PANCREATIC CYST: ICD-10-CM

## 2024-01-09 DIAGNOSIS — K80.20 CALCULUS OF GALLBLADDER WITHOUT CHOLECYSTITIS WITHOUT OBSTRUCTION: ICD-10-CM

## 2024-01-09 DIAGNOSIS — Z71.89 ADVANCE CARE PLANNING: ICD-10-CM

## 2024-01-09 DIAGNOSIS — I10 BENIGN ESSENTIAL HYPERTENSION: Chronic | ICD-10-CM

## 2024-01-09 DIAGNOSIS — Z91.89 AT RISK FOR CONSTIPATION: ICD-10-CM

## 2024-01-09 DIAGNOSIS — R55 VASOVAGAL SYNCOPE: ICD-10-CM

## 2024-01-09 DIAGNOSIS — H91.93 BILATERAL HEARING LOSS, UNSPECIFIED HEARING LOSS TYPE: ICD-10-CM

## 2024-01-09 DIAGNOSIS — Z91.89 AT RISK FOR DELIRIUM: ICD-10-CM

## 2024-01-09 DIAGNOSIS — F01.50 MIXED DEMENTIA (HCC): ICD-10-CM

## 2024-01-09 PROCEDURE — 99306 1ST NF CARE HIGH MDM 50: CPT | Performed by: STUDENT IN AN ORGANIZED HEALTH CARE EDUCATION/TRAINING PROGRAM

## 2024-01-09 NOTE — ASSESSMENT & PLAN NOTE
"Per recent CT \"Again seen are multiple pancreatic calcifications and stable pancreatic cysts measuring up to 1.9 cm in the uncinate \"  No apparent acute/associated symptoms  Recommend close f/u with PCP and consideration of routine outpatient surveillance and goals of care discussion as appropriate  "

## 2024-01-09 NOTE — ASSESSMENT & PLAN NOTE
Per review patient has had noted blood in stool since recent concern of colon cancer  -monitor Hb on routine labs - seems stable  -monitor for acute/worsening bleed  -consider PPI if needed  -consider further workup, GI/Heme consult if persistent/worsening  -transfuse PRN Hb <7

## 2024-01-09 NOTE — UTILIZATION REVIEW
NOTIFICATION OF ADMISSION DISCHARGE   This is a Notification of Discharge from [facility]. Please be advised that this patient has been discharge from our facility. Below you will find the admission and discharge date and time including the patient’s disposition.   UTILIZATION REVIEW CONTACT:  Umer Rainey  Utilization   Network Utilization Review Department  Phone: [phone] x carefully listen to the prompts. All voicemails are confidential.  Email: [email or web address]     ADMISSION INFORMATION  PRESENTATION DATE: 1/2/2024  9:00 AM  OBERVATION ADMISSION DATE:   INPATIENT ADMISSION DATE: 1/3/24  4:43 PM   DISCHARGE DATE: 1/8/2024  6:06 PM   DISPOSITION:Released to SNF/TCU/SNU Facility    Network Utilization Review Department  ATTENTION: Please call with any questions or concerns to [phone] and carefully listen to the prompts so that you are directed to the right person. All voicemails are confidential.   For Discharge needs, contact Care Management DC Support Team at [phone] opt. [phone]  Send all requests for admission clinical reviews, approved or denied determinations and any other requests to dedicated fax number below belonging to the campus where the patient is receiving treatment. List of dedicated fax numbers for the Facilities:  FACILITY NAME UR FAX NUMBER   ADMISSION DENIALS (Administrative/Medical Necessity) [phone]   DISCHARGE SUPPORT TEAM ([facility]) [phone]   PARENT CHILD HEALTH (Maternity/NICU/Pediatrics) [phone]   [facility] [phone]   [facility] [phone]   [facility] [phone]   [facility] [phone]   [facility] [phone]   [facility] [phone]   [facility] [phone]   [facility]  [phone]   [facility] [phone]   [facility] [phone]   [facility] [phone]

## 2024-01-09 NOTE — ASSESSMENT & PLAN NOTE
Monitor BP - generally stable 120s-130s systolic  No acute cardiac complaints  Avoid hypotension  Continue amlodipine, metoprolol with holding parameters

## 2024-01-09 NOTE — ASSESSMENT & PLAN NOTE
Follows [facility] outpatient (last visit Sept 2023)  Supportive care  Delirium precautions  Mentation at baseline per daughter, does have periods of confusion same as at home  Continue regimen including melatonin, sertraline, quetiapine

## 2024-01-09 NOTE — ASSESSMENT & PLAN NOTE
At risk due to hospitalization, relative immobility, comorbidities  Monitor stool output  Bowel regimen at facility: miralax and senna as appropriate; consider bisacodyl suppository PRN if no BM in 2-3 days  Encourage mobility as tolerated, PO hydration as appropriate, high fiber diet/prune juice (in outpatient setting as appropriate)  Goal is for 1 easy BM every 1-2 days

## 2024-01-09 NOTE — ASSESSMENT & PLAN NOTE
Recent CT scan reported with concern for apple core lesion in the transverse colon concerning for metastatic cancer. Per record review at that time there was discussion about colonoscopy but the family and the patient did not want to pursue colonoscopy at that time.   [facility]   Code status level 3 DNR/DNI

## 2024-01-09 NOTE — ASSESSMENT & PLAN NOTE
Delirium precautions  -Patient is high risk of delirium due to age, comorbidities, hospitalization/unfamiliar environment  -delirium precautions  -maintain normal sleep/wake cycle  -minimize overnight interruptions, group overnight vitals/labs/nursing checks as possible  -dim lights, close blinds and turn off tv to minimize stimulation and encourage sleep environment in evenings  -ensure that pain is well controlled  -monitor for fecal and urinary retention which may precipitate delirium  -encourage early mobilization and ambulation  -provide frequent reorientation and redirection  -encourage family and friends at the bedside to help help calm patient if anxious  -avoid medications which may precipitate or worsen delirium such as tramadol, benzodiazepine, anticholinergics, and benadryl  -encourage hydration and nutrition   -redirect unwanted behaviors as first line, avoid physical restraints, use chemical restraint only if all other attempts have been unsuccessful

## 2024-01-09 NOTE — ASSESSMENT & PLAN NOTE
reportedly seen on security camera patient stood up from chair, tried to sit back down due to dizziness but missed the chair; unclear if true syncopal episode vs orthostasis  -CT head and Cspine negative, echo without clear etiology  -monitor orthostatic vitals and encourage PO intake  -PT/OT  -fall precautions  -encourage appropriate DME use  -SW to follow for safe discharge planning/homecare services as appropriate

## 2024-01-09 NOTE — ASSESSMENT & PLAN NOTE
Continues with mild hearing loss, no hearing aids.   Speak clearly towards patient when communicating.  Decrease outside noise distractions.    Follow-up with audiology as needed

## 2024-01-09 NOTE — ASSESSMENT & PLAN NOTE
Secondary to fall  Evaluated by Orthopedics in hospital, s/p reduction and splinting, managed non-operatively  TAWANNA MELTON, continue sugar-tong splint   Follow up with Orthopedics

## 2024-01-09 NOTE — ASSESSMENT & PLAN NOTE
Lab Results   Component Value Date    HGBA1C 6.6 (H) 10/05/2023       Monitor glucose on routine labs - appears to be generally stable in 80s-110s recently  Avoid hypoglycemia  Diet controlled in outpatient setting  Consider insulin with sliding scale if needed - no present indication

## 2024-01-09 NOTE — ASSESSMENT & PLAN NOTE
Clinical diagnosis given her fracture after fall, however no DEXA noted on file  DEXA was ordered previously but per PCP documentation patient/family would likely not pursue this further  PT/OT, load bearing exercises as tolerated  Calcium/vitamin D supplementation as per PCP outpatient

## 2024-01-09 NOTE — ASSESSMENT & PLAN NOTE
-stable  -recommend OOB with meals, sit upright for at least 30 minutes afterwards, avoid trigger foods  -continue to monitor  -follow up with PCP, GI as appropriate

## 2024-01-09 NOTE — ASSESSMENT & PLAN NOTE
Concern for Afib vs SR with PVCs on recent cardiac workup  No acute cardiac complaints  Continue rate control with metoprolol  Defer anticoagulation to outpatient team given reported hx of bleed/hematochezia, fall risk  Follow up with PCP, Cardiology as appropriate

## 2024-01-09 NOTE — ASSESSMENT & PLAN NOTE
L wrist fracture s/p fall  Arrived to rehab on regimen including tylenol 650mg q6hr PRN, oxycodone 5mg q6hr PRN  As of 1/9 optimize pain control with tylenol to 650mg TID scheduled and change oxycodone to 2.5mg q6hr PRN  Pain overall seems well controlled  Adjust/wean regimen as able

## 2024-01-09 NOTE — ASSESSMENT & PLAN NOTE
"Recent CT with \"There are gallstone(s) within the gallbladder, without pericholecystic inflammatory changes \"  No apparent acute/associated symptoms  Encourage PO intake/hydration  Follow up with PCP, GI as appropriate  "
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
## 2024-01-09 NOTE — ASSESSMENT & PLAN NOTE
Statin discontinued per PCP in Nov 2023 following family discussion per PCP documentation  Follow up with PCP

## 2024-01-10 ENCOUNTER — TELEPHONE (OUTPATIENT)
Dept: OBGYN CLINIC | Facility: MEDICAL CENTER | Age: 89
End: 2024-01-10

## 2024-01-10 NOTE — TELEPHONE ENCOUNTER
[facility]     Doctor: Dr Vargas     Reason for call: Rosalva is calling to set up an appointment for the patient. Her discharge date should be around 1/22 from [facility].      Other extraarticular fracture of lower end of left radius, initial encounter for closed fracture.    MRN:   [id number]     [facility] Office please   Call back#: Rosalva from [facility].   [phone]

## 2024-01-10 NOTE — TELEPHONE ENCOUNTER
Attempted to contact Rosalva. She is gone for the day and not in tomorrow. LVM for Nabila to contact us to schedule pt around 1-17 for fx follow up lt wrist

## 2024-01-11 ENCOUNTER — NURSING HOME VISIT (OUTPATIENT)
Dept: GERIATRICS | Facility: OTHER | Age: 89
End: 2024-01-11
Payer: COMMERCIAL

## 2024-01-11 DIAGNOSIS — G30.9 MIXED DEMENTIA (HCC): ICD-10-CM

## 2024-01-11 DIAGNOSIS — G89.11 ACUTE PAIN DUE TO TRAUMA: ICD-10-CM

## 2024-01-11 DIAGNOSIS — F01.50 MIXED DEMENTIA (HCC): ICD-10-CM

## 2024-01-11 DIAGNOSIS — K92.1 BLOOD IN STOOL: ICD-10-CM

## 2024-01-11 DIAGNOSIS — I10 BENIGN ESSENTIAL HYPERTENSION: Chronic | ICD-10-CM

## 2024-01-11 DIAGNOSIS — S52.502D CLOSED FRACTURE OF DISTAL END OF LEFT RADIUS WITH ROUTINE HEALING, UNSPECIFIED FRACTURE MORPHOLOGY, SUBSEQUENT ENCOUNTER: Primary | ICD-10-CM

## 2024-01-11 DIAGNOSIS — F02.80 MIXED DEMENTIA (HCC): ICD-10-CM

## 2024-01-11 PROCEDURE — 99309 SBSQ NF CARE MODERATE MDM 30: CPT | Performed by: STUDENT IN AN ORGANIZED HEALTH CARE EDUCATION/TRAINING PROGRAM

## 2024-01-11 NOTE — ASSESSMENT & PLAN NOTE
Per review patient has had noted blood in stool since recent concern of colon cancer  -monitor Hb on routine labs - seems stable overall - recheck H/H on Monday  -monitor for acute/worsening bleed  -consider PPI if needed  -consider further workup, GI/Heme consult if persistent/worsening  -transfuse PRN Hb <7

## 2024-01-11 NOTE — ASSESSMENT & PLAN NOTE
L wrist fracture s/p fall  Arrived to rehab on regimen including tylenol 650mg q6hr PRN, oxycodone 5mg q6hr PRN  As of 1/9 optimized pain control with tylenol to 650mg TID scheduled and changed oxycodone to 2.5mg q6hr PRN  Pain overall seems well controlled  Adjust/wean regimen as able  Anticipate discontinuing oxycodone by discharge

## 2024-01-11 NOTE — ASSESSMENT & PLAN NOTE
Follows [facility] outpatient (last visit Sept 2023)  Seems stable  Supportive care  Delirium precautions  Mentation at baseline per daughter, does have periods of confusion same as at home  Continue regimen including melatonin, sertraline, quetiapine

## 2024-01-11 NOTE — PROGRESS NOTES
[facility]  Facility: [facility]    PROGRESS NOTE  Nursing Home Place of Service: nursing home place of service: POS 31 Skilled Care-Part A Coverage    NAME: Hilary Oleary  : 1934 AGE: 89 y.o. SEX: female MRN: [id number]  DATE OF ENCOUNTER: 2024    Assessment and Plan     Problem List Items Addressed This Visit       Benign essential hypertension (Chronic)     Monitor BP - generally controlled/borderline soft in 100s-130s systolic  No acute cardiac complaints  Avoid hypotension  Continue amlodipine, metoprolol with holding parameters  As of  reduce amlodipine from 5mg to 2.5mg daily dose with hold parameters  Consider further weaning of amlodipine if BP remains controlled/soft            Mixed dementia (HCC)     Follows [facility] outpatient (last visit 2023)  Seems stable  Supportive care  Delirium precautions  Mentation at baseline per daughter, does have periods of confusion same as at home  Continue regimen including melatonin, sertraline, quetiapine         Closed fracture of distal end of left radius with routine healing - Primary     Secondary to fall  Evaluated by Orthopedics in hospital, s/p reduction and splinting, managed non-operatively  NWDENISE MELTON, continue sugar-tong splint   Pain control as appropriate  PT/OT  Follow up with Orthopedics         Blood in stool     Per review patient has had noted blood in stool since recent concern of colon cancer  -monitor Hb on routine labs - seems stable overall - recheck H/H on Monday  -monitor for acute/worsening bleed  -consider PPI if needed  -consider further workup, GI/Heme consult if persistent/worsening  -transfuse PRN Hb <7         Acute pain due to trauma     L wrist fracture s/p fall  Arrived to rehab on regimen including tylenol 650mg q6hr PRN, oxycodone 5mg q6hr PRN  As of  optimized pain control with tylenol to 650mg TID scheduled and changed oxycodone to 2.5mg q6hr PRN  Pain  "overall seems well controlled  Adjust/wean regimen as able  Anticipate discontinuing oxycodone by discharge                Chief Complaint     Follow up pain    History of Present Illness     Hilary Oleary is a 89 y.o. female who was seen today for follow up. 89 y.o. female with PMH including HTN, HLD, GERD, osteoporosis, depression, DM2, dementia with behaviors     Patient seen and examined in room  Others present: none  Patient seated in chair watching TV  Appears comfortable, awake, alert, oriented to situation, able to converse appropriately  Patient polite with no acute agitation, appearing pleasantly confused similar to prior. She is Aox2. She reports feeling fine today and has no acute concerns at this time. She reports the pain in her left wrist is gradually improving and not bothersome to her at this time. She has a cane at bedside and reports she has been making progress with therapy, she feels she is able to walk further and better than she was able to previously. No acute pain anywhere else including abdomen/chest. Feels her appetite is stable/good. Breathing fine. Urinating fine. Reports her last BM was yesterday and normal, denies any abd pain/C/D/N/V. No acute cardiopulmonary, abdominal, or urinary symptoms; see ROS for more details.   No further questions or acute concerns identified.    Lab Review:   1/8: BMP generally stable, eGFR 81; CBC generally stable/non-actionable, Hb 12.0 (baseline seems around 12-13); recent TSH WNL; recent A1c 6.6  1/11: BMP generally stable/non-actionable; CBC with Hb 10.4        CXR \"No acute cardiopulmonary disease.  Granulomatous disease.\"  XR L wrist \"Impacted distal radius fracture with intra-articular extension \"  XR L elbow \"No acute osseous abnormality. \"  CT head \"No acute intracranial abnormality. \"  CT C-spine \"No cervical spine fracture or traumatic malalignment. \"  XR L wrist 1/2 \"Study limited by splint artifact. Distal radial fracture appears well aligned. " "Possible scapholunate ligament tear. \"        EKG 1/4: wander, RBBB, 76bpm, Qtc 591  Echo 1/3: EF 65, normal systolic function, no WMA, \"unable to assess diastolic function due to atrial fibrillation\"       The following portions of the patient's history were reviewed and updated as appropriate: allergies, current medications, past family history, past medical history, past social history, past surgical history and problem list.    Review of Systems     Review of Systems   Constitutional:  Negative for appetite change, chills, diaphoresis and fever.   HENT:  Negative for drooling, ear pain, hearing loss, rhinorrhea, sore throat and trouble swallowing.    Eyes:  Negative for pain, discharge, redness, itching and visual disturbance.   Respiratory:  Negative for cough, choking, chest tightness, shortness of breath and wheezing.    Cardiovascular:  Negative for chest pain and palpitations.   Gastrointestinal:  Negative for abdominal pain, blood in stool, constipation, diarrhea, nausea and vomiting.   Genitourinary:  Negative for difficulty urinating, dysuria and hematuria.   Musculoskeletal:  Positive for arthralgias and gait problem. Negative for back pain and neck pain.   Skin:  Negative for color change.   Neurological:  Negative for dizziness, facial asymmetry, speech difficulty, light-headedness and headaches.   Psychiatric/Behavioral:  Positive for confusion (pleasantly confused, baseline). Negative for agitation and behavioral problems. The patient is not nervous/anxious and is not hyperactive.    All other systems reviewed and are negative.      Active Problem List     Patient Active Problem List   Diagnosis    Enteritis    Benign essential hypertension    Mixed hyperlipidemia    Gastroesophageal reflux disease without esophagitis    Leiomyoma of uterus    Pancreatic cyst    Right bundle-branch block    Thickened endometrium    Resting tremor    Closed fracture of right hip with routine healing    Vitamin D " deficiency    Age related osteoporosis    Constipation, slow transit    Major depressive disorder with single episode, in full remission (HCC)    Metabolic encephalopathy    Type 2 diabetes mellitus without complication, without long-term current use of insulin (HCC)    Bilateral hearing loss    SIRS (systemic inflammatory response syndrome) (HCC)    Colorectal cancer suspected    Lung nodule seen on imaging study    Obesity, morbid (HCC)    Mixed dementia (HCC)    Suspected Syncope and fall    Closed fracture of distal end of left radius with routine healing    Blood in stool    Abnormal EKG    At risk for constipation    At risk for delirium    Advance care planning    Calculus of gallbladder without cholecystitis without obstruction    Acute pain due to trauma       Objective     Vital Signs:     BP: 109/75 mmHg  1/11/2024 07:40   Temp:96.9 °F  1/11/2024 07:52 Pulse:78 bpm  1/11/2024 07:52 Weight:160.1 Lbs  1/10/2024 05:07   Resp:22 Breaths/min  1/11/2024 07:52 BS: O2:97 %  1/11/2024 07:53 Pain:0  1/11/2024 09:16       Physical Exam  Vitals reviewed.   Constitutional:       General: She is not in acute distress.     Appearance: She is not toxic-appearing or diaphoretic.   HENT:      Head: Normocephalic and atraumatic.      Right Ear: External ear normal.      Left Ear: External ear normal.      Nose: Nose normal. No rhinorrhea.      Mouth/Throat:      Mouth: Mucous membranes are moist.      Pharynx: Oropharynx is clear. No posterior oropharyngeal erythema.   Eyes:      General: No scleral icterus.        Right eye: No discharge.         Left eye: No discharge.      Extraocular Movements: Extraocular movements intact.      Conjunctiva/sclera: Conjunctivae normal.      Pupils: Pupils are equal, round, and reactive to light.   Cardiovascular:      Rate and Rhythm: Normal rate and regular rhythm.   Pulmonary:      Effort: Pulmonary effort is normal. No respiratory distress.      Breath sounds: Normal breath sounds.  "No wheezing or rales.   Abdominal:      General: Bowel sounds are normal.      Palpations: Abdomen is soft.      Tenderness: There is no abdominal tenderness. There is no guarding.   Musculoskeletal:      Cervical back: No rigidity.      Comments: No notable bilateral lower extremity edema  L forearm/wrist in splint/wrap, mild swelling, able to move fingers of L hand with sensation intact   Skin:     General: Skin is warm and dry.      Coloration: Skin is not jaundiced.   Neurological:      General: No focal deficit present.      Mental Status: She is alert. Mental status is at baseline.      Comments: Pleasantly confused  Oriented x2 to self and to location \"hospital\" and \"[place]\" but not to month/year   Psychiatric:         Mood and Affect: Mood normal.         Behavior: Behavior normal.         Pertinent Laboratory/Diagnostic Studies:  Laboratory and Imaging studies reviewed. Full report in the paper chart.     Current Medications   Medications reviewed and updated in facility chart.      -Total time spent on this encounter today including documentation and workup review, face to face time, history and exam, and documentation/orders was approximately 30 minutes.  -This note will be copied to [facility] EMR where vitals and medication orders are placed.    Ramesh Uribe D.O.  Geriatric Medicine  1/11/2024 2:17 PM    "

## 2024-01-11 NOTE — ASSESSMENT & PLAN NOTE
Secondary to fall  Evaluated by Orthopedics in hospital, s/p reduction and splinting, managed non-operatively  TAWANNA MELTON, continue sugar-tong splint   Pain control as appropriate  PT/OT  Follow up with Orthopedics

## 2024-01-11 NOTE — ASSESSMENT & PLAN NOTE
Monitor BP - generally controlled/borderline soft in 100s-130s systolic  No acute cardiac complaints  Avoid hypotension  Continue amlodipine, metoprolol with holding parameters  As of 1/11 reduce amlodipine from 5mg to 2.5mg daily dose with hold parameters  Consider further weaning of amlodipine if BP remains controlled/soft

## 2024-01-14 ENCOUNTER — APPOINTMENT (OUTPATIENT)
Dept: RADIOLOGY | Facility: HOSPITAL | Age: 89
End: 2024-01-14
Payer: COMMERCIAL

## 2024-01-14 PROCEDURE — 73110 X-RAY EXAM OF WRIST: CPT

## 2024-01-15 NOTE — TELEPHONE ENCOUNTER
Unable to reach someone at facility to schedule pt. Looking in this chart, it is noted that pt was seen just yesterday and placed in a full cast, after xray. Needs a follow up appt at some time for xray in cast.

## 2024-01-16 ENCOUNTER — HOME HEALTH ADMISSION (OUTPATIENT)
Dept: HOME HEALTH SERVICES | Facility: HOME HEALTHCARE | Age: 89
End: 2024-01-16
Payer: COMMERCIAL

## 2024-01-16 NOTE — TELEPHONE ENCOUNTER
Called [facility] rehab again was told that they are going to send a message to the nurse in regards to calling us back to schedule a 3 week fu appointment for patient. They were notified that we have been trying to reach them to schedule this appointment for about a week now. Ask them to call back today or tomorrow to schedule this appointment.

## 2024-01-16 NOTE — TELEPHONE ENCOUNTER
Waiting for a call back from the nurse the [facility]. Please transfer call to me at the [place] office.

## 2024-01-17 ENCOUNTER — NURSING HOME VISIT (OUTPATIENT)
Dept: GERIATRICS | Facility: OTHER | Age: 89
End: 2024-01-17
Payer: COMMERCIAL

## 2024-01-17 VITALS
SYSTOLIC BLOOD PRESSURE: 146 MMHG | OXYGEN SATURATION: 95 % | HEART RATE: 65 BPM | DIASTOLIC BLOOD PRESSURE: 77 MMHG | TEMPERATURE: 97.8 F

## 2024-01-17 DIAGNOSIS — I10 BENIGN ESSENTIAL HYPERTENSION: Chronic | ICD-10-CM

## 2024-01-17 DIAGNOSIS — F02.80 MIXED DEMENTIA (HCC): Primary | ICD-10-CM

## 2024-01-17 DIAGNOSIS — S52.502D CLOSED FRACTURE OF DISTAL END OF LEFT RADIUS WITH ROUTINE HEALING, UNSPECIFIED FRACTURE MORPHOLOGY, SUBSEQUENT ENCOUNTER: ICD-10-CM

## 2024-01-17 DIAGNOSIS — G30.9 MIXED DEMENTIA (HCC): Primary | ICD-10-CM

## 2024-01-17 DIAGNOSIS — F32.5 MAJOR DEPRESSIVE DISORDER WITH SINGLE EPISODE, IN FULL REMISSION (HCC): ICD-10-CM

## 2024-01-17 DIAGNOSIS — I10 HYPERTENSION, UNSPECIFIED TYPE: ICD-10-CM

## 2024-01-17 DIAGNOSIS — F01.50 MIXED DEMENTIA (HCC): Primary | ICD-10-CM

## 2024-01-17 PROCEDURE — 99309 SBSQ NF CARE MODERATE MDM 30: CPT

## 2024-01-17 RX ORDER — AMLODIPINE BESYLATE 2.5 MG/1
2.5 TABLET ORAL DAILY
Start: 2024-01-17 | End: 2024-01-23 | Stop reason: SDUPTHER

## 2024-01-17 NOTE — ASSESSMENT & PLAN NOTE
S/p fall with left wrist fracture  Non-operative management   NWB LUE   Continue tong splint   F/u with orthopedics outpatient

## 2024-01-17 NOTE — ASSESSMENT & PLAN NOTE
Followed by [facility] outpatient   Continue seroquel  Redirection, reorientation and distraction as appropriate   Fall/safety precautions  Supportive care, assistance with ADLs   Avoid deliriogenic medications   Encourage adequate hydration and nutrition   Maintain sleep/wake cycle

## 2024-01-17 NOTE — ASSESSMENT & PLAN NOTE
BP acceptable, goal <140/90  Continue amlodipine 2.5 mg daily   Continue metoprolol 100 mg daily   Monitor BP   Continue hold parameters
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
## 2024-01-19 ENCOUNTER — NURSING HOME VISIT (OUTPATIENT)
Dept: GERIATRICS | Facility: OTHER | Age: 89
End: 2024-01-19
Payer: COMMERCIAL

## 2024-01-19 DIAGNOSIS — G89.11 ACUTE PAIN DUE TO TRAUMA: ICD-10-CM

## 2024-01-19 DIAGNOSIS — S52.502D CLOSED FRACTURE OF DISTAL END OF LEFT RADIUS WITH ROUTINE HEALING, UNSPECIFIED FRACTURE MORPHOLOGY, SUBSEQUENT ENCOUNTER: Primary | ICD-10-CM

## 2024-01-19 DIAGNOSIS — F02.80 MIXED DEMENTIA (HCC): ICD-10-CM

## 2024-01-19 DIAGNOSIS — G30.9 MIXED DEMENTIA (HCC): ICD-10-CM

## 2024-01-19 DIAGNOSIS — F01.50 MIXED DEMENTIA (HCC): ICD-10-CM

## 2024-01-19 DIAGNOSIS — I10 BENIGN ESSENTIAL HYPERTENSION: Chronic | ICD-10-CM

## 2024-01-19 PROCEDURE — 99309 SBSQ NF CARE MODERATE MDM 30: CPT | Performed by: STUDENT IN AN ORGANIZED HEALTH CARE EDUCATION/TRAINING PROGRAM

## 2024-01-19 NOTE — ASSESSMENT & PLAN NOTE
Monitor BP - generally controlled/borderline soft in 100s-130s systolic  No acute cardiac complaints  Avoid hypotension  Continue metoprolol with holding parameters  As of 1/11 reduced amlodipine from 5mg to 2.5mg daily and as of 1/19 stopped amlodipine completely

## 2024-01-19 NOTE — PROGRESS NOTES
[facility]  Facility: [facility]    PROGRESS NOTE  Nursing Home Place of Service: nursing home place of service: POS 31 Skilled Care-Part A Coverage    NAME: Hilary Oleary  : 1934 AGE: 89 y.o. SEX: female MRN: [id number]  DATE OF ENCOUNTER: 2024    Assessment and Plan     Problem List Items Addressed This Visit       Benign essential hypertension (Chronic)     Monitor BP - generally controlled/borderline soft in 100s-130s systolic  No acute cardiac complaints  Avoid hypotension  Continue metoprolol with holding parameters  As of  reduced amlodipine from 5mg to 2.5mg daily and as of  stopped amlodipine completely           Mixed dementia (HCC)     Follows [facility] outpatient (last visit 2023)  Seems stable  Supportive care  Delirium precautions  Mentation at baseline per daughter, does have periods of confusion same as at home  Continue regimen including melatonin, sertraline, quetiapine         Closed fracture of distal end of left radius with routine healing - Primary     Secondary to fall  Evaluated by Orthopedics in hospital, s/p reduction and splinting, managed non-operatively  Evaluated on unit by Orthopedics 24  TAWANNA MELTON, short arm cast  Pain control as appropriate  PT/OT  Follow up with Orthopedics         Acute pain due to trauma     L wrist fracture s/p fall  Arrived to rehab on regimen including tylenol 650mg q6hr PRN, oxycodone 5mg q6hr PRN  As of  optimized pain control with tylenol to 650mg TID scheduled and changed oxycodone to 2.5mg q6hr PRN  As of  discontinued oxycodone, has not been using and does not appear to need  Pain overall seems well controlled  Adjust/wean regimen as able                  Chief Complaint     Follow up pain    History of Present Illness     Hilary Oleary is a 89 y.o. female who was seen today for follow up. 89 y.o. female with PMH including HTN, HLD, GERD, osteoporosis,  "depression, DM2, dementia with behaviors     Patient seen and examined in room  Others present: none  Patient seated in chair  Appears comfortable, awake, alert, oriented to situation, able to converse appropriately  Patient polite with no acute agitation, appearing pleasantly confused similar to prior. She is Aox2-3 (able to look at board in room to determine month). She reports feeling fine today and has no acute concerns at this time. She reports the pain in her left wrist is gradually improving and not bothersome to her at this time. Feels she has been making progress with therapy, she feels she is able to walk further and better than she was able to previously. No acute pain anywhere else including abdomen/chest. Feels her appetite is stable/good. Breathing fine. Urinating fine. Reports her last BM was yesterday (confirmed with nurse) and normal, denies any abd pain/C/D/N/V. No acute cardiopulmonary, abdominal, or urinary symptoms; see ROS for more details.   No further questions or acute concerns identified.    Lab Review:   1/8: BMP generally stable, eGFR 81; CBC generally stable/non-actionable, Hb 12.0 (baseline seems around 12-13); recent TSH WNL; recent A1c 6.6  1/11: BMP generally stable/non-actionable; CBC with Hb 10.4  1/15: Hb 10.2        CXR \"No acute cardiopulmonary disease.  Granulomatous disease.\"  XR L wrist \"Impacted distal radius fracture with intra-articular extension \"  XR L elbow \"No acute osseous abnormality. \"  CT head \"No acute intracranial abnormality. \"  CT C-spine \"No cervical spine fracture or traumatic malalignment. \"  XR L wrist 1/2 \"Study limited by splint artifact. Distal radial fracture appears well aligned. Possible scapholunate ligament tear. \"  XR L wrist 1/14 \"No significant change in alignment of distal radius fracture and scapholunate widening within limitations by overlying cast \"        EKG 1/4: wander, RBBB, 76bpm, Qtc 591  Echo 1/3: EF 65, normal systolic function, no " "WMA, \"unable to assess diastolic function due to atrial fibrillation\"       The following portions of the patient's history were reviewed and updated as appropriate: allergies, current medications, past family history, past medical history, past social history, past surgical history and problem list.    Review of Systems     Review of Systems   Constitutional:  Negative for appetite change, chills, diaphoresis and fever.   HENT:  Negative for drooling, ear pain, hearing loss, rhinorrhea, sore throat and trouble swallowing.    Eyes:  Negative for pain, discharge, redness, itching and visual disturbance.   Respiratory:  Negative for cough, choking, chest tightness, shortness of breath and wheezing.    Cardiovascular:  Negative for chest pain and palpitations.   Gastrointestinal:  Negative for abdominal pain, blood in stool, constipation, diarrhea, nausea and vomiting.   Genitourinary:  Negative for difficulty urinating, dysuria and hematuria.   Musculoskeletal:  Positive for arthralgias (improving) and gait problem. Negative for back pain and neck pain.   Skin:  Negative for color change.   Neurological:  Negative for dizziness, facial asymmetry, speech difficulty, light-headedness and headaches.   Psychiatric/Behavioral:  Positive for confusion (pleasantly confused, baseline). Negative for agitation and behavioral problems. The patient is not nervous/anxious and is not hyperactive.    All other systems reviewed and are negative.      Active Problem List     Patient Active Problem List   Diagnosis    Enteritis    Benign essential hypertension    Mixed hyperlipidemia    Gastroesophageal reflux disease without esophagitis    Leiomyoma of uterus    Pancreatic cyst    Right bundle-branch block    Thickened endometrium    Resting tremor    Closed fracture of right hip with routine healing    Vitamin D deficiency    Age related osteoporosis    Constipation, slow transit    Major depressive disorder with single episode, in " full remission (HCC)    Metabolic encephalopathy    Type 2 diabetes mellitus without complication, without long-term current use of insulin (HCC)    Bilateral hearing loss    SIRS (systemic inflammatory response syndrome) (HCC)    Colorectal cancer suspected    Lung nodule seen on imaging study    Obesity, morbid (HCC)    Mixed dementia (HCC)    Suspected Syncope and fall    Closed fracture of distal end of left radius with routine healing    Blood in stool    Abnormal EKG    At risk for constipation    At risk for delirium    Advance care planning    Calculus of gallbladder without cholecystitis without obstruction    Acute pain due to trauma       Objective     Vital Signs:     BP: 133/59 mmHg  1/19/2024 07:42   Temp:97 °F  1/19/2024 07:42 Pulse:82 bpm  1/19/2024 07:42 Weight:162.1 Lbs  1/15/2024 02:41   Resp:19 Breaths/min  1/19/2024 07:42 BS: O2:97 %  1/19/2024 07:44 Pain:0  1/19/2024 09:36       Physical Exam  Vitals reviewed.   Constitutional:       General: She is not in acute distress.     Appearance: She is not toxic-appearing or diaphoretic.   HENT:      Head: Normocephalic and atraumatic.      Right Ear: External ear normal.      Left Ear: External ear normal.      Nose: Nose normal. No rhinorrhea.      Mouth/Throat:      Mouth: Mucous membranes are moist.      Pharynx: Oropharynx is clear. No posterior oropharyngeal erythema.   Eyes:      General: No scleral icterus.        Right eye: No discharge.         Left eye: No discharge.      Extraocular Movements: Extraocular movements intact.      Conjunctiva/sclera: Conjunctivae normal.      Pupils: Pupils are equal, round, and reactive to light.   Cardiovascular:      Rate and Rhythm: Normal rate and regular rhythm.   Pulmonary:      Effort: Pulmonary effort is normal. No respiratory distress.      Breath sounds: Normal breath sounds. No wheezing or rales.   Abdominal:      General: Bowel sounds are normal.      Palpations: Abdomen is soft.      Tenderness:  "There is no abdominal tenderness. There is no guarding.   Musculoskeletal:      Cervical back: No rigidity.      Comments: No notable bilateral lower extremity edema  L forearm/wrist in splint/wrap, minimal swelling, able to move fingers of L hand with sensation intact   Skin:     General: Skin is warm and dry.      Coloration: Skin is not jaundiced.   Neurological:      General: No focal deficit present.      Mental Status: She is alert. Mental status is at baseline.      Comments: Pleasantly confused  Oriented x2 to self and to location \"hospital\" and \"[place]\" and not to month initially but independently looks at board and concludes it is January   Psychiatric:         Mood and Affect: Mood normal.         Behavior: Behavior normal.         Pertinent Laboratory/Diagnostic Studies:  Laboratory and Imaging studies reviewed. Full report in the paper chart.     Current Medications   Medications reviewed and updated in facility chart.      -Total time spent on this encounter today including documentation and workup review, face to face time, history and exam, and documentation/orders was approximately 30 minutes.  -This note will be copied to [facility] EMR where vitals and medication orders are placed.    Ramesh Uribe D.O.  Geriatric Medicine  1/19/2024 11:56 AM    "

## 2024-01-19 NOTE — ASSESSMENT & PLAN NOTE
Follows [facility] outpatient (last visit Sept 2023)  Seems stable  Supportive care  Delirium precautions  Mentation at baseline per daughter, does have periods of confusion same as at home  Continue regimen including melatonin, sertraline, quetiapine

## 2024-01-19 NOTE — ASSESSMENT & PLAN NOTE
Secondary to fall  Evaluated by Orthopedics in hospital, s/p reduction and splinting, managed non-operatively  Evaluated on unit by Orthopedics 1/14/24  TAWANNA MELTON, short arm cast  Pain control as appropriate  PT/OT  Follow up with Orthopedics

## 2024-01-19 NOTE — ASSESSMENT & PLAN NOTE
L wrist fracture s/p fall  Arrived to rehab on regimen including tylenol 650mg q6hr PRN, oxycodone 5mg q6hr PRN  As of 1/9 optimized pain control with tylenol to 650mg TID scheduled and changed oxycodone to 2.5mg q6hr PRN  As of 1/19 discontinued oxycodone, has not been using and does not appear to need  Pain overall seems well controlled  Adjust/wean regimen as able

## 2024-01-22 ENCOUNTER — APPOINTMENT (OUTPATIENT)
Dept: RADIOLOGY | Facility: HOSPITAL | Age: 89
End: 2024-01-22
Payer: COMMERCIAL

## 2024-01-22 PROCEDURE — 73110 X-RAY EXAM OF WRIST: CPT

## 2024-01-23 ENCOUNTER — NURSING HOME VISIT (OUTPATIENT)
Dept: GERIATRICS | Facility: OTHER | Age: 89
End: 2024-01-23
Payer: COMMERCIAL

## 2024-01-23 VITALS
DIASTOLIC BLOOD PRESSURE: 77 MMHG | OXYGEN SATURATION: 96 % | TEMPERATURE: 97 F | SYSTOLIC BLOOD PRESSURE: 134 MMHG | HEART RATE: 77 BPM

## 2024-01-23 DIAGNOSIS — E11.9 TYPE 2 DIABETES MELLITUS WITHOUT COMPLICATION, WITHOUT LONG-TERM CURRENT USE OF INSULIN (HCC): ICD-10-CM

## 2024-01-23 DIAGNOSIS — I10 BENIGN ESSENTIAL HYPERTENSION: Chronic | ICD-10-CM

## 2024-01-23 DIAGNOSIS — F02.80 MIXED DEMENTIA (HCC): ICD-10-CM

## 2024-01-23 DIAGNOSIS — G30.9 MIXED DEMENTIA (HCC): ICD-10-CM

## 2024-01-23 DIAGNOSIS — F01.50 MIXED DEMENTIA (HCC): ICD-10-CM

## 2024-01-23 DIAGNOSIS — S52.502D CLOSED FRACTURE OF DISTAL END OF LEFT RADIUS WITH ROUTINE HEALING, UNSPECIFIED FRACTURE MORPHOLOGY, SUBSEQUENT ENCOUNTER: Primary | ICD-10-CM

## 2024-01-23 DIAGNOSIS — I10 HYPERTENSION, UNSPECIFIED TYPE: ICD-10-CM

## 2024-01-23 DIAGNOSIS — F32.5 MAJOR DEPRESSIVE DISORDER WITH SINGLE EPISODE, IN FULL REMISSION (HCC): ICD-10-CM

## 2024-01-23 PROCEDURE — 99316 NF DSCHRG MGMT 30 MIN+: CPT

## 2024-01-23 RX ORDER — AMLODIPINE BESYLATE 2.5 MG/1
2.5 TABLET ORAL DAILY
Qty: 20 TABLET | Refills: 0 | Status: SHIPPED | OUTPATIENT
Start: 2024-01-23

## 2024-01-23 NOTE — ASSESSMENT & PLAN NOTE
BP acceptable, goal <140/90  Continue amlodipine 2.5 mg daily   Continue metoprolol 100 mg daily   Monitor BP

## 2024-01-23 NOTE — ASSESSMENT & PLAN NOTE
Lab Results   Component Value Date    HGBA1C 6.6 (H) 10/05/2023   Diet controlled  Not currently on insulin   F/u with PCP

## 2024-01-23 NOTE — ASSESSMENT & PLAN NOTE
Followed by [facility] outpatient   Continue seroquel  Redirection, reorientation and distraction as appropriate   Fall/safety precautions  Supportive care, assistance with ADLs   Avoid deliriogenic medications   Encourage adequate hydration and nutrition   Maintain sleep/wake cycle

## 2024-01-23 NOTE — ASSESSMENT & PLAN NOTE
S/p fall with left wrist fracture  Non-operative management   NWB LINH Maya L wrist cast   F/u with orthopedics outpatient
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
## 2024-01-24 RX ORDER — QUETIAPINE FUMARATE 25 MG/1
50 TABLET, FILM COATED ORAL
Qty: 60 TABLET | Refills: 1 | OUTPATIENT
Start: 2024-01-24 | End: 2024-03-24

## 2024-01-25 ENCOUNTER — HOME CARE VISIT (OUTPATIENT)
Dept: HOME HEALTH SERVICES | Facility: HOME HEALTHCARE | Age: 89
End: 2024-01-25
Payer: COMMERCIAL

## 2024-01-25 VITALS — OXYGEN SATURATION: 97 % | HEART RATE: 63 BPM

## 2024-01-25 VITALS
OXYGEN SATURATION: 95 % | SYSTOLIC BLOOD PRESSURE: 122 MMHG | RESPIRATION RATE: 16 BRPM | DIASTOLIC BLOOD PRESSURE: 68 MMHG | HEART RATE: 72 BPM

## 2024-01-25 PROCEDURE — G0299 HHS/HOSPICE OF RN EA 15 MIN: HCPCS

## 2024-01-25 PROCEDURE — 400013 VN SOC

## 2024-01-25 PROCEDURE — G0151 HHCP-SERV OF PT,EA 15 MIN: HCPCS

## 2024-01-30 ENCOUNTER — HOME CARE VISIT (OUTPATIENT)
Dept: HOME HEALTH SERVICES | Facility: HOME HEALTHCARE | Age: 89
End: 2024-01-30
Payer: COMMERCIAL

## 2024-01-30 VITALS — OXYGEN SATURATION: 99 % | HEART RATE: 71 BPM | SYSTOLIC BLOOD PRESSURE: 132 MMHG | DIASTOLIC BLOOD PRESSURE: 80 MMHG

## 2024-01-30 VITALS
RESPIRATION RATE: 18 BRPM | HEART RATE: 70 BPM | DIASTOLIC BLOOD PRESSURE: 74 MMHG | OXYGEN SATURATION: 98 % | SYSTOLIC BLOOD PRESSURE: 132 MMHG

## 2024-01-30 VITALS — OXYGEN SATURATION: 97 % | HEART RATE: 74 BPM

## 2024-01-30 PROCEDURE — G0152 HHCP-SERV OF OT,EA 15 MIN: HCPCS

## 2024-01-30 PROCEDURE — G0299 HHS/HOSPICE OF RN EA 15 MIN: HCPCS

## 2024-01-30 PROCEDURE — G0151 HHCP-SERV OF PT,EA 15 MIN: HCPCS

## 2024-01-31 ENCOUNTER — NURSE TRIAGE (OUTPATIENT)
Dept: PALLIATIVE MEDICINE | Facility: CLINIC | Age: 89
End: 2024-01-31

## 2024-01-31 ENCOUNTER — RA CDI HCC (OUTPATIENT)
Dept: OTHER | Facility: HOSPITAL | Age: 89
End: 2024-01-31

## 2024-02-01 ENCOUNTER — OFFICE VISIT (OUTPATIENT)
Dept: FAMILY MEDICINE CLINIC | Facility: CLINIC | Age: 89
End: 2024-02-01
Payer: COMMERCIAL

## 2024-02-01 ENCOUNTER — APPOINTMENT (OUTPATIENT)
Dept: LAB | Facility: CLINIC | Age: 89
End: 2024-02-01
Payer: COMMERCIAL

## 2024-02-01 VITALS
DIASTOLIC BLOOD PRESSURE: 90 MMHG | HEIGHT: 62 IN | SYSTOLIC BLOOD PRESSURE: 144 MMHG | OXYGEN SATURATION: 98 % | HEART RATE: 78 BPM | BODY MASS INDEX: 31.1 KG/M2 | WEIGHT: 169 LBS | TEMPERATURE: 97.7 F

## 2024-02-01 DIAGNOSIS — R79.0 LOW MAGNESIUM LEVEL: ICD-10-CM

## 2024-02-01 DIAGNOSIS — G30.9 MIXED DEMENTIA (HCC): ICD-10-CM

## 2024-02-01 DIAGNOSIS — R53.83 OTHER FATIGUE: ICD-10-CM

## 2024-02-01 DIAGNOSIS — D64.9 ANEMIA, UNSPECIFIED TYPE: ICD-10-CM

## 2024-02-01 DIAGNOSIS — F01.50 MIXED DEMENTIA (HCC): ICD-10-CM

## 2024-02-01 DIAGNOSIS — S52.592D OTHER CLOSED FRACTURE OF DISTAL END OF LEFT RADIUS WITH ROUTINE HEALING, SUBSEQUENT ENCOUNTER: Primary | ICD-10-CM

## 2024-02-01 DIAGNOSIS — C19 COLORECTAL CANCER (HCC): ICD-10-CM

## 2024-02-01 DIAGNOSIS — E11.9 TYPE 2 DIABETES MELLITUS WITHOUT COMPLICATION, WITHOUT LONG-TERM CURRENT USE OF INSULIN (HCC): ICD-10-CM

## 2024-02-01 DIAGNOSIS — F03.93 DEMENTIA WITH MOOD DISTURBANCE, UNSPECIFIED DEMENTIA SEVERITY, UNSPECIFIED DEMENTIA TYPE (HCC): ICD-10-CM

## 2024-02-01 DIAGNOSIS — F02.80 MIXED DEMENTIA (HCC): ICD-10-CM

## 2024-02-01 PROBLEM — E43 UNSPECIFIED SEVERE PROTEIN-CALORIE MALNUTRITION (HCC): Status: ACTIVE | Noted: 2024-02-01

## 2024-02-01 PROBLEM — K51.00 PANCOLITIS (HCC): Status: ACTIVE | Noted: 2024-02-01

## 2024-02-01 PROBLEM — K86.1 OTHER CHRONIC PANCREATITIS (HCC): Status: ACTIVE | Noted: 2024-02-01

## 2024-02-01 LAB
ANION GAP SERPL CALCULATED.3IONS-SCNC: 10 MMOL/L
BASOPHILS # BLD AUTO: 0.02 THOUSANDS/ÂΜL (ref 0–0.1)
BASOPHILS NFR BLD AUTO: 0 % (ref 0–1)
BUN SERPL-MCNC: 16 MG/DL (ref 5–25)
CALCIUM SERPL-MCNC: 9.3 MG/DL (ref 8.4–10.2)
CHLORIDE SERPL-SCNC: 101 MMOL/L (ref 96–108)
CO2 SERPL-SCNC: 26 MMOL/L (ref 21–32)
CREAT SERPL-MCNC: 0.71 MG/DL (ref 0.6–1.3)
EOSINOPHIL # BLD AUTO: 0.07 THOUSAND/ÂΜL (ref 0–0.61)
EOSINOPHIL NFR BLD AUTO: 1 % (ref 0–6)
ERYTHROCYTE [DISTWIDTH] IN BLOOD BY AUTOMATED COUNT: 16.1 % (ref 11.6–15.1)
GFR SERPL CREATININE-BSD FRML MDRD: 75 ML/MIN/1.73SQ M
GLUCOSE P FAST SERPL-MCNC: 124 MG/DL (ref 65–99)
HCT VFR BLD AUTO: 35.3 % (ref 34.8–46.1)
HGB BLD-MCNC: 11.1 G/DL (ref 11.5–15.4)
IMM GRANULOCYTES # BLD AUTO: 0.04 THOUSAND/UL (ref 0–0.2)
IMM GRANULOCYTES NFR BLD AUTO: 0 % (ref 0–2)
LYMPHOCYTES # BLD AUTO: 1.11 THOUSANDS/ÂΜL (ref 0.6–4.47)
LYMPHOCYTES NFR BLD AUTO: 12 % (ref 14–44)
MAGNESIUM SERPL-MCNC: 1.8 MG/DL (ref 1.9–2.7)
MCH RBC QN AUTO: 27.9 PG (ref 26.8–34.3)
MCHC RBC AUTO-ENTMCNC: 31.4 G/DL (ref 31.4–37.4)
MCV RBC AUTO: 89 FL (ref 82–98)
MONOCYTES # BLD AUTO: 0.65 THOUSAND/ÂΜL (ref 0.17–1.22)
MONOCYTES NFR BLD AUTO: 7 % (ref 4–12)
NEUTROPHILS # BLD AUTO: 7.18 THOUSANDS/ÂΜL (ref 1.85–7.62)
NEUTS SEG NFR BLD AUTO: 80 % (ref 43–75)
NRBC BLD AUTO-RTO: 0 /100 WBCS
PLATELET # BLD AUTO: 288 THOUSANDS/UL (ref 149–390)
PMV BLD AUTO: 10.8 FL (ref 8.9–12.7)
POTASSIUM SERPL-SCNC: 5.1 MMOL/L (ref 3.5–5.3)
RBC # BLD AUTO: 3.98 MILLION/UL (ref 3.81–5.12)
SODIUM SERPL-SCNC: 137 MMOL/L (ref 135–147)
WBC # BLD AUTO: 9.07 THOUSAND/UL (ref 4.31–10.16)

## 2024-02-01 PROCEDURE — 99214 OFFICE O/P EST MOD 30 MIN: CPT | Performed by: NURSE PRACTITIONER

## 2024-02-01 PROCEDURE — 80048 BASIC METABOLIC PNL TOTAL CA: CPT

## 2024-02-01 PROCEDURE — 83735 ASSAY OF MAGNESIUM: CPT

## 2024-02-01 PROCEDURE — G0180 MD CERTIFICATION HHA PATIENT: HCPCS | Performed by: STUDENT IN AN ORGANIZED HEALTH CARE EDUCATION/TRAINING PROGRAM

## 2024-02-01 PROCEDURE — 85025 COMPLETE CBC W/AUTO DIFF WBC: CPT

## 2024-02-01 PROCEDURE — 36415 COLL VENOUS BLD VENIPUNCTURE: CPT

## 2024-02-01 RX ORDER — POTASSIUM CHLORIDE 750 MG/1
10 TABLET, EXTENDED RELEASE ORAL 2 TIMES DAILY
Qty: 90 TABLET | Refills: 1 | Status: SHIPPED | OUTPATIENT
Start: 2024-02-01

## 2024-02-01 RX ORDER — LANOLIN ALCOHOL/MO/W.PET/CERES
400 CREAM (GRAM) TOPICAL DAILY
Qty: 90 TABLET | Refills: 1 | Status: SHIPPED | OUTPATIENT
Start: 2024-02-01

## 2024-02-01 RX ORDER — QUETIAPINE FUMARATE 25 MG/1
50 TABLET, FILM COATED ORAL
Qty: 60 TABLET | Refills: 0 | Status: SHIPPED | OUTPATIENT
Start: 2024-02-01 | End: 2024-03-02

## 2024-02-01 NOTE — PROGRESS NOTES
[facility]    ASSESSMENT AND PLAN     1. Other closed fracture of distal end of left radius with routine healing, subsequent encounter  Assessment & Plan:  S/p fall with left wrist fracture 1/2  Non-operative management   Casted - maintain NWB LUE     F/u with orthopedics outpatient as scheduled      2. Colorectal cancer suspected  -     potassium chloride (Klor-Con M10) 10 mEq tablet; Take 1 tablet (10 mEq total) by mouth 2 (two) times a day  -     magnesium Oxide (MAG-OX) 400 mg TABS; Take 1 tablet (400 mg total) by mouth daily    3. Mixed dementia (HCC)  -     QUEtiapine (SEROquel) 25 mg tablet; Take 2 tablets (50 mg total) by mouth daily at bedtime    4. Anemia, unspecified type  Assessment & Plan:  History of  With increased fatigue - will check labs today    Orders:  -     CBC and differential; Future    5. Type 2 diabetes mellitus without complication, without long-term current use of insulin (HCC)    6. Other fatigue  Assessment & Plan:  Check labs as above  Encourage maintaining sleep/wake cycle  Appropriate daytime stimulation  Healthy diet - good hydration  Continue supplements as advised  Hopeful PT will increase energy, mobility      Orders:  -     CBC and differential; Future  -     Basic metabolic panel; Future  -     Magnesium; Future    7. Low magnesium level  -     Magnesium; Future    8. Dementia with mood disturbance, unspecified dementia severity, unspecified dementia type (HCC)  Assessment & Plan:  Symptoms wax/wane  [facility]                   SUBJECTIVE       Patient ID: Hilary Oleary is a 89 y.o. female.    Chief Complaint   Patient presents with    Follow-up       HISTORY OF PRESENT ILLNESS    Follow up to recent hospitalization  Record reviewed - No TCM completed  Pt was discharged to SNF prior to home   Hospital 1/2-8; SNF 1/8-24  Has skilled nursing and PT coming in  Remains pleasantly confused. Cast intact. Does not appear in pain,  but daughter reports noting  "increased fatigue since home              The following portions of the patient's history were reviewed and updated as appropriate: allergies, current medications, past family history, past medical history, past social history, past surgical history, and problem list.    REVIEW OF SYSTEMS  Note dementia - ROS obtained through daughter present at visit today (primary caregiver)  Review of Systems   Constitutional:  Positive for activity change, appetite change and fatigue. Negative for fever.   Respiratory: Negative.     Cardiovascular: Negative.    Gastrointestinal: Negative.    Genitourinary: Negative.         No changes   Psychiatric/Behavioral:  Positive for confusion (baseline) and sleep disturbance. Negative for agitation and behavioral problems.        OBJECTIVE      VITAL SIGNS  /90 (BP Location: Left arm, Patient Position: Sitting, Cuff Size: Adult)   Pulse 78   Temp 97.7 °F (36.5 °C)   Ht 5' 2\" (1.575 m)   Wt 76.7 kg (169 lb)   SpO2 98%   BMI 30.91 kg/m²     CURRENT MEDICATIONS    Current Outpatient Medications:     Acetaminophen (Tylenol) 325 MG CAPS, Take by mouth as needed, Disp: , Rfl:     amLODIPine (NORVASC) 2.5 mg tablet, Take 1 tablet (2.5 mg total) by mouth daily, Disp: 20 tablet, Rfl: 0    magnesium Oxide (MAG-OX) 400 mg TABS, Take 1 tablet (400 mg total) by mouth daily, Disp: 90 tablet, Rfl: 1    Melatonin 10 MG TABS, Take 10 mg by mouth daily at bedtime, Disp: , Rfl:     metoprolol succinate (TOPROL-XL) 100 mg 24 hr tablet, TAKE 1 TABLET BY MOUTH EVERY DAY IN THE EVENING, Disp: 90 tablet, Rfl: 1    polyethylene glycol (MIRALAX) 17 g packet, Take 17 g by mouth daily, Disp: , Rfl:     potassium chloride (Klor-Con M10) 10 mEq tablet, Take 1 tablet (10 mEq total) by mouth 2 (two) times a day, Disp: 90 tablet, Rfl: 1    QUEtiapine (SEROquel) 25 mg tablet, Take 2 tablets (50 mg total) by mouth daily at bedtime, Disp: 60 tablet, Rfl: 0    sertraline (ZOLOFT) 50 mg tablet, Take 75 mg by " mouth daily 1 and half tablet by mouth daily, Disp: , Rfl:       PHYSICAL EXAMINATION   Physical Exam  Vitals and nursing note reviewed. Chaperone present: daughter - primary caregiver.   Constitutional:       Appearance: Normal appearance.   Cardiovascular:      Rate and Rhythm: Normal rate and regular rhythm.   Pulmonary:      Effort: Pulmonary effort is normal. No respiratory distress.      Breath sounds: Normal breath sounds and air entry. No wheezing, rhonchi or rales.      Comments: Encourage deep breathing exercise  Musculoskeletal:      Right lower leg: No edema.      Left lower leg: No edema.      Comments: Right wrist casted  Good skin coloring; good finger movement   Neurological:      Mental Status: She is alert. Mental status is at baseline. She is confused.      Comments: Pleasantly confused  Appears at baseline cognitively   Psychiatric:         Attention and Perception: Attention normal.         Mood and Affect: Mood normal.         Behavior: Behavior normal.

## 2024-02-02 ENCOUNTER — HOME CARE VISIT (OUTPATIENT)
Dept: HOME HEALTH SERVICES | Facility: HOME HEALTHCARE | Age: 89
End: 2024-02-02
Payer: COMMERCIAL

## 2024-02-02 ENCOUNTER — TELEPHONE (OUTPATIENT)
Age: 89
End: 2024-02-02

## 2024-02-02 VITALS
RESPIRATION RATE: 18 BRPM | SYSTOLIC BLOOD PRESSURE: 124 MMHG | HEART RATE: 76 BPM | OXYGEN SATURATION: 97 % | DIASTOLIC BLOOD PRESSURE: 64 MMHG

## 2024-02-02 VITALS — HEART RATE: 78 BPM | OXYGEN SATURATION: 95 %

## 2024-02-02 DIAGNOSIS — R39.9 UTI SYMPTOMS: Primary | ICD-10-CM

## 2024-02-02 PROCEDURE — 87086 URINE CULTURE/COLONY COUNT: CPT | Performed by: NURSE PRACTITIONER

## 2024-02-02 PROCEDURE — G0299 HHS/HOSPICE OF RN EA 15 MIN: HCPCS

## 2024-02-02 PROCEDURE — G0151 HHCP-SERV OF PT,EA 15 MIN: HCPCS

## 2024-02-02 PROCEDURE — G0152 HHCP-SERV OF OT,EA 15 MIN: HCPCS

## 2024-02-02 NOTE — TELEPHONE ENCOUNTER
They need to contact the hospital for a hospital discharge summary. No call back number left. Could not call [employer] back

## 2024-02-02 NOTE — TELEPHONE ENCOUNTER
Evin from [facility] Management Team called requesting the patient Discharge summary from the hospital 1/8 to 1/24 be faxed to [phone]. Please advise.

## 2024-02-03 LAB — BACTERIA UR CULT: NORMAL

## 2024-02-05 ENCOUNTER — HOME CARE VISIT (OUTPATIENT)
Dept: HOME HEALTH SERVICES | Facility: HOME HEALTHCARE | Age: 89
End: 2024-02-05
Payer: COMMERCIAL

## 2024-02-05 VITALS — HEART RATE: 63 BPM | OXYGEN SATURATION: 97 %

## 2024-02-05 VITALS — HEART RATE: 76 BPM | OXYGEN SATURATION: 97 %

## 2024-02-05 PROCEDURE — G0151 HHCP-SERV OF PT,EA 15 MIN: HCPCS

## 2024-02-05 NOTE — TELEPHONE ENCOUNTER
Caller: Bianca Oleary -PT Daughter     Doctor: Flor    Reason for call: Calling to confirm her mother's appointment date is okay for her wrist fracture   Please advise (NEW PT)    Call back#: [phone]

## 2024-02-06 ENCOUNTER — TELEPHONE (OUTPATIENT)
Age: 89
End: 2024-02-06

## 2024-02-06 ENCOUNTER — HOME CARE VISIT (OUTPATIENT)
Dept: HOME HEALTH SERVICES | Facility: HOME HEALTHCARE | Age: 89
End: 2024-02-06
Payer: COMMERCIAL

## 2024-02-06 VITALS — OXYGEN SATURATION: 98 % | HEART RATE: 83 BPM

## 2024-02-06 PROCEDURE — G0152 HHCP-SERV OF OT,EA 15 MIN: HCPCS

## 2024-02-06 NOTE — TELEPHONE ENCOUNTER
Patient's daughter, Pallavi, called in regards to the urine results. She said they have been waiting for these results and feel the patient is in need of an antibiotic. Please review results and call patients daughter today with results. Patient is delirious and altered.

## 2024-02-06 NOTE — TELEPHONE ENCOUNTER
Bianca daughter calling for Urine result done 2/2/24.  Bianca stated mom has been more weak and confused.    Please review and advise  Thank you

## 2024-02-06 NOTE — PROGRESS NOTES
[facility]  [address] [facility], [address] [place] [address]  [phone]    Social Work Follow-Up    LSW met with Buddy Torre for follow up visit. Completed Pandora Cognitive Assessment, score 18/30 (previously 16/30 in 3/1/23), and Geriatric Depression Screen, 3/15 (previously 7/15 in 3/1/23). Yonatan Cognitive Assessment (MoCA) Version 8.3  Education: HS    Points Earned POSSIBLE Points   Visuospatial/Executive   Alternating [place] Making 0 1   Visuoconstructional skills 0 1   Visuoconstructional skills (clock) 3 3   Naming   Naming Animals 3 3   Attention   Digit Span 1 2   Vigilance (letters) 1 1   Serial 7 subtraction 2 3   Language   Sentence Repetition 2 2   Verbal fluency 0 1   Abstraction   Abstraction (word pairings) 2 2   Delayed recall   Delayed recall 0 5   Memory index score: 8/15   Orientation   Orientation 3 6   TOTAL SCORE: 18/30  (Normal ?26/30)   Additional notes:      LSW to remain available as needed. Results have been released via [employer]. Please verify that these have been viewed by patient. If not, please call patient with results.    I have reviewed your recent results.    CBC NORMAL-The CBC appears to be stable at this time with no sign of major anemia, abnormal white count or platelet abnormality.  CMP/BMP NORMAL-The electrolytes all appear stable at this time.  This includes kidney functions along with routine electrolytes like sugar, potassium and sodium.  If it was a CMP test, the liver enzymes were noted to be stable also.  LIPID NORMAL SCREEN-The cholesterol panel screening showed levels that are considered at target at this time.  Recheck each year.   TSH NORMAL-The TSH screening indicated a normal function of the thyroid.    Please let me know if you have any additional questions or concerns about above.

## 2024-02-06 NOTE — TELEPHONE ENCOUNTER
Pallavi is advised of Esmer's result note. Due to reported change in mental status which was not present at visit, I advised them to take her to the ER for immediate evaluation. Call us for f/u after discharge.

## 2024-02-06 NOTE — TELEPHONE ENCOUNTER
Thank you for relaying my message from 2/4.  I agree with refer to ER as these reported symptoms do appear to be a change from her baseline

## 2024-02-07 ENCOUNTER — OFFICE VISIT (OUTPATIENT)
Dept: URGENT CARE | Facility: CLINIC | Age: 89
End: 2024-02-07
Payer: COMMERCIAL

## 2024-02-07 ENCOUNTER — HOME CARE VISIT (OUTPATIENT)
Dept: HOME HEALTH SERVICES | Facility: HOME HEALTHCARE | Age: 89
End: 2024-02-07
Payer: COMMERCIAL

## 2024-02-07 VITALS
RESPIRATION RATE: 18 BRPM | SYSTOLIC BLOOD PRESSURE: 122 MMHG | OXYGEN SATURATION: 97 % | HEART RATE: 68 BPM | DIASTOLIC BLOOD PRESSURE: 74 MMHG

## 2024-02-07 VITALS
SYSTOLIC BLOOD PRESSURE: 92 MMHG | RESPIRATION RATE: 18 BRPM | DIASTOLIC BLOOD PRESSURE: 64 MMHG | OXYGEN SATURATION: 100 % | HEART RATE: 68 BPM | TEMPERATURE: 96.5 F

## 2024-02-07 DIAGNOSIS — R41.0 CONFUSION: Primary | ICD-10-CM

## 2024-02-07 PROBLEM — D64.9 ANEMIA: Status: ACTIVE | Noted: 2024-02-07

## 2024-02-07 PROBLEM — R53.83 OTHER FATIGUE: Status: ACTIVE | Noted: 2024-02-07

## 2024-02-07 PROCEDURE — G0151 HHCP-SERV OF PT,EA 15 MIN: HCPCS

## 2024-02-07 PROCEDURE — G0299 HHS/HOSPICE OF RN EA 15 MIN: HCPCS

## 2024-02-07 PROCEDURE — 99213 OFFICE O/P EST LOW 20 MIN: CPT | Performed by: PHYSICIAN ASSISTANT
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
## 2024-02-07 NOTE — ASSESSMENT & PLAN NOTE
S/p fall with left wrist fracture 1/2  Non-operative management   Casted - maintain NWB LUE     F/u with orthopedics outpatient as scheduled

## 2024-02-07 NOTE — PATIENT INSTRUCTIONS
Discussed with family members.  They do not want to do a simple urine dip at this time but prefer that a catheterized specimen be obtained.  Informed that we do not do that here.    Also reviewed recommendations from primary care office yesterday.  Daughter is not wanting to take mother to emergency room at this time.    Recommend following up with primary care office but if significantly worsening then will provider concurs that should get further evaluation at the emergency room.

## 2024-02-07 NOTE — ASSESSMENT & PLAN NOTE
Check labs as above  Encourage maintaining sleep/wake cycle  Appropriate daytime stimulation  Healthy diet - good hydration  Continue supplements as advised  Hopeful PT will increase energy, mobility

## 2024-02-07 NOTE — CASE COMMUNICATION
OT discipline discharge due to pt at [facility] with current restrictions for LUE and hard cast placement.  Please refer back once restrictions are lifted if pt is in need of OT.

## 2024-02-07 NOTE — TELEPHONE ENCOUNTER
Reason for call:   [x] Refill   [] Prior Auth  [] Other:     Office:   [x] PCP/Provider - [facility]  [] Specialty/Provider -     Medication: sertraline    Dose/Frequency: 50mg (1-1/2 tabs qd)    Quantity: 135    Pharmacy: [facility]/pharmacy #[id number] - [place] - [address] [place] [address]     Does the patient have enough for 3 days?   [x] Yes   [] No - Send as HP to POD

## 2024-02-08 VITALS — HEART RATE: 68 BPM | OXYGEN SATURATION: 97 %

## 2024-02-11 ENCOUNTER — HOME CARE VISIT (OUTPATIENT)
Dept: HOME HEALTH SERVICES | Facility: HOME HEALTHCARE | Age: 89
End: 2024-02-11
Payer: COMMERCIAL

## 2024-02-15 ENCOUNTER — OFFICE VISIT (OUTPATIENT)
Dept: OBGYN CLINIC | Facility: MEDICAL CENTER | Age: 89
End: 2024-02-15
Payer: COMMERCIAL

## 2024-02-15 ENCOUNTER — HOME CARE VISIT (OUTPATIENT)
Dept: HOME HEALTH SERVICES | Facility: HOME HEALTHCARE | Age: 89
End: 2024-02-15
Payer: COMMERCIAL

## 2024-02-15 ENCOUNTER — APPOINTMENT (OUTPATIENT)
Dept: RADIOLOGY | Facility: MEDICAL CENTER | Age: 89
End: 2024-02-15
Payer: COMMERCIAL

## 2024-02-15 VITALS
SYSTOLIC BLOOD PRESSURE: 113 MMHG | BODY MASS INDEX: 31.1 KG/M2 | WEIGHT: 169 LBS | HEIGHT: 62 IN | DIASTOLIC BLOOD PRESSURE: 75 MMHG | HEART RATE: 96 BPM

## 2024-02-15 DIAGNOSIS — S52.502D CLOSED FRACTURE OF DISTAL END OF LEFT RADIUS WITH ROUTINE HEALING, UNSPECIFIED FRACTURE MORPHOLOGY, SUBSEQUENT ENCOUNTER: ICD-10-CM

## 2024-02-15 DIAGNOSIS — S52.502D CLOSED FRACTURE OF DISTAL END OF LEFT RADIUS WITH ROUTINE HEALING, UNSPECIFIED FRACTURE MORPHOLOGY, SUBSEQUENT ENCOUNTER: Primary | ICD-10-CM

## 2024-02-15 PROCEDURE — G0151 HHCP-SERV OF PT,EA 15 MIN: HCPCS

## 2024-02-15 PROCEDURE — 99203 OFFICE O/P NEW LOW 30 MIN: CPT | Performed by: ORTHOPAEDIC SURGERY

## 2024-02-15 PROCEDURE — 73110 X-RAY EXAM OF WRIST: CPT
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
## 2024-02-18 VITALS — OXYGEN SATURATION: 96 % | HEART RATE: 71 BPM

## 2024-02-22 ENCOUNTER — TELEPHONE (OUTPATIENT)
Age: 89
End: 2024-02-22

## 2024-02-22 NOTE — TELEPHONE ENCOUNTER
NANCY received a call from patient's daughter requesting homecare list. DONNAW emailed homecare list to patient's daughter. Patient also started with palliative care. LSW remains available as needed.

## 2024-02-23 ENCOUNTER — HOSPITAL ENCOUNTER (EMERGENCY)
Facility: HOSPITAL | Age: 89
Discharge: HOME | End: 2024-02-23
Attending: EMERGENCY MEDICINE
Payer: COMMERCIAL

## 2024-02-23 VITALS
RESPIRATION RATE: 18 BRPM | OXYGEN SATURATION: 94 % | TEMPERATURE: 97.5 F | DIASTOLIC BLOOD PRESSURE: 78 MMHG | SYSTOLIC BLOOD PRESSURE: 140 MMHG | HEART RATE: 69 BPM

## 2024-02-23 DIAGNOSIS — R32 URINARY INCONTINENCE: ICD-10-CM

## 2024-02-23 DIAGNOSIS — Z71.89 ADVANCE CARE PLANNING: ICD-10-CM

## 2024-02-23 DIAGNOSIS — J18.9 RIGHT LOWER LOBE PNEUMONIA: Primary | ICD-10-CM

## 2024-02-23 DIAGNOSIS — R82.90 MALODOROUS URINE: ICD-10-CM

## 2024-02-23 LAB
BACTERIA UR QL AUTO: ABNORMAL /HPF
BILIRUB UR QL STRIP: NEGATIVE
CLARITY UR: CLEAR
COLOR UR: YELLOW
GLUCOSE UR STRIP-MCNC: NEGATIVE MG/DL
HGB UR QL STRIP.AUTO: ABNORMAL
HYALINE CASTS #/AREA URNS LPF: ABNORMAL /LPF
KETONES UR STRIP-MCNC: NEGATIVE MG/DL
LEUKOCYTE ESTERASE UR QL STRIP: ABNORMAL
MUCOUS THREADS UR QL AUTO: ABNORMAL
NITRITE UR QL STRIP: NEGATIVE
NON-SQ EPI CELLS URNS QL MICRO: ABNORMAL /HPF
PH UR STRIP.AUTO: 6 [PH]
PROT UR STRIP-MCNC: ABNORMAL MG/DL
RBC #/AREA URNS AUTO: ABNORMAL /HPF
SP GR UR STRIP.AUTO: 1.02 (ref 1–1.03)
UROBILINOGEN UR STRIP-ACNC: <2 MG/DL
WBC #/AREA URNS AUTO: ABNORMAL /HPF

## 2024-02-23 PROCEDURE — 87086 URINE CULTURE/COLONY COUNT: CPT | Performed by: EMERGENCY MEDICINE

## 2024-02-23 PROCEDURE — 99284 EMERGENCY DEPT VISIT MOD MDM: CPT | Performed by: EMERGENCY MEDICINE

## 2024-02-23 PROCEDURE — 81001 URINALYSIS AUTO W/SCOPE: CPT | Performed by: EMERGENCY MEDICINE

## 2024-02-23 PROCEDURE — 99284 EMERGENCY DEPT VISIT MOD MDM: CPT

## 2024-02-23 RX ORDER — AMOXICILLIN AND CLAVULANATE POTASSIUM 875; 125 MG/1; MG/1
1 TABLET, FILM COATED ORAL EVERY 12 HOURS
Qty: 14 TABLET | Refills: 0 | Status: SHIPPED | OUTPATIENT
Start: 2024-02-23 | End: 2024-03-01

## 2024-02-23 RX ORDER — AMOXICILLIN AND CLAVULANATE POTASSIUM 875; 125 MG/1; MG/1
1 TABLET, FILM COATED ORAL EVERY 12 HOURS
Qty: 14 TABLET | Refills: 0 | Status: SHIPPED | OUTPATIENT
Start: 2024-02-23 | End: 2024-02-23

## 2024-02-23 RX ORDER — ONDANSETRON 2 MG/ML
1 INJECTION INTRAMUSCULAR; INTRAVENOUS ONCE
Status: DISCONTINUED | OUTPATIENT
Start: 2024-02-23 | End: 2024-02-23

## 2024-02-23 RX ORDER — AMOXICILLIN AND CLAVULANATE POTASSIUM 875; 125 MG/1; MG/1
1 TABLET, FILM COATED ORAL ONCE
Status: COMPLETED | OUTPATIENT
Start: 2024-02-23 | End: 2024-02-23

## 2024-02-23 RX ADMIN — AMOXICILLIN AND CLAVULANATE POTASSIUM 1 TABLET: 875; 125 TABLET, FILM COATED ORAL at 15:21

## 2024-02-23 NOTE — ED ATTENDING ATTESTATION
2/23/2024  I, Rock sU MD, saw and evaluated the patient. I have discussed the patient with the resident/non-physician practitioner and agree with the resident's/non-physician practitioner's findings, Plan of Care, and MDM as documented in the resident's/non-physician practitioner's note, except where noted. All available labs and Radiology studies were reviewed.  I was present for key portions of any procedure(s) performed by the resident/non-physician practitioner and I was immediately available to provide assistance.       At this point I agree with the current assessment done in the Emergency Department.  I have conducted an independent evaluation of this patient a history and physical is as follows:    89-year-old woman with history of colon mass and history of dementia currently on palliative care through [facility] presenting after outpatient x-ray showed right lower lobe consolidation.  History obtained from patient and from her daughters.  Patient has had a cough productive of sputum for the past 11 days.  Family has noted some episodes where she seems short of breath, but she is not short of breath at present.  She denies having any chest pain or abdominal pain.  Patient has been having problems with urinary incontinence for several months and has had frequent UTIs.  Family notes that she has developed malodorous urine over the past few days.  Additionally notes that the patient was treated for C. difficile 1 month ago.  She is having about 1 episode of diarrhea a day at this point.  No fever.  On exam patient awake and alert in no acute distress.  Moist mucous membranes.  Neck supple.  Heart regular rate and rhythm, no murmurs rubs or gallops.  Normal work of breathing without any tachypnea.  There are mild end expiratory wheezes in all lung fields.  No rales or rhonchi.  Abdomen soft, nontender, nondistended.  Skin warm and dry.    I had an extensive discussion with the patient's family  regarding options of outpatient versus inpatient treatment.  I also had a phone conversations with the patient's granddaughter who is a hospitalist/critical care physician assistant in [place].  After these discussions family is in agreement with plan to pursue outpatient treatment preferentially, and they will return for reevaluation if the patient is worsening or is not getting better in the next few days.  I do think that pursuing outpatient therapy preferentially is a good course of action given that the patient has stable vital signs without fever or hypoxia, does not have multilobar pneumonia, and there are multiple potential risks of hospitalization.  Family also expressed that they are currently receiving palliative care resources through [facility] but are considering transition to hospice, and they have a lot of questions regarding this.  The had a discussion with on-call hospice, there is not anyone currently on for hospice in the hospital but they were happy to help expedite outpatient follow-up if we will place a referral which was done.  Patient started on Augmentin.  Referred to urology due to ongoing urinary incontinence.  Urinalysis and urine culture will be sent.  The patient is being started on Augmentin for the pneumonia which would likely cover UTI, however we are still sending urine culture in case there would need to be change of antibiotics for UTI depending on sensitivities.    ED Course         Critical Care Time  Procedures
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
## 2024-02-24 LAB — BACTERIA UR CULT: NORMAL

## 2024-02-29 ENCOUNTER — TELEPHONE (OUTPATIENT)
Age: 89
End: 2024-02-29

## 2024-02-29 NOTE — TELEPHONE ENCOUNTER
Raya Almonte [facility] ([phone]) calldc to confirm patient's appointment on Monday, 3/4/24, and the need for paperwork to be completed by the provider as patient will be relocating into memory care.    Paperwork to be fax to this office Attn:  Bianca.  Once received it will be placed in the provider's mail bin.

## 2024-03-02 DIAGNOSIS — I10 BENIGN ESSENTIAL HYPERTENSION: Chronic | ICD-10-CM

## 2024-03-02 RX ORDER — METOPROLOL SUCCINATE 100 MG/1
TABLET, EXTENDED RELEASE ORAL
Qty: 90 TABLET | Refills: 1 | Status: SHIPPED | OUTPATIENT
Start: 2024-03-02

## 2024-03-04 ENCOUNTER — TELEPHONE (OUTPATIENT)
Age: 89
End: 2024-03-04

## 2024-03-04 NOTE — TELEPHONE ENCOUNTER
Narinder called from [facility] that she will be faxing over DMV paperwork for provider to fill out so patient can be admitted to [facility].

## 2024-03-04 NOTE — TELEPHONE ENCOUNTER
Patients daughter called in regards to the [facility] [employer] paperwork.  They need this completed as soon as possible so the patient can get into [facility].  Please return patients daughters call when this is completed.

## 2024-03-05 NOTE — TELEPHONE ENCOUNTER
Spoke with patients daughter (Bianca) to let her know I left a message with Narinder that her know that Hilary never had an appt here on 3/4/24. That appt was for [facility] [place] and it was cancelled due to Bianca being sick. I also let her know I never received any paperwork from [facility] so I left her the fax number up front.

## 2024-03-06 ENCOUNTER — OFFICE VISIT (OUTPATIENT)
Dept: OBGYN CLINIC | Facility: MEDICAL CENTER | Age: 89
End: 2024-03-06

## 2024-03-06 ENCOUNTER — APPOINTMENT (OUTPATIENT)
Dept: RADIOLOGY | Facility: MEDICAL CENTER | Age: 89
End: 2024-03-06
Payer: COMMERCIAL

## 2024-03-06 VITALS
BODY MASS INDEX: 31.1 KG/M2 | DIASTOLIC BLOOD PRESSURE: 83 MMHG | SYSTOLIC BLOOD PRESSURE: 131 MMHG | HEART RATE: 74 BPM | WEIGHT: 169 LBS | HEIGHT: 62 IN

## 2024-03-06 DIAGNOSIS — S52.502D CLOSED FRACTURE OF DISTAL END OF LEFT RADIUS WITH ROUTINE HEALING, UNSPECIFIED FRACTURE MORPHOLOGY, SUBSEQUENT ENCOUNTER: Primary | ICD-10-CM

## 2024-03-06 DIAGNOSIS — S52.502D CLOSED FRACTURE OF DISTAL END OF LEFT RADIUS WITH ROUTINE HEALING, UNSPECIFIED FRACTURE MORPHOLOGY, SUBSEQUENT ENCOUNTER: ICD-10-CM

## 2024-03-06 PROCEDURE — 99024 POSTOP FOLLOW-UP VISIT: CPT | Performed by: ORTHOPAEDIC SURGERY

## 2024-03-06 PROCEDURE — 73110 X-RAY EXAM OF WRIST: CPT

## 2024-03-06 RX ORDER — CEPHALEXIN 500 MG/1
CAPSULE ORAL
COMMUNITY
Start: 2024-02-07
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
## 2024-03-11 DIAGNOSIS — C19 COLORECTAL CANCER (HCC): ICD-10-CM

## 2024-03-11 RX ORDER — POTASSIUM CHLORIDE 750 MG/1
10 TABLET, EXTENDED RELEASE ORAL 2 TIMES DAILY
Qty: 180 TABLET | Refills: 0 | Status: SHIPPED | OUTPATIENT
Start: 2024-03-11

## 2024-03-11 NOTE — TELEPHONE ENCOUNTER
[facility] Tyrel Zuniga called requesting a medication list signed by the doctor. As well as an order for PT/OT eval and treat. Please fax this to their office asap. Pt is set to move into dementia care facility on Wed 3/13. This information is needed preferably today but tomorrow is ok too. Their office is also sending over a copy of the med list that the family has just incase it's needed.    Attn: Raya  Fax: [phone]

## 2024-03-12 ENCOUNTER — TELEPHONE (OUTPATIENT)
Age: 89
End: 2024-03-12

## 2024-03-12 NOTE — TELEPHONE ENCOUNTER
[facility]zy [facility] called stating that they received the medication list but they need either a stamp with the providers name or a signature from the provider she states that she did fax over a form requesting this today please call Raya at  [phone] with any questions

## 2024-03-12 NOTE — TELEPHONE ENCOUNTER
Caretaker at [facility] calling again stating they still have not received packet with current medication list attached. Patient is supposed to be moving in tomorrow, but if they do not receive packet signed with medication list she cannot move in. I saw a signed packet scanned into media but not with medication list attached, verified fax number for [facility]. Please Fax again [phone]

## 2024-03-21 ENCOUNTER — TELEPHONE (OUTPATIENT)
Dept: FAMILY MEDICINE CLINIC | Facility: CLINIC | Age: 89
End: 2024-03-21

## 2024-03-21 NOTE — TELEPHONE ENCOUNTER
"Pt currently in assisted living and cancelled last appointment w/notes:  \"We are going to be using the physician that visits [facility] going forward.\"  Please remove Travis Baker as pt's PCP. Thank you.  "

## 2024-03-25 NOTE — TELEPHONE ENCOUNTER
03/25/24 1:59 PM     The office's request has been received, reviewed, and the patient chart updated. The PCP has successfully been removed with a patient attribution note. This message will now be completed.    Thank you  Rosalva Zarate

## 2024-04-18 ENCOUNTER — OFFICE VISIT (OUTPATIENT)
Dept: OBGYN CLINIC | Facility: MEDICAL CENTER | Age: 89
End: 2024-04-18
Payer: COMMERCIAL

## 2024-04-18 ENCOUNTER — APPOINTMENT (OUTPATIENT)
Dept: RADIOLOGY | Facility: MEDICAL CENTER | Age: 89
End: 2024-04-18
Payer: COMMERCIAL

## 2024-04-18 VITALS
BODY MASS INDEX: 29.44 KG/M2 | HEIGHT: 62 IN | SYSTOLIC BLOOD PRESSURE: 117 MMHG | HEART RATE: 87 BPM | DIASTOLIC BLOOD PRESSURE: 74 MMHG | WEIGHT: 160 LBS

## 2024-04-18 DIAGNOSIS — S52.502D CLOSED FRACTURE OF DISTAL END OF LEFT RADIUS WITH ROUTINE HEALING, UNSPECIFIED FRACTURE MORPHOLOGY, SUBSEQUENT ENCOUNTER: ICD-10-CM

## 2024-04-18 DIAGNOSIS — S52.502D CLOSED FRACTURE OF DISTAL END OF LEFT RADIUS WITH ROUTINE HEALING, UNSPECIFIED FRACTURE MORPHOLOGY, SUBSEQUENT ENCOUNTER: Primary | ICD-10-CM

## 2024-04-18 PROCEDURE — 73110 X-RAY EXAM OF WRIST: CPT

## 2024-04-18 PROCEDURE — 99213 OFFICE O/P EST LOW 20 MIN: CPT | Performed by: ORTHOPAEDIC SURGERY

## 2024-04-18 RX ORDER — BENZONATATE 100 MG/1
CAPSULE ORAL
COMMUNITY
Start: 2024-02-28

## 2024-04-18 RX ORDER — ATORVASTATIN CALCIUM 10 MG/1
TABLET, FILM COATED ORAL
COMMUNITY
Start: 2024-04-16

## 2024-04-18 RX ORDER — ALBUTEROL SULFATE 0.63 MG/3ML
SOLUTION RESPIRATORY (INHALATION)
COMMUNITY
Start: 2024-02-14
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
## 2024-05-20 ENCOUNTER — HOSPICE ADMISSION (OUTPATIENT)
Dept: HOME HOSPICE | Facility: HOSPICE | Age: 89
End: 2024-05-20
Payer: MEDICARE

## 2024-05-20 ENCOUNTER — HOME CARE VISIT (OUTPATIENT)
Dept: HOME HEALTH SERVICES | Facility: HOME HEALTHCARE | Age: 89
End: 2024-05-20
Payer: MEDICARE

## 2024-05-20 NOTE — Clinical Note
For RLOC Hilary Oleary  [birth year]  88 yo female currently at [facility]. Referred by [facility] for presumed malignant colon mass at the rectosigmoid junction seen on CT with what appeared to also be pulmonary mets. in the fall. Family declined any further work up as pt also has vascular dementia and would not do any treatment anyway. She has declined since Feb. Had to be placed in [facility] custodial. Freq falls, Can only now walk a short distance with walker then needs WC. Needs assistance with adls. Very decreased appetite with wt loss. WT in Oct was 189 now 169. Has periods of emesis. PPS 40 appears appropriate for RLOC ?

## 2024-05-21 ENCOUNTER — HOME CARE VISIT (OUTPATIENT)
Dept: HOME HEALTH SERVICES | Facility: HOME HEALTHCARE | Age: 89
End: 2024-05-21
Payer: MEDICARE

## 2024-05-21 VITALS
RESPIRATION RATE: 18 BRPM | TEMPERATURE: 98.2 F | HEART RATE: 63 BPM | OXYGEN SATURATION: 93 % | DIASTOLIC BLOOD PRESSURE: 88 MMHG | SYSTOLIC BLOOD PRESSURE: 138 MMHG

## 2024-05-21 PROCEDURE — G0299 HHS/HOSPICE OF RN EA 15 MIN: HCPCS

## 2024-05-22 ENCOUNTER — HOME CARE VISIT (OUTPATIENT)
Dept: HOME HEALTH SERVICES | Facility: HOME HEALTHCARE | Age: 89
End: 2024-05-22
Payer: MEDICARE

## 2024-05-22 ENCOUNTER — HOME CARE VISIT (OUTPATIENT)
Dept: HOME HOSPICE | Facility: HOSPICE | Age: 89
End: 2024-05-22
Payer: MEDICARE

## 2024-05-22 PROCEDURE — G0155 HHCP-SVS OF CSW,EA 15 MIN: HCPCS

## 2024-05-22 PROCEDURE — 10330057 MEDICATION, GENERAL

## 2024-05-22 PROCEDURE — G0156 HHCP-SVS OF AIDE,EA 15 MIN: HCPCS

## 2024-05-23 ENCOUNTER — HOME CARE VISIT (OUTPATIENT)
Dept: HOME HEALTH SERVICES | Facility: HOME HEALTHCARE | Age: 89
End: 2024-05-23
Payer: MEDICARE

## 2024-05-23 ENCOUNTER — HOME CARE VISIT (OUTPATIENT)
Dept: HOME HOSPICE | Facility: HOSPICE | Age: 89
End: 2024-05-23
Payer: MEDICARE

## 2024-05-23 PROCEDURE — G0156 HHCP-SVS OF AIDE,EA 15 MIN: HCPCS

## 2024-05-24 ENCOUNTER — HOME CARE VISIT (OUTPATIENT)
Dept: HOME HEALTH SERVICES | Facility: HOME HEALTHCARE | Age: 89
End: 2024-05-24
Payer: MEDICARE

## 2024-05-24 PROCEDURE — G0299 HHS/HOSPICE OF RN EA 15 MIN: HCPCS

## 2024-05-24 PROCEDURE — G0156 HHCP-SVS OF AIDE,EA 15 MIN: HCPCS

## 2024-05-28 ENCOUNTER — HOME CARE VISIT (OUTPATIENT)
Dept: HOME HEALTH SERVICES | Facility: HOME HEALTHCARE | Age: 89
End: 2024-05-28
Payer: MEDICARE

## 2024-05-28 PROCEDURE — G0156 HHCP-SVS OF AIDE,EA 15 MIN: HCPCS

## 2024-05-29 ENCOUNTER — HOME CARE VISIT (OUTPATIENT)
Dept: HOME HEALTH SERVICES | Facility: HOME HEALTHCARE | Age: 89
End: 2024-05-29
Payer: MEDICARE

## 2024-05-29 ENCOUNTER — HOME CARE VISIT (OUTPATIENT)
Dept: HOME HOSPICE | Facility: HOSPICE | Age: 89
End: 2024-05-29
Payer: MEDICARE

## 2024-05-29 PROCEDURE — G0299 HHS/HOSPICE OF RN EA 15 MIN: HCPCS

## 2024-05-30 ENCOUNTER — HOME CARE VISIT (OUTPATIENT)
Dept: HOME HEALTH SERVICES | Facility: HOME HEALTHCARE | Age: 89
End: 2024-05-30
Payer: MEDICARE

## 2024-05-30 PROCEDURE — G0156 HHCP-SVS OF AIDE,EA 15 MIN: HCPCS

## 2024-05-31 ENCOUNTER — HOME CARE VISIT (OUTPATIENT)
Dept: HOME HEALTH SERVICES | Facility: HOME HEALTHCARE | Age: 89
End: 2024-05-31
Payer: MEDICARE

## 2024-05-31 ENCOUNTER — HOME CARE VISIT (OUTPATIENT)
Dept: HOME HOSPICE | Facility: HOSPICE | Age: 89
End: 2024-05-31
Payer: MEDICARE

## 2024-05-31 PROCEDURE — G0155 HHCP-SVS OF CSW,EA 15 MIN: HCPCS

## 2024-05-31 PROCEDURE — G0299 HHS/HOSPICE OF RN EA 15 MIN: HCPCS

## 2024-06-03 ENCOUNTER — HOME CARE VISIT (OUTPATIENT)
Dept: HOME HEALTH SERVICES | Facility: HOME HEALTHCARE | Age: 89
End: 2024-06-03
Payer: MEDICARE

## 2024-06-03 VITALS
DIASTOLIC BLOOD PRESSURE: 80 MMHG | HEART RATE: 88 BPM | TEMPERATURE: 97.4 F | SYSTOLIC BLOOD PRESSURE: 110 MMHG | RESPIRATION RATE: 18 BRPM | OXYGEN SATURATION: 97 %

## 2024-06-03 PROCEDURE — G0299 HHS/HOSPICE OF RN EA 15 MIN: HCPCS

## 2024-06-03 PROCEDURE — G0156 HHCP-SVS OF AIDE,EA 15 MIN: HCPCS

## 2024-06-05 ENCOUNTER — HOME CARE VISIT (OUTPATIENT)
Dept: HOME HEALTH SERVICES | Facility: HOME HEALTHCARE | Age: 89
End: 2024-06-05
Payer: MEDICARE

## 2024-06-05 PROCEDURE — G0156 HHCP-SVS OF AIDE,EA 15 MIN: HCPCS

## 2024-06-06 ENCOUNTER — HOME CARE VISIT (OUTPATIENT)
Dept: HOME HEALTH SERVICES | Facility: HOME HEALTHCARE | Age: 89
End: 2024-06-06
Payer: MEDICARE

## 2024-06-06 PROCEDURE — G0156 HHCP-SVS OF AIDE,EA 15 MIN: HCPCS

## 2024-06-07 ENCOUNTER — HOME CARE VISIT (OUTPATIENT)
Dept: HOME HEALTH SERVICES | Facility: HOME HEALTHCARE | Age: 89
End: 2024-06-07
Payer: MEDICARE

## 2024-06-07 PROCEDURE — G0299 HHS/HOSPICE OF RN EA 15 MIN: HCPCS

## 2024-06-10 ENCOUNTER — HOME CARE VISIT (OUTPATIENT)
Dept: HOME HOSPICE | Facility: HOSPICE | Age: 89
End: 2024-06-10
Payer: MEDICARE

## 2024-06-10 ENCOUNTER — HOME CARE VISIT (OUTPATIENT)
Dept: HOME HEALTH SERVICES | Facility: HOME HEALTHCARE | Age: 89
End: 2024-06-10
Payer: MEDICARE

## 2024-06-10 PROCEDURE — G0155 HHCP-SVS OF CSW,EA 15 MIN: HCPCS

## 2024-06-10 PROCEDURE — G0156 HHCP-SVS OF AIDE,EA 15 MIN: HCPCS

## 2024-06-10 PROCEDURE — G0299 HHS/HOSPICE OF RN EA 15 MIN: HCPCS

## 2024-06-12 ENCOUNTER — HOME CARE VISIT (OUTPATIENT)
Dept: HOME HEALTH SERVICES | Facility: HOME HEALTHCARE | Age: 89
End: 2024-06-12
Payer: MEDICARE

## 2024-06-12 PROCEDURE — G0156 HHCP-SVS OF AIDE,EA 15 MIN: HCPCS

## 2024-06-14 ENCOUNTER — HOME CARE VISIT (OUTPATIENT)
Dept: HOME HEALTH SERVICES | Facility: HOME HEALTHCARE | Age: 89
End: 2024-06-14
Payer: MEDICARE

## 2024-06-14 PROCEDURE — G0299 HHS/HOSPICE OF RN EA 15 MIN: HCPCS

## 2024-06-14 PROCEDURE — G0156 HHCP-SVS OF AIDE,EA 15 MIN: HCPCS

## 2024-06-17 ENCOUNTER — HOME CARE VISIT (OUTPATIENT)
Dept: HOME HEALTH SERVICES | Facility: HOME HEALTHCARE | Age: 89
End: 2024-06-17
Payer: MEDICARE

## 2024-06-17 PROCEDURE — G0156 HHCP-SVS OF AIDE,EA 15 MIN: HCPCS

## 2024-06-18 ENCOUNTER — HOME CARE VISIT (OUTPATIENT)
Dept: HOME HEALTH SERVICES | Facility: HOME HEALTHCARE | Age: 89
End: 2024-06-18
Payer: MEDICARE

## 2024-06-18 ENCOUNTER — HOME CARE VISIT (OUTPATIENT)
Dept: HOME HOSPICE | Facility: HOSPICE | Age: 89
End: 2024-06-18
Payer: MEDICARE

## 2024-06-18 VITALS — SYSTOLIC BLOOD PRESSURE: 118 MMHG | HEART RATE: 86 BPM | DIASTOLIC BLOOD PRESSURE: 70 MMHG

## 2024-06-18 PROCEDURE — G0299 HHS/HOSPICE OF RN EA 15 MIN: HCPCS

## 2024-06-20 ENCOUNTER — HOME CARE VISIT (OUTPATIENT)
Dept: HOME HEALTH SERVICES | Facility: HOME HEALTHCARE | Age: 89
End: 2024-06-20
Payer: MEDICARE

## 2024-06-20 PROCEDURE — G0156 HHCP-SVS OF AIDE,EA 15 MIN: HCPCS

## 2024-06-21 ENCOUNTER — HOME CARE VISIT (OUTPATIENT)
Dept: HOME HEALTH SERVICES | Facility: HOME HEALTHCARE | Age: 89
End: 2024-06-21
Payer: MEDICARE

## 2024-06-21 PROCEDURE — G0156 HHCP-SVS OF AIDE,EA 15 MIN: HCPCS

## 2024-06-22 ENCOUNTER — HOME CARE VISIT (OUTPATIENT)
Dept: HOME HOSPICE | Facility: HOSPICE | Age: 89
End: 2024-06-22
Payer: MEDICARE

## 2024-06-23 ENCOUNTER — HOME CARE VISIT (OUTPATIENT)
Dept: HOME HEALTH SERVICES | Facility: HOME HEALTHCARE | Age: 89
End: 2024-06-23
Payer: MEDICARE

## 2024-06-23 VITALS — SYSTOLIC BLOOD PRESSURE: 108 MMHG | DIASTOLIC BLOOD PRESSURE: 60 MMHG

## 2024-06-23 PROCEDURE — G0299 HHS/HOSPICE OF RN EA 15 MIN: HCPCS

## 2024-06-24 ENCOUNTER — HOME CARE VISIT (OUTPATIENT)
Dept: HOME HOSPICE | Facility: HOSPICE | Age: 89
End: 2024-06-24
Payer: MEDICARE

## 2024-06-24 PROCEDURE — G0155 HHCP-SVS OF CSW,EA 15 MIN: HCPCS

## 2024-06-26 ENCOUNTER — HOME CARE VISIT (OUTPATIENT)
Dept: HOME HEALTH SERVICES | Facility: HOME HEALTHCARE | Age: 89
End: 2024-06-26
Payer: MEDICARE

## 2024-06-26 PROCEDURE — G0156 HHCP-SVS OF AIDE,EA 15 MIN: HCPCS

## 2024-06-27 ENCOUNTER — HOME CARE VISIT (OUTPATIENT)
Dept: HOME HEALTH SERVICES | Facility: HOME HEALTHCARE | Age: 89
End: 2024-06-27
Payer: MEDICARE

## 2024-06-27 PROCEDURE — G0156 HHCP-SVS OF AIDE,EA 15 MIN: HCPCS

## 2024-06-28 ENCOUNTER — HOME CARE VISIT (OUTPATIENT)
Dept: HOME HEALTH SERVICES | Facility: HOME HEALTHCARE | Age: 89
End: 2024-06-28
Payer: MEDICARE

## 2024-06-28 PROCEDURE — G0156 HHCP-SVS OF AIDE,EA 15 MIN: HCPCS

## 2024-07-01 ENCOUNTER — HOME CARE VISIT (OUTPATIENT)
Dept: HOME HEALTH SERVICES | Facility: HOME HEALTHCARE | Age: 89
End: 2024-07-01
Payer: MEDICARE

## 2024-07-01 VITALS — SYSTOLIC BLOOD PRESSURE: 122 MMHG | DIASTOLIC BLOOD PRESSURE: 74 MMHG | RESPIRATION RATE: 16 BRPM | HEART RATE: 84 BPM

## 2024-07-01 PROCEDURE — G0156 HHCP-SVS OF AIDE,EA 15 MIN: HCPCS

## 2024-07-01 PROCEDURE — G0299 HHS/HOSPICE OF RN EA 15 MIN: HCPCS

## 2024-07-03 ENCOUNTER — HOME CARE VISIT (OUTPATIENT)
Dept: HOME HEALTH SERVICES | Facility: HOME HEALTHCARE | Age: 89
End: 2024-07-03
Payer: MEDICARE

## 2024-07-03 PROCEDURE — G0156 HHCP-SVS OF AIDE,EA 15 MIN: HCPCS

## 2024-07-04 ENCOUNTER — HOME CARE VISIT (OUTPATIENT)
Dept: HOME HEALTH SERVICES | Facility: HOME HEALTHCARE | Age: 89
End: 2024-07-04
Payer: MEDICARE

## 2024-07-04 PROCEDURE — G0156 HHCP-SVS OF AIDE,EA 15 MIN: HCPCS

## 2024-07-05 ENCOUNTER — HOME CARE VISIT (OUTPATIENT)
Dept: HOME HOSPICE | Facility: HOSPICE | Age: 89
End: 2024-07-05
Payer: MEDICARE

## 2024-07-08 ENCOUNTER — HOME CARE VISIT (OUTPATIENT)
Dept: HOME HOSPICE | Facility: HOSPICE | Age: 89
End: 2024-07-08
Payer: MEDICARE

## 2024-07-08 ENCOUNTER — HOME CARE VISIT (OUTPATIENT)
Dept: HOME HEALTH SERVICES | Facility: HOME HEALTHCARE | Age: 89
End: 2024-07-08
Payer: MEDICARE

## 2024-07-08 VITALS
TEMPERATURE: 98.1 F | SYSTOLIC BLOOD PRESSURE: 108 MMHG | DIASTOLIC BLOOD PRESSURE: 56 MMHG | RESPIRATION RATE: 20 BRPM | HEART RATE: 60 BPM

## 2024-07-08 PROCEDURE — G0299 HHS/HOSPICE OF RN EA 15 MIN: HCPCS

## 2024-07-09 ENCOUNTER — HOME CARE VISIT (OUTPATIENT)
Dept: HOME HEALTH SERVICES | Facility: HOME HEALTHCARE | Age: 89
End: 2024-07-09
Payer: MEDICARE

## 2024-07-09 PROCEDURE — G0156 HHCP-SVS OF AIDE,EA 15 MIN: HCPCS

## 2024-07-12 ENCOUNTER — HOME CARE VISIT (OUTPATIENT)
Dept: HOME HEALTH SERVICES | Facility: HOME HEALTHCARE | Age: 89
End: 2024-07-12
Payer: MEDICARE

## 2024-07-12 PROCEDURE — G0299 HHS/HOSPICE OF RN EA 15 MIN: HCPCS

## 2024-07-12 PROCEDURE — G0156 HHCP-SVS OF AIDE,EA 15 MIN: HCPCS

## 2024-07-13 ENCOUNTER — HOME CARE VISIT (OUTPATIENT)
Dept: HOME HEALTH SERVICES | Facility: HOME HEALTHCARE | Age: 89
End: 2024-07-13
Payer: MEDICARE

## 2024-07-13 PROCEDURE — G0156 HHCP-SVS OF AIDE,EA 15 MIN: HCPCS

## 2024-07-15 ENCOUNTER — HOME CARE VISIT (OUTPATIENT)
Dept: HOME HEALTH SERVICES | Facility: HOME HEALTHCARE | Age: 89
End: 2024-07-15
Payer: MEDICARE

## 2024-07-15 PROCEDURE — G0299 HHS/HOSPICE OF RN EA 15 MIN: HCPCS

## 2024-07-15 PROCEDURE — G0156 HHCP-SVS OF AIDE,EA 15 MIN: HCPCS

## 2024-07-16 ENCOUNTER — HOME CARE VISIT (OUTPATIENT)
Dept: HOME HOSPICE | Facility: HOSPICE | Age: 89
End: 2024-07-16
Payer: MEDICARE

## 2024-07-16 PROCEDURE — G0155 HHCP-SVS OF CSW,EA 15 MIN: HCPCS

## 2024-07-18 ENCOUNTER — HOME CARE VISIT (OUTPATIENT)
Dept: HOME HEALTH SERVICES | Facility: HOME HEALTHCARE | Age: 89
End: 2024-07-18
Payer: MEDICARE

## 2024-07-18 PROCEDURE — G0156 HHCP-SVS OF AIDE,EA 15 MIN: HCPCS

## 2024-07-19 ENCOUNTER — HOME CARE VISIT (OUTPATIENT)
Dept: HOME HEALTH SERVICES | Facility: HOME HEALTHCARE | Age: 89
End: 2024-07-19
Payer: MEDICARE

## 2024-07-19 PROCEDURE — G0156 HHCP-SVS OF AIDE,EA 15 MIN: HCPCS

## 2024-07-22 ENCOUNTER — HOME CARE VISIT (OUTPATIENT)
Dept: HOME HEALTH SERVICES | Facility: HOME HEALTHCARE | Age: 89
End: 2024-07-22
Payer: MEDICARE

## 2024-07-22 VITALS
TEMPERATURE: 97.1 F | HEART RATE: 84 BPM | RESPIRATION RATE: 16 BRPM | SYSTOLIC BLOOD PRESSURE: 132 MMHG | DIASTOLIC BLOOD PRESSURE: 88 MMHG | OXYGEN SATURATION: 95 %

## 2024-07-22 PROCEDURE — G0299 HHS/HOSPICE OF RN EA 15 MIN: HCPCS

## 2024-07-22 PROCEDURE — G0156 HHCP-SVS OF AIDE,EA 15 MIN: HCPCS

## 2024-07-23 ENCOUNTER — HOME CARE VISIT (OUTPATIENT)
Dept: HOME HEALTH SERVICES | Facility: HOME HEALTHCARE | Age: 89
End: 2024-07-23
Payer: MEDICARE

## 2024-07-23 PROCEDURE — G0156 HHCP-SVS OF AIDE,EA 15 MIN: HCPCS

## 2024-07-24 ENCOUNTER — HOME CARE VISIT (OUTPATIENT)
Dept: HOME HOSPICE | Facility: HOSPICE | Age: 89
End: 2024-07-24
Payer: MEDICARE

## 2024-07-24 ENCOUNTER — HOME CARE VISIT (OUTPATIENT)
Dept: HOME HEALTH SERVICES | Facility: HOME HEALTHCARE | Age: 89
End: 2024-07-24
Payer: MEDICARE

## 2024-07-24 PROCEDURE — G0156 HHCP-SVS OF AIDE,EA 15 MIN: HCPCS

## 2024-07-25 ENCOUNTER — HOME CARE VISIT (OUTPATIENT)
Dept: HOME HEALTH SERVICES | Facility: HOME HEALTHCARE | Age: 89
End: 2024-07-25
Payer: MEDICARE

## 2024-07-25 PROCEDURE — G0156 HHCP-SVS OF AIDE,EA 15 MIN: HCPCS

## 2024-07-26 ENCOUNTER — HOME CARE VISIT (OUTPATIENT)
Dept: HOME HEALTH SERVICES | Facility: HOME HEALTHCARE | Age: 89
End: 2024-07-26
Payer: MEDICARE

## 2024-07-26 PROCEDURE — G0156 HHCP-SVS OF AIDE,EA 15 MIN: HCPCS

## 2024-07-29 ENCOUNTER — HOME CARE VISIT (OUTPATIENT)
Dept: HOME HEALTH SERVICES | Facility: HOME HEALTHCARE | Age: 89
End: 2024-07-29
Payer: MEDICARE

## 2024-07-29 ENCOUNTER — HOME CARE VISIT (OUTPATIENT)
Dept: HOME HOSPICE | Facility: HOSPICE | Age: 89
End: 2024-07-29
Payer: MEDICARE

## 2024-07-29 PROCEDURE — G0156 HHCP-SVS OF AIDE,EA 15 MIN: HCPCS

## 2024-07-29 PROCEDURE — G0155 HHCP-SVS OF CSW,EA 15 MIN: HCPCS

## 2024-07-30 ENCOUNTER — HOME CARE VISIT (OUTPATIENT)
Dept: HOME HEALTH SERVICES | Facility: HOME HEALTHCARE | Age: 89
End: 2024-07-30
Payer: MEDICARE

## 2024-07-30 PROCEDURE — G0156 HHCP-SVS OF AIDE,EA 15 MIN: HCPCS

## 2024-07-31 ENCOUNTER — HOME CARE VISIT (OUTPATIENT)
Dept: HOME HEALTH SERVICES | Facility: HOME HEALTHCARE | Age: 89
End: 2024-07-31
Payer: MEDICARE

## 2024-07-31 PROCEDURE — G0156 HHCP-SVS OF AIDE,EA 15 MIN: HCPCS

## 2024-08-01 ENCOUNTER — HOME CARE VISIT (OUTPATIENT)
Dept: HOME HEALTH SERVICES | Facility: HOME HEALTHCARE | Age: 89
End: 2024-08-01
Payer: MEDICARE

## 2024-08-01 PROCEDURE — G0299 HHS/HOSPICE OF RN EA 15 MIN: HCPCS

## 2024-08-01 PROCEDURE — G0156 HHCP-SVS OF AIDE,EA 15 MIN: HCPCS

## 2024-08-02 ENCOUNTER — HOME CARE VISIT (OUTPATIENT)
Dept: HOME HEALTH SERVICES | Facility: HOME HEALTHCARE | Age: 89
End: 2024-08-02
Payer: MEDICARE

## 2024-08-02 PROCEDURE — G0156 HHCP-SVS OF AIDE,EA 15 MIN: HCPCS

## 2024-08-05 ENCOUNTER — HOME CARE VISIT (OUTPATIENT)
Dept: HOME HEALTH SERVICES | Facility: HOME HEALTHCARE | Age: 89
End: 2024-08-05
Payer: MEDICARE

## 2024-08-05 VITALS — HEART RATE: 74 BPM | SYSTOLIC BLOOD PRESSURE: 128 MMHG | RESPIRATION RATE: 16 BRPM | DIASTOLIC BLOOD PRESSURE: 68 MMHG

## 2024-08-05 VITALS
SYSTOLIC BLOOD PRESSURE: 126 MMHG | HEART RATE: 76 BPM | OXYGEN SATURATION: 99 % | TEMPERATURE: 98 F | DIASTOLIC BLOOD PRESSURE: 70 MMHG | RESPIRATION RATE: 18 BRPM

## 2024-08-05 PROCEDURE — G0299 HHS/HOSPICE OF RN EA 15 MIN: HCPCS

## 2024-08-05 PROCEDURE — G0156 HHCP-SVS OF AIDE,EA 15 MIN: HCPCS

## 2024-08-06 ENCOUNTER — HOME CARE VISIT (OUTPATIENT)
Dept: HOME HEALTH SERVICES | Facility: HOME HEALTHCARE | Age: 89
End: 2024-08-06
Payer: MEDICARE

## 2024-08-06 PROCEDURE — G0156 HHCP-SVS OF AIDE,EA 15 MIN: HCPCS

## 2024-08-07 ENCOUNTER — HOME CARE VISIT (OUTPATIENT)
Dept: HOME HEALTH SERVICES | Facility: HOME HEALTHCARE | Age: 89
End: 2024-08-07
Payer: MEDICARE

## 2024-08-07 PROCEDURE — G0156 HHCP-SVS OF AIDE,EA 15 MIN: HCPCS

## 2024-08-08 ENCOUNTER — HOME CARE VISIT (OUTPATIENT)
Dept: HOME HEALTH SERVICES | Facility: HOME HEALTHCARE | Age: 89
End: 2024-08-08
Payer: MEDICARE

## 2024-08-08 PROCEDURE — G0156 HHCP-SVS OF AIDE,EA 15 MIN: HCPCS

## 2024-08-12 ENCOUNTER — HOME CARE VISIT (OUTPATIENT)
Dept: HOME HOSPICE | Facility: HOSPICE | Age: 89
End: 2024-08-12
Payer: MEDICARE

## 2024-08-12 ENCOUNTER — HOME CARE VISIT (OUTPATIENT)
Dept: HOME HEALTH SERVICES | Facility: HOME HEALTHCARE | Age: 89
End: 2024-08-12
Payer: MEDICARE

## 2024-08-12 PROBLEM — R65.10 SIRS (SYSTEMIC INFLAMMATORY RESPONSE SYNDROME) (HCC): Status: RESOLVED | Noted: 2023-10-06 | Resolved: 2024-08-12

## 2024-08-12 PROCEDURE — G0155 HHCP-SVS OF CSW,EA 15 MIN: HCPCS

## 2024-08-12 PROCEDURE — G0299 HHS/HOSPICE OF RN EA 15 MIN: HCPCS

## 2024-08-13 ENCOUNTER — HOME CARE VISIT (OUTPATIENT)
Dept: HOME HEALTH SERVICES | Facility: HOME HEALTHCARE | Age: 89
End: 2024-08-13
Payer: MEDICARE

## 2024-08-13 PROCEDURE — G0156 HHCP-SVS OF AIDE,EA 15 MIN: HCPCS

## 2024-08-14 ENCOUNTER — HOME CARE VISIT (OUTPATIENT)
Dept: HOME HEALTH SERVICES | Facility: HOME HEALTHCARE | Age: 89
End: 2024-08-14
Payer: MEDICARE

## 2024-08-14 PROCEDURE — G0156 HHCP-SVS OF AIDE,EA 15 MIN: HCPCS

## 2024-08-15 ENCOUNTER — HOME CARE VISIT (OUTPATIENT)
Dept: HOME HEALTH SERVICES | Facility: HOME HEALTHCARE | Age: 89
End: 2024-08-15
Payer: MEDICARE

## 2024-08-15 PROCEDURE — G0156 HHCP-SVS OF AIDE,EA 15 MIN: HCPCS

## 2024-08-16 ENCOUNTER — HOME CARE VISIT (OUTPATIENT)
Dept: HOME HOSPICE | Facility: HOSPICE | Age: 89
End: 2024-08-16
Payer: MEDICARE

## 2024-08-16 ENCOUNTER — HOME CARE VISIT (OUTPATIENT)
Dept: HOME HEALTH SERVICES | Facility: HOME HEALTHCARE | Age: 89
End: 2024-08-16
Payer: MEDICARE

## 2024-08-16 PROCEDURE — G0156 HHCP-SVS OF AIDE,EA 15 MIN: HCPCS

## 2024-08-19 ENCOUNTER — HOME CARE VISIT (OUTPATIENT)
Dept: HOME HEALTH SERVICES | Facility: HOME HEALTHCARE | Age: 89
End: 2024-08-19
Payer: MEDICARE

## 2024-08-19 PROCEDURE — G0156 HHCP-SVS OF AIDE,EA 15 MIN: HCPCS

## 2024-08-20 ENCOUNTER — HOME CARE VISIT (OUTPATIENT)
Dept: HOME HEALTH SERVICES | Facility: HOME HEALTHCARE | Age: 89
End: 2024-08-20
Payer: MEDICARE

## 2024-08-20 PROCEDURE — G0156 HHCP-SVS OF AIDE,EA 15 MIN: HCPCS

## 2024-08-20 PROCEDURE — G0299 HHS/HOSPICE OF RN EA 15 MIN: HCPCS

## 2024-08-21 ENCOUNTER — HOME CARE VISIT (OUTPATIENT)
Dept: HOME HEALTH SERVICES | Facility: HOME HEALTHCARE | Age: 89
End: 2024-08-21
Payer: MEDICARE

## 2024-08-21 PROCEDURE — G0156 HHCP-SVS OF AIDE,EA 15 MIN: HCPCS

## 2024-08-22 ENCOUNTER — HOME CARE VISIT (OUTPATIENT)
Dept: HOME HOSPICE | Facility: HOSPICE | Age: 89
End: 2024-08-22
Payer: MEDICARE

## 2024-08-22 ENCOUNTER — HOME CARE VISIT (OUTPATIENT)
Dept: HOME HEALTH SERVICES | Facility: HOME HEALTHCARE | Age: 89
End: 2024-08-22
Payer: MEDICARE

## 2024-08-22 DIAGNOSIS — Z91.89 AT RISK FOR DELIRIUM: ICD-10-CM

## 2024-08-22 DIAGNOSIS — M25.579 ANKLE PAIN: Primary | ICD-10-CM

## 2024-08-22 PROCEDURE — G0156 HHCP-SVS OF AIDE,EA 15 MIN: HCPCS

## 2024-08-22 PROCEDURE — G0299 HHS/HOSPICE OF RN EA 15 MIN: HCPCS

## 2024-08-23 ENCOUNTER — HOME CARE VISIT (OUTPATIENT)
Dept: HOME HEALTH SERVICES | Facility: HOME HEALTHCARE | Age: 89
End: 2024-08-23
Payer: MEDICARE

## 2024-08-23 PROCEDURE — G0156 HHCP-SVS OF AIDE,EA 15 MIN: HCPCS

## 2024-08-25 ENCOUNTER — HOME CARE VISIT (OUTPATIENT)
Dept: HOME HEALTH SERVICES | Facility: HOME HEALTHCARE | Age: 89
End: 2024-08-25
Payer: MEDICARE

## 2024-08-25 VITALS — DIASTOLIC BLOOD PRESSURE: 70 MMHG | RESPIRATION RATE: 17 BRPM | SYSTOLIC BLOOD PRESSURE: 130 MMHG | HEART RATE: 88 BPM

## 2024-08-25 PROCEDURE — G0299 HHS/HOSPICE OF RN EA 15 MIN: HCPCS

## 2024-08-26 ENCOUNTER — HOME CARE VISIT (OUTPATIENT)
Dept: HOME HOSPICE | Facility: HOSPICE | Age: 89
End: 2024-08-26
Payer: MEDICARE

## 2024-08-26 ENCOUNTER — HOME CARE VISIT (OUTPATIENT)
Dept: HOME HEALTH SERVICES | Facility: HOME HEALTHCARE | Age: 89
End: 2024-08-26
Payer: MEDICARE

## 2024-08-26 PROCEDURE — G0155 HHCP-SVS OF CSW,EA 15 MIN: HCPCS

## 2024-08-26 PROCEDURE — G0156 HHCP-SVS OF AIDE,EA 15 MIN: HCPCS

## 2024-08-27 ENCOUNTER — HOME CARE VISIT (OUTPATIENT)
Dept: HOME HEALTH SERVICES | Facility: HOME HEALTHCARE | Age: 89
End: 2024-08-27
Payer: MEDICARE

## 2024-08-27 PROCEDURE — G0156 HHCP-SVS OF AIDE,EA 15 MIN: HCPCS

## 2024-08-29 ENCOUNTER — HOME CARE VISIT (OUTPATIENT)
Dept: HOME HEALTH SERVICES | Facility: HOME HEALTHCARE | Age: 89
End: 2024-08-29
Payer: MEDICARE

## 2024-08-29 PROCEDURE — G0156 HHCP-SVS OF AIDE,EA 15 MIN: HCPCS

## 2024-08-30 ENCOUNTER — HOME CARE VISIT (OUTPATIENT)
Dept: HOME HOSPICE | Facility: HOSPICE | Age: 89
End: 2024-08-30
Payer: MEDICARE

## 2024-09-03 ENCOUNTER — HOME CARE VISIT (OUTPATIENT)
Dept: HOME HEALTH SERVICES | Facility: HOME HEALTHCARE | Age: 89
End: 2024-09-03
Payer: MEDICARE

## 2024-09-03 PROCEDURE — G0156 HHCP-SVS OF AIDE,EA 15 MIN: HCPCS

## 2024-09-04 ENCOUNTER — HOME CARE VISIT (OUTPATIENT)
Dept: HOME HEALTH SERVICES | Facility: HOME HEALTHCARE | Age: 89
End: 2024-09-04
Payer: MEDICARE

## 2024-09-04 PROCEDURE — G0299 HHS/HOSPICE OF RN EA 15 MIN: HCPCS

## 2024-09-04 PROCEDURE — G0156 HHCP-SVS OF AIDE,EA 15 MIN: HCPCS

## 2024-09-05 ENCOUNTER — HOME CARE VISIT (OUTPATIENT)
Dept: HOME HEALTH SERVICES | Facility: HOME HEALTHCARE | Age: 89
End: 2024-09-05
Payer: MEDICARE

## 2024-09-05 VITALS
DIASTOLIC BLOOD PRESSURE: 80 MMHG | RESPIRATION RATE: 16 BRPM | SYSTOLIC BLOOD PRESSURE: 122 MMHG | OXYGEN SATURATION: 94 % | TEMPERATURE: 97.3 F | HEART RATE: 77 BPM

## 2024-09-05 PROCEDURE — G0156 HHCP-SVS OF AIDE,EA 15 MIN: HCPCS

## 2024-09-06 ENCOUNTER — TELEPHONE (OUTPATIENT)
Dept: HOME HEALTH SERVICES | Facility: HOME HEALTHCARE | Age: 89
End: 2024-09-06

## 2024-09-09 ENCOUNTER — HOME CARE VISIT (OUTPATIENT)
Dept: HOME HEALTH SERVICES | Facility: HOME HEALTHCARE | Age: 89
End: 2024-09-09
Payer: MEDICARE

## 2024-09-09 PROCEDURE — G0155 HHCP-SVS OF CSW,EA 15 MIN: HCPCS

## 2024-09-10 ENCOUNTER — HOME CARE VISIT (OUTPATIENT)
Dept: HOME HEALTH SERVICES | Facility: HOME HEALTHCARE | Age: 89
End: 2024-09-10
Payer: MEDICARE

## 2024-09-10 PROCEDURE — G0299 HHS/HOSPICE OF RN EA 15 MIN: HCPCS

## 2024-09-10 PROCEDURE — G0156 HHCP-SVS OF AIDE,EA 15 MIN: HCPCS

## 2024-09-11 ENCOUNTER — HOME CARE VISIT (OUTPATIENT)
Dept: HOME HEALTH SERVICES | Facility: HOME HEALTHCARE | Age: 89
End: 2024-09-11
Payer: MEDICARE

## 2024-09-11 VITALS
HEART RATE: 74 BPM | OXYGEN SATURATION: 97 % | SYSTOLIC BLOOD PRESSURE: 132 MMHG | TEMPERATURE: 97.8 F | DIASTOLIC BLOOD PRESSURE: 84 MMHG | RESPIRATION RATE: 16 BRPM

## 2024-09-11 PROCEDURE — G0156 HHCP-SVS OF AIDE,EA 15 MIN: HCPCS

## 2024-09-12 ENCOUNTER — HOME CARE VISIT (OUTPATIENT)
Dept: HOME HEALTH SERVICES | Facility: HOME HEALTHCARE | Age: 89
End: 2024-09-12
Payer: MEDICARE

## 2024-09-12 PROCEDURE — G0156 HHCP-SVS OF AIDE,EA 15 MIN: HCPCS

## 2024-09-13 ENCOUNTER — HOME CARE VISIT (OUTPATIENT)
Dept: HOME HOSPICE | Facility: HOSPICE | Age: 89
End: 2024-09-13
Payer: MEDICARE

## 2024-09-13 ENCOUNTER — HOME CARE VISIT (OUTPATIENT)
Dept: HOME HEALTH SERVICES | Facility: HOME HEALTHCARE | Age: 89
End: 2024-09-13
Payer: MEDICARE

## 2024-09-13 PROCEDURE — G0156 HHCP-SVS OF AIDE,EA 15 MIN: HCPCS

## 2024-09-15 ENCOUNTER — HOME CARE VISIT (OUTPATIENT)
Dept: HOME HOSPICE | Facility: HOSPICE | Age: 89
End: 2024-09-15
Payer: MEDICARE

## 2024-09-15 DIAGNOSIS — Z51.5 HOSPICE CARE PATIENT: Primary | ICD-10-CM

## 2024-09-15 RX ORDER — PROCHLORPERAZINE 25 MG
25 SUPPOSITORY, RECTAL RECTAL EVERY 12 HOURS PRN
Qty: 12 SUPPOSITORY | Refills: 0 | Status: SHIPPED | OUTPATIENT
Start: 2024-09-15 | End: 2024-09-28

## 2024-09-15 RX ORDER — ACETAMINOPHEN 650 MG/1
650 SUPPOSITORY RECTAL EVERY 6 HOURS PRN
Qty: 50 SUPPOSITORY | Refills: 0 | Status: SHIPPED | OUTPATIENT
Start: 2024-09-15 | End: 2024-09-28

## 2024-09-15 NOTE — TELEPHONE ENCOUNTER
9/15/2024 2:29 PM  Count includes the [facility] facility patient requests medication adjusted due to change in patient condition.  Filled electronically via Epic as per [place] State Law.    Requested Prescriptions     Signed Prescriptions Disp Refills    prochlorperazine (COMPAZINE) 25 mg suppository 12 suppository 0     Sig: Insert 1 suppository (25 mg total) into the rectum every 12 (twelve) hours as needed for nausea or vomiting     Authorizing Provider: ACE OROZCO    acetaminophen (TYLENOL) 650 mg suppository 50 suppository 0     Sig: Insert 1 suppository (650 mg total) into the rectum every 6 (six) hours as needed for mild pain, moderate pain or fever     Authorizing Provider: ACE OROZCO MD  [facility]  Hospice Answering Service: [phone]  You can find me on [employer]!

## 2024-09-16 ENCOUNTER — HOME CARE VISIT (OUTPATIENT)
Dept: HOME HEALTH SERVICES | Facility: HOME HEALTHCARE | Age: 89
End: 2024-09-16
Payer: MEDICARE

## 2024-09-16 VITALS
RESPIRATION RATE: 18 BRPM | OXYGEN SATURATION: 99 % | TEMPERATURE: 98.1 F | HEART RATE: 88 BPM | DIASTOLIC BLOOD PRESSURE: 98 MMHG | SYSTOLIC BLOOD PRESSURE: 136 MMHG

## 2024-09-16 PROCEDURE — G0299 HHS/HOSPICE OF RN EA 15 MIN: HCPCS

## 2024-09-16 PROCEDURE — G0156 HHCP-SVS OF AIDE,EA 15 MIN: HCPCS

## 2024-09-17 ENCOUNTER — HOME CARE VISIT (OUTPATIENT)
Dept: HOME HEALTH SERVICES | Facility: HOME HEALTHCARE | Age: 89
End: 2024-09-17
Payer: MEDICARE

## 2024-09-17 PROCEDURE — G0156 HHCP-SVS OF AIDE,EA 15 MIN: HCPCS

## 2024-09-18 ENCOUNTER — HOME CARE VISIT (OUTPATIENT)
Dept: HOME HEALTH SERVICES | Facility: HOME HEALTHCARE | Age: 89
End: 2024-09-18
Payer: MEDICARE

## 2024-09-18 VITALS — HEART RATE: 113 BPM | RESPIRATION RATE: 18 BRPM

## 2024-09-18 DIAGNOSIS — Z51.5 HOSPICE CARE PATIENT: Primary | ICD-10-CM

## 2024-09-18 PROCEDURE — G0156 HHCP-SVS OF AIDE,EA 15 MIN: HCPCS

## 2024-09-18 PROCEDURE — G0299 HHS/HOSPICE OF RN EA 15 MIN: HCPCS

## 2024-09-18 RX ORDER — LORAZEPAM 2 MG/ML
CONCENTRATE ORAL
Qty: 30 ML | Refills: 0 | Status: SHIPPED | OUTPATIENT
Start: 2024-09-18 | End: 2024-09-28

## 2024-09-18 RX ORDER — MORPHINE SULFATE 20 MG/ML
SOLUTION ORAL
Qty: 30 ML | Refills: 0 | Status: SHIPPED | OUTPATIENT
Start: 2024-09-18 | End: 2024-09-28

## 2024-09-19 ENCOUNTER — HOME CARE VISIT (OUTPATIENT)
Dept: HOME HOSPICE | Facility: HOSPICE | Age: 89
End: 2024-09-19
Payer: MEDICARE

## 2024-09-19 ENCOUNTER — HOME CARE VISIT (OUTPATIENT)
Dept: HOME HEALTH SERVICES | Facility: HOME HEALTHCARE | Age: 89
End: 2024-09-19
Payer: MEDICARE

## 2024-09-19 PROCEDURE — G0156 HHCP-SVS OF AIDE,EA 15 MIN: HCPCS

## 2024-09-19 PROCEDURE — G0155 HHCP-SVS OF CSW,EA 15 MIN: HCPCS

## 2024-09-19 PROCEDURE — G0299 HHS/HOSPICE OF RN EA 15 MIN: HCPCS

## 2024-09-20 ENCOUNTER — HOME CARE VISIT (OUTPATIENT)
Dept: HOME HEALTH SERVICES | Facility: HOME HEALTHCARE | Age: 89
End: 2024-09-20
Payer: MEDICARE

## 2024-09-20 PROCEDURE — G0299 HHS/HOSPICE OF RN EA 15 MIN: HCPCS

## 2024-09-27 NOTE — TELEPHONE ENCOUNTER
9/27/2024 7:08 AM  [facility] facility patient requests medication adjusted due to change in patient condition.  Filled electronically via Epic as per [place] State Law.    Requested Prescriptions     Signed Prescriptions Disp Refills    hyoscyamine (LEVSIN/SL) 0.125 mg SL tablet 30 tablet 0     Sig: Take 1 tablet (0.125 mg total) by mouth every 4 (four) hours as needed for cramping (increased secretions)     Authorizing Provider: ACE OROZCO MD  [facility]  Hospice Answering Service: [phone]  You can find me on [employer]!

## 2024-10-03 ENCOUNTER — HOME CARE VISIT (OUTPATIENT)
Dept: HOME HOSPICE | Facility: HOSPICE | Age: 89
End: 2024-10-03
Payer: MEDICARE

## 2024-10-03 NOTE — CASE COMMUNICATION
Hilary Zhang, Bereavement Final IDG 10/1/24 (1LR) Due: 10/25/24   : 1934  SOC: 24  DOD: 24  Diagnosis: Metastatic Colon Cancer    Hilary was a 89 year old woman at [facility]. She raised 5 children. Enrico Olivo and her spouse's 3 children Bianca, Nicholas, and Chano. Nicholas and Chano have passed. Bianca said she was tashia to have two moms. Hilary was Congregational and belonged to [employer] in [place]. She enjoyed chrystal ding and spending time with family. Bianca mentioned family and friends as her supports.    TOD: Bianca and Enrico were at bedside at [facility]. Support services were declined. Only Bianca provided information for bereavement follow-up.    CC: ISSAC Sampson made a condolence call to Bianca.    Call Assignments:  DONNIE Sampson to reassess daughter Bianca Zhang at . (Due: 10/25/24)

## (undated) DEVICE — SUT VICRYL PLUS 2-0 CTB-1 27 IN VCPB259H

## (undated) DEVICE — 6617 IOBAN II PATIENT ISOLATION DRAPE 5/BX,4BX/CS: Brand: STERI-DRAPE™ IOBAN™ 2

## (undated) DEVICE — DRAPE C-ARMOUR

## (undated) DEVICE — DRAPE SURGIKIT SADDLE BAG

## (undated) DEVICE — ARTHROSCOPY FLOOR MAT

## (undated) DEVICE — DRAPE C-ARM X-RAY

## (undated) DEVICE — INTENDED FOR TISSUE SEPARATION, AND OTHER PROCEDURES THAT REQUIRE A SHARP SURGICAL BLADE TO PUNCTURE OR CUT.: Brand: BARD-PARKER SAFETY BLADES SIZE 10, STERILE

## (undated) DEVICE — 3M™ TEGADERM™ TRANSPARENT FILM DRESSING FRAME STYLE, 1628, 6 IN X 8 IN (15 CM X 20 CM), 10/CT 8CT/CASE: Brand: 3M™ TEGADERM™

## (undated) DEVICE — STERILE ORIF HIP PACK: Brand: CARDINAL HEALTH

## (undated) DEVICE — PADDING CAST 4 IN  COTTON STRL

## (undated) DEVICE — 3.2MM GUIDE WIRE 400MM

## (undated) DEVICE — SPONGE PVP SCRUB WING STERILE

## (undated) DEVICE — 3M™ TEGADERM™ TRANSPARENT FILM DRESSING FRAME STYLE, 1626W, 4 IN X 4-3/4 IN (10 CM X 12 CM), 50/CT 4CT/CASE: Brand: 3M™ TEGADERM™

## (undated) DEVICE — CHLORAPREP HI-LITE 26ML ORANGE

## (undated) DEVICE — CURITY NON-ADHERENT STRIPS: Brand: CURITY

## (undated) DEVICE — ABDOMINAL PAD: Brand: DERMACEA

## (undated) DEVICE — MEDI-VAC YANKAUER SUCTION HANDLE W/STRAIGHT TIP & CONTROL VENT: Brand: CARDINAL HEALTH

## (undated) DEVICE — 2.5MM REAMING ROD WITH BALL TIP/950MM-STERILE

## (undated) DEVICE — PAD GROUNDING ADULT

## (undated) DEVICE — ACE WRAP 6 IN UNSTERILE

## (undated) DEVICE — GLOVE INDICATOR PI UNDERGLOVE SZ 8.5 BLUE

## (undated) DEVICE — PROXIMATE PLUS MD MULTI-DIRECTIONAL RELEASE SKIN STAPLERS CONTAINS 35 STAINLESS STEEL STAPLES APPROXIMATE CLOSED DIMENSIONS: 6.9MM X 3.9MM WIDE: Brand: PROXIMATE

## (undated) DEVICE — PLUMEPEN PRO 10FT

## (undated) DEVICE — GLOVE SRG BIOGEL ORTHOPEDIC 8.5

## (undated) DEVICE — SUT VICRYL PLUS 0 CTB-1 27 IN VCPB260H

## (undated) DEVICE — GAUZE SPONGES,16 PLY: Brand: CURITY

## (undated) DEVICE — 4.0MM THREE-FLUTED DRILL BIT QC/195MM